# Patient Record
Sex: FEMALE | Race: WHITE | Employment: UNEMPLOYED | URBAN - METROPOLITAN AREA
[De-identification: names, ages, dates, MRNs, and addresses within clinical notes are randomized per-mention and may not be internally consistent; named-entity substitution may affect disease eponyms.]

---
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
## 2020-01-01 ENCOUNTER — MEDICAL CORRESPONDENCE (OUTPATIENT)
Dept: HEALTH INFORMATION MANAGEMENT | Facility: CLINIC | Age: 20
End: 2020-01-01

## 2020-06-11 ENCOUNTER — HOSPITAL ENCOUNTER (INPATIENT)
Facility: HOSPITAL | Age: 20
LOS: 83 days | Discharge: PSYCHIATRIC OR SUBSTANCE USE FACILITY | End: 2020-09-02
Attending: EMERGENCY MEDICINE | Admitting: PSYCHIATRY & NEUROLOGY
Payer: MEDICAID

## 2020-06-11 DIAGNOSIS — R12 HEARTBURN: ICD-10-CM

## 2020-06-11 DIAGNOSIS — R63.5 WEIGHT GAIN DUE TO MEDICATION: ICD-10-CM

## 2020-06-11 DIAGNOSIS — F25.0 SCHIZOAFFECTIVE DISORDER, BIPOLAR TYPE (H): ICD-10-CM

## 2020-06-11 DIAGNOSIS — R45.850 HOMICIDAL BEHAVIOR: ICD-10-CM

## 2020-06-11 DIAGNOSIS — F22 PARANOID DELUSION (H): ICD-10-CM

## 2020-06-11 DIAGNOSIS — G25.81 RESTLESS LEG SYNDROME: Primary | ICD-10-CM

## 2020-06-11 DIAGNOSIS — R45.851 SUICIDAL IDEATION: ICD-10-CM

## 2020-06-11 DIAGNOSIS — R25.9 ABNORMAL MOVEMENTS: ICD-10-CM

## 2020-06-11 DIAGNOSIS — J45.909 ASTHMA, UNSPECIFIED ASTHMA SEVERITY, UNSPECIFIED WHETHER COMPLICATED, UNSPECIFIED WHETHER PERSISTENT: ICD-10-CM

## 2020-06-11 DIAGNOSIS — G47.9 SLEEP DISTURBANCES: ICD-10-CM

## 2020-06-11 DIAGNOSIS — F41.9 ANXIETY: ICD-10-CM

## 2020-06-11 DIAGNOSIS — T50.905A WEIGHT GAIN DUE TO MEDICATION: ICD-10-CM

## 2020-06-11 LAB
ALBUMIN SERPL-MCNC: 3.9 G/DL (ref 3.4–5)
ALP SERPL-CCNC: 62 U/L (ref 40–150)
ALT SERPL W P-5'-P-CCNC: 165 U/L (ref 0–50)
ANION GAP SERPL CALCULATED.3IONS-SCNC: 4 MMOL/L (ref 3–14)
AST SERPL W P-5'-P-CCNC: 84 U/L (ref 0–45)
BASOPHILS # BLD AUTO: 0 10E9/L (ref 0–0.2)
BASOPHILS NFR BLD AUTO: 0.4 %
BILIRUB SERPL-MCNC: 0.4 MG/DL (ref 0.2–1.3)
BUN SERPL-MCNC: 11 MG/DL (ref 7–30)
CALCIUM SERPL-MCNC: 8.8 MG/DL (ref 8.5–10.1)
CHLORIDE SERPL-SCNC: 108 MMOL/L (ref 94–109)
CO2 SERPL-SCNC: 26 MMOL/L (ref 20–32)
CREAT SERPL-MCNC: 0.63 MG/DL (ref 0.52–1.04)
DIFFERENTIAL METHOD BLD: NORMAL
EOSINOPHIL # BLD AUTO: 0.1 10E9/L (ref 0–0.7)
EOSINOPHIL NFR BLD AUTO: 0.7 %
ERYTHROCYTE [DISTWIDTH] IN BLOOD BY AUTOMATED COUNT: 12.3 % (ref 10–15)
GFR SERPL CREATININE-BSD FRML MDRD: >90 ML/MIN/{1.73_M2}
GLUCOSE SERPL-MCNC: 81 MG/DL (ref 70–99)
HCT VFR BLD AUTO: 42.1 % (ref 35–47)
HGB BLD-MCNC: 14.1 G/DL (ref 11.7–15.7)
IMM GRANULOCYTES # BLD: 0 10E9/L (ref 0–0.4)
IMM GRANULOCYTES NFR BLD: 0.2 %
LYMPHOCYTES # BLD AUTO: 2.8 10E9/L (ref 0.8–5.3)
LYMPHOCYTES NFR BLD AUTO: 35.2 %
MCH RBC QN AUTO: 30.1 PG (ref 26.5–33)
MCHC RBC AUTO-ENTMCNC: 33.5 G/DL (ref 31.5–36.5)
MCV RBC AUTO: 90 FL (ref 78–100)
MONOCYTES # BLD AUTO: 0.6 10E9/L (ref 0–1.3)
MONOCYTES NFR BLD AUTO: 7 %
NEUTROPHILS # BLD AUTO: 4.6 10E9/L (ref 1.6–8.3)
NEUTROPHILS NFR BLD AUTO: 56.5 %
NRBC # BLD AUTO: 0 10*3/UL
NRBC BLD AUTO-RTO: 0 /100
PLATELET # BLD AUTO: 280 10E9/L (ref 150–450)
POTASSIUM SERPL-SCNC: 3.8 MMOL/L (ref 3.4–5.3)
PROT SERPL-MCNC: 8.1 G/DL (ref 6.8–8.8)
RBC # BLD AUTO: 4.69 10E12/L (ref 3.8–5.2)
SODIUM SERPL-SCNC: 138 MMOL/L (ref 133–144)
TSH SERPL DL<=0.005 MIU/L-ACNC: 0.85 MU/L (ref 0.4–4)
WBC # BLD AUTO: 8.1 10E9/L (ref 4–11)

## 2020-06-11 PROCEDURE — 85025 COMPLETE CBC W/AUTO DIFF WBC: CPT | Performed by: EMERGENCY MEDICINE

## 2020-06-11 PROCEDURE — 80053 COMPREHEN METABOLIC PANEL: CPT | Performed by: EMERGENCY MEDICINE

## 2020-06-11 PROCEDURE — 84443 ASSAY THYROID STIM HORMONE: CPT | Performed by: EMERGENCY MEDICINE

## 2020-06-11 PROCEDURE — 36415 COLL VENOUS BLD VENIPUNCTURE: CPT | Performed by: EMERGENCY MEDICINE

## 2020-06-11 PROCEDURE — 99285 EMERGENCY DEPT VISIT HI MDM: CPT

## 2020-06-11 PROCEDURE — 99284 EMERGENCY DEPT VISIT MOD MDM: CPT | Mod: Z6 | Performed by: EMERGENCY MEDICINE

## 2020-06-11 PROCEDURE — 87635 SARS-COV-2 COVID-19 AMP PRB: CPT | Performed by: EMERGENCY MEDICINE

## 2020-06-11 PROCEDURE — 12400000 ZZH R&B MH

## 2020-06-11 RX ORDER — HYDROXYZINE HYDROCHLORIDE 25 MG/1
25-50 TABLET, FILM COATED ORAL EVERY 4 HOURS PRN
Status: DISCONTINUED | OUTPATIENT
Start: 2020-06-11 | End: 2020-06-17

## 2020-06-11 RX ORDER — OLANZAPINE 5 MG/1
5-10 TABLET ORAL 3 TIMES DAILY PRN
Status: DISCONTINUED | OUTPATIENT
Start: 2020-06-11 | End: 2020-06-17

## 2020-06-11 RX ORDER — ACETAMINOPHEN 325 MG/1
650 TABLET ORAL EVERY 4 HOURS PRN
Status: DISCONTINUED | OUTPATIENT
Start: 2020-06-11 | End: 2020-09-02 | Stop reason: HOSPADM

## 2020-06-11 RX ORDER — OLANZAPINE 10 MG/2ML
5-10 INJECTION, POWDER, FOR SOLUTION INTRAMUSCULAR 3 TIMES DAILY PRN
Status: DISCONTINUED | OUTPATIENT
Start: 2020-06-11 | End: 2020-06-17

## 2020-06-11 ASSESSMENT — ENCOUNTER SYMPTOMS
AGITATION: 1
ABDOMINAL PAIN: 0
DYSPHORIC MOOD: 1
FEVER: 0
SHORTNESS OF BREATH: 0

## 2020-06-11 ASSESSMENT — ACTIVITIES OF DAILY LIVING (ADL)
TRANSFERRING: 0-->INDEPENDENT
BATHING: 0-->INDEPENDENT
SWALLOWING: 0-->SWALLOWS FOODS/LIQUIDS WITHOUT DIFFICULTY
DRESS: 0-->INDEPENDENT
COGNITION: 0 - NO COGNITION ISSUES REPORTED
TOILETING: 0-->INDEPENDENT
RETIRED_COMMUNICATION: 0-->UNDERSTANDS/COMMUNICATES WITHOUT DIFFICULTY
FALL_HISTORY_WITHIN_LAST_SIX_MONTHS: NO
RETIRED_EATING: 0-->INDEPENDENT
AMBULATION: 0-->INDEPENDENT

## 2020-06-11 ASSESSMENT — MIFFLIN-ST. JEOR: SCORE: 1490.51

## 2020-06-11 NOTE — ED NOTES
1619 Mental Health package printed  1620 [facility] aware of pt.   1622 Security aware of pt   1622 Nurse given an update
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
## 2020-06-11 NOTE — PROGRESS NOTES
06/11/20 1812   Patient Belongings   Did you bring any home meds/supplements to the hospital?  No   Patient Belongings locker   Patient Belongings Remaining with Patient none   Patient Belongings Put in Hospital Secure Location (Security or Locker, etc.) other (see comments)  (Sweatshirt, zebra pants, 2x bandanas, sandels, jacket,2x backpacks, wipes, mask, oil filter, 2x hats, underwear, socks x3, hair dye, white shirt, red top, scarf, 2x lighters)   Belongings Search Yes   Clothing Search Yes   Second Staff [employer]   List items sent to safe: 4x bracelets, white necklace, ring w/stone, 2x lotto tickets  All other belongings put in assigned cubby in belongings room. A pair of black sandals brought in 8/28/20   Bree RED       I have reviewed my belongings list on admission and verify that it is correct.     Patient signature_______________________________    Second staff witness (if patient unable to sign) ______________________________       I have received all my belongings at discharge.    Patient signature________________________________    OCHOA Stubbs  6/11/2020  6:16 PM

## 2020-06-11 NOTE — ED PROVIDER NOTES
"  History     Chief Complaint   Patient presents with     Psychiatric Evaluation     HPI  Jonelle Adams is a 19 year old female who presented to the emergency department via [place] [employer].  [place] [facility] [place] will kill herself if not allowed to leave at [place].  She is also stated that she does not know where she lives and she believes that she has been a missing person since the age of 6 years.  She has been rambling throughout ED stay and when she was with a [place] [employer].  Patient also stated that she wants to kill residents at [address] because they are \"in her house\".  Patient does not know how she [place] [facility]  All this is history was obtained from [employer].  Patient was smiling and anxious but was not able to give any history.  She however allowed me to examine her and requested that she be taken to her \"father's house\".  According to [place] [employer], she has had behavioral health issues from the time she was a teenager.    Allergies:  No Known Allergies    Problem List:    Patient Active Problem List    Diagnosis Date Noted     Paranoid delusion (H) 06/11/2020     Priority: Medium     Vaginal odor 12/05/2017     Priority: Medium     Pelvic pain in female 01/24/2017     Priority: Medium     Contact with or exposure to viral disease 01/24/2017     Priority: Medium     Abnormal uterine bleeding (AUB) 01/24/2017     Priority: Medium     Period less than 15 days apart x one accompanied by pelvic pain, heavy bleeding, and clots.          Past Medical History:    No past medical history on file.    Past Surgical History:    Past Surgical History:   Procedure Laterality Date     TONSILLECTOMY         Family History:    No family history on file.    Social History:  Marital Status:  Single [1]  Social History     Tobacco Use     Smoking status: Current Every Day Smoker     Smokeless tobacco: Never Used   Substance Use Topics     " "Alcohol use: Yes     Comment: occ     Drug use: Yes     Types: Marijuana     Comment: previous use        Medications:    No current outpatient medications on file.        Review of Systems   Constitutional: Negative for fever.   Respiratory: Negative for shortness of breath.    Cardiovascular: Negative for chest pain.   Gastrointestinal: Negative for abdominal pain.   Psychiatric/Behavioral: Positive for agitation and dysphoric mood.   All other systems reviewed and are negative.      Physical Exam   BP: 97/53  Heart Rate: 62  Temp: 96.2  F (35.7  C)  Resp: 16  Height: 163.8 cm (5' 4.5\")  Weight: 72.8 kg (160 lb 6.4 oz)  SpO2: 100 %      Physical Exam  Vitals signs and nursing note reviewed.   Constitutional:       General: She is not in acute distress.     Appearance: She is not diaphoretic.   HENT:      Head: Normocephalic and atraumatic.      Mouth/Throat:      Pharynx: No oropharyngeal exudate.   Eyes:      General: No scleral icterus.     Pupils: Pupils are equal, round, and reactive to light.   Cardiovascular:      Heart sounds: Normal heart sounds.   Pulmonary:      Effort: No respiratory distress.      Breath sounds: Normal breath sounds.   Abdominal:      General: Bowel sounds are normal.      Palpations: Abdomen is soft.      Tenderness: There is no abdominal tenderness.   Musculoskeletal:         General: No tenderness.   Skin:     General: Skin is warm.      Findings: No rash.   Psychiatric:         Attention and Perception: She is inattentive.         Mood and Affect: Mood is anxious and elated. Affect is labile.         Behavior: Behavior is agitated.         Thought Content: Thought content is delusional. Thought content includes homicidal and suicidal ideation.         Judgment: Judgment is impulsive.         ED Course   Patient evaluated upon arrival to the ED and laboratory tests ordered.    Procedures      Results for orders placed or performed during the hospital encounter of 06/11/20 (from the " past 24 hour(s))   CBC with platelets differential   Result Value Ref Range    WBC 8.1 4.0 - 11.0 10e9/L    RBC Count 4.69 3.8 - 5.2 10e12/L    Hemoglobin 14.1 11.7 - 15.7 g/dL    Hematocrit 42.1 35.0 - 47.0 %    MCV 90 78 - 100 fl    MCH 30.1 26.5 - 33.0 pg    MCHC 33.5 31.5 - 36.5 g/dL    RDW 12.3 10.0 - 15.0 %    Platelet Count 280 150 - 450 10e9/L    Diff Method Automated Method     % Neutrophils 56.5 %    % Lymphocytes 35.2 %    % Monocytes 7.0 %    % Eosinophils 0.7 %    % Basophils 0.4 %    % Immature Granulocytes 0.2 %    Nucleated RBCs 0 0 /100    Absolute Neutrophil 4.6 1.6 - 8.3 10e9/L    Absolute Lymphocytes 2.8 0.8 - 5.3 10e9/L    Absolute Monocytes 0.6 0.0 - 1.3 10e9/L    Absolute Eosinophils 0.1 0.0 - 0.7 10e9/L    Absolute Basophils 0.0 0.0 - 0.2 10e9/L    Abs Immature Granulocytes 0.0 0 - 0.4 10e9/L    Absolute Nucleated RBC 0.0    Comprehensive metabolic panel   Result Value Ref Range    Sodium 138 133 - 144 mmol/L    Potassium 3.8 3.4 - 5.3 mmol/L    Chloride 108 94 - 109 mmol/L    Carbon Dioxide 26 20 - 32 mmol/L    Anion Gap 4 3 - 14 mmol/L    Glucose 81 70 - 99 mg/dL    Urea Nitrogen 11 7 - 30 mg/dL    Creatinine 0.63 0.52 - 1.04 mg/dL    GFR Estimate >90 >60 mL/min/[1.73_m2]    GFR Estimate If Black >90 >60 mL/min/[1.73_m2]    Calcium 8.8 8.5 - 10.1 mg/dL    Bilirubin Total 0.4 0.2 - 1.3 mg/dL    Albumin 3.9 3.4 - 5.0 g/dL    Protein Total 8.1 6.8 - 8.8 g/dL    Alkaline Phosphatase 62 40 - 150 U/L     (H) 0 - 50 U/L    AST 84 (H) 0 - 45 U/L   TSH with free T4 reflex   Result Value Ref Range    TSH 0.85 0.40 - 4.00 mU/L       Medications   hydrOXYzine (ATARAX) tablet 25-50 mg (has no administration in time range)   acetaminophen (TYLENOL) tablet 650 mg (has no administration in time range)   magnesium hydroxide (MILK OF MAGNESIA) suspension 30 mL (has no administration in time range)   nicotine (NICORETTE) gum 2-4 mg (has no administration in time range)   OLANZapine (zyPREXA) tablet  5-10 mg (has no administration in time range)     Or   OLANZapine (zyPREXA) injection 5-10 mg (has no administration in time range)       Assessments & Plan (with Medical Decision Making)   Suicidal ideation:  Homicidal behavior:  Delusions:  Suicidal ideation: Patient is threatening to kill staff at [facility] if she is not allowed to leave there.    Homicidal behavior: She is threatening to kill the residents at  [address] [place] [address] believing that the house belongs to her and she wants to reprocess the house.  Delusions: Patient is delusional believing that she has been a lost child from the time she was 6 years of age.  P services safety was signed and patient admitted to the fifth floor under care of Dr. Davi Grossman.  Bree Russell NP graciously accepted the admission.    I have reviewed the nursing notes.    I have reviewed the findings, diagnosis, plan and need for follow up with the patient.    Current Discharge Medication List          Final diagnoses:   Suicidal ideation   Homicidal behavior       6/11/2020   [facility]     Yadiel Godinez MD  06/12/20 0824

## 2020-06-12 LAB
ALBUMIN UR-MCNC: 30 MG/DL
AMORPH CRY #/AREA URNS HPF: ABNORMAL /HPF
AMPHETAMINES UR QL: ABNORMAL NG/ML
APPEARANCE UR: ABNORMAL
BACTERIA #/AREA URNS HPF: ABNORMAL /HPF
BARBITURATES UR QL SCN: NOT DETECTED NG/ML
BENZODIAZ UR QL SCN: ABNORMAL NG/ML
BILIRUB UR QL STRIP: NEGATIVE
BUPRENORPHINE UR QL: NOT DETECTED NG/ML
CANNABINOIDS UR QL: ABNORMAL NG/ML
COCAINE UR QL SCN: NOT DETECTED NG/ML
COLOR UR AUTO: YELLOW
D-METHAMPHET UR QL: ABNORMAL NG/ML
GLUCOSE UR STRIP-MCNC: NEGATIVE MG/DL
HCG UR QL: NEGATIVE
HGB UR QL STRIP: NEGATIVE
KETONES UR STRIP-MCNC: NEGATIVE MG/DL
LEUKOCYTE ESTERASE UR QL STRIP: ABNORMAL
METHADONE UR QL SCN: NOT DETECTED NG/ML
MUCOUS THREADS #/AREA URNS LPF: PRESENT /LPF
NITRATE UR QL: NEGATIVE
OPIATES UR QL SCN: NOT DETECTED NG/ML
OXYCODONE UR QL SCN: NOT DETECTED NG/ML
PCP UR QL SCN: NOT DETECTED NG/ML
PH UR STRIP: 6 PH (ref 4.7–8)
PROPOXYPH UR QL: NOT DETECTED NG/ML
RBC #/AREA URNS AUTO: 3 /HPF (ref 0–2)
SARS-COV-2 PCR COMMENT: NORMAL
SARS-COV-2 RNA SPEC QL NAA+PROBE: NEGATIVE
SARS-COV-2 RNA SPEC QL NAA+PROBE: NORMAL
SOURCE: ABNORMAL
SP GR UR STRIP: 1.03 (ref 1–1.03)
SPECIMEN SOURCE: NORMAL
SPECIMEN SOURCE: NORMAL
SQUAMOUS #/AREA URNS AUTO: 18 /HPF (ref 0–1)
TRICYCLICS UR QL SCN: NOT DETECTED NG/ML
UROBILINOGEN UR STRIP-MCNC: NORMAL MG/DL (ref 0–2)
WBC #/AREA URNS AUTO: 4 /HPF (ref 0–5)

## 2020-06-12 PROCEDURE — 80306 DRUG TEST PRSMV INSTRMNT: CPT | Performed by: EMERGENCY MEDICINE

## 2020-06-12 PROCEDURE — 99223 1ST HOSP IP/OBS HIGH 75: CPT | Mod: 95 | Performed by: NURSE PRACTITIONER

## 2020-06-12 PROCEDURE — 12400000 ZZH R&B MH

## 2020-06-12 PROCEDURE — 81001 URINALYSIS AUTO W/SCOPE: CPT | Performed by: EMERGENCY MEDICINE

## 2020-06-12 PROCEDURE — 81025 URINE PREGNANCY TEST: CPT | Performed by: EMERGENCY MEDICINE

## 2020-06-12 RX ORDER — HALOPERIDOL 5 MG/ML
5 INJECTION INTRAMUSCULAR EVERY 6 HOURS PRN
Status: DISCONTINUED | OUTPATIENT
Start: 2020-06-12 | End: 2020-09-02 | Stop reason: HOSPADM

## 2020-06-12 RX ORDER — HALOPERIDOL 5 MG/1
5 TABLET ORAL EVERY 6 HOURS PRN
Status: DISCONTINUED | OUTPATIENT
Start: 2020-06-12 | End: 2020-09-02 | Stop reason: HOSPADM

## 2020-06-12 RX ORDER — DIPHENHYDRAMINE HYDROCHLORIDE 50 MG/ML
50 INJECTION INTRAMUSCULAR; INTRAVENOUS EVERY 6 HOURS PRN
Status: DISCONTINUED | OUTPATIENT
Start: 2020-06-12 | End: 2020-09-02 | Stop reason: HOSPADM

## 2020-06-12 RX ORDER — LORAZEPAM 2 MG/ML
1 INJECTION INTRAMUSCULAR EVERY 6 HOURS PRN
Status: DISCONTINUED | OUTPATIENT
Start: 2020-06-12 | End: 2020-06-17

## 2020-06-12 RX ORDER — DIPHENHYDRAMINE HCL 50 MG
50 CAPSULE ORAL EVERY 6 HOURS PRN
Status: DISCONTINUED | OUTPATIENT
Start: 2020-06-12 | End: 2020-09-02 | Stop reason: HOSPADM

## 2020-06-12 RX ORDER — LORAZEPAM 1 MG/1
1 TABLET ORAL EVERY 6 HOURS PRN
Status: DISCONTINUED | OUTPATIENT
Start: 2020-06-12 | End: 2020-06-17

## 2020-06-12 ASSESSMENT — ACTIVITIES OF DAILY LIVING (ADL)
HYGIENE/GROOMING: INDEPENDENT
DRESS: INDEPENDENT;SCRUBS (BEHAVIORAL HEALTH)

## 2020-06-12 NOTE — H&P
"Psychiatric Eval/H&P  Patient Name: Jonelle Adams   YOB: 2000  Age: 19 year old  [id number]    Primary Physician: ALIA Simms   Completed By: SABI Calderón CNP     CC:  Delusional, Homicidal statements      (per ED) Jonelle Adams is a 19 year old female who presented to the emergency department via [place] [employer].  Patient presented to [facility] stating that she will kill herself if not allowed to leave at [place].  She is also stated that she does not know where she lives and she believes that she has been a missing person since the age of 6 years.  She has been rambling throughout ED stay and when she was with a [place] [employer].  Patient also stated that she wants to kill residents at [address] because they are \"in her house\".  Patient does not know how she arrived at the [facility].  All this is history was obtained from [employer] [place] [employer].  Patient was smiling and anxious but was not able to give any history.  She however allowed me to examine her and requested that she be taken to her \"father's house\".  According to [place] [employer], she has had behavioral health issues from the time she was a teenager.     Jonelle Adams is a 19 year old female who is being evaluated via a billable video visit.      The patient has been notified of following:     \"This video visit will be conducted via a call between you and your physician/provider. We have found that certain health care needs can be provided without the need for an in-person physical exam.  This service lets us provide the care you need with a video conversation.  If a prescription is necessary we can send it directly to your pharmacy.  If lab work is needed we can place an order for that and you can then stop by our lab to have the test done at a later time.    If during the course of the call the physician/provider feels a video visit is not appropriate, you will not be charged for this service.\" " "    Patient has given verbal consent for Video visit? Yes    Video-Visit Details    Video Start Time: 0917    Video End Time (time video stopped): 0925    Originating Location (pt. Location): [facility]    Distant Location (provider location):  [facility]     Mode of Communication:  Video Conference via [employer]      [facility]  Met with patient in MICU via telemed, she immediately covers herself/face with a blanket and and states \"I'm not supposed to speak to anyone, you need to talk to my dad\".  She states his name is Travis Anthony and \"he's a \".  She becomes very irritable with any further questions, states \"I don't know why I' here, but the doctors have been very nice to me\".  She is able to identify that she is in the hospital in [place].  When asked about if she needs anything, medications, she replies \"yeah, Xanax and adderall\".  She also mentions her brothers are coming, but \"no clue\" where they are.  When asked where she lives, she replies \"where ever my dad says I live - talk to him, he's coming here\".  Any further questioning is met with resistance and no history is available upon record review.    Past Psychiatric History:   Unknown.  There are no medications listed as prescribed  According to [place] [employer], she has had behavioral health issues from the time she was a teenager.     Social History:   Answers no kids, education \"way above your pay grade\" - no additional information provided.       Chemical Use History:   Utox positive for cannabis, methamphetamine, amphetamine, and benzodiazepam        Family Psychiatric History:  Unknown       MSE/PSYCH  PSYCHIATRIC EXAM  BP 99/55   Temp 96.9  F (36.1  C) (Tympanic)   Resp 12   Ht 1.638 m (5' 4.5\")   Wt 72.8 kg (160 lb 6.4 oz)   SpO2 99%   BMI 27.11 kg/m       -Appearance/Behavior: Patient covers with a blanket and Casually groomed  {attitude:guarded and uncooperative  -Motor: agitated.  -Gait: Normal.  -Abnormal involuntary " movements: not able to accurately assess  -Mood: irritable and paranoid.  -Affect: not able to assess, patient covers face with blanket  -Speech: Pressured                  -Thought process/associations: Flight of ideas.  -Thought content: paranoid delusions, loose associations.  -Perceptual disturbances: not able to assess.              -Suicidal/Homicidal Ideation: Patient denies - admitted to ED with homicidal statements  -Judgment: Poor.  -Insight: Poor.  *Orientation: place.  *Memory: not good historian  *Attention: Poor  *Language: fluent, no aphasias, able to repeat phrases and name objects. Vocab intact.  *Fund of information:  not assessed.  *Cognitive functioning estimate: 0 - independent.          Medical Review of Systems:   Medical History and ROS  Prior to Admission medications    Medication Sig Start Date End Date Taking? Authorizing Provider   chlorhexidine (PERIDEX) 0.12 % solution Swish and spit 15 mLs in mouth 2 times daily 5/30/18   Radha Avila MD     No Known Allergies  No past medical history on file.  Past Surgical History:   Procedure Laterality Date     TONSILLECTOMY       Physical Exam and Review of Systems: performed by TRACIE Oreilly 6/12  No changes or discrepancies noted       Labs:   Results for orders placed or performed during the hospital encounter of 06/11/20   HCG qualitative urine     Status: None   Result Value Ref Range    HCG Qual Urine Negative NEG^Negative   UA reflex to Microscopic and Culture     Status: Abnormal    Specimen: Midstream Urine   Result Value Ref Range    Color Urine Yellow     Appearance Urine Slightly Cloudy     Glucose Urine Negative NEG^Negative mg/dL    Bilirubin Urine Negative NEG^Negative    Ketones Urine Negative NEG^Negative mg/dL    Specific Gravity Urine 1.030 1.003 - 1.035    Blood Urine Negative NEG^Negative    pH Urine 6.0 4.7 - 8.0 pH    Protein Albumin Urine 30 (A) NEG^Negative mg/dL    Urobilinogen mg/dL Normal 0.0 -  2.0 mg/dL    Nitrite Urine Negative NEG^Negative    Leukocyte Esterase Urine Small (A) NEG^Negative    Source Midstream Urine     RBC Urine 3 (H) 0 - 2 /HPF    WBC Urine 4 0 - 5 /HPF    Bacteria Urine None (A) NEG^Negative /HPF    Squamous Epithelial /HPF Urine 18 (H) 0 - 1 /HPF    Mucous Urine Present (A) NEG^Negative /LPF    Amorphous Crystals Moderate (A) NEG^Negative /HPF   Urine Drugs of Abuse Screen Panel 13     Status: Abnormal   Result Value Ref Range    Cannabinoids (32-spw-0-carboxy-9-THC) Detected, Abnormal Result (A) NDET^Not Detected ng/mL    Phencyclidine (Phencyclidine) Not Detected NDET^Not Detected ng/mL    Cocaine (Benzoylecgonine) Not Detected NDET^Not Detected ng/mL    Methamphetamine (d-Methamphetamine) Detected, Abnormal Result (A) NDET^Not Detected ng/mL    Opiates (Morphine) Not Detected NDET^Not Detected ng/mL    Amphetamine (d-Amphetamine) Detected, Abnormal Result (A) NDET^Not Detected ng/mL    Benzodiazepines (Nordiazepam) Detected, Abnormal Result (A) NDET^Not Detected ng/mL    Tricyclic Antidepressants (Desipramine) Not Detected NDET^Not Detected ng/mL    Methadone (Methadone) Not Detected NDET^Not Detected ng/mL    Barbiturates (Butalbital) Not Detected NDET^Not Detected ng/mL    Oxycodone (Oxycodone) Not Detected NDET^Not Detected ng/mL    Propoxyphene (Norpropoxyphene) Not Detected NDET^Not Detected ng/mL    Buprenorphine (Buprenorphine) Not Detected NDET^Not Detected ng/mL   CBC with platelets differential     Status: None   Result Value Ref Range    WBC 8.1 4.0 - 11.0 10e9/L    RBC Count 4.69 3.8 - 5.2 10e12/L    Hemoglobin 14.1 11.7 - 15.7 g/dL    Hematocrit 42.1 35.0 - 47.0 %    MCV 90 78 - 100 fl    MCH 30.1 26.5 - 33.0 pg    MCHC 33.5 31.5 - 36.5 g/dL    RDW 12.3 10.0 - 15.0 %    Platelet Count 280 150 - 450 10e9/L    Diff Method Automated Method     % Neutrophils 56.5 %    % Lymphocytes 35.2 %    % Monocytes 7.0 %    % Eosinophils 0.7 %    % Basophils 0.4 %    % Immature  "Granulocytes 0.2 %    Nucleated RBCs 0 0 /100    Absolute Neutrophil 4.6 1.6 - 8.3 10e9/L    Absolute Lymphocytes 2.8 0.8 - 5.3 10e9/L    Absolute Monocytes 0.6 0.0 - 1.3 10e9/L    Absolute Eosinophils 0.1 0.0 - 0.7 10e9/L    Absolute Basophils 0.0 0.0 - 0.2 10e9/L    Abs Immature Granulocytes 0.0 0 - 0.4 10e9/L    Absolute Nucleated RBC 0.0    Comprehensive metabolic panel     Status: Abnormal   Result Value Ref Range    Sodium 138 133 - 144 mmol/L    Potassium 3.8 3.4 - 5.3 mmol/L    Chloride 108 94 - 109 mmol/L    Carbon Dioxide 26 20 - 32 mmol/L    Anion Gap 4 3 - 14 mmol/L    Glucose 81 70 - 99 mg/dL    Urea Nitrogen 11 7 - 30 mg/dL    Creatinine 0.63 0.52 - 1.04 mg/dL    GFR Estimate >90 >60 mL/min/[1.73_m2]    GFR Estimate If Black >90 >60 mL/min/[1.73_m2]    Calcium 8.8 8.5 - 10.1 mg/dL    Bilirubin Total 0.4 0.2 - 1.3 mg/dL    Albumin 3.9 3.4 - 5.0 g/dL    Protein Total 8.1 6.8 - 8.8 g/dL    Alkaline Phosphatase 62 40 - 150 U/L     (H) 0 - 50 U/L    AST 84 (H) 0 - 45 U/L   TSH with free T4 reflex     Status: None   Result Value Ref Range    TSH 0.85 0.40 - 4.00 mU/L   Asymptomatic COVID-19 Virus (Coronavirus) by PCR     Status: None    Specimen: Nasopharyngeal   Result Value Ref Range    COVID-19 Virus PCR to [facility] - Source Nasopharyngeal     COVID-19 Virus PCR to [facility] - Result       Test received-See reflex to [facility] test SARS CoV2 (COVID-19) Virus RT-PCR          Assessment/Impression:   Patient presented to the emergency department via [place] [employer] after she was at [facility] stating that she will kill herself if not allowed to live at [place].  She is also stated that she does not know where she lives and she believes that she has been a missing person since the age of 6 years.  She was rambling throughout ED stay and when she was with a [place] [employer].  Patient also stated that she wants to kill residents at [address] because they are \"in her house\".  Patient does not " "know how she arrived at the [facility].  All this is history was obtained from [employer]. According to [employer], she has had behavioral health issues from the time she was a teenager.     Patient presents as very irritable with any assessment attempts, states \"talk to my dad\" and \"I don't know why I' here, but the doctors have been very nice to me\".  She is able to identify that she is in the hospital in [place].  When asked about if she needs anything, medications, she replies \"yeah, Xanax and adderall\".  She also mentions her brothers are coming, but \"no clue\" where they are.  When asked where she lives, she replies \"where ever my dad says I live - talk to him, he's coming here\".  Any further questioning is met with resistance and no history is available upon record review.  Will provide PRNs for comfort until further assessment - given positives in UTOX, patient is likely experiencing substance induced delusion.        Educated regarding medication indications, risks, benefits, side effects, contraindications and possible interactions. Verbally expressed understanding.     DX:  Paranoid Delusion r/o substance related  Substance Abuse Disorder       Plan:  Admit to Unit: [address]  Attending: Bree Leary    Monitor for target symptoms:   Provide a safe environment and therapeutic milieu.     PRN medications ordered for comfort    Anticipated length of stay:  3-5 days for stabilization and safe discharge planning       Bree Leary, APRN, CNP  "
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
## 2020-06-12 NOTE — PLAN OF CARE
"Problem: Adult Behavioral Health Plan of Care  Goal: Patient-Specific Goal (Individualization)  6/12/2020 0940 by Bina Weston RN  Outcome: No Change    Pt awakened for breakfast, noted to be irritable and took meal to room, ate 100%. Pt cooperative with request for urine sample and sent to lab at 0805. Returned to bedrest after eating. Pt was again awakened for assessment and admission with NP provider by electronic conference call, but refused to get out of bed. Pt did speak to provider remotely to state she was \"not allowed to talk to anyone,\" referencing that she is waiting for her Dad to return from [place] before she can talk to anyone. Pt denied any discomfort today, will assess as pt allows throughout the shift.     Problem: Cognitive Impairment (Psychotic Signs/Symptoms)  Goal: Improved Thought Clarity and Organization (Psychotic Signs/Symptoms)  6/12/2020 0940 by Bina Weston, RN  Outcome: No Change  Irritable and refusing to cooperate with assessment, difficult to determine baseline thought clarity today.     Face to face end of shift report to be communicated to oncoming shift RN.     Bina Weston RN  6/12/2020  10:18 AM           "

## 2020-06-12 NOTE — H&P
"Jonelle Adams is a 19 year old female who is being evaluated via a billable video visit.      The patient has been notified of following:     \"This video visit will be conducted via a call between you and your physician/provider. We have found that certain health care needs can be provided without the need for an in-person physical exam.  This service lets us provide the care you need with a video conversation.  If a prescription is necessary we can send it directly to your pharmacy.  If lab work is needed we can place an order for that and you can then stop by our lab to have the test done at a later time.    If during the course of the call the physician/provider feels a video visit is not appropriate, you will not be charged for this service.\"     Patient has given verbal consent for Video visit? No      Video Start Time: 1120    Jonelle Adams complains of    Chief Complaint   Patient presents with     Psychiatric Evaluation       I have reviewed and updated the patient's Past Medical History, Social History, Family History and Medication List.    ALLERGIES  Patient has no known allergies.            Video-Visit Details    Type of service:  Video Visit    Video End Time (time video stopped): 1123    Originating Location (pt. Location): [address] Location (provider location):  [facility]     Mode of Communication:  Video Conference via TRAC[employer] [facility]    History and Physical  Medical Services       Date of Admission:  6/11/2020  Date of Service (when I saw the patient): 06/12/20    Assessment & Plan     Principal Problem:    Paranoid delusion (H)    covid screening - pending     Pt refused to talk. Reports that everyone needs to talk to her dad. Pt does however deny any medical concerns.     Pt medically stable, no acute medical concerns. No chronic  medical problems identified. Will sign off. Please consult for any new medical issues " "or concerns.         Code Status: Full Code    Rosio Rocha CNP    Primary Care Physician   ALIA Simms    Chief Complaint   Psych evaluation     History is obtained from the electronic health record    History of Present Illness   (per ED) Jonelle Adams is a 19 year old female who presented to the emergency department via [place] [employer].  Patient presented to [facility] stating that she will kill herself if not allowed to leave at [place].  She is also stated that she does not know where she lives and she believes that she has been a missing person since the age of 6 years.  She has been rambling throughout ED stay and when she was with a [place] PD.  Patient also stated that she wants to kill residents at [address] because they are \"in her house\".  Patient does not know how she arrived at the [facility].  All this is history was obtained from [employer] [place] [employer].  Patient was smiling and anxious but was not able to give any history.  She however allowed me to examine her and requested that she be taken to her \"father's house\".  According to [place] [employer], she has had behavioral health issues from the time she was a teenager.    Past Medical History    I have reviewed this patient's medical history and updated it with pertinent information if needed.   No past medical history on file.    Past Surgical History   I have reviewed this patient's surgical history and updated it with pertinent information if needed.  Past Surgical History:   Procedure Laterality Date     TONSILLECTOMY         Prior to Admission Medications   Prior to Admission Medications   Prescriptions Last Dose Informant Patient Reported? Taking?   chlorhexidine (PERIDEX) 0.12 % solution   No No   Sig: Swish and spit 15 mLs in mouth 2 times daily      Facility-Administered Medications: None     Allergies   No Known Allergies    Social History   I have reviewed this patient's social history and updated it with " pertinent information if needed. Jonelle Adams  reports that she has been smoking. She has never used smokeless tobacco. She reports current alcohol use. She reports current drug use. Drug: Marijuana.    Family History   I have reviewed this patient's family history and updated it with pertinent information if needed.   No family history on file.    Review of Systems   Review of systems not obtainable due to patient factors - pt refused     Physical Exam   Temp: 96.9  F (36.1  C) Temp src: Tympanic BP: 99/55   Heart Rate: 62 Resp: 12 SpO2: 99 % O2 Device: None (Room air)    Vital Signs with Ranges  Temp:  [96.2  F (35.7  C)-96.9  F (36.1  C)] 96.9  F (36.1  C)  Heart Rate:  [62-67] 62  Resp:  [12-16] 12  BP: ()/(53-65) 99/55  SpO2:  [98 %-100 %] 99 %  160 lbs 6.4 oz    Constitutional: NAD, disheveled   HEENT: pt refused assessment   Respiratory: speaks in full sentences, no audible wheeze  Cardiovascular: pt refused   GI: pt refused   Lymph/Hematologic: pt refused   Genitourinary: deferred   Skin: pt refused   Musculoskeletal: pt refused   Neurologic: pt refused   Psychiatric: irritable, uncooperative     Data   Data reviewed today:   Recent Labs   Lab 06/11/20  1632   WBC 8.1   HGB 14.1   MCV 90         POTASSIUM 3.8   CHLORIDE 108   CO2 26   BUN 11   CR 0.63   ANIONGAP 4   DORIAN 8.8   GLC 81   ALBUMIN 3.9   PROTTOTAL 8.1   BILITOTAL 0.4   ALKPHOS 62   *   AST 84*       No results found for this or any previous visit (from the past 24 hour(s)).

## 2020-06-12 NOTE — PLAN OF CARE
Face to face end of shift report obtained from Ping RUTLEDGE RN.     Problem: Adult Inpatient Plan of Care  Goal: Patient-Specific Goal (Individualization)  Description: Patient will sleep 6-8 hours per night  Patient will eat at 75% of meals daily  Patient will attend <50% of group programming daily  Patient will be compliant with medication  Patient will have appropriate boundaries staff and peers during hospital  6/12/2020 0005 by Cindi Marin, RN  Outcome: No Change     Pt observed resting in bed.Pt appears to be sleeping in bed with eyes closed, having regular respirations and position changes. 15 minutes and PRN safety checks completed with no noted complaints.      Patient slept 7 hours. Will continue to monitor.    Problem: Cognitive Impairment (Psychotic Signs/Symptoms)  Goal: Improved Thought Clarity and Organization (Psychotic Signs/Symptoms)  Outcome: No Change    Face to face end of shift report communicated to oncoming RN.  Cindi Marin, RN  6:07 AM
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
## 2020-06-12 NOTE — PLAN OF CARE
"ADMISSION NOTE    Reason for admission Delusions/psychosis.  Safety concerns, per nurse to nurse reported, pt give specific address in [place] of where she wants to go and kill people. However, pt denies HI/SI and has no plans to hurt herself or others.  Risk for or history of violence none noted in records but pt admits to having \"anger issues and stated \"sometimes I don't recognize my own emotions\".   Full skin assessment: full skin assessment was completed. Pt skin was pink, moist clear/clean and cool to touch.     Patient arrived on unit from [facility] accompanied by Coleman [facility] on 6/11/2020  5:00 PM.   Status on arrival: Pt was cooperative, but with disorganized behavior with delusional thoughts. Pt was smiling to herself,   /65   Temp 96.9  F (36.1  C) (Tympanic)   Resp 14   Ht 1.638 m (5' 4.5\")   Wt 72.8 kg (160 lb 6.4 oz)   SpO2 98%   BMI 27.11 kg/m    Patient given tour of unit and Welcome to  unit papers given to patient, wanding completed, belongings inventoried, and admission assessment completed.   Patient's legal status on arrival is on a  hold. Appropriate legal rights discussed with and copy given to patient. Patient Bill of Rights discussed with and copy given to patient.   Patient denies SI, HI, and thoughts of self harm and contracts for safety while on unit.      Ping Sierra RN  6/11/2020  7:38 PM         "

## 2020-06-13 PROCEDURE — 99233 SBSQ HOSP IP/OBS HIGH 50: CPT | Mod: 95 | Performed by: NURSE PRACTITIONER

## 2020-06-13 PROCEDURE — 12400000 ZZH R&B MH

## 2020-06-13 ASSESSMENT — ACTIVITIES OF DAILY LIVING (ADL)
LAUNDRY: UNABLE TO COMPLETE
HYGIENE/GROOMING: INDEPENDENT
ORAL_HYGIENE: INDEPENDENT
DRESS: SCRUBS (BEHAVIORAL HEALTH);INDEPENDENT

## 2020-06-13 NOTE — PLAN OF CARE
Problem: Adult Behavioral Health Plan of Care  Goal: Patient-Specific Goal (Individualization)  Description: Patient will sleep 6-8 hours per night  Patient will eat at 75% of meals daily  Patient will attend <50% of group programming daily  Patient will be compliant with medication  Patient will have appropriate boundaries staff and peers during hospital  6/13/2020 1741 by Bina Weston, RN  Outcome: No Change    Pt remains uncooperative with staff and provider, refuses to participate in assessment for second day. Did come to lounge to get meals, but again brought to room to eat alone. Denied pain or any needs when writer asked, but otherwise became agitated with simple interaction and covered head with blankets, stating she will not talk until her Dad is here. Not attending groups, no medications given on shift.  Face to face end of shift report communicated to oncoming shift RN.     Bina Weston, RN  6/13/2020  5:47 PM

## 2020-06-13 NOTE — PLAN OF CARE
Face to face report received from Vivek  RN. Pt. Observed.     Problem: Adult Behavioral Health Plan of Care  Goal: Patient-Specific Goal (Individualization)  Description: Patient will sleep 6-8 hours per night  Patient will eat at 75% of meals daily  Patient will attend <50% of group programming daily  Patient will be compliant with medication  Patient will have appropriate boundaries staff and peers during hospital  6/13/2020 0522 by Julia Doherty, RN  Outcome: No Change  Note:     Pt has been in bed with eyes closed and regular respirations x 6.5 hors this noc shift. Pt. Up @ 0520 requesting and administered juice. 15 minute and PRN checks all night. No complaints offered. Will continue to monitor.       Face to face end of shift report to be communicated to oncoming RN.     Julia Doherty RN  6/13/2020

## 2020-06-13 NOTE — PROGRESS NOTES
"[facility]  Psychiatric Progress Note      Impression:   (per ED) Jonelle Adams is a 19 year old female who presented to the emergency department via [place] [employer].  Patient presented to [facility] stating that she will kill herself if not allowed to leave at [place].  She is also stated that she does not know where she lives and she believes that she has been a missing person since the age of 6 years.  She has been rambling throughout ED stay and when she was with a [place] [employer].  Patient also stated that she wants to kill residents at [address] because they are \"in her house\".  Patient does not know how she arrived at the [facility].  All this is history was obtained from [employer] [place] [employer] [place] [employer].  Patient was smiling and anxious but was not able to give any history.  She however allowed me to examine her and requested that she be taken to her \"father's house\".  According to [place] [employer], she has had behavioral health issues from the time she was a teenager.    Patient lying in bed in MICU - again, covers herself and face with a blanket and yells \"I'm not doing this, my dad is not here!\"  Encourage patient to just talk a bit, she replies \"not without my fucking dad!\" and adds \"Viri been missing since I was 3 year old\".  No reply when asked if there is anything we can help with.  Remind patient she has medications if needed for anything.    Limited cooperation with nursing staff so far, stating mainly \"I just want to sleep\".  Suspect substances used prior to admission will need to clear before cooperative.  Offer prn medications for comfort.      Educated regarding medication indications, risks, benefits, side effects, contraindications and possible interactions. Verbally expressed understanding.        Diagnoses:     Paranoid Delusion r/o substance related  Substance Abuse Disorder    Attestation:  Patient has been seen and evaluated by me,  Bree Leary, SABI CNP    "       Interim History:   The patient's care was discussed with the treatment team and chart notes were reviewed.    BEHAVIORAL TEAM DISCUSSION    Progress: None  Continued Stay Criteria/Rationale: Remains uncooperative and delusional  Medical/Physical: None  Precautions:   Falls precaution?: YES  Behavioral Orders   Procedures     Code 1 - Restrict to Unit     Routine Programming     As clinically indicated     Status 15     Every 15 minutes.     Plan: Continue to monitor and encourage assessments.  Offer prn medications.  Will attempt to contact Travis Anthony - although no contact information is yet provided    Rationale for change in precautions or plan: Remains sleeping much of her stay so far, encourage participation in assessments      Participants: Bree Leary NP,  Nursing        Medications:     Current Facility-Administered Medications Ordered in Epic   Medication Dose Route Frequency Last Rate Last Dose     acetaminophen (TYLENOL) tablet 650 mg  650 mg Oral Q4H PRN         diphenhydrAMINE (BENADRYL) capsule 50 mg  50 mg Oral Q6H PRN        Or     diphenhydrAMINE (BENADRYL) injection 50 mg  50 mg Intramuscular Q6H PRN         haloperidol (HALDOL) tablet 5 mg  5 mg Oral Q6H PRN        Or     haloperidol lactate (HALDOL) injection 5 mg  5 mg Intramuscular Q6H PRN         hydrOXYzine (ATARAX) tablet 25-50 mg  25-50 mg Oral Q4H PRN         LORazepam (ATIVAN) tablet 1 mg  1 mg Oral Q6H PRN        Or     LORazepam (ATIVAN) injection 1 mg  1 mg Intramuscular Q6H PRN         magnesium hydroxide (MILK OF MAGNESIA) suspension 30 mL  30 mL Oral At Bedtime PRN         nicotine (NICORETTE) gum 2-4 mg  2-4 mg Buccal Q1H PRN         OLANZapine (zyPREXA) tablet 5-10 mg  5-10 mg Oral TID PRN        Or     OLANZapine (zyPREXA) injection 5-10 mg  5-10 mg Intramuscular TID PRN         No current [facility]-ordered outpatient medications on file.              10 point ROS negative see H&P       Allergies:   No Known Allergies          "Psychiatric Examination:   BP 96/55   Pulse 77   Temp 97.1  F (36.2  C) (Tympanic)   Resp 16   Ht 1.638 m (5' 4.5\")   Wt 72.8 kg (160 lb 6.4 oz)   SpO2 98%   BMI 27.11 kg/m    Weight is 160 lbs 6.4 oz  Body mass index is 27.11 kg/m .    Appearance:  dressed in hospital scrubs  Attitude:  guarded and uncooperative  Eye Contact:  None, under covers  Mood:  angry  Affect:  unable to assess  Speech:  loud and angry  Psychomotor Behavior:  fidgeting  Thought Process:  disorganized and illogical  Associations:  loosening of associations present  Thought Content:  unclear  Insight:  limited  Judgment:  poor  Oriented to:  place  Attention Span and Concentration:  poor  Recent and Remote Memory:  poor  Fund of Knowledge: delayed  Muscle Strength and Tone: normal  Gait and Station: not observed           Labs:     Results for orders placed or performed during the hospital encounter of 06/11/20   HCG qualitative urine     Status: None   Result Value Ref Range    HCG Qual Urine Negative NEG^Negative   UA reflex to Microscopic and Culture     Status: Abnormal    Specimen: Midstream Urine   Result Value Ref Range    Color Urine Yellow     Appearance Urine Slightly Cloudy     Glucose Urine Negative NEG^Negative mg/dL    Bilirubin Urine Negative NEG^Negative    Ketones Urine Negative NEG^Negative mg/dL    Specific Gravity Urine 1.030 1.003 - 1.035    Blood Urine Negative NEG^Negative    pH Urine 6.0 4.7 - 8.0 pH    Protein Albumin Urine 30 (A) NEG^Negative mg/dL    Urobilinogen mg/dL Normal 0.0 - 2.0 mg/dL    Nitrite Urine Negative NEG^Negative    Leukocyte Esterase Urine Small (A) NEG^Negative    Source Midstream Urine     RBC Urine 3 (H) 0 - 2 /HPF    WBC Urine 4 0 - 5 /HPF    Bacteria Urine None (A) NEG^Negative /HPF    Squamous Epithelial /HPF Urine 18 (H) 0 - 1 /HPF    Mucous Urine Present (A) NEG^Negative /LPF    Amorphous Crystals Moderate (A) NEG^Negative /HPF   Urine Drugs of Abuse Screen Panel 13     Status: " Abnormal   Result Value Ref Range    Cannabinoids (25-hgu-5-carboxy-9-THC) Detected, Abnormal Result (A) NDET^Not Detected ng/mL    Phencyclidine (Phencyclidine) Not Detected NDET^Not Detected ng/mL    Cocaine (Benzoylecgonine) Not Detected NDET^Not Detected ng/mL    Methamphetamine (d-Methamphetamine) Detected, Abnormal Result (A) NDET^Not Detected ng/mL    Opiates (Morphine) Not Detected NDET^Not Detected ng/mL    Amphetamine (d-Amphetamine) Detected, Abnormal Result (A) NDET^Not Detected ng/mL    Benzodiazepines (Nordiazepam) Detected, Abnormal Result (A) NDET^Not Detected ng/mL    Tricyclic Antidepressants (Desipramine) Not Detected NDET^Not Detected ng/mL    Methadone (Methadone) Not Detected NDET^Not Detected ng/mL    Barbiturates (Butalbital) Not Detected NDET^Not Detected ng/mL    Oxycodone (Oxycodone) Not Detected NDET^Not Detected ng/mL    Propoxyphene (Norpropoxyphene) Not Detected NDET^Not Detected ng/mL    Buprenorphine (Buprenorphine) Not Detected NDET^Not Detected ng/mL   CBC with platelets differential     Status: None   Result Value Ref Range    WBC 8.1 4.0 - 11.0 10e9/L    RBC Count 4.69 3.8 - 5.2 10e12/L    Hemoglobin 14.1 11.7 - 15.7 g/dL    Hematocrit 42.1 35.0 - 47.0 %    MCV 90 78 - 100 fl    MCH 30.1 26.5 - 33.0 pg    MCHC 33.5 31.5 - 36.5 g/dL    RDW 12.3 10.0 - 15.0 %    Platelet Count 280 150 - 450 10e9/L    Diff Method Automated Method     % Neutrophils 56.5 %    % Lymphocytes 35.2 %    % Monocytes 7.0 %    % Eosinophils 0.7 %    % Basophils 0.4 %    % Immature Granulocytes 0.2 %    Nucleated RBCs 0 0 /100    Absolute Neutrophil 4.6 1.6 - 8.3 10e9/L    Absolute Lymphocytes 2.8 0.8 - 5.3 10e9/L    Absolute Monocytes 0.6 0.0 - 1.3 10e9/L    Absolute Eosinophils 0.1 0.0 - 0.7 10e9/L    Absolute Basophils 0.0 0.0 - 0.2 10e9/L    Abs Immature Granulocytes 0.0 0 - 0.4 10e9/L    Absolute Nucleated RBC 0.0    Comprehensive metabolic panel     Status: Abnormal   Result Value Ref Range    Sodium  "138 133 - 144 mmol/L    Potassium 3.8 3.4 - 5.3 mmol/L    Chloride 108 94 - 109 mmol/L    Carbon Dioxide 26 20 - 32 mmol/L    Anion Gap 4 3 - 14 mmol/L    Glucose 81 70 - 99 mg/dL    Urea Nitrogen 11 7 - 30 mg/dL    Creatinine 0.63 0.52 - 1.04 mg/dL    GFR Estimate >90 >60 mL/min/[1.73_m2]    GFR Estimate If Black >90 >60 mL/min/[1.73_m2]    Calcium 8.8 8.5 - 10.1 mg/dL    Bilirubin Total 0.4 0.2 - 1.3 mg/dL    Albumin 3.9 3.4 - 5.0 g/dL    Protein Total 8.1 6.8 - 8.8 g/dL    Alkaline Phosphatase 62 40 - 150 U/L     (H) 0 - 50 U/L    AST 84 (H) 0 - 45 U/L   TSH with free T4 reflex     Status: None   Result Value Ref Range    TSH 0.85 0.40 - 4.00 mU/L   Asymptomatic COVID-19 Virus (Coronavirus) by PCR     Status: None    Specimen: Nasopharyngeal   Result Value Ref Range    COVID-19 Virus PCR to [facility] - Source Nasopharyngeal     COVID-19 Virus PCR to [facility] - Result       Test received-See reflex to [facility] test SARS CoV2 (COVID-19) Virus RT-PCR   SARS-CoV-2 COVID-19 Virus (Coronavirus) RT-PCR Nasopharyngeal     Status: None    Specimen: Nasopharyngeal   Result Value Ref Range    SARS-CoV-2 Virus Specimen Source Nasopharyngeal     SARS-CoV-2 PCR Result NEGATIVE     SARS-CoV-2 PCR Comment       This automated, real-time RT-PCR assay by [employer] on the [employer] has   been given Emergency Use Authorization (EUA) for the in vitro qualitative detection of RNA   from the SARS-CoV2 virus in nasopharyngeal swabs in viral transport medium from patients   with signs and symptoms of infection who are suspected of COVID-19. The performance is   unknown in asymptomatic patients.       Jonelle Adams is a 19 year old female who is being evaluated via a billable video visit.       The patient has been notified of following:      \"This video visit will be conducted via a call between you and your physician/provider. We have found that certain health care needs can be provided without the need for an " "in-person physical exam.  This service lets us provide the care you need with a video conversation.  If a prescription is necessary we can send it directly to your pharmacy.  If lab work is needed we can place an order for that and you can then stop by our lab to have the test done at a later time.     If during the course of the call the physician/provider feels a video visit is not appropriate, you will not be charged for this service.\"      Patient has given verbal consent for Video visit? Yes     Video-Visit Details     Video Start Time: 1130     Video End Time (time video stopped): 1134    Originating Location (pt. Location): [facility]     Distant Location (provider location):  [facility]      Mode of Communication:  Video Conference via [employer]        "

## 2020-06-14 PROCEDURE — 99233 SBSQ HOSP IP/OBS HIGH 50: CPT | Mod: 95 | Performed by: NURSE PRACTITIONER

## 2020-06-14 PROCEDURE — 25000132 ZZH RX MED GY IP 250 OP 250 PS 637: Performed by: NURSE PRACTITIONER

## 2020-06-14 PROCEDURE — 25000128 H RX IP 250 OP 636: Performed by: NURSE PRACTITIONER

## 2020-06-14 PROCEDURE — 12400000 ZZH R&B MH

## 2020-06-14 RX ADMIN — DIPHENHYDRAMINE HYDROCHLORIDE 50 MG: 50 CAPSULE ORAL at 22:08

## 2020-06-14 RX ADMIN — LORAZEPAM 1 MG: 2 INJECTION INTRAMUSCULAR; INTRAVENOUS at 11:20

## 2020-06-14 RX ADMIN — HALOPERIDOL LACTATE 5 MG: 5 INJECTION, SOLUTION INTRAMUSCULAR at 11:21

## 2020-06-14 RX ADMIN — DIPHENHYDRAMINE HYDROCHLORIDE 50 MG: 50 INJECTION, SOLUTION INTRAMUSCULAR; INTRAVENOUS at 11:21

## 2020-06-14 ASSESSMENT — ACTIVITIES OF DAILY LIVING (ADL)
HYGIENE/GROOMING: PROMPTS;INDEPENDENT
LAUNDRY: UNABLE TO COMPLETE
HYGIENE/GROOMING: PROMPTS
ORAL_HYGIENE: INDEPENDENT
DRESS: PROMPTS
DRESS: PROMPTS;INDEPENDENT;SCRUBS (BEHAVIORAL HEALTH)
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
## 2020-06-14 NOTE — PLAN OF CARE
"  Problem: Adult Behavioral Health Plan of Care  Goal: Patient-Specific Goal (Individualization)  Description: Patient will sleep 6-8 hours per night  Patient will eat at 75% of meals daily  Patient will attend <50% of group programming daily  Patient will be compliant with medication  Patient will have appropriate boundaries staff and peers during hospital  6/13/2020 2239 by Chelsie Montiel RN  Outcome: No Change  Note: Patient isolative to her room this shift. Spent most of the shift in bed. States she is \"tired\" and wants to sleep this shift.  She is disheveled and untidy.  Her affect is flat.  Appears somewhat guarded of staff.  Received no scheduled or PRN medications this shift.      "
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
## 2020-06-14 NOTE — PLAN OF CARE
Face to face report received from Chelsie CHRISTOPHER. Pt. Observed.     Problem: Adult Behavioral Health Plan of Care  Goal: Patient-Specific Goal (Individualization)  Description: Patient will sleep 6-8 hours per night  Patient will eat at 75% of meals daily  Patient will attend <50% of group programming daily  Patient will be compliant with medication  Patient will have appropriate boundaries staff and peers during hospital  6/14/2020 0314 by Julia Doherty, RN  Outcome: No Change  Note:     Pt has been in bed with eyes closed and regular respirations x 7 hours this noc shift. 15 minute and PRN checks all night. No complaints offered. Will continue to monitor.    0610 Staff taking pt VS this a.m. Pt. Asking why she is here. Per staff she offered to get this writer to explain. Pt. Threatening to kill staff if she talked to anyone about her.         Face to face end of shift report communicated to oncoming RN.     Julia Doherty, ASHWIN  6/14/2020

## 2020-06-15 PROCEDURE — 25000132 ZZH RX MED GY IP 250 OP 250 PS 637: Performed by: NURSE PRACTITIONER

## 2020-06-15 PROCEDURE — 99233 SBSQ HOSP IP/OBS HIGH 50: CPT | Mod: 95 | Performed by: NURSE PRACTITIONER

## 2020-06-15 PROCEDURE — 12400000 ZZH R&B MH

## 2020-06-15 RX ADMIN — OLANZAPINE 10 MG: 5 TABLET, FILM COATED ORAL at 08:52

## 2020-06-15 NOTE — PLAN OF CARE
Face to face end of shift report obtained from Chelsie STEWART RN.     Problem: Adult Behavioral Health Plan of Care  Goal: Patient-Specific Goal (Individualization)  Description: Patient will sleep 6-8 hours per night  Patient will eat at 75% of meals daily  Patient will attend <50% of group programming daily  Patient will be compliant with medication  Patient will have appropriate boundaries staff and peers during hospital  6/15/2020 0131 by Cindi Marin, RN  Outcome: No Change  Note:    Pt observed resting in bed.Pt appears to be sleeping in bed with eyes closed, having regular respirations and position changes. 15 minutes and PRN safety checks completed with no noted complaints.      Patient slept 6.5 hours. Will continue to monitor.    Problem: Cognitive Impairment (Psychotic Signs/Symptoms)  Goal: Improved Thought Clarity and Organization (Psychotic Signs/Symptoms)  Outcome: No Change     Face to face end of shift report communicated to oncoming RN.  Cindi Marin, RN  6:20 AM

## 2020-06-15 NOTE — PLAN OF CARE
"Face to Face report received from ASHWIN Puente.  Rounding completed. Patient observed in room.   Problem: Adult Behavioral Health Plan of Care  Goal: Patient-Specific Goal (Individualization)  Description: Patient will sleep 6-8 hours per night  Patient will eat at 75% of meals daily  Patient will attend <50% of group programming daily  Patient will be compliant with medication  Patient will have appropriate boundaries staff and peers during hospital  6/15/2020 0816 by Vivek Farley, RN  Outcome: Improving  She is resting in her room. She is irritable when approached. She refuses to see provider today stating \"I'm not talking to you\", \"I said I don't want to talk to you for the 4th fucking time\". \"I need my dad here\".     Problem: Cognitive Impairment (Psychotic Signs/Symptoms)  Goal: Improved Thought Clarity and Organization (Psychotic Signs/Symptoms)  6/15/2020 0816 by Vivek Farley, RN  Outcome: No Change  She continues to be delusional, distractible, and tangential. She refuses to have conversation and states \"I just need to sleep, I'm waiting for my dad\"          "

## 2020-06-15 NOTE — PLAN OF CARE
"Face to face end of shift report communicated from Vivek MARINO RN.   Pt in her room in bed at the start of the shift.       Valentina Salcido RN  6/15/2020  4:09 PM       Problem: Adult Behavioral Health Plan of Care  Goal: Patient-Specific Goal (Individualization)  Description: Patient will sleep 6-8 hours per night  Patient will eat at 75% of meals daily  Patient will attend <50% of group programming daily  Patient will be compliant with medication  Patient will have appropriate boundaries staff and peers during hospital    Pt denies pain, anxiety. Pt became agitated when asking any questions regarding depression, hallucinations and SI/HI. Pt stated that she does not want anyone in her room. Pt states \"I can't answer questions, I'm underage.  I won't answer anything unless my dad is here\". Pt refused to get out of bed to have room checked.   6/15/2020 1608 by Valentina Salcido RN  Outcome: Improving  Note:        Problem: Cognitive Impairment (Psychotic Signs/Symptoms)  Goal: Improved Thought Clarity and Organization (Psychotic Signs/Symptoms)  6/15/2020 1608 by Valentina Salcido RN  Outcome: Improving     Problem: Mental State/Mood Impairment (Psychotic Signs/Symptoms)  Goal: Improved Mental State and Mood (Psychotic Signs/Symptoms)  6/15/2020 1608 by Valentina Salcido RN  Outcome: Improving       Face to face end of shift report communicated to oncromelia CHRISTOPHER.     Valentina Salcido RN  6/15/2020  4:09 PM       "

## 2020-06-15 NOTE — PROGRESS NOTES
"[facility]  Psychiatric Progress Note      Impression:   (per ED) Jonelle Adams is a 19 year old female who presented to the emergency department via [place] [employer].  Patient presented to [facility] stating that she will kill herself if not allowed to leave at [place].  She is also stated that she does not know where she lives and she believes that she has been a missing person since the age of 6 years.  She has been rambling throughout ED stay and when she was with a [place] [employer].  Patient also stated that she wants to kill residents at [address] because they are \"in her house\".  Patient does not know how she arrived at the [facility].  All this is history was obtained from Officer [employer].  Patient was smiling and anxious but was not able to give any history.  She however allowed me to examine her and requested that she be taken to her \"father's house\".  According to [employer], she has had behavioral health issues from the time she was a teenager.    Patient again uncooperative and states she does not want to talk, covering up with her blanket and rolling over in her bed.  Inform patient we have filed a petition for civil commitment and will need CAROLINE's for anyone she would like us to speak with, including her dad.  She yells out \"what for?\" - explained, further attempts at discussion are dismissed.      Educated regarding medication indications, risks, benefits, side effects, contraindications and possible interactions. Verbally expressed understanding.        Diagnoses:     Paranoid Delusion r/o substance related  Substance Abuse Disorder    Attestation:  Patient has been seen and evaluated by me,  SABI Calderón CNP          Interim History:   The patient's care was discussed with the treatment team and chart notes were reviewed.    BEHAVIORAL TEAM DISCUSSION    Progress: None  Continued Stay Criteria/Rationale: Remains uncooperative and " "delusional  Medical/Physical: None  Precautions:   Falls precaution?: YES  Behavioral Orders   Procedures     Code 1 - Restrict to Unit     Routine Programming     As clinically indicated     Status 15     Every 15 minutes.     Plan: Continue to monitor and encourage assessments.  Offer prn medications.  Filed petition for commitment     Rationale for change in precautions or plan: Remains sleeping much of her stay so far, encourage participation in assessments  PRN medications for comfort and staff safety    Participants: Bree Leary NP,  Nursing        Medications:     Current Facility-Administered Medications Ordered in Epic   Medication Dose Route Frequency Last Rate Last Dose     acetaminophen (TYLENOL) tablet 650 mg  650 mg Oral Q4H PRN         diphenhydrAMINE (BENADRYL) capsule 50 mg  50 mg Oral Q6H PRN   50 mg at 06/14/20 2208    Or     diphenhydrAMINE (BENADRYL) injection 50 mg  50 mg Intramuscular Q6H PRN   50 mg at 06/14/20 1121     haloperidol (HALDOL) tablet 5 mg  5 mg Oral Q6H PRN        Or     haloperidol lactate (HALDOL) injection 5 mg  5 mg Intramuscular Q6H PRN   5 mg at 06/14/20 1121     hydrOXYzine (ATARAX) tablet 25-50 mg  25-50 mg Oral Q4H PRN         LORazepam (ATIVAN) tablet 1 mg  1 mg Oral Q6H PRN        Or     LORazepam (ATIVAN) injection 1 mg  1 mg Intramuscular Q6H PRN   1 mg at 06/14/20 1120     magnesium hydroxide (MILK OF MAGNESIA) suspension 30 mL  30 mL Oral At Bedtime PRN         nicotine (NICORETTE) gum 2-4 mg  2-4 mg Buccal Q1H PRN         OLANZapine (zyPREXA) tablet 5-10 mg  5-10 mg Oral TID PRN   10 mg at 06/15/20 0852    Or     OLANZapine (zyPREXA) injection 5-10 mg  5-10 mg Intramuscular TID PRN         No current [facility]-ordered outpatient medications on file.              10 point ROS negative see H&P       Allergies:   No Known Allergies         Psychiatric Examination:   BP (!) 86/53   Pulse 79   Temp 97.6  F (36.4  C) (Tympanic)   Resp 16   Ht 1.638 m (5' 4.5\")   Wt " 72.8 kg (160 lb 6.4 oz)   SpO2 98%   BMI 27.11 kg/m    Weight is 160 lbs 6.4 oz  Body mass index is 27.11 kg/m .    Appearance:  dressed in hospital scrubs - covers face and body with blanket, in bed  Attitude:  guarded and uncooperative  Eye Contact:  None, under covers  Mood:  angry  Affect:  unable to assess  Speech:  loud and angry  Psychomotor Behavior:  fidgeting  Thought Process:  disorganized and illogical  Associations:  loosening of associations present  Thought Content:  unclear  Insight:  limited  Judgment:  poor  Oriented to:  place  Attention Span and Concentration:  poor  Recent and Remote Memory:  poor  Fund of Knowledge: delayed  Muscle Strength and Tone: normal  Gait and Station: not observed           Labs:     Results for orders placed or performed during the hospital encounter of 06/11/20   HCG qualitative urine     Status: None   Result Value Ref Range    HCG Qual Urine Negative NEG^Negative   UA reflex to Microscopic and Culture     Status: Abnormal    Specimen: Midstream Urine   Result Value Ref Range    Color Urine Yellow     Appearance Urine Slightly Cloudy     Glucose Urine Negative NEG^Negative mg/dL    Bilirubin Urine Negative NEG^Negative    Ketones Urine Negative NEG^Negative mg/dL    Specific Gravity Urine 1.030 1.003 - 1.035    Blood Urine Negative NEG^Negative    pH Urine 6.0 4.7 - 8.0 pH    Protein Albumin Urine 30 (A) NEG^Negative mg/dL    Urobilinogen mg/dL Normal 0.0 - 2.0 mg/dL    Nitrite Urine Negative NEG^Negative    Leukocyte Esterase Urine Small (A) NEG^Negative    Source Midstream Urine     RBC Urine 3 (H) 0 - 2 /HPF    WBC Urine 4 0 - 5 /HPF    Bacteria Urine None (A) NEG^Negative /HPF    Squamous Epithelial /HPF Urine 18 (H) 0 - 1 /HPF    Mucous Urine Present (A) NEG^Negative /LPF    Amorphous Crystals Moderate (A) NEG^Negative /HPF   Urine Drugs of Abuse Screen Panel 13     Status: Abnormal   Result Value Ref Range    Cannabinoids (38-qpt-1-carboxy-9-THC) Detected,  Abnormal Result (A) NDET^Not Detected ng/mL    Phencyclidine (Phencyclidine) Not Detected NDET^Not Detected ng/mL    Cocaine (Benzoylecgonine) Not Detected NDET^Not Detected ng/mL    Methamphetamine (d-Methamphetamine) Detected, Abnormal Result (A) NDET^Not Detected ng/mL    Opiates (Morphine) Not Detected NDET^Not Detected ng/mL    Amphetamine (d-Amphetamine) Detected, Abnormal Result (A) NDET^Not Detected ng/mL    Benzodiazepines (Nordiazepam) Detected, Abnormal Result (A) NDET^Not Detected ng/mL    Tricyclic Antidepressants (Desipramine) Not Detected NDET^Not Detected ng/mL    Methadone (Methadone) Not Detected NDET^Not Detected ng/mL    Barbiturates (Butalbital) Not Detected NDET^Not Detected ng/mL    Oxycodone (Oxycodone) Not Detected NDET^Not Detected ng/mL    Propoxyphene (Norpropoxyphene) Not Detected NDET^Not Detected ng/mL    Buprenorphine (Buprenorphine) Not Detected NDET^Not Detected ng/mL   CBC with platelets differential     Status: None   Result Value Ref Range    WBC 8.1 4.0 - 11.0 10e9/L    RBC Count 4.69 3.8 - 5.2 10e12/L    Hemoglobin 14.1 11.7 - 15.7 g/dL    Hematocrit 42.1 35.0 - 47.0 %    MCV 90 78 - 100 fl    MCH 30.1 26.5 - 33.0 pg    MCHC 33.5 31.5 - 36.5 g/dL    RDW 12.3 10.0 - 15.0 %    Platelet Count 280 150 - 450 10e9/L    Diff Method Automated Method     % Neutrophils 56.5 %    % Lymphocytes 35.2 %    % Monocytes 7.0 %    % Eosinophils 0.7 %    % Basophils 0.4 %    % Immature Granulocytes 0.2 %    Nucleated RBCs 0 0 /100    Absolute Neutrophil 4.6 1.6 - 8.3 10e9/L    Absolute Lymphocytes 2.8 0.8 - 5.3 10e9/L    Absolute Monocytes 0.6 0.0 - 1.3 10e9/L    Absolute Eosinophils 0.1 0.0 - 0.7 10e9/L    Absolute Basophils 0.0 0.0 - 0.2 10e9/L    Abs Immature Granulocytes 0.0 0 - 0.4 10e9/L    Absolute Nucleated RBC 0.0    Comprehensive metabolic panel     Status: Abnormal   Result Value Ref Range    Sodium 138 133 - 144 mmol/L    Potassium 3.8 3.4 - 5.3 mmol/L    Chloride 108 94 - 109 mmol/L  "   Carbon Dioxide 26 20 - 32 mmol/L    Anion Gap 4 3 - 14 mmol/L    Glucose 81 70 - 99 mg/dL    Urea Nitrogen 11 7 - 30 mg/dL    Creatinine 0.63 0.52 - 1.04 mg/dL    GFR Estimate >90 >60 mL/min/[1.73_m2]    GFR Estimate If Black >90 >60 mL/min/[1.73_m2]    Calcium 8.8 8.5 - 10.1 mg/dL    Bilirubin Total 0.4 0.2 - 1.3 mg/dL    Albumin 3.9 3.4 - 5.0 g/dL    Protein Total 8.1 6.8 - 8.8 g/dL    Alkaline Phosphatase 62 40 - 150 U/L     (H) 0 - 50 U/L    AST 84 (H) 0 - 45 U/L   TSH with free T4 reflex     Status: None   Result Value Ref Range    TSH 0.85 0.40 - 4.00 mU/L   Asymptomatic COVID-19 Virus (Coronavirus) by PCR     Status: None    Specimen: Nasopharyngeal   Result Value Ref Range    COVID-19 Virus PCR to [facility] - Source Nasopharyngeal     COVID-19 Virus PCR to [facility] - Result       Test received-See reflex to [place] test SARS CoV2 (COVID-19) Virus RT-PCR   SARS-CoV-2 COVID-19 Virus (Coronavirus) RT-PCR Nasopharyngeal     Status: None    Specimen: Nasopharyngeal   Result Value Ref Range    SARS-CoV-2 Virus Specimen Source Nasopharyngeal     SARS-CoV-2 PCR Result NEGATIVE     SARS-CoV-2 PCR Comment       This automated, real-time RT-PCR assay by [employer] on the [employer] has   been given Emergency Use Authorization (EUA) for the in vitro qualitative detection of RNA   from the SARS-CoV2 virus in nasopharyngeal swabs in viral transport medium from patients   with signs and symptoms of infection who are suspected of COVID-19. The performance is   unknown in asymptomatic patients.       Jonelle Adams is a 19 year old female who is being evaluated via a billable video visit.       The patient has been notified of following:      \"This video visit will be conducted via a call between you and your physician/provider. We have found that certain health care needs can be provided without the need for an in-person physical exam.  This service lets us provide the care you need with a video " "conversation.  If a prescription is necessary we can send it directly to your pharmacy.  If lab work is needed we can place an order for that and you can then stop by our lab to have the test done at a later time.     If during the course of the call the physician/provider feels a video visit is not appropriate, you will not be charged for this service.\"      Patient has given verbal consent for Video visit? no     Video-Visit Details     Video Start Time: 1053     Video End Time (time video stopped): 1054    Originating Location (pt. Location): [facility]     Distant Location (provider location):  [facility]      Mode of Communication:  Video Conference via [facility]        "

## 2020-06-15 NOTE — PLAN OF CARE
"Faxed and emailed physician's statement in support of commitment to [facility] this morning.     Spoke with Justine from [facility] this morning- she states pt \"grew up in their system.\" Justine states pt's dad is Dannie Jackson, mom is Cookie Adams and she is not sure who Travis Anthony is who pt has listed as her dad. Pt's dad went threw all of the hoops and got custody of pt years ago. He got into a car accident recently and got a TBI. Her father has a history of meth use for awhile.  Justine states she will be calling this afternoon to screen pt.   "

## 2020-06-16 PROCEDURE — 25000132 ZZH RX MED GY IP 250 OP 250 PS 637: Performed by: NURSE PRACTITIONER

## 2020-06-16 PROCEDURE — 12400000 ZZH R&B MH

## 2020-06-16 RX ADMIN — OLANZAPINE 10 MG: 5 TABLET, FILM COATED ORAL at 15:47

## 2020-06-16 ASSESSMENT — ACTIVITIES OF DAILY LIVING (ADL)
HYGIENE/GROOMING: INDEPENDENT
ORAL_HYGIENE: INDEPENDENT
DRESS: SCRUBS (BEHAVIORAL HEALTH);INDEPENDENT
LAUNDRY: UNABLE TO COMPLETE

## 2020-06-16 NOTE — PROGRESS NOTES
[facility]  Psychiatric Progress Note      Patient is again refusing to be seen by provider.              Interim History:   The patient's care was discussed with the treatment team and chart notes were reviewed.    BEHAVIORAL TEAM DISCUSSION    Progress: None  Continued Stay Criteria/Rationale: Delusional, uncooperative, angry, unable to get adequate assessment  Medical/Physical: None  Precautions:   Falls precaution?: YES  Behavioral Orders   Procedures     Code 1 - Restrict to Unit     Routine Programming     As clinically indicated     Status 15     Every 15 minutes.     Plan:   Continue to encourage assessments  Prn for comfort  Filed petition for commitment  Rationale for change in precautions or plan: Delusional, unable to get adequate assessment             Medications:     Current Facility-Administered Medications Ordered in Epic   Medication Dose Route Frequency Last Rate Last Dose     acetaminophen (TYLENOL) tablet 650 mg  650 mg Oral Q4H PRN         diphenhydrAMINE (BENADRYL) capsule 50 mg  50 mg Oral Q6H PRN   50 mg at 06/14/20 2208    Or     diphenhydrAMINE (BENADRYL) injection 50 mg  50 mg Intramuscular Q6H PRN   50 mg at 06/14/20 1121     haloperidol (HALDOL) tablet 5 mg  5 mg Oral Q6H PRN        Or     haloperidol lactate (HALDOL) injection 5 mg  5 mg Intramuscular Q6H PRN   5 mg at 06/14/20 1121     hydrOXYzine (ATARAX) tablet 25-50 mg  25-50 mg Oral Q4H PRN         LORazepam (ATIVAN) tablet 1 mg  1 mg Oral Q6H PRN        Or     LORazepam (ATIVAN) injection 1 mg  1 mg Intramuscular Q6H PRN   1 mg at 06/14/20 1120     magnesium hydroxide (MILK OF MAGNESIA) suspension 30 mL  30 mL Oral At Bedtime PRN         nicotine (NICORETTE) gum 2-4 mg  2-4 mg Buccal Q1H PRN         OLANZapine (zyPREXA) tablet 5-10 mg  5-10 mg Oral TID PRN   10 mg at 06/15/20 0852    Or     OLANZapine (zyPREXA) injection 5-10 mg  5-10 mg Intramuscular TID PRN         No current [facility]-ordered outpatient medications on  file.

## 2020-06-16 NOTE — PLAN OF CARE
"Face to Face report received from ASHWIN Swanson.  Rounding completed. Patient observed in room.   Problem: Adult Behavioral Health Plan of Care  Goal: Patient-Specific Goal (Individualization)  Description: Patient will sleep 6-8 hours per night  Patient will eat at 75% of meals daily  Patient will attend <50% of group programming daily  Patient will be compliant with medication  Patient will have appropriate boundaries staff and peers during hospital  6/16/2020 0737 by Vivek Farley RN  Outcome: No Change  She remains in bed this morning. She doesn't offer any conversation. She refused to talk with the [facility] screener, saying that she is too young to talk to a screener. She continues to think that she is waiting for her dad to pick her up. She is not responsive to any redirection. She covers her head with her blanket and excuses staff from her room.  1128: patient refused to see ADDIS Giron via IPAD. She states \"I'm not going to fucking see anyone on that thing, I'm just waiting for my dad\".  She covers her head with her blankets.   1139: nurse asked patient if she would talk with [facility] screener, Patient yells \"no, I'm not talking to anyone without my dad here, I'm too young to talk to them\". Patient reminded that she is a 19 year old adult and that her dad is not coming to the hospital. Patient continues to say that she will not talk to anyone without her dad. She then talks about Obamacare.   1330: patient demanding to leave. \"You have to let me out of here, I'm an  and a \", \"My client is here and you have to let me out of here\". She is then noted to be talking to \"Tacho Greogry\" and refers to him as her client when no one is next to her. \"I am not psychotic, I am not Cookie, that is my mom, she is psychotic, not me, I do drugs.\" Her speech is pressured. She is rambling. She insist that she is not going to go through a commitment because \"I work for the [employer] and " "[place] is here\", \"I will have to get the police to come here and they will shoot you\".     Problem: Cognitive Impairment (Psychotic Signs/Symptoms)  Goal: Improved Thought Clarity and Organization (Psychotic Signs/Symptoms)  Outcome: No Change  She continues with delusional thinking. She believes that a man named Travis Anthony is her father which isn't true. She believes that this man will be coming to pick her up and refuses any treatment here in the hospital.      Problem: Mental State/Mood Impairment (Psychotic Signs/Symptoms)  Goal: Improved Mental State and Mood (Psychotic Signs/Symptoms)  Outcome: No Change  She is irritable with questions from staff. She is only up to eat and then returns to bed. She refuses to shower after multiple prompts.     Face to face end of shift report to be communicated to evening shift RN.     Vivek Farley RN  6/16/2020  3:02 PM           "

## 2020-06-16 NOTE — PLAN OF CARE
Problem: Adult Behavioral Health Plan of Care  Goal: Patient-Specific Goal (Individualization)  Description: Patient will sleep 6-8 hours per night  Patient will eat at 75% of meals daily  Patient will attend <50% of group programming daily  Patient will be compliant with medication  Patient will have appropriate boundaries staff and peers during hospital  6/16/2020 0029 by Kiki Castle, RN  Outcome: No Change  Note:      Face to face shift report received from Olena RN. Rounding completed, pt observed.     Pt appeared to be sleeping most of this shift, normal respirations and position changes noted.     Face to face report will be communicated to oncoming RN.    Kiki Castle, ASHWIN  6/16/2020  6:11 AM

## 2020-06-16 NOTE — PLAN OF CARE
Face to face end of shift report communicated from Vivek MARINO RN. [facility]     Valentina Salcido RN  6/16/2020  3:56 PM       Problem: Mental State/Mood Impairment (Psychotic Signs/Symptoms)  Goal: Improved Mental State and Mood (Psychotic Signs/Symptoms)  6/16/2020 1555 by Valentina Salcido RN  Outcome: No Change     Problem: Adult Behavioral Health Plan of Care  Goal: Patient-Specific Goal (Individualization)  Description: Patient will sleep 6-8 hours per night  Patient will eat at 75% of meals daily  Patient will attend <50% of group programming daily  Patient will be compliant with medication  Patient will have appropriate boundaries staff and peers during hospital  6/16/2020 1555 by Valentina Salcido RN  Outcome: Improving  Note:        Problem: Cognitive Impairment (Psychotic Signs/Symptoms)  Goal: Improved Thought Clarity and Organization (Psychotic Signs/Symptoms)    Pt continues to make delusional statements. Pt states she is ready to leave and she if perfectly fine. Pt told that she is on a hold and will need to talk to the [facility] screener. Pt asked to talk to her at this time, was told that the screener is no longer at work and will need to wait until tomorrow. Pt states that she needs to talk to Peg the screener and she works from home, pt wants nursing to call her. Pt was offered zyprexa 10mg which she did take at 1547. Pt states that she is not agitated and denies all symptoms.   6/16/2020 1555 by Valentina Salcido RN  Outcome: Improving    Face to face end of shift report communicated to oncromelia RN.     Valentina Salcido RN  6/16/2020  3:56 PM

## 2020-06-16 NOTE — PLAN OF CARE
Justine from [facility] attempted to screen pt this morning and pt refused to participate in screening.

## 2020-06-17 PROCEDURE — 12400000 ZZH R&B MH

## 2020-06-17 PROCEDURE — 25000128 H RX IP 250 OP 636: Performed by: NURSE PRACTITIONER

## 2020-06-17 PROCEDURE — 99232 SBSQ HOSP IP/OBS MODERATE 35: CPT | Performed by: NURSE PRACTITIONER

## 2020-06-17 RX ORDER — OLANZAPINE 10 MG/2ML
10 INJECTION, POWDER, FOR SOLUTION INTRAMUSCULAR DAILY
Status: DISCONTINUED | OUTPATIENT
Start: 2020-06-18 | End: 2020-06-23

## 2020-06-17 RX ORDER — LORAZEPAM 2 MG/ML
2 INJECTION INTRAMUSCULAR EVERY 6 HOURS PRN
Status: DISCONTINUED | OUTPATIENT
Start: 2020-06-17 | End: 2020-07-12

## 2020-06-17 RX ORDER — LORAZEPAM 1 MG/1
2 TABLET ORAL EVERY 6 HOURS PRN
Status: DISCONTINUED | OUTPATIENT
Start: 2020-06-17 | End: 2020-07-12

## 2020-06-17 RX ORDER — OLANZAPINE 10 MG/1
10 TABLET ORAL DAILY
Status: DISCONTINUED | OUTPATIENT
Start: 2020-06-17 | End: 2020-06-17

## 2020-06-17 RX ORDER — OLANZAPINE 10 MG/1
10 TABLET ORAL DAILY
Status: DISCONTINUED | OUTPATIENT
Start: 2020-06-18 | End: 2020-06-23

## 2020-06-17 RX ORDER — OLANZAPINE 10 MG/2ML
10 INJECTION, POWDER, FOR SOLUTION INTRAMUSCULAR DAILY
Status: DISCONTINUED | OUTPATIENT
Start: 2020-06-17 | End: 2020-06-17

## 2020-06-17 RX ADMIN — DIPHENHYDRAMINE HYDROCHLORIDE 50 MG: 50 INJECTION, SOLUTION INTRAMUSCULAR; INTRAVENOUS at 14:10

## 2020-06-17 RX ADMIN — HALOPERIDOL LACTATE 5 MG: 5 INJECTION, SOLUTION INTRAMUSCULAR at 14:10

## 2020-06-17 RX ADMIN — LORAZEPAM 2 MG: 2 INJECTION INTRAMUSCULAR; INTRAVENOUS at 14:10

## 2020-06-17 ASSESSMENT — ACTIVITIES OF DAILY LIVING (ADL)
DRESS: SCRUBS (BEHAVIORAL HEALTH)
HYGIENE/GROOMING: INDEPENDENT
LAUNDRY: UNABLE TO COMPLETE
ORAL_HYGIENE: INDEPENDENT

## 2020-06-17 NOTE — PLAN OF CARE
"Face to Face report received from ASHWIN Swanosn.  Rounding completed. Patient observed in room.   Problem: Adult Behavioral Health Plan of Care  Goal: Patient-Specific Goal (Individualization)  Description: Patient will sleep 6-8 hours per night  Patient will eat at 75% of meals daily  Patient will attend <50% of group programming daily  Patient will be compliant with medication  Patient will have appropriate boundaries staff and peers during hospital  6/17/2020 0720 by Vivek Farley RN  Outcome: No Change  She is resting in her room this morning. She doesn't offer any conversation. She is dismissive. She did get up and eat all of her breakfast.     Problem: Cognitive Impairment (Psychotic Signs/Symptoms)  Goal: Improved Thought Clarity and Organization (Psychotic Signs/Symptoms)  Outcome: No Change  She continues to be delusional and says that she is not psychotic. She talks of working for the counties of \"[place]\". She says \"I'm not going to court\". She is reminded that a petition for commitment was filed with [facility] and she should be getting papers today or tomorrow. She states \"I don't go to court, I work for the [employer]\", she denies having any mental illness.   1400: patient escorted by security back into the MHICU after eloping from the MHICU.   1410: patient given haldol 5mg, benadryl 50mg and ativan 2mg IM at this time. She accepted with encouragement and no manual hold necessary at this time.      Problem: Mental State/Mood Impairment (Psychotic Signs/Symptoms)  Goal: Improved Mental State and Mood (Psychotic Signs/Symptoms)  Outcome: No Change  She is calm so far this morning. She can be labile when questioned about her mental health.  Face to face end of shift report to be communicated to evening shift RN.     Vivek Farley RN  6/17/2020  2:49 PM           "

## 2020-06-17 NOTE — PROGRESS NOTES
"[facility]  Psychiatric Progress Note                Today Jonelle mcgeeoped from icu. She is extremely disorganized and delusional. She is very confrontational. When moises asked her retrurn to the icu she came up closer to the nurse and said \"you need to get the fuck out of my face\". She said this twice to the staff with severe agitation as if she were going to strike out. She asked ot speak with me and then told me \"I want my dad to be present and pointed at another patient stating that this was her father\". She has been taking prn zyprexa with limited b enefit of thought process. Does seem to help with agitation a bit though is still very agitated when wakes up and quite threatneing in her tone and what she says to staff. She is very high risk of harming staff or another patient if medicatinos are not scheudled as she is extremely confrontational and difficult to redirect. Code green was called and IM haldol and ativan was ordered.           Interim History:   The patient's care was discussed with the treatment team and chart notes were reviewed.    BEHAVIORAL TEAM DISCUSSION    Progress: None  Continued Stay Criteria/Rationale: Delusional, uncooperative, angry, unable to get adequate assessment. threatening  Medical/Physical: None  Precautions:   Falls precaution?: YES  Behavioral Orders   Procedures     Code 1 - Restrict to Unit     Routine Programming     As clinically indicated     Status 15     Every 15 minutes.     Plan:       zyprexa scheduled.   Prn haldol will be available with ativan and benardyl if zyprexa is not effective enough           Medications:     Current Facility-Administered Medications Ordered in Epic   Medication Dose Route Frequency Last Rate Last Dose     acetaminophen (TYLENOL) tablet 650 mg  650 mg Oral Q4H PRN         diphenhydrAMINE (BENADRYL) capsule 50 mg  50 mg Oral Q6H PRN   50 mg at 06/14/20 2208    Or     diphenhydrAMINE (BENADRYL) injection 50 mg  50 mg " Intramuscular Q6H PRN   50 mg at 06/17/20 1410     haloperidol (HALDOL) tablet 5 mg  5 mg Oral Q6H PRN        Or     haloperidol lactate (HALDOL) injection 5 mg  5 mg Intramuscular Q6H PRN   5 mg at 06/17/20 1410     LORazepam (ATIVAN) injection 2 mg  2 mg Intramuscular Q6H PRN   2 mg at 06/17/20 1410    Or     LORazepam (ATIVAN) tablet 2 mg  2 mg Oral Q6H PRN         magnesium hydroxide (MILK OF MAGNESIA) suspension 30 mL  30 mL Oral At Bedtime PRN         nicotine (NICORETTE) gum 2-4 mg  2-4 mg Buccal Q1H PRN         [START ON 6/18/2020] OLANZapine (zyPREXA) tablet 10 mg  10 mg Oral Daily        Or     [START ON 6/18/2020] OLANZapine (zyPREXA) injection 10 mg  10 mg Intramuscular Daily         No current Epic-ordered outpatient medications on file.

## 2020-06-17 NOTE — PLAN OF CARE
"  Problem: Adult Behavioral Health Plan of Care  Goal: Patient-Specific Goal (Individualization)  Description: Patient will sleep 6-8 hours per night  Patient will eat at 75% of meals daily  Patient will attend <50% of group programming daily  Patient will be compliant with medication  Patient will have appropriate boundaries staff and peers during hospital    Patient isolative and withdrawn to her room, lying in bed. Affect is flat, mood is calm. Denies all criteria. States \"I'm fine, whatever\". Did receive a copy of her court papers. Ate dinner, has been in bed all shift, is more cooperative, offers a \"thank you\" to writer. Has been appropriate with staff and peers.   Face to face end of shift report communicated to oncoming shift RN.     Maddie Bergeron RN  6/17/2020  10:59 PM          6/17/2020 1622 by Maddie Bergeron RN  Outcome: No Change    Problem: Cognitive Impairment (Psychotic Signs/Symptoms)  Goal: Improved Thought Clarity and Organization (Psychotic Signs/Symptoms)    Patient in bed, does not appear to be responding to internal stimuli.   6/17/2020 1622 by Maddie Bergeron RN  Outcome: No Change          "

## 2020-06-17 NOTE — PLAN OF CARE
"Violent/Self-Destructive Seclusion or Restraint Initiation Note    Patient behaviors justifying violent/self-destructive seclusion or restraint (describe attempted least restricted alternatives and patient's response to interventions): Patient demanding to leave. She is psychotic and delusional. [facility] el[facility] the [facility] staff. She refused to return to the [facility] and states \"I will have you arrested, I'm placing you on a citizens arrest\", \"I am not going back there\". Security then put hands on patient to escort her back to her room in the [facility]  She yells \"I'm resisting, can't you see that I'm resisting\".   Type of restraint initiated: manual hold  Date Started: 6/17/2020  Time Started: 1400  LIP notified (name): April ADDIS Haryd  Order obtained: Yes.   Patient educated on reason for seclusion or restraints and discontinuation criteria: Yes.  Care plan updated: Yes.    Violent/Self-Destructive Seclusion or Restraint Discontinuation Note    Type of seclusion or restraint: manual hold  Patient's response to intervention: irritable, psychotic and continues to say that she is leaving.   Date of discontinuation: 6/17/2020  Time of discontinuation: 1401   Face to face assessment completed: Yes.  Restraint monitoring minimum of every 15 minutes: Yes.  Debriefing with patient completed: Yes.  Care plan updated: Yes.         "

## 2020-06-17 NOTE — PLAN OF CARE
Seclusion/Restraint Face to Face Note  In person face to face assessment completed, including an evaluation of the patient's immediate reaction to the intervention, complete review of systems assessment, behavioral assessment and review/assessment of history, drugs and medications, recent labs, etc., and behavioral condition.  The patient experienced: No adverse physical outcome from seclusion/restraint initiation.  The intervention of restraint or seclusion needs to terminate.  If face to face was completed by a trained RN, the above findings were discused with Roxy Hardy NP (responsible provider).   Vivek Farley RN  6/17/2020  2:57 PM

## 2020-06-17 NOTE — PLAN OF CARE
Received court dates from [facility]- Pt's confinement hearing is scheduled for Friday June 19th at 8:15am and the commitment hearing is scheduled for Tuesday June 30th at 8:15am. Notified security and pt's nurse.       Met with pt this afternoon and provided her with paperwork from Justine Romero. Informed pt of upcoming court dates. Court process was explained to pt but she did not retain this. Pt stated she does not need to be here and that her mom is actually supposed to be here instead. Pt was frustrated that she could not leave but was informed that she would see a  on Friday.

## 2020-06-17 NOTE — PLAN OF CARE
Problem: Adult Behavioral Health Plan of Care  Goal: Patient-Specific Goal (Individualization)  Description: Patient will sleep 6-8 hours per night  Patient will eat at 75% of meals daily  Patient will attend <50% of group programming daily  Patient will be compliant with medication  Patient will have appropriate boundaries staff and peers during hospital  6/16/2020 2322 by Kiki Castle, RN  Outcome: No Change  Note:      Face to face shift report received from Olena RN. Rounding completed, pt observed.     Pt appeared to be sleeping most of this shift, normal respirations and position changes noted.     Face to face report will be communicated to oncoming RN.    Kiki Castle, ASHWIN  6/17/2020  6:04 AM

## 2020-06-17 NOTE — PLAN OF CARE
4697. Looking through the window Jonelle was observed standing at the end of the hallway.  This writer and a  open the MHICU door to complete 15 minute check.  While  door was closing Jonelle dashed up the hallway and through the door to the open area of the unit.She refused to go back into the MHICU .

## 2020-06-18 PROCEDURE — 25000132 ZZH RX MED GY IP 250 OP 250 PS 637: Performed by: NURSE PRACTITIONER

## 2020-06-18 PROCEDURE — 12400000 ZZH R&B MH

## 2020-06-18 RX ADMIN — OLANZAPINE 10 MG: 10 TABLET, FILM COATED ORAL at 08:29

## 2020-06-18 ASSESSMENT — ACTIVITIES OF DAILY LIVING (ADL)
HYGIENE/GROOMING: INDEPENDENT
DRESS: SCRUBS (BEHAVIORAL HEALTH);INDEPENDENT
LAUNDRY: UNABLE TO COMPLETE
ORAL_HYGIENE: INDEPENDENT

## 2020-06-18 NOTE — PLAN OF CARE
Nurse took over patient's cares at 1315. Patient has been in bed.     Face to face end of shift report communicated to oncoming shift RN.     Maddie Bergeron RN  6/18/2020  2:31 PM

## 2020-06-18 NOTE — PLAN OF CARE
Problem: Adult Behavioral Health Plan of Care  Goal: Patient-Specific Goal (Individualization)  Description: Patient will sleep 6-8 hours per night  Patient will eat at 75% of meals daily  Patient will attend >50% of group programming daily  Patient will be compliant with medication  Patient will have appropriate boundaries staff and peers during hospital  6/17/2020-patient unable to wean to open unit due to elopement from MHICU. Treatment team will reassess daily.  6/18/2020 0742 by Kiki Castle, RN  Outcome: No Change  Note:      Writer continued cares of pt from previous shift.     Pt provides minimal conversation with writer this shift. Pt is compliant with medications and mouth checks.  Pt denies need to be here and says she was wrongfully placed here. Pt does eat meals in her room. Pt has maintained appropriate boundaries this shift. Pt has not had attempts to elope this shift.     Face to face report will be communicated to oncoming RN.    Kiki Castle RN  6/18/2020  12:11 PM

## 2020-06-18 NOTE — PROGRESS NOTES
"[facility]  Psychiatric Progress Note    .    She is calmer than she was the past two days though still disorganized and delusional. She tells me she has power to \"read what people are and what they do\". She states \"when I look at you I see nurse, pharmacueticals, and hospital\". She states the  told her she did not need ot be here and that she could go to treatment at [facility] today and that there was a warrant for her arrest. None of this is accurate. She is asking to come out of mhicu though she is a significant flight risk and continues to be very delusional and asking to discharge. itis not safe ofr her to come out of mhicu at this time. Did have court hearing this AM.       Interim History:   The patient's care was discussed with the treatment team and chart notes were reviewed.    BEHAVIORAL TEAM DISCUSSION    Progress: Is not as agitated as yesterday.  Petition for [place] filled out.   Continued Stay Criteria/Rationale: Delusional, uncooperative, angry, unable to get adequate assessment. threatening  Medical/Physical: None  Precautions: no  Falls precaution?: no  Behavioral Orders   Procedures     Code 1 - Restrict to Unit     Elopement precautions     Routine Programming     As clinically indicated     Status 15     Every 15 minutes.            Medications:     Current Facility-Administered Medications Ordered in Epic   Medication Dose Route Frequency Last Rate Last Dose     acetaminophen (TYLENOL) tablet 650 mg  650 mg Oral Q4H PRN         diphenhydrAMINE (BENADRYL) capsule 50 mg  50 mg Oral Q6H PRN   50 mg at 06/14/20 2208    Or     diphenhydrAMINE (BENADRYL) injection 50 mg  50 mg Intramuscular Q6H PRN   50 mg at 06/17/20 1410     haloperidol (HALDOL) tablet 5 mg  5 mg Oral Q6H PRN        Or     haloperidol lactate (HALDOL) injection 5 mg  5 mg Intramuscular Q6H PRN   5 mg at 06/17/20 1410     LORazepam (ATIVAN) injection 2 mg  2 mg Intramuscular Q6H PRN   2 mg at 06/17/20 1410    Or     " "LORazepam (ATIVAN) tablet 2 mg  2 mg Oral Q6H PRN         magnesium hydroxide (MILK OF MAGNESIA) suspension 30 mL  30 mL Oral At Bedtime PRN         nicotine (NICORETTE) gum 2-4 mg  2-4 mg Buccal Q1H PRN         OLANZapine (zyPREXA) tablet 10 mg  10 mg Oral Daily   10 mg at 06/18/20 0829    Or     OLANZapine (zyPREXA) injection 10 mg  10 mg Intramuscular Daily         No current Epic-ordered outpatient medications on file.           MSE/PSYCH  PSYCHIATRIC EXAM  /58   Pulse 98   Temp 97.1  F (36.2  C) (Tympanic)   Resp 18   Ht 1.638 m (5' 4.5\")   Wt 72.8 kg (160 lb 6.4 oz)   SpO2 98%   BMI 27.11 kg/m    -Appearance/Behavior: normal and improved  -Motor: intact  -Gait: intact  -Abnormal involuntary movements: None  -Mood: labile  -Affect:  Less expansive  -Speech: less pressurred    -Thought process/associations: tangential  -Thought content:  hallucinations though likely present.grandiose  -Perceptual disturbances: denies hallucinations..              -Suicidal/Homicidal Ideation: denies any   -Judgment: poor  -Insight: poor  *Orientation: time, place and person.  *Memory: difficult to assess due to mike  *Attention: poor  *Language: fluent, no aphasias, able to repeat phrases and name objects. Vocab intact.  *Fund of information: difficult to assess due to mike  *Cognitive functioning estimate: 0 - independent.        Plan:       Will not wean out of mhicu yet too high of flight  Continue scheduled Zyprexa to prevent escalation of behaviors. Was aggressive 2 days ago and symptoms are somewhat improving.   Prn haldol will be available with ativan and benardyl if zyprexa is not effective enough  Petition for [place] will be filled out including Invega, Zyprexa, Abilify, Risperdal, and Geodon  "

## 2020-06-18 NOTE — PLAN OF CARE
Problem: Adult Behavioral Health Plan of Care  Goal: Patient-Specific Goal (Individualization)  Description: Patient will sleep 6-8 hours per night  Patient will eat at 75% of meals daily  Patient will attend >50% of group programming daily  Patient will be compliant with medication  Patient will have appropriate boundaries staff and peers during hospital  6/17/2020-patient unable to wean to open unit due to elopement from MHICU. Treatment team will reassess daily.  6/17/2020 2351 by Kiki Castle, RN  Outcome: No Change     Face to face shift report received from Maddie CHRISTOPHER. Rounding completed, pt observed.     Pt appeared to be sleeping most of this shift, normal respirations and position changes noted.     Face to face report will be communicated to oncoming RN.    Kiki Castle RN  6/18/2020  6:23 AM

## 2020-06-19 PROCEDURE — 12400000 ZZH R&B MH

## 2020-06-19 PROCEDURE — 99233 SBSQ HOSP IP/OBS HIGH 50: CPT | Performed by: NURSE PRACTITIONER

## 2020-06-19 PROCEDURE — 25000132 ZZH RX MED GY IP 250 OP 250 PS 637: Performed by: NURSE PRACTITIONER

## 2020-06-19 RX ADMIN — DIPHENHYDRAMINE HYDROCHLORIDE 50 MG: 50 CAPSULE ORAL at 20:38

## 2020-06-19 RX ADMIN — OLANZAPINE 10 MG: 10 TABLET, FILM COATED ORAL at 08:45

## 2020-06-19 ASSESSMENT — ACTIVITIES OF DAILY LIVING (ADL)
HYGIENE/GROOMING: INDEPENDENT
LAUNDRY: UNABLE TO COMPLETE
DRESS: SCRUBS (BEHAVIORAL HEALTH);INDEPENDENT
LAUNDRY: UNABLE TO COMPLETE
HYGIENE/GROOMING: INDEPENDENT
DRESS: SCRUBS (BEHAVIORAL HEALTH);INDEPENDENT
ORAL_HYGIENE: INDEPENDENT
ORAL_HYGIENE: INDEPENDENT

## 2020-06-19 NOTE — PLAN OF CARE
Problem: Adult Behavioral Health Plan of Care  Goal: Patient-Specific Goal (Individualization)  Description: Patient will sleep 6-8 hours per night  Patient will eat at 75% of meals daily  Patient will attend >50% of group programming daily  Patient will be compliant with medication  Patient will have appropriate boundaries staff and peers during hospital  6/19/2020-patient unable to wean to open unit due to elopement from MHICU. Treatment team will reassess daily.  6/19/2020 0755 by Kiki Castle, RN  Outcome: No Change  Note:        Problem: Cognitive Impairment (Psychotic Signs/Symptoms)  Goal: Improved Thought Clarity and Organization (Psychotic Signs/Symptoms)  Outcome: No Change     Problem: Mental State/Mood Impairment (Psychotic Signs/Symptoms)  Goal: Improved Mental State and Mood (Psychotic Signs/Symptoms)  Outcome: No Change     Writer continued cares of pt from previous shift.    Pt attends court this morning and returns to room appropriately afterwards. Pt denies needing to be here and says she is supposed to be at inpatient treatment and she was only homeless as she went to detox and then they didn't have an open inpatient bed so she had no where to go while she was waiting. Pt says she just needs a police escort to [place] so she can go to treatment. Pt says in court this morning they were going to let here leave on intensive supervised probation with an ankle monitor and then someone else started talking so now she is stuck here longer. Pt reminded that a mental health commitment was filed and that today was not a probation violation hearing.

## 2020-06-19 NOTE — PLAN OF CARE
"ADMISSION NOTE    Patient arrived on unit from [facility]  accompanied by security and staff  on 6/19/2020  5:06 PM.   Status on arrival: alert   BP 99/57   Pulse 89   Temp 99.4  F (37.4  C) (Tympanic)   Resp 16   Ht 1.638 m (5' 4.5\")   Wt 72.8 kg (160 lb 6.4 oz)   SpO2 97%   BMI 27.11 kg/m    Patient given tour of unit and Welcome to  unit papers given to patient, wanding completed, belongings inventoried, and admission assessment completed.   Patient's legal status on arrival is confined and 6-30 for comittment ct. 0815. Appropriate legal rights discussed with and copy given to patient. Patient Bill of Rights discussed with and copy given to patient.   Patient denies SI, HI, and thoughts of self harm and contracts for safety while on unit.      Lina Carrero RN  6/19/2020  5:06 PM          "

## 2020-06-20 PROCEDURE — 12400000 ZZH R&B MH

## 2020-06-20 PROCEDURE — 25000132 ZZH RX MED GY IP 250 OP 250 PS 637: Performed by: NURSE PRACTITIONER

## 2020-06-20 RX ADMIN — OLANZAPINE 10 MG: 10 TABLET, FILM COATED ORAL at 08:40

## 2020-06-20 ASSESSMENT — ACTIVITIES OF DAILY LIVING (ADL)
ORAL_HYGIENE: INDEPENDENT
HYGIENE/GROOMING: INDEPENDENT
ORAL_HYGIENE: INDEPENDENT
HYGIENE/GROOMING: INDEPENDENT
LAUNDRY: UNABLE TO COMPLETE
DRESS: INDEPENDENT
DRESS: INDEPENDENT;SCRUBS (BEHAVIORAL HEALTH)

## 2020-06-20 ASSESSMENT — MIFFLIN-ST. JEOR: SCORE: 1528.61

## 2020-06-20 NOTE — PLAN OF CARE
Face to face shift report received from Linda MCGREGOR RN. Rounding completed, pt observed.  Xi Go RN  6/20/2020  8:04 AM  Problem: Adult Behavioral Health Plan of Care  Goal: Patient-Specific Goal (Individualization)  Description: Patient will sleep 6-8 hours per night  Patient will eat at 75% of meals daily  Patient will attend >50% of group programming daily  Patient will be compliant with medication  Patient will have appropriate boundaries staff and peers during hospital  ELOPEMENT PRECAUTIONS; Eloped from ICU 6/17/2020 6/20/2020-No wean r/t unpredictable behavior. Treatment team will reassess daily.  6/20/2020 0801 by Xi Go RN  Outcome: Improving  Note: Shift Summery:  Patient remains in a room in the MHICU r/t unpredictable behavior.  Patient awakened easily when nurse in room.  Patient is cooperative with nurse but minimizes why she is here or what she is here for.  Patient took her scheduled Zyprexa 10 mg po without issues.  Cooperative with mouth check after administration.  Patient stated her last name as different (Onur) than on hospital name band.  Patient explained that it is her fathers last name and she prefers to use that instead of Bryan but did state that Bryan is her legal name.    Problem: Mental State/Mood Impairment (Psychotic Signs/Symptoms)  Goal: Improved Mental State and Mood (Psychotic Signs/Symptoms)  Description: Patient will have a reality based conversation.  Patient will report a decrease in delusions/hallucinations.  Patient will use coping skills to manage labile behavior.  6/20/2020 0801 by Xi Go RN  Outcome: Improving   Patient held a reality based conversation with nurse but interactions were brief.    Problem: Cognitive Impairment (Psychotic Signs/Symptoms)  Goal: Improved Thought Clarity and Organization (Psychotic Signs/Symptoms)  6/20/2020 0801 by Xi Go RN  Outcome: Change based on patient need/priority     Face to face report will  be communicated to oncoming RN.

## 2020-06-20 NOTE — PLAN OF CARE
"Pt denies SI/HI and hallucinations. During initial assessment with patient when she arrived on unit patient asked for a pen to correct her court paperwork. Paperwork with corrections filed in top of chart.  In her corrected paperwork PT again wrote/changed her address to the address she had initially stated she was going to \"kill the people who live there because she lives there\" PT also stated her last name is Shannon not Bryan.   When asked if patient knew why she was here, PT stated \" I dont like the way you are talking to me, because I did not do anything, I shouldn't be here I am getting a .\"  PT asked if she could come to groups yet.    PT was calm throughout shift, layed in bed throughout shift but her paperwork that she gave me is still presenting as delusional.    Face to face end of shift report communicated to oncoming ASHWIN Pandey RN  6/19/2020  11:00 PM             Problem: Adult Behavioral Health Plan of Care  Goal: Patient-Specific Goal (Individualization)  Description: Patient will sleep 6-8 hours per night  Patient will eat at 75% of meals daily  Patient will attend >50% of group programming daily  Patient will be compliant with medication  Patient will have appropriate boundaries staff and peers during hospital  6/19/2020-patient unable to wean to open unit due to elopement from MHICU. Treatment team will reassess daily.  Outcome: No Change  Note:        Problem: Cognitive Impairment (Psychotic Signs/Symptoms)  Goal: Improved Thought Clarity and Organization (Psychotic Signs/Symptoms)  Outcome: No Change                     "

## 2020-06-20 NOTE — PLAN OF CARE
PT arrived with security from [facility]. Calm, cooperative and room placement in MHICU for unpredictable behavior.

## 2020-06-20 NOTE — PLAN OF CARE
Problem: Adult Behavioral Health Plan of Care  Goal: Patient-Specific Goal (Individualization)  Description: Patient will sleep 6-8 hours per night  Patient will eat at 75% of meals daily  Patient will attend >50% of group programming daily  Patient will be compliant with medication  Patient will have appropriate boundaries staff and peers during hospital  6/19/2020-patient unable to wean to open unit due to elopement from MHICU. Treatment team will reassess daily.  6/20/2020 0133 by Linda Fair RN  Outcome: Improving  Note: Report received from Yessi rice. Pt observed sleeping in right side lying position with regular and unlabored respirations.    Pt has been in bed with eyes closed and regular respirations. 15 minute and PRN checks all night. No complaints offered. Will continue to monitor.    Pt slept approx 7 hours this NOC shift    Face to face end of shift report communicated to oncromelia RN.    June 20, 2020  1:33 AM          Problem: Cognitive Impairment (Psychotic Signs/Symptoms)  Goal: Improved Thought Clarity and Organization (Psychotic Signs/Symptoms)  6/20/2020 0133 by Linda Fair, RN  Outcome: Improving  Note: Unable to assess due to pt sleeping. No issues or concerns noted at this time.       Problem: Mental State/Mood Impairment (Psychotic Signs/Symptoms)  Goal: Improved Mental State and Mood (Psychotic Signs/Symptoms)  Outcome: Improving  Note: Unable to assess due to pt sleeping. No issues or concerns noted at this time.

## 2020-06-21 PROCEDURE — 99233 SBSQ HOSP IP/OBS HIGH 50: CPT | Performed by: NURSE PRACTITIONER

## 2020-06-21 PROCEDURE — 12400000 ZZH R&B MH

## 2020-06-21 PROCEDURE — 25000132 ZZH RX MED GY IP 250 OP 250 PS 637: Performed by: NURSE PRACTITIONER

## 2020-06-21 RX ADMIN — OLANZAPINE 10 MG: 10 TABLET, FILM COATED ORAL at 08:06

## 2020-06-21 ASSESSMENT — ACTIVITIES OF DAILY LIVING (ADL)
LAUNDRY: UNABLE TO COMPLETE
DRESS: INDEPENDENT;SCRUBS (BEHAVIORAL HEALTH)
ORAL_HYGIENE: PROMPTS
DRESS: INDEPENDENT
HYGIENE/GROOMING: PROMPTS
ORAL_HYGIENE: PROMPTS;INDEPENDENT
HYGIENE/GROOMING: PROMPTS;INDEPENDENT

## 2020-06-21 NOTE — PLAN OF CARE
"Face to face shift report received from Linda MCGREGOR RN. Rounding completed, pt observed.  Xi Go RN  6/21/2020  7:52 AM    Problem: Adult Behavioral Health Plan of Care  Goal: Patient-Specific Goal (Individualization)  Description: Patient will sleep 6-8 hours per night  Patient will eat at 75% of meals daily  Patient will attend >50% of group programming daily  Patient will be compliant with medication  Patient will have appropriate boundaries staff and peers during hospital  ELOPEMENT PRECAUTIONS; [facility] 6/21/2020-No wean r/t unpredictable behavior. Treatment team will reassess daily.  6/21/2020 0768 by Xi Go RN  Outcome: Improving  Note: Shift Summery:  Patient remains in a room in the ICU r/t unpredictable behavior  Patient cooperative with nurse at start of shift but stated she was tired.  Patient took Zyprexa without issues and cooperative with mouth check.  Patient visited with NP and this RN.  Patient making delusional and grandiose statements about how she created the world and then asking if her Dad was here.  When told that her Dad was not here, patient stated,\"He must have OD'd\". Patient paces around her room during conversation.   Patient is aware that she has a court hearing on 6/30/20 but then states that she is \"here in place of [place].  She is the one who is supposed to be here.\"  Patient is not currrently aggressive but too disorganized and unpredictable to wean to open unit at this time.  Patient denies pain or physical issues.  Appetite is good. Neglected hygiene.     Problem: Mental State/Mood Impairment (Psychotic Signs/Symptoms)  Goal: Improved Mental State and Mood (Psychotic Signs/Symptoms)  Description: Patient will have a reality based conversation.  Patient will report a decrease in delusions/hallucinations.  Patient will use coping skills to manage labile behavior.  6/21/2020 0293 by Xi Go RN  Outcome: No Change   Remains delusional " and grandiose.    Face to face report will be communicated to oncoming RN.

## 2020-06-21 NOTE — PLAN OF CARE
PT has been in bed throughout shift. PT irritable and continues to report that she is not supposed to be here. PT is disheveled and odorous. PT has asked to go to groups but is currently still a no wean. PT has not made any threats to staff this shift or had any outbursts. PT has been calm throughout shift.   PT denies SI/HI and does not appear to be responding to hallucinations.     PT had no PRNs this shift and no requests for PRNS.     Face to face end of shift report communicated to oncoming RN    Yessi Pandey RN  6/20/2020  11:18 PM       Problem: Adult Behavioral Health Plan of Care  Goal: Patient-Specific Goal (Individualization)  Description: Patient will sleep 6-8 hours per night  Patient will eat at 75% of meals daily  Patient will attend >50% of group programming daily  Patient will be compliant with medication  Patient will have appropriate boundaries staff and peers during hospital  ELOPEMENT PRECAUTIONS; Eloped from MHICU 6/17/2020 6/20/2020-No wean r/t unpredictable behavior. Treatment team will reassess daily.  Outcome: Improving  Note:        Problem: Mental State/Mood Impairment (Psychotic Signs/Symptoms)  Goal: Improved Mental State and Mood (Psychotic Signs/Symptoms)  Description: Patient will have a reality based conversation.  Patient will report a decrease in delusions/hallucinations.  Patient will use coping skills to manage labile behavior.  6/20/2020 9035 by Yessi Pandey, RN  Outcome: No Change

## 2020-06-21 NOTE — PROGRESS NOTES
"[facility]  Psychiatric Progress Note    Dayna is still very delusional.  She tells me that she is sleeping better and she states the Zyprexa is helping with that.  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  When I asked her if she can see she states \"yes, that is how I created the world\".  She stated that she has created the world multiple times in her conversation and obviously appears still very grandiose.  She is asking to come out of the mental health intensive care unit and to attend groups though she is extremely preoccupied disorganized and delusional and would not benefit from any of our groups at this point and would likely be a significant distraction to others.  I told her I was still concerned about her being a flight risk and she states that she would not run though then she tells me that it is not her who needs to be here and that she is taking the bed of a woman named Marbella who \"really needs to be here\" she then tells me that she believes that her father is at home and that he has overdosed and .  She appears to truly believe everything she is telling me.          Interim History:   The patient's care was discussed with the treatment team and chart notes were reviewed.    BEHAVIORAL TEAM DISCUSSION    Progress: Is not as agitated as yesterday.  Still very delusional.  Not agitated though very high flight risk   medical/Physical: None  Precautions: no  Falls precaution?: no  Behavioral Orders   Procedures     Code 1 - Restrict to Unit     Elopement precautions     Routine Programming     As clinically indicated     Status 15     Every 15 minutes.            Medications:     Current Facility-Administered Medications Ordered in Epic   Medication Dose Route Frequency Last Rate Last Dose     acetaminophen (TYLENOL) tablet 650 mg  650 mg Oral Q4H PRN         diphenhydrAMINE (BENADRYL) capsule 50 mg  50 mg Oral Q6H PRN   50 mg at 20    Or     diphenhydrAMINE (BENADRYL) " "injection 50 mg  50 mg Intramuscular Q6H PRN   50 mg at 06/17/20 1410     haloperidol (HALDOL) tablet 5 mg  5 mg Oral Q6H PRN        Or     haloperidol lactate (HALDOL) injection 5 mg  5 mg Intramuscular Q6H PRN   5 mg at 06/17/20 1410     LORazepam (ATIVAN) injection 2 mg  2 mg Intramuscular Q6H PRN   2 mg at 06/17/20 1410    Or     LORazepam (ATIVAN) tablet 2 mg  2 mg Oral Q6H PRN         magnesium hydroxide (MILK OF MAGNESIA) suspension 30 mL  30 mL Oral At Bedtime PRN         nicotine (NICORETTE) gum 2-4 mg  2-4 mg Buccal Q1H PRN         OLANZapine (zyPREXA) tablet 10 mg  10 mg Oral Daily   10 mg at 06/21/20 0806    Or     OLANZapine (zyPREXA) injection 10 mg  10 mg Intramuscular Daily         No current Epic-ordered outpatient medications on file.           MSE/PSYCH  PSYCHIATRIC EXAM  /61   Pulse 79   Temp 98.8  F (37.1  C) (Tympanic)   Resp 14   Ht 1.638 m (5' 4.5\")   Wt 76.6 kg (168 lb 12.8 oz)   SpO2 99%   BMI 28.53 kg/m    -Appearance/Behavior: normal and improved  -Motor: intact  -Gait: intact  -Abnormal involuntary movements: None  -Mood: labile  -Affect:  Less expansive  -Speech: less pressurred    -Thought process/associations: tangential  -Thought content:  hallucinations though likely present.grandiose still very delusional.  Believes she created the world.  -Perceptual disturbances: denies hallucinations..              -Suicidal/Homicidal Ideation: denies any   -Judgment: poor  -Insight: poor  *Orientation: time, place and person.  *Memory: difficult to assess due to mike  *Attention: poor  *Language: fluent, no aphasias, able to repeat phrases and name objects. Vocab intact.  *Fund of information: difficult to assess due to mike  *Cognitive functioning estimate: 0 - independent.        Plan:       Will not wean out of mhicu yet, still she is too high of flight risk  Continue scheduled Zyprexa to prevent escalation of behaviors. Was aggressive 3 days ago and symptoms are somewhat " improving although she remains very delusional..   Prn haldol will be available with ativan and benardyl if zyprexa is not effective enough  Petition for Granado will be filled out including Invega, Zyprexa, Abilify, Risperdal, and Geodon

## 2020-06-21 NOTE — PLAN OF CARE
Problem: Adult Behavioral Health Plan of Care  Goal: Patient-Specific Goal (Individualization)  Description: Patient will sleep 6-8 hours per night  Patient will eat at 75% of meals daily  Patient will attend >50% of group programming daily  Patient will be compliant with medication  Patient will have appropriate boundaries staff and peers during hospital  ELOPEMENT PRECAUTIONS; Eloped from MHICU 6/17/2020 6/20/2020-No wean r/t unpredictable behavior. Treatment team will reassess daily.  6/20/2020 2349 by Linda Fair, RN  Outcome: Improving  Note: Report received from Yessi rice. Pt observed sleeping in supine position with regular and unlabored respirations.    Pt has been in bed with eyes closed and regular respirations. 15 minute and PRN checks all night. No complaints offered. Will continue to monitor.    Pt slept approx 8 hours    Face to face end of shift report communicated to oncoming RN.    June 20, 2020  11:49 PM          Problem: Mental State/Mood Impairment (Psychotic Signs/Symptoms)  Goal: Improved Mental State and Mood (Psychotic Signs/Symptoms)  Description: Patient will have a reality based conversation.  Patient will report a decrease in delusions/hallucinations.  Patient will use coping skills to manage labile behavior.  6/20/2020 2349 by Linda Fair, RN  Outcome: Improving  Note: Unable to assess due to pt sleeping. No issues or concerns noted at this time.

## 2020-06-22 PROCEDURE — 25000132 ZZH RX MED GY IP 250 OP 250 PS 637: Performed by: NURSE PRACTITIONER

## 2020-06-22 PROCEDURE — 12400000 ZZH R&B MH

## 2020-06-22 RX ADMIN — OLANZAPINE 10 MG: 10 TABLET, FILM COATED ORAL at 08:08

## 2020-06-22 RX ADMIN — DIPHENHYDRAMINE HYDROCHLORIDE 50 MG: 50 CAPSULE ORAL at 20:23

## 2020-06-22 ASSESSMENT — ACTIVITIES OF DAILY LIVING (ADL)
HYGIENE/GROOMING: PROMPTS
DRESS: SCRUBS (BEHAVIORAL HEALTH);INDEPENDENT
LAUNDRY: UNABLE TO COMPLETE
DRESS: INDEPENDENT
ORAL_HYGIENE: PROMPTS
HYGIENE/GROOMING: PROMPTS
ORAL_HYGIENE: PROMPTS

## 2020-06-22 NOTE — PLAN OF CARE
Face to face shift report received from Mariposa KELLY RN. Rounding completed, pt observed.  Xi Go RN  6/22/2020  7:53 AM  Problem: Adult Behavioral Health Plan of Care  Goal: Patient-Specific Goal (Individualization)  Description: Patient will sleep 6-8 hours per night  Patient will eat at 75% of meals daily  Patient will attend >50% of group programming daily  Patient will be compliant with medication  Patient will have appropriate boundaries staff and peers during hospital  ELOPEMENT PRECAUTIONS; Eloped from ICU 6/17/2020 6/22/2020-No wean r/t unpredictable behavior. Treatment team will reassess daily.  6/22/2020 0751 by Xi Go RN  Outcome: No Change  Note: Shift Summery:  Patient remains in a room in the MHICU r/t unpredictable behavior and the need for decreased stimulation.  Patient cooperates with nursing assessment but continues to deny that she should be here.  Patient encouraged ivone shower but declines even after multiple prompts.  Patient gets up to eat and returns to bed.     Problem: Mental State/Mood Impairment (Psychotic Signs/Symptoms)  Goal: Improved Mental State and Mood (Psychotic Signs/Symptoms)  Description: Patient will have a reality based conversation.  Patient will report a decrease in delusions/hallucinations.  Patient will use coping skills to manage labile behavior.  6/22/2020 0751 by Xi Go RN  Outcome: No Change  Patient can only appropriately answer questions like if she is in pain or if she ahs showered.  Any other type of question and the patient rambles delusional comments/statements.     Face to face report will be communicated to oncoming RN.

## 2020-06-22 NOTE — PLAN OF CARE
"PT requesting to go to groups again this shift. PT continues with the delusion that she is not supposed to be here and that she does not have mental health issues. PT encouraged to shower but refused. PT untidy, disheveled and odorous. PT has not had any emotional outbursts throughout shift and has remained in bed.   PT denies all mental health criteria as shes \"waiting for my  because I should not even be in here.\"  PT is eating and drinking well.       Face to face end of shift report communicated to oncoming RN.     Yessi Pandey RN  6/21/2020  9:05 PM                 Problem: Adult Behavioral Health Plan of Care  Goal: Patient-Specific Goal (Individualization)  Description: Patient will sleep 6-8 hours per night  Patient will eat at 75% of meals daily  Patient will attend >50% of group programming daily  Patient will be compliant with medication  Patient will have appropriate boundaries staff and peers during hospital  ELOPEMENT PRECAUTIONS; Eloped from ICU 6/17/2020 6/21/2020-No wean r/t unpredictable behavior. Treatment team will reassess daily.  6/21/2020 2102 by Yessi Pandey, RN  Outcome: Improving  Note:        Problem: Mental State/Mood Impairment (Psychotic Signs/Symptoms)  Goal: Improved Mental State and Mood (Psychotic Signs/Symptoms)  Description: Patient will have a reality based conversation.  Patient will report a decrease in delusions/hallucinations.  Patient will use coping skills to manage labile behavior.  6/21/2020 2102 by Yessi Pandey, RN  Outcome: No Change     "

## 2020-06-22 NOTE — PLAN OF CARE
Face to face end of shift report will be communicated to oncromelia RN.    Problem: Adult Behavioral Health Plan of Care  Goal: Patient-Specific Goal (Individualization)  Description: Patient will sleep 6-8 hours per night  Patient will eat at 75% of meals daily  Patient will attend >50% of group programming daily  Patient will be compliant with medication  Patient will have appropriate boundaries staff and peers during hospital  ELOPEMENT PRECAUTIONS; Eloped from ICU 6/17/2020 6/21/2020-No wean r/t unpredictable behavior. Treatment team will reassess daily.  6/22/2020 0043 by Marbella Renteria RN  Outcome: No Change     Problem: Mental State/Mood Impairment (Psychotic Signs/Symptoms)  Goal: Improved Mental State and Mood (Psychotic Signs/Symptoms)  Description: Patient will have a reality based conversation.  Patient will report a decrease in delusions/hallucinations.  Patient will use coping skills to manage labile behavior.  6/22/2020 0043 by Marbella Renteria RN  Outcome: No Change   Face to face end of shift report obtained from ASHWIN Dickson. Pt observed resting in bed.  Pt appears to be sleeping in bed with eyes closed, having regular respirations and position changes. 15 minutes and PRN safety checks completed with no noted complains. Will continue to monitor.   0600- Pt slept 5.5 hours. Woke up at 0415 and requested juice, otherwise no complains or statements to staff. Pt pleasant.

## 2020-06-22 NOTE — PLAN OF CARE
Faxed jarvised petition to [facility] court admin this morning.     Spoke with staff at [facility] court admin this morning who confirms they received the petition.    Called and spoke with  Ceferino Resendiz who states he will be able to represent the hospital in the chisholm hearing on the 30th at 8:15am.     Received court paperwork from [facility] with pt's chisholm hearing which is scheduled for June 30th at 8:15am. Faxed court paperwork to Ceferino Resendiz and informed the court that he would be appearing by phone. Updated pt's nurse and NP.

## 2020-06-23 LAB
ALBUMIN SERPL-MCNC: 3.5 G/DL (ref 3.4–5)
ALP SERPL-CCNC: 63 U/L (ref 40–150)
ALT SERPL W P-5'-P-CCNC: 381 U/L (ref 0–50)
AST SERPL W P-5'-P-CCNC: 179 U/L (ref 0–45)
BILIRUB DIRECT SERPL-MCNC: <0.1 MG/DL (ref 0–0.2)
BILIRUB SERPL-MCNC: 0.3 MG/DL (ref 0.2–1.3)
PROT SERPL-MCNC: 7.7 G/DL (ref 6.8–8.8)

## 2020-06-23 PROCEDURE — 99233 SBSQ HOSP IP/OBS HIGH 50: CPT | Performed by: NURSE PRACTITIONER

## 2020-06-23 PROCEDURE — 25000132 ZZH RX MED GY IP 250 OP 250 PS 637: Performed by: NURSE PRACTITIONER

## 2020-06-23 PROCEDURE — 12400000 ZZH R&B MH

## 2020-06-23 PROCEDURE — 80076 HEPATIC FUNCTION PANEL: CPT | Performed by: NURSE PRACTITIONER

## 2020-06-23 PROCEDURE — 36415 COLL VENOUS BLD VENIPUNCTURE: CPT | Performed by: NURSE PRACTITIONER

## 2020-06-23 RX ORDER — OLANZAPINE 10 MG/2ML
10 INJECTION, POWDER, FOR SOLUTION INTRAMUSCULAR DAILY
Status: DISCONTINUED | OUTPATIENT
Start: 2020-06-24 | End: 2020-07-05

## 2020-06-23 RX ADMIN — DIPHENHYDRAMINE HYDROCHLORIDE 50 MG: 50 CAPSULE ORAL at 22:31

## 2020-06-23 RX ADMIN — OLANZAPINE 10 MG: 10 TABLET, FILM COATED ORAL at 08:20

## 2020-06-23 ASSESSMENT — ACTIVITIES OF DAILY LIVING (ADL)
DRESS: SCRUBS (BEHAVIORAL HEALTH)
ORAL_HYGIENE: PROMPTS
HYGIENE/GROOMING: PROMPTS

## 2020-06-23 NOTE — PROGRESS NOTES
"[facility]  Psychiatric Progress Note    Dayna is able to hold conversation for a longer period of time before she becomes delusional and agitated. 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 She then tells me that I look like I am high on cocaine and xanax. \"what kind of doctor asks if they can sit down and talk to you\". She wants to move over out of mhicu though she is still a very large flight risk as she continues to state she does not want to be here and doesn't need to be here. There would be too much stimulation if she was out of mhicu and her risk of aggression and paranoia would likely increase.           Interim History:   The patient's care was discussed with the treatment team and chart notes were reviewed.    BEHAVIORAL TEAM DISCUSSION    Progress: Is not as agitated as yesterday.  Still very delusional.  Not agitated though very high flight risk   medical/Physical: None  Precautions: no  Falls precaution?: no  Behavioral Orders   Procedures     Code 1 - Restrict to Unit     Elopement precautions     Routine Programming     As clinically indicated     Status 15     Every 15 minutes.            Medications:     Current Facility-Administered Medications Ordered in Epic   Medication Dose Route Frequency Last Rate Last Dose     acetaminophen (TYLENOL) tablet 650 mg  650 mg Oral Q4H PRN         diphenhydrAMINE (BENADRYL) capsule 50 mg  50 mg Oral Q6H PRN   50 mg at 06/22/20 2023    Or     diphenhydrAMINE (BENADRYL) injection 50 mg  50 mg Intramuscular Q6H PRN   50 mg at 06/17/20 1410     haloperidol (HALDOL) tablet 5 mg  5 mg Oral Q6H PRN        Or     haloperidol lactate (HALDOL) injection 5 mg  5 mg Intramuscular Q6H PRN   5 mg at 06/17/20 1410     LORazepam (ATIVAN) injection 2 mg  2 mg Intramuscular Q6H PRN   2 mg at 06/17/20 1410    Or     LORazepam (ATIVAN) tablet 2 mg  2 mg Oral Q6H PRN         " "magnesium hydroxide (MILK OF MAGNESIA) suspension 30 mL  30 mL Oral At Bedtime PRN         nicotine (NICORETTE) gum 2-4 mg  2-4 mg Buccal Q1H PRN         OLANZapine (zyPREXA) tablet 10 mg  10 mg Oral Daily   10 mg at 06/23/20 0820    Or     OLANZapine (zyPREXA) injection 10 mg  10 mg Intramuscular Daily         No current Epic-ordered outpatient medications on file.           MSE/PSYCH  PSYCHIATRIC EXAM  /64   Pulse 78   Temp 98.1  F (36.7  C) (Tympanic)   Resp 17   Ht 1.638 m (5' 4.5\")   Wt 76.6 kg (168 lb 12.8 oz)   SpO2 98%   BMI 28.53 kg/m    -Appearance/Behavior: normal and improved  -Motor: intact  -Gait: intact  -Abnormal involuntary movements: None  -Mood: labile  -Affect:  Less expansive  -Speech: less pressurred    -Thought process/associations: tangential  -Thought content:  hallucinations though likely present.grandiose still very delusional.  Believes she created the world.  -Perceptual disturbances: denies hallucinations..              -Suicidal/Homicidal Ideation: denies any   -Judgment: poor  -Insight: poor  *Orientation: time, place and person.  *Memory: difficult to assess due to mike  *Attention: poor  *Language: fluent, no aphasias, able to repeat phrases and name objects. Vocab intact.  *Fund of information: difficult to assess due to mike  *Cognitive functioning estimate: 0 - independent.        Plan:     Start depakote Er if lfts are normal.   Will not wean out of mhicu yet, still she is too high of flight risk  Continue scheduled Zyprexa and increase to 15 mg to prevent escalation of behaviors. Was aggressive 3 days ago and symptoms are somewhat improving although she remains very delusional..   Prn haldol will be available with ativan and benardyl if zyprexa is not effective enough  Petition for Granado will be filled out including Invega, Zyprexa, Abilify, Risperdal, and Geodon  "

## 2020-06-23 NOTE — PLAN OF CARE
"  Problem: Adult Behavioral Health Plan of Care  Goal: Patient-Specific Goal (Individualization)  Description: Patient will sleep 6-8 hours per night  Patient will eat at 75% of meals daily  Patient will attend >50% of group programming daily  Patient will be compliant with medication  Patient will have appropriate boundaries staff and peers during hospital  ELOPEMENT PRECAUTIONS; Eloped from MHICU 6/17/2020 6/22/2020-No wean r/t unpredictable behavior. Treatment team will reassess daily.  6/23/2020 0937 by Linda Concepcion RN  Outcome: No Change  Note:        Problem: Mental State/Mood Impairment (Psychotic Signs/Symptoms)  Goal: Improved Mental State and Mood (Psychotic Signs/Symptoms)  Description: Patient will have a reality based conversation.  Patient will report a decrease in delusions/hallucinations.  Patient will use coping skills to manage labile behavior.  6/23/2020 0937 by Linda Concepcion RN  Outcome: No Change   Pt. Slept 8 hours last night, ate 100% of breakfast, complaint with taking prescribed medication, cooperative with cares, states \"I want to go home, I am homesick\", no weaning due to unpredictable behaviors and flight risk.  Pt. 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    Face to face end of shift report will be communicated to oncoming afternoon shift RN.     Linda Concepcion RN  6/23/2020  10:40 AM        "

## 2020-06-23 NOTE — PLAN OF CARE
Face to face shift report received from ALIA Mckeon RN. Rounding completed, pt observed.   Problem: Adult Behavioral Health Plan of Care  Goal: Patient-Specific Goal (Individualization)  Description: Patient will sleep 6-8 hours per night  Patient will eat at 75% of meals daily  Patient will attend >50% of group programming daily  Patient will be compliant with medication  Patient will have appropriate boundaries staff and peers during hospital  ELOPEMENT PRECAUTIONS; Eloped from ICU 6/17/2020 6/23/2020-Per treatment team and NP, , elopement risk and the need for decreased stimulation wean r/t unpredictable behavior. Treatment team will reassess daily.  6/23/2020 1556 by Ping Sierra, RN  Outcome: No Change  Note: Shift Summery:      Pt continues in the  ICU,  With disorganized and delusions. Pt requested to wean to groups but mood is still labile with clear speech. She has been calm and displayed safe behaviors.Overall appearance is disheveled. Pt ate hundred percent of meals and snacks. Nursing will continue to monitor pt for any changes in behaviors.      Face to face report will be communicated to oncoming RN.    Ping Sierra RN  6/23/2020  3:58 PM   Problem: Adult Behavioral Health Plan of Care  Goal: Adheres to Safety Considerations for Self and Others  Outcome: No Change     Problem: Adult Behavioral Health Plan of Care  Goal: Optimized Coping Skills in Response to Life Stressors  Outcome: No Change     Problem: Adult Behavioral Health Plan of Care  Goal: Develops/Participates in Therapeutic Central to Support Successful Transition  Outcome: No Change     Problem: Mental State/Mood Impairment (Psychotic Signs/Symptoms)  Goal: Improved Mental State and Mood (Psychotic Signs/Symptoms)  Description: Patient will have a reality based conversation.  Patient will report a decrease in delusions/hallucinations.  Patient will use coping skills to manage labile behavior.  6/23/2020 1556 by Ping Sierra,  RN  Outcome: No Change

## 2020-06-23 NOTE — PLAN OF CARE
"PT has been calm and cooperative throughout shift. PT initially refused shower but then took one around 830pm. PT requested again to come out for groups but continues to make delusional statements. \"I should be able to come out, I am here for someone else anyways, I dont want to have to lay in here all day.\"  PT denies all mental health criteria.   PT denies pain.     Face to face end of shift report communicated to oncoming RN    Yessi Pandey RN  6/22/2020  10:18 PM               Problem: Adult Behavioral Health Plan of Care  Goal: Patient-Specific Goal (Individualization)  Description: Patient will sleep 6-8 hours per night  Patient will eat at 75% of meals daily  Patient will attend >50% of group programming daily  Patient will be compliant with medication  Patient will have appropriate boundaries staff and peers during hospital  ELOPEMENT PRECAUTIONS; Eloped from MHICU 6/17/2020 6/22/2020-No wean r/t unpredictable behavior. Treatment team will reassess daily.  Outcome: Improving  Note:        Problem: Mental State/Mood Impairment (Psychotic Signs/Symptoms)  Goal: Improved Mental State and Mood (Psychotic Signs/Symptoms)  Description: Patient will have a reality based conversation.  Patient will report a decrease in delusions/hallucinations.  Patient will use coping skills to manage labile behavior.  Outcome: Improving     "

## 2020-06-23 NOTE — PLAN OF CARE
Observed pt lying on her right side - eyes closed - non-labored breathing noted  It is now 0648 and pt has slept all noc without issue.Face to face end of shift report communicated to oncoming RN    Tita Power RN  6/23/2020  6:49 AM      .

## 2020-06-24 PROCEDURE — 25000132 ZZH RX MED GY IP 250 OP 250 PS 637: Performed by: NURSE PRACTITIONER

## 2020-06-24 PROCEDURE — 12400000 ZZH R&B MH

## 2020-06-24 RX ADMIN — OLANZAPINE 15 MG: 10 TABLET, FILM COATED ORAL at 11:05

## 2020-06-24 RX ADMIN — HALOPERIDOL 5 MG: 5 TABLET ORAL at 09:15

## 2020-06-24 RX ADMIN — DIPHENHYDRAMINE HYDROCHLORIDE 50 MG: 50 CAPSULE ORAL at 09:15

## 2020-06-24 RX ADMIN — LORAZEPAM 2 MG: 1 TABLET ORAL at 09:15

## 2020-06-24 ASSESSMENT — ACTIVITIES OF DAILY LIVING (ADL)
LAUNDRY: UNABLE TO COMPLETE
DRESS: SCRUBS (BEHAVIORAL HEALTH);INDEPENDENT
ORAL_HYGIENE: INDEPENDENT
HYGIENE/GROOMING: INDEPENDENT

## 2020-06-24 NOTE — PLAN OF CARE
Observed pt lying in a supine position - eyes closed non-labored breathing noted. Continuous camera and 15 min physical checks.  It is now 0650 pt was moved to 526 bed as MHICU bed needed - pt pleasant, polite and appropriate.

## 2020-06-24 NOTE — PLAN OF CARE
Face to face shift report received from GEMMA Montgomery RN. Rounding completed, pt observed.   Problem: Adult Behavioral Health Plan of Care  Goal: Plan of Care Review  Outcome: No Change     Problem: Adult Behavioral Health Plan of Care  Goal: Patient-Specific Goal (Individualization)  Description: Patient will sleep 6-8 hours per night  Patient will eat at 75% of meals daily  Patient will attend >50% of group programming daily  Patient will be compliant with medication  Patient will have appropriate boundaries staff and peers during hospital  ELOPEMENT PRECAUTIONS; Eloped from ICU 6/17/2020 6/24/2020 1616 by Ping Sierra, RN  Outcome: No Change  Note: Shift Summery:      Pt spend most the shift in bed sleeping, mood is calm with blunted affect. Pt speech is clear and overall appearance is disheveled with uncombed hair. Pt was encouraged to shower and wash hair. Pt adhered but didn't shower. Pt is cooperative and displayed safe behavior during this shift. She reported feeling well and verbalized feeling tired. Pt ate 100% of meals and compliant with unit guidelines. Nursing will continue to monitor pt for any changes in behaviors.     Face to face report will be communicated to oncoming RN.    Ping Sierra RN  6/24/2020  4:22 PM   Problem: Adult Behavioral Health Plan of Care  Goal: Adheres to Safety Considerations for Self and Others  Outcome: No Change     Problem: Adult Behavioral Health Plan of Care  Goal: Optimized Coping Skills in Response to Life Stressors  Outcome: No Change     Problem: Mental State/Mood Impairment (Psychotic Signs/Symptoms)  Goal: Improved Mental State and Mood (Psychotic Signs/Symptoms)  Description: Patient will have a reality based conversation.  Patient will report a decrease in delusions/hallucinations.  Patient will use coping skills to manage labile behavior.  6/24/2020 1616 by Ping Sierra, RN  Outcome: No Change

## 2020-06-24 NOTE — PLAN OF CARE
"Face to face end of shift report communicated from Tita HARDIN RN. Pt in [facility] at the start of the shift.     Valentina Salcido RN  6/24/2020  8:03 AM       Problem: Adult Behavioral Health Plan of Care  Goal: Patient-Specific Goal (Individualization)  Description: Patient will sleep 6-8 hours per night  Patient will eat at 75% of meals daily  Patient will attend >50% of group programming daily  Patient will be compliant with medication  Patient will have appropriate boundaries staff and peers during hospital  ELOPEMENT PRECAUTIONS; Eloped from ICU 6/17/2020 6/23/2020-Per treatment team and NP, , elopement risk and the need for decreased stimulation wean r/t unpredictable behavior. Treatment team will reassess daily.  Outcome: Improving    Pt in [facility] for breakfast, attended morning group. Pt denies pain, rates her anxiety at 10/10. Pt then states that she can't rate any of the symptoms. Pt states that she hs been like this for so long that it is her new normal and can't tell if her symptoms are high or low. Pt states that she has been drinking and doing meth so much that she doesn't even get high anymore. Pt states that she can't explain how she feels, that she paints the colors of the world like her mother did. Pt states that she is like a butterfly but not a monarch since they are mean. Pt also talked about jumping out of an airplane with her brother. Pt admits to being high anxiety, unfocused but states she is not crazy like they told her mom. Pt was given diphenhydramine 50mg, ativan 2mg and haldol 5mg at 0915.     1045-Pt came to nursing office, pt states that she needs \"a shot in her arm of the stuff she got in the isolation\" Pt was told that she did get the same medications this morning only in pill form. She stated that what she took this morning is exactly opposite of what she needs. Pt states that the other lady gave me shots in the arm when I first came here\". Pt was asked who she is talking about, " "pt states that she sent me a picture of the lady with her mind. Nursing told pt that we can not read her mind. Pt stated that it was the lady who is on the computer. Pt stated that she needs adderall Encouraged pt to talk to provider.     1105-pt asked if she wanted her morning medication, pt stated \"yea, I think I better\". Zyprexa given at 1105.    Pt in lounge for lunch but stayed in her room after lunch.           Problem: Mental State/Mood Impairment (Psychotic Signs/Symptoms)  Goal: Improved Mental State and Mood (Psychotic Signs/Symptoms)  Description: Patient will have a reality based conversation.  Patient will report a decrease in delusions/hallucinations.  Patient will use coping skills to manage labile behavior.    Pt able to have a conversation with nursing, occasionally pt appeared to have magical thinking. Pt   Outcome: Improving    Face to face end of shift report communicated to oncoming RN. Reported that pt is an elopement risk.     Valentina Salcido RN  6/24/2020  8:04 AM          "

## 2020-06-24 NOTE — PLAN OF CARE
Received email with time change of pt's commitment and chisholm hearing on June 30th- hearing will be changed from 8:15am to 9:30am- notified security.     Provided pt with copy of examiners report. Pt was resting in her room and denied any questions or concerns.

## 2020-06-25 PROCEDURE — 12400000 ZZH R&B MH

## 2020-06-25 PROCEDURE — 25000132 ZZH RX MED GY IP 250 OP 250 PS 637: Performed by: NURSE PRACTITIONER

## 2020-06-25 PROCEDURE — 99232 SBSQ HOSP IP/OBS MODERATE 35: CPT | Performed by: NURSE PRACTITIONER

## 2020-06-25 RX ADMIN — OLANZAPINE 15 MG: 10 TABLET, FILM COATED ORAL at 08:46

## 2020-06-25 RX ADMIN — LORAZEPAM 2 MG: 1 TABLET ORAL at 16:20

## 2020-06-25 ASSESSMENT — ACTIVITIES OF DAILY LIVING (ADL)
LAUNDRY: UNABLE TO COMPLETE
ORAL_HYGIENE: INDEPENDENT
HYGIENE/GROOMING: INDEPENDENT
DRESS: SCRUBS (BEHAVIORAL HEALTH);INDEPENDENT

## 2020-06-25 NOTE — PROGRESS NOTES
"[facility]  Psychiatric Progress Note    Patient is moved out to open unit. Today she is resting in her bed, limited responses although states \"I'm doing good\" and reports \"finally sleeping ok\".  She does not offer any additional concerns and wants to sleep.  She had labs drawn which indicates elevated liver functions so will not be starting Depakote.  Will consider starting lithium.  Patient has commit and chisholm court on Tuesday.          Interim History:   The patient's care was discussed with the treatment team and chart notes were reviewed.    BEHAVIORAL TEAM DISCUSSION    Progress: minimal, remains very delusional.  Not as agitated though remains flight risk   medical/Physical: None  Precautions: no  Falls precaution?: no  Behavioral Orders   Procedures     Code 1 - Restrict to Unit     Elopement precautions     Routine Programming     As clinically indicated     Status 15     Every 15 minutes.            Medications:     Current Facility-Administered Medications Ordered in Epic   Medication Dose Route Frequency Last Rate Last Dose     acetaminophen (TYLENOL) tablet 650 mg  650 mg Oral Q4H PRN         diphenhydrAMINE (BENADRYL) capsule 50 mg  50 mg Oral Q6H PRN   50 mg at 06/24/20 0915    Or     diphenhydrAMINE (BENADRYL) injection 50 mg  50 mg Intramuscular Q6H PRN   50 mg at 06/17/20 1410     haloperidol (HALDOL) tablet 5 mg  5 mg Oral Q6H PRN   5 mg at 06/24/20 0915    Or     haloperidol lactate (HALDOL) injection 5 mg  5 mg Intramuscular Q6H PRN   5 mg at 06/17/20 1410     LORazepam (ATIVAN) injection 2 mg  2 mg Intramuscular Q6H PRN   2 mg at 06/17/20 1410    Or     LORazepam (ATIVAN) tablet 2 mg  2 mg Oral Q6H PRN   2 mg at 06/24/20 0915     magnesium hydroxide (MILK OF MAGNESIA) suspension 30 mL  30 mL Oral At Bedtime PRN         nicotine (NICORETTE) gum 2-4 mg  2-4 mg Buccal Q1H PRN         OLANZapine (zyPREXA) tablet 15 mg  15 mg Oral Daily   15 mg at 06/25/20 0846    Or     OLANZapine " "(zyPREXA) injection 10 mg  10 mg Intramuscular Daily         No current Epic-ordered outpatient medications on file.           MSE/PSYCH  PSYCHIATRIC EXAM  BP (!) 95/45   Pulse 68   Temp 97.8  F (36.6  C) (Tympanic)   Resp 14   Ht 1.638 m (5' 4.5\")   Wt 76.6 kg (168 lb 12.8 oz)   SpO2 98%   BMI 28.53 kg/m    -Appearance/Behavior: normal and improved  -Motor: intact  -Gait: intact  -Abnormal involuntary movements: None  -Mood: labile  -Affect:  Less expansive  -Speech: less pressurred    -Thought process/associations: tangential  -Thought content:  hallucinations though likely present.grandiose still very delusional.  Believes she created the world.  -Perceptual disturbances: denies hallucinations..              -Suicidal/Homicidal Ideation: denies any   -Judgment: poor  -Insight: poor  *Orientation: time, place and person.  *Memory: difficult to assess due to mike  *Attention: poor  *Language: fluent, no aphasias, able to repeat phrases and name objects. Vocab intact.  *Fund of information: difficult to assess due to mike  *Cognitive functioning estimate: 0 - independent.        Plan:   ALT - 381  AST -179  Will repeat labs, possibly start lithium  Continue scheduled Zyprexa and increase to 15 mg to prevent escalation of behaviors.   Prn haldol will be available with ativan and benardyl if zyprexa is not effective enough  Petition for Granado will be filled out including Invega, Zyprexa, Abilify, Risperdal, and Geodon  "

## 2020-06-25 NOTE — PLAN OF CARE
Face to face shift report received from nurse. Rounding completed, pt observed.    Problem: Adult Behavioral Health Plan of Care  Goal: Patient-Specific Goal (Individualization)  Description: Patient will sleep 6-8 hours per night  Patient will eat at 75% of meals daily  Patient will attend >50% of group programming daily  Patient will be compliant with medication  Patient will have appropriate boundaries staff and peers during hospital  EL[facility]CAUTIONS; [facility]  Outcome: No Change  Note: Patient is bed at the start of shift. Patient came out to the day room to eat breakfast then went back to room and layed down. Patient in bed until lunch. Patient is calm and cooperative. Patient went back to room after lunch.    Problem: Mental State/Mood Impairment (Psychotic Signs/Symptoms)  Goal: Improved Mental State and Mood (Psychotic Signs/Symptoms)  Description: Patient will have a reality based conversation.  Patient will report a decrease in delusions/hallucinations.  Patient will use coping skills to manage labile behavior.  Outcome: No Change   Patient is calm.    Face to face report will be communicated to oncoming RN.    Rhys King RN  6/25/2020  7:53 AM

## 2020-06-25 NOTE — PLAN OF CARE
Observed pt lying on her right side - eyes closed - non-labored breathing noted - it is now 0615 and pt has slept all night without issue.Face to face end of shift report communicated to oncoming RN.     Tita Power RN  6/25/2020  6:17 AM

## 2020-06-25 NOTE — PLAN OF CARE
Face to face shift report received from PAT Joiner RN. Rounding completed, pt observed.   Problem: Adult Behavioral Health Plan of Care  Goal: Patient-Specific Goal (Individualization)  Description: Patient will sleep 6-8 hours per night  Patient will eat at 75% of meals daily  Patient will attend >50% of group programming daily  Patient will be compliant with medication  Patient will have appropriate boundaries staff and peers during hospital  ELOPEMENT PRECAUTIONS; Eloped from ICU 6/17/2020 6/25/2020 1708 by Ping Sierra RN  Outcome: No Change  Note: Shift Summery:   1620, pt utilized prn Ativan 2 mg for anxiety. Prn was effective.  Pt spend most of the shift in bed however, she  came out for meals and other request. Pt's mood is labile with blunted affect. Pt speech is clear and overall appearance is disheveled with uncombed hair. Pt was cooperative with display of safe behaviors. Pt ate 100% of meals and was compliant with unit guidelines. Nursing will continue to monitor pt for any changes in behaviors.     Face to face report will be communicated to oncoming RN.    Ping Sierra RN  6/25/2020  5:10 PM      Problem: Adult Behavioral Health Plan of Care  Goal: Adheres to Safety Considerations for Self and Others  Outcome: No Change     Problem: Adult Behavioral Health Plan of Care  Goal: Optimized Coping Skills in Response to Life Stressors  Outcome: No Change     Problem: Adult Behavioral Health Plan of Care  Goal: Develops/Participates in Therapeutic [place] to Support Successful Transition  Outcome: No Change     Problem: Mental State/Mood Impairment (Psychotic Signs/Symptoms)  Goal: Improved Mental State and Mood (Psychotic Signs/Symptoms)  Description: Patient will have a reality based conversation.  Patient will report a decrease in delusions/hallucinations.  Patient will use coping skills to manage labile behavior.  6/25/2020 1708 by Ping Sierra, RN  Outcome: No Change

## 2020-06-26 PROCEDURE — 12400000 ZZH R&B MH

## 2020-06-26 PROCEDURE — 25000132 ZZH RX MED GY IP 250 OP 250 PS 637: Performed by: NURSE PRACTITIONER

## 2020-06-26 RX ORDER — LITHIUM CARBONATE 300 MG/1
300 TABLET, FILM COATED, EXTENDED RELEASE ORAL EVERY 12 HOURS SCHEDULED
Status: DISCONTINUED | OUTPATIENT
Start: 2020-06-26 | End: 2020-06-28

## 2020-06-26 RX ADMIN — LITHIUM CARBONATE 300 MG: 300 TABLET, EXTENDED RELEASE ORAL at 20:35

## 2020-06-26 RX ADMIN — OLANZAPINE 15 MG: 10 TABLET, FILM COATED ORAL at 08:36

## 2020-06-26 RX ADMIN — LITHIUM CARBONATE 300 MG: 300 TABLET, EXTENDED RELEASE ORAL at 12:08

## 2020-06-26 ASSESSMENT — ACTIVITIES OF DAILY LIVING (ADL): HYGIENE/GROOMING: INDEPENDENT

## 2020-06-26 NOTE — PLAN OF CARE
Observed pt lying on her right side - eyes closed - non-labored breathing noted.  Slept all noc without issue.Face to face end of shift report communicated to oncoming RN.     Tita Power RN  6/26/2020  6:50 AM           Tita Power RN  6/26/2020  6:49 AM

## 2020-06-26 NOTE — PLAN OF CARE
Pt was in her room resting most of the day. Pt has court for commitment and chisholm on June 30th at 9:30am

## 2020-06-26 NOTE — PLAN OF CARE
"Problem: Adult Behavioral Health Plan of Care  Goal: Patient-Specific Goal (Individualization)  Description: Patient will sleep 6-8 hours per night  Patient will eat at 75% of meals daily  Patient will attend >50% of group programming daily  Patient will be compliant with medication  Patient will have appropriate boundaries staff and peers during hospital  ELOPEMENT PRECAUTIONS; Eloped from [facility] 6/17/2020    Pt out in lounge at start of shift for breakfast; ate 100%. According to charting, slept approx. 8 hours last night. Reports slept well. Compliant with medications. When asked about anxiety, etc pt stated \"how would I know, you're the nurse you tell me.\" Talking about how her \"grandpa had 70 heart attacks\". Pt did become irritable with writer at this time. Will continue to monitor.    1200 Ate 100% of lunch. Pt education provided on lithium and pt compliant with this medication. Provided new bedding as there was a red nicko; pt states it is from a marker, writer unsure as pt did request pads this am. Pt spent much of morning in room bed resting.  1535 Pt up at nurses station asking for a different name band as the one she has isn't her real name. Convinced another nurse took her mother's baby away.     Outcome: No Change     Problem: Mental State/Mood Impairment (Psychotic Signs/Symptoms)  Goal: Improved Mental State and Mood (Psychotic Signs/Symptoms)  Description: Patient will have a reality based conversation.  Patient will report a decrease in delusions/hallucinations.  Patient will use coping skills to manage labile behavior.  Outcome: No Change    Face to face end of shift report communicated to oncoming RN.     6/26/2020  12:40 PM     "

## 2020-06-27 PROCEDURE — 12400000 ZZH R&B MH

## 2020-06-27 PROCEDURE — 25000132 ZZH RX MED GY IP 250 OP 250 PS 637: Performed by: NURSE PRACTITIONER

## 2020-06-27 PROCEDURE — 99232 SBSQ HOSP IP/OBS MODERATE 35: CPT | Performed by: NURSE PRACTITIONER

## 2020-06-27 RX ADMIN — LITHIUM CARBONATE 300 MG: 300 TABLET, EXTENDED RELEASE ORAL at 20:30

## 2020-06-27 RX ADMIN — OLANZAPINE 15 MG: 10 TABLET, FILM COATED ORAL at 08:33

## 2020-06-27 RX ADMIN — LORAZEPAM 2 MG: 1 TABLET ORAL at 16:52

## 2020-06-27 RX ADMIN — HALOPERIDOL 5 MG: 5 TABLET ORAL at 16:52

## 2020-06-27 RX ADMIN — NICOTINE POLACRILEX 4 MG: 2 GUM, CHEWING ORAL at 16:52

## 2020-06-27 RX ADMIN — LITHIUM CARBONATE 300 MG: 300 TABLET, EXTENDED RELEASE ORAL at 08:33

## 2020-06-27 RX ADMIN — NICOTINE POLACRILEX 4 MG: 2 GUM, CHEWING ORAL at 08:57

## 2020-06-27 ASSESSMENT — ACTIVITIES OF DAILY LIVING (ADL)
HYGIENE/GROOMING: INDEPENDENT
HYGIENE/GROOMING: INDEPENDENT
DRESS: INDEPENDENT;SCRUBS (BEHAVIORAL HEALTH)

## 2020-06-27 NOTE — PROGRESS NOTES
"[facility]  Psychiatric Progress Note    Impression:   (per ED) Jonelle Adams is a 19 year old female who presented to the emergency department via [employer].  Patient presented to [facility] stating that she will kill herself if not allowed to leave at [place].  She is also stated that she does not know where she lives and she believes that she has been a missing person since the age of 6 years.  She has been rambling throughout ED stay and when she was with a [place] [employer].  Patient also stated that she wants to kill residents at [address] because they are \"in her house\".  Patient does not know how she arrived at the [facility].  All this is history was obtained from [employer].  Patient was smiling and anxious but was not able to give any history.  She however allowed me to examine her and requested that she be taken to her \"father's house\".  According to [employer], she has had behavioral health issues from the time she was a teenager.     Patient is up on the unit, showered and getting coffee this morning.  She reports doing \"ok\" and slept \"ok\".  We discuss starting the lithium, she states \"yeah, I think that's what I took\".  She denies suicidal thoughts or negative thoughts - and states \"no, I'm fine\" and politely ends conversation.       Educated regarding medication indications, risks, benefits, side effects, contraindications and possible interactions. Verbally expressed understanding.        Diagnoses:      Schizoaffective Disorder, bipolar type  Substance Abuse Disorder     Attestation:  Patient has been seen and evaluated by me,  SABI Calderón CNP          Interim History:   The patient's care was discussed with the treatment team and chart notes were reviewed.     BEHAVIORAL TEAM DISCUSSION     Progress: minimal  Continued Stay Criteria/Rationale: more cooperative remains delusional  Medical/Physical: None  Precautions:   Falls " "precaution?: YES       Behavioral Orders   Procedures     Code 1 - Restrict to Unit     Routine Programming       As clinically indicated     Status 15       Every 15 minutes.     Plan:   Start Lithium 300mg bid  Continue Zyprexa 15mg qday  Continue to monitor and offer prn medications.  Commitment and [place] court Tuesday, 6/30     Rationale for change in precautions or plan: Medication adjustment to target mood and delusion  Petition for [place]  including Invega, Zyprexa, Abilify, Risperdal, and Geodon     Participants: Bree Leary NP,  Nursing         Current Facility-Administered Medications   Medication     acetaminophen (TYLENOL) tablet 650 mg     diphenhydrAMINE (BENADRYL) capsule 50 mg    Or     diphenhydrAMINE (BENADRYL) injection 50 mg     haloperidol (HALDOL) tablet 5 mg    Or     haloperidol lactate (HALDOL) injection 5 mg     lithium ER (LITHOBID) CR tablet 300 mg     LORazepam (ATIVAN) injection 2 mg    Or     LORazepam (ATIVAN) tablet 2 mg     magnesium hydroxide (MILK OF MAGNESIA) suspension 30 mL     nicotine (NICORETTE) gum 2-4 mg     OLANZapine (zyPREXA) tablet 15 mg    Or     OLANZapine (zyPREXA) injection 10 mg            10 point ROS negative see H&P       Allergies:   No Known Allergies          Psychiatric Examination:   BP (!) 86/53   Pulse 79   Temp 97.6  F (36.4  C) (Tympanic)   Resp 16   Ht 1.638 m (5' 4.5\")   Wt 72.8 kg (160 lb 6.4 oz)   SpO2 98%   BMI 27.11 kg/m    Weight is 160 lbs 6.4 oz  Body mass index is 27.11 kg/m .     Appearance: appropriate, groomed  Attitude: somewhat guarded although cooperative today  Eye Contact:  minimal  Mood:  improving  Affect: flat  Speech: appropriate  Psychomotor Behavior: normal, no evidence of abnormal movements  Thought Process:  disorganized and illogical  Associations:  loosening of associations present  Thought Content:  disorganized, some improvement  Insight:  limited  Judgment:  poor  Oriented to:  x3  Attention Span and " Concentration:  poor  Recent and Remote Memory:  poor  Fund of Knowledge: delayed  Muscle Strength and Tone: normal  Gait and Station: normal             Labs:             Results for orders placed or performed during the hospital encounter of 06/11/20   HCG qualitative urine     Status: None   Result Value Ref Range     HCG Qual Urine Negative NEG^Negative   UA reflex to Microscopic and Culture     Status: Abnormal     Specimen: Midstream Urine   Result Value Ref Range     Color Urine Yellow       Appearance Urine Slightly Cloudy       Glucose Urine Negative NEG^Negative mg/dL     Bilirubin Urine Negative NEG^Negative     Ketones Urine Negative NEG^Negative mg/dL     Specific Gravity Urine 1.030 1.003 - 1.035     Blood Urine Negative NEG^Negative     pH Urine 6.0 4.7 - 8.0 pH     Protein Albumin Urine 30 (A) NEG^Negative mg/dL     Urobilinogen mg/dL Normal 0.0 - 2.0 mg/dL     Nitrite Urine Negative NEG^Negative     Leukocyte Esterase Urine Small (A) NEG^Negative     Source Midstream Urine       RBC Urine 3 (H) 0 - 2 /HPF     WBC Urine 4 0 - 5 /HPF     Bacteria Urine None (A) NEG^Negative /HPF     Squamous Epithelial /HPF Urine 18 (H) 0 - 1 /HPF     Mucous Urine Present (A) NEG^Negative /LPF     Amorphous Crystals Moderate (A) NEG^Negative /HPF   Urine Drugs of Abuse Screen Panel 13     Status: Abnormal   Result Value Ref Range     Cannabinoids (19-jia-7-carboxy-9-THC) Detected, Abnormal Result (A) NDET^Not Detected ng/mL     Phencyclidine (Phencyclidine) Not Detected NDET^Not Detected ng/mL     Cocaine (Benzoylecgonine) Not Detected NDET^Not Detected ng/mL     Methamphetamine (d-Methamphetamine) Detected, Abnormal Result (A) NDET^Not Detected ng/mL     Opiates (Morphine) Not Detected NDET^Not Detected ng/mL     Amphetamine (d-Amphetamine) Detected, Abnormal Result (A) NDET^Not Detected ng/mL     Benzodiazepines (Nordiazepam) Detected, Abnormal Result (A) NDET^Not Detected ng/mL     Tricyclic Antidepressants  (Desipramine) Not Detected NDET^Not Detected ng/mL     Methadone (Methadone) Not Detected NDET^Not Detected ng/mL     Barbiturates (Butalbital) Not Detected NDET^Not Detected ng/mL     Oxycodone (Oxycodone) Not Detected NDET^Not Detected ng/mL     Propoxyphene (Norpropoxyphene) Not Detected NDET^Not Detected ng/mL     Buprenorphine (Buprenorphine) Not Detected NDET^Not Detected ng/mL   CBC with platelets differential     Status: None   Result Value Ref Range     WBC 8.1 4.0 - 11.0 10e9/L     RBC Count 4.69 3.8 - 5.2 10e12/L     Hemoglobin 14.1 11.7 - 15.7 g/dL     Hematocrit 42.1 35.0 - 47.0 %     MCV 90 78 - 100 fl     MCH 30.1 26.5 - 33.0 pg     MCHC 33.5 31.5 - 36.5 g/dL     RDW 12.3 10.0 - 15.0 %     Platelet Count 280 150 - 450 10e9/L     Diff Method Automated Method       % Neutrophils 56.5 %     % Lymphocytes 35.2 %     % Monocytes 7.0 %     % Eosinophils 0.7 %     % Basophils 0.4 %     % Immature Granulocytes 0.2 %     Nucleated RBCs 0 0 /100     Absolute Neutrophil 4.6 1.6 - 8.3 10e9/L     Absolute Lymphocytes 2.8 0.8 - 5.3 10e9/L     Absolute Monocytes 0.6 0.0 - 1.3 10e9/L     Absolute Eosinophils 0.1 0.0 - 0.7 10e9/L     Absolute Basophils 0.0 0.0 - 0.2 10e9/L     Abs Immature Granulocytes 0.0 0 - 0.4 10e9/L     Absolute Nucleated RBC 0.0     Comprehensive metabolic panel     Status: Abnormal   Result Value Ref Range     Sodium 138 133 - 144 mmol/L     Potassium 3.8 3.4 - 5.3 mmol/L     Chloride 108 94 - 109 mmol/L     Carbon Dioxide 26 20 - 32 mmol/L     Anion Gap 4 3 - 14 mmol/L     Glucose 81 70 - 99 mg/dL     Urea Nitrogen 11 7 - 30 mg/dL     Creatinine 0.63 0.52 - 1.04 mg/dL     GFR Estimate >90 >60 mL/min/[1.73_m2]     GFR Estimate If Black >90 >60 mL/min/[1.73_m2]     Calcium 8.8 8.5 - 10.1 mg/dL     Bilirubin Total 0.4 0.2 - 1.3 mg/dL     Albumin 3.9 3.4 - 5.0 g/dL     Protein Total 8.1 6.8 - 8.8 g/dL     Alkaline Phosphatase 62 40 - 150 U/L      (H) 0 - 50 U/L     AST 84 (H) 0 - 45 U/L    TSH with free T4 reflex     Status: None   Result Value Ref Range     TSH 0.85 0.40 - 4.00 mU/L   Asymptomatic COVID-19 Virus (Coronavirus) by PCR     Status: None     Specimen: Nasopharyngeal   Result Value Ref Range     COVID-19 Virus PCR to [facility] - Source Nasopharyngeal       COVID-19 Virus PCR to [facility] - Result           Test received-See reflex to [place] test SARS CoV2 (COVID-19) Virus RT-PCR   SARS-CoV-2 COVID-19 Virus (Coronavirus) RT-PCR Nasopharyngeal     Status: None     Specimen: Nasopharyngeal   Result Value Ref Range     SARS-CoV-2 Virus Specimen Source Nasopharyngeal       SARS-CoV-2 PCR Result NEGATIVE       SARS-CoV-2 PCR Comment           This automated, real-time RT-PCR assay by [employer] on the [employer] has   been given Emergency Use Authorization (EUA) for the in vitro qualitative detection of RNA   from the SARS-CoV2 virus in nasopharyngeal swabs in viral transport medium from patients   with signs and symptoms of infection who are suspected of COVID-19. The performance is   unknown in asymptomatic patients.

## 2020-06-27 NOTE — PLAN OF CARE
"Problem: Adult Behavioral Health Plan of Care  Goal: Patient-Specific Goal (Individualization)  Description: Patient will sleep 6-8 hours per night  Patient will eat at 75% of meals daily  Patient will attend >50% of group programming daily  Patient will be compliant with medication  Patient will have appropriate boundaries staff and peers during hospital  ELOPEMENT PRECAUTIONS; Eloped from [facility] 6/17/2020    Pt in bed at start of shift. According to charting, pt slept approx. 8 hours last night. Ate 100% of breakfast. BP this am 86/50. Rechecked, /56. Compliant with medications. Pleasant, cooperative this am. Pt states unable to report if anxious. Denies pain. Pt did request if writer was able to change her last name in the system to her father's last name. States that \"Bryan\" is not her real last name, however was pleasant during this conversation. Did spend majority of the shift sitting in the rocking chair out in the lounge. Pt did shower and change bedding independently. Will continue to monitor.    1100 Attending groups.  1200 Ate 100% of lunch.    Outcome: No Change    Problem: Mental State/Mood Impairment (Psychotic Signs/Symptoms)  Goal: Improved Mental State and Mood (Psychotic Signs/Symptoms)  Description: Patient will have a reality based conversation.  Patient will report a decrease in delusions/hallucinations.  Patient will use coping skills to manage labile behavior.    Outcome: No Change    Face to face end of shift report communicated to nickolas RN.     6/27/2020  12:56 PM     "

## 2020-06-27 NOTE — PLAN OF CARE
Problem: Adult Behavioral Health Plan of Care  Goal: Patient-Specific Goal (Individualization)  Description: Patient will sleep 6-8 hours per night  Patient will eat at 75% of meals daily  Patient will attend >50% of group programming daily  Patient will be compliant with medication  Patient will have appropriate boundaries staff and peers during hospital  ELOPEMENT PRECAUTIONS; Eloped from ICU 6/17/2020 6/27/2020 0108 by Linda Fair, RN  Outcome: Improving  Note: Report received from Kyara Rounding complete. Pt observed sleeping in left side lying position with regular and unlabored respirations.    Pt up briefly at approx 0005 for a tampon and juice and returned to bed and was observed to be sleeping again by the 0015 check.    Pt has been in bed with eyes closed and regular respirations. 15 minute and PRN checks all night. No complaints offered. Will continue to monitor.    Pt slept approx 8 hours    Face to face end of shift report communicated to oncoming RN.    June 27, 2020  1:08 AM          Problem: Mental State/Mood Impairment (Psychotic Signs/Symptoms)  Goal: Improved Mental State and Mood (Psychotic Signs/Symptoms)  Description: Patient will have a reality based conversation.  Patient will report a decrease in delusions/hallucinations.  Patient will use coping skills to manage labile behavior.  6/27/2020 0108 by Linda Fair, RN  Outcome: Improving  Note: Pt only made brief requests and has remained asleep. Unable to assess.

## 2020-06-27 NOTE — PLAN OF CARE
"Problem: Adult Behavioral Health Plan of Care  Goal: Patient-Specific Goal (Individualization)  Description: Patient will sleep 6-8 hours per night  Patient will eat at 75% of meals daily  Patient will attend >50% of group programming daily  Patient will be compliant with medication  Patient will have appropriate boundaries staff and peers during hospital  ELOPEMENT PRECAUTIONS; Eloped from [facility] 6/17/2020 6/27/2020 1832 by Bina Weston, RN  Outcome: No Change    Pt asleep at start of shift, was up in lounge doing a jigsaw puzzle for a short time when writer made attempt to assess her. Pt is delusional and unable to participate, gives vague answers and rambles with disorganized thoughts. 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 Pt frustrated because she is supposed to be \"hooked up to a machine,\" and indicated her AC veins have never been this clear and her BP should never be under 116. Pt asked what we've done to her, as she was 6 feet tall a month ago, but is now 5'6\". Pt continued to ramble, increasingly agitated when explaining \"I'm not a Bryan, I'm Román Razo Shannon Admas, the only girl.\" Pt then asked about her Lithium; writer explained it was not due until bedtime and she was offered PRN which pt accepted. Pt was given Ativan and Haldol, asleep in bed after eating 100% of supper. Continued monitoring for needs or opportunity to assess further.     Problem: Mental State/Mood Impairment (Psychotic Signs/Symptoms)  Goal: Improved Mental State and Mood (Psychotic Signs/Symptoms)  Description: Patient will have a reality based conversation.  Patient will report a decrease in delusions/hallucinations.  Patient will use coping skills to manage labile behavior.  6/27/2020 1832 by Bina Weston, RN  Outcome: No Change  Continues to have delusional content in " conversation.    2330 - Face to face end of shift report to be communicated to oncoming shift RN.     Bina Weston RN  6/27/2020  7:30 PM

## 2020-06-28 PROCEDURE — 25000132 ZZH RX MED GY IP 250 OP 250 PS 637: Performed by: NURSE PRACTITIONER

## 2020-06-28 PROCEDURE — 12400000 ZZH R&B MH

## 2020-06-28 RX ORDER — LITHIUM CARBONATE 450 MG
450 TABLET, EXTENDED RELEASE ORAL EVERY 12 HOURS SCHEDULED
Status: DISCONTINUED | OUTPATIENT
Start: 2020-06-28 | End: 2020-07-05

## 2020-06-28 RX ADMIN — LITHIUM CARBONATE 450 MG: 450 TABLET ORAL at 19:47

## 2020-06-28 RX ADMIN — OLANZAPINE 15 MG: 10 TABLET, FILM COATED ORAL at 08:21

## 2020-06-28 RX ADMIN — NICOTINE POLACRILEX 4 MG: 2 GUM, CHEWING ORAL at 17:09

## 2020-06-28 RX ADMIN — LITHIUM CARBONATE 300 MG: 300 TABLET, EXTENDED RELEASE ORAL at 08:21

## 2020-06-28 ASSESSMENT — ACTIVITIES OF DAILY LIVING (ADL)
HYGIENE/GROOMING: INDEPENDENT;SHOWER
HYGIENE/GROOMING: INDEPENDENT
DRESS: INDEPENDENT;SCRUBS (BEHAVIORAL HEALTH)

## 2020-06-28 ASSESSMENT — MIFFLIN-ST. JEOR: SCORE: 1561.72

## 2020-06-28 NOTE — PLAN OF CARE
Face to face end of shift report will be communicated to oncromelia RN.     Problem: Adult Behavioral Health Plan of Care  Goal: Patient-Specific Goal (Individualization)  Description: Patient will sleep 6-8 hours per night  Patient will eat at 75% of meals daily  Patient will attend >50% of group programming daily  Patient will be compliant with medication  Patient will have appropriate boundaries staff and peers during hospital  EL[facility]CAUTIONS[facility] 6/28/2020 0104 by Marbella Renteria RN  Outcome: No Change     Problem: Mental State/Mood Impairment (Psychotic Signs/Symptoms)  Goal: Improved Mental State and Mood (Psychotic Signs/Symptoms)  Description: Patient will have a reality based conversation.  Patient will report a decrease in delusions/hallucinations.  Patient will use coping skills to manage labile behavior.  6/28/2020 0104 by Marbella Renteria RN  Outcome: No Change   Face to face end of shift report obtained from ASHWIN Curran. Pt observed resting in bed.  Pt appears to be sleeping in bed with eyes closed, having regular respirations, and position changes. 15 minutes and PRN safety checks completed with no noted complains.  Will continue to monitor.   0600- Pt slept 6 hours. Pt woke up once and requested snack, then pt returned to bed without any complains.

## 2020-06-28 NOTE — PLAN OF CARE
Problem: Adult Behavioral Health Plan of Care  Goal: Patient-Specific Goal (Individualization)  Description: Patient will sleep 6-8 hours per night  Patient will eat at 75% of meals daily  Patient will attend >50% of group programming daily  Patient will be compliant with medication  Patient will have appropriate boundaries staff and peers during hospital  [facility]CAUT[facility]    Pt in bed at start of shift. According to charting, pt slept approx. 6 hours last night. Ate 100% of breakfast. Compliant with medications. Pt pleasant, cooperative. Unable to report if feeling anxious, pain etc as she states she doesn't know the symptoms Stated her evening and night went well. At first states she will go to groups today and then says she probably won't when asked. Pt did not attend groups today. Isolative and withdrawn today. No complaints at this time. Will continue to monitor.    1200 Ate 100% of lunch.  1315 Bed resting.  1410 Pt out in lounge for snack. Encouraged to attend group.  1419 Pt attended group for a couple minutes.    Outcome: No Change    Problem: Mental State/Mood Impairment (Psychotic Signs/Symptoms)  Goal: Improved Mental State and Mood (Psychotic Signs/Symptoms)  Description: Patient will have a reality based conversation.  Patient will report a decrease in delusions/hallucinations.  Patient will use coping skills to manage labile behavior.    Outcome: No Change    Face to face end of shift report communicated to oncoming RN.     6/28/2020  1:15 PM

## 2020-06-28 NOTE — PLAN OF CARE
Problem: Adult Behavioral Health Plan of Care  Goal: Patient-Specific Goal (Individualization)  Description: Patient will sleep 6-8 hours per night  Patient will eat at 75% of meals daily  Patient will attend >50% of group programming daily  Patient will be compliant with medication  Patient will have appropriate boundaries staff and peers during hospital  ELOPEMENT PRECAUTIONS; Eloped from ICU 6/17/2020 6/28/2020 1901 by Bina Weston, RN  Outcome: No Change  Pt remains isolative to her room, napping most of the shift. Does interact with staff appropriately and has been calm and cooperative. Pt taking Lithium as ordered, no PRN meds requested or needed on shift.  When discussing medications, pt stated she feels healthy today. Not attending groups. Denied any physical complaint and is eating 100% of meals.     Problem: Mental State/Mood Impairment (Psychotic Signs/Symptoms)  Goal: Improved Mental State and Mood (Psychotic Signs/Symptoms)  Description: Patient will have a reality based conversation.  Patient will report a decrease in delusions/hallucinations.  Patient will use coping skills to manage labile behavior.  6/28/2020 1901 by Bina Weston, RN  Outcome: No Change    2330 - Face to face end of shift report to be communicated to oncoming shift RN.     Bina Weston RN  6/28/2020  7:10 PM

## 2020-06-29 LAB
ALBUMIN SERPL-MCNC: 3.4 G/DL (ref 3.4–5)
ALP SERPL-CCNC: 56 U/L (ref 40–150)
ALT SERPL W P-5'-P-CCNC: 304 U/L (ref 0–50)
AST SERPL W P-5'-P-CCNC: 143 U/L (ref 0–45)
BILIRUB DIRECT SERPL-MCNC: <0.1 MG/DL (ref 0–0.2)
BILIRUB SERPL-MCNC: 0.2 MG/DL (ref 0.2–1.3)
PROT SERPL-MCNC: 7.6 G/DL (ref 6.8–8.8)

## 2020-06-29 PROCEDURE — 36415 COLL VENOUS BLD VENIPUNCTURE: CPT | Performed by: NURSE PRACTITIONER

## 2020-06-29 PROCEDURE — 12400000 ZZH R&B MH

## 2020-06-29 PROCEDURE — 25000132 ZZH RX MED GY IP 250 OP 250 PS 637: Performed by: NURSE PRACTITIONER

## 2020-06-29 PROCEDURE — 99232 SBSQ HOSP IP/OBS MODERATE 35: CPT | Performed by: NURSE PRACTITIONER

## 2020-06-29 PROCEDURE — 80076 HEPATIC FUNCTION PANEL: CPT | Performed by: NURSE PRACTITIONER

## 2020-06-29 RX ADMIN — NICOTINE POLACRILEX 4 MG: 2 GUM, CHEWING ORAL at 20:36

## 2020-06-29 RX ADMIN — LITHIUM CARBONATE 450 MG: 450 TABLET ORAL at 09:24

## 2020-06-29 RX ADMIN — OLANZAPINE 15 MG: 10 TABLET, FILM COATED ORAL at 09:29

## 2020-06-29 RX ADMIN — LITHIUM CARBONATE 450 MG: 450 TABLET ORAL at 20:15

## 2020-06-29 RX ADMIN — DIPHENHYDRAMINE HYDROCHLORIDE 50 MG: 50 CAPSULE ORAL at 20:36

## 2020-06-29 ASSESSMENT — ACTIVITIES OF DAILY LIVING (ADL)
HYGIENE/GROOMING: INDEPENDENT
ORAL_HYGIENE: INDEPENDENT
DRESS: INDEPENDENT

## 2020-06-29 NOTE — PLAN OF CARE
Observed pt lying in a prone position - eyes closed - non-labored breathing noted.  It is now 0633 and pt has slept most of the night until lab interrupted her to obtain labs ordered by CNP - pt did inquire what lab and previous labs were for and she was given that information.  - pleasant, polite and appropriateFace to face end of shift report communicated to oncoming ASHWIN Power RN  6/29/2020  6:34 AM

## 2020-06-29 NOTE — PROGRESS NOTES
"[facility]  Psychiatric Progress Note    Impression:   (per ED) Jonelle Adams is a 19 year old female who presented to the emergency department via [place] [employer].  Patient presented to [facility] stating that she will kill herself if not allowed to leave at [place].  She is also stated that she does not know where she lives and she believes that she has been a missing person since the age of 6 years.  She has been rambling throughout ED stay and when she was with a [place] [employer].  Patient also stated that she wants to kill residents at [address] because they are \"in her house\".  Patient does not know how she arrived at the [facility].  All this is history was obtained from [employer].  Patient was smiling and anxious but was not able to give any history.  She however allowed me to examine her and requested that she be taken to her \"father's house\".  According to [place] [employer], she has had behavioral health issues from the time she was a teenager.     Patient is resting in her room.  She reports doing \"good\", sleeping \"good\" and denies suicidal thoughts or negative thoughts.  We discuss the recent increase in Lithium to 450mg and inform her we will be drawing lab again in a couple days.  She states \"well I'm going home tomorrow so don't worry about it\".  Remind patient she has court tomorrow and we will find out what her discharge plan will be after that.  Patient states \"I know I'm going to court tomorrow and then get to go home, my  said\".  She adds \"you know I have a house and that's where I'll be\".  Patient has been cooperative and polite, although clearly lacks insight.  She politely dismisses again.  Patient typically keeps to herself on the unit, most often in her room, she does not attend group programming, remains appropriate though with staff and peers.       Educated regarding medication indications, risks, benefits, side effects, contraindications " "and possible interactions. Verbally expressed understanding.        Diagnoses:      Schizoaffective Disorder, bipolar type  Substance Abuse Disorder     Attestation:  Patient has been seen and evaluated by me,  Bree Leary, SABI MOLINA          Interim History:   The patient's care was discussed with the treatment team and chart notes were reviewed.     BEHAVIORAL TEAM DISCUSSION     Progress: minimal  Continued Stay Criteria/Rationale: more cooperative remains delusional  Medical/Physical: None  Precautions:   Falls precaution?: YES       Behavioral Orders   Procedures     Code 1 - Restrict to Unit     Routine Programming       As clinically indicated     Status 15       Every 15 minutes.     Plan:   Continue Lithium 450mg bid, increased 6/28  Continue Zyprexa 15mg qday  Continue to monitor and offer prn medications.  Commitment and [place] court Tuesday, 6/30     Rationale for change in precautions or plan: Medication adjustment to target mood and delusion  Petition for [place]  including Invega, Zyprexa, Abilify, Risperdal, and Geodon     Participants: Bree Leary NP,  Nursing         Current Facility-Administered Medications   Medication     acetaminophen (TYLENOL) tablet 650 mg     diphenhydrAMINE (BENADRYL) capsule 50 mg    Or     diphenhydrAMINE (BENADRYL) injection 50 mg     haloperidol (HALDOL) tablet 5 mg    Or     haloperidol lactate (HALDOL) injection 5 mg     lithium (ESKALITH CR/LITHOBID) CR tablet 450 mg     LORazepam (ATIVAN) injection 2 mg    Or     LORazepam (ATIVAN) tablet 2 mg     magnesium hydroxide (MILK OF MAGNESIA) suspension 30 mL     nicotine (NICORETTE) gum 2-4 mg     OLANZapine (zyPREXA) tablet 15 mg    Or     OLANZapine (zyPREXA) injection 10 mg            10 point ROS negative see H&P       Allergies:   No Known Allergies          Psychiatric Examination:   BP (!) 86/53   Pulse 79   Temp 97.6  F (36.4  C) (Tympanic)   Resp 16   Ht 1.638 m (5' 4.5\")   Wt 72.8 kg (160 lb 6.4 " oz)   SpO2 98%   BMI 27.11 kg/m    Weight is 160 lbs 6.4 oz  Body mass index is 27.11 kg/m .     Appearance: appropriate, groomed  Attitude: somewhat guarded although cooperative again today  Eye Contact:  minimal  Mood:  improving  Affect: flat  Speech: appropriate  Psychomotor Behavior: normal, no evidence of abnormal movements  Thought Process:  disorganized and illogical  Associations:  loosening of associations present  Thought Content:  disorganized, some improvement  Insight:  limited  Judgment:  poor  Oriented to:  x3  Attention Span and Concentration:  poor  Recent and Remote Memory:  poor  Fund of Knowledge: delayed  Muscle Strength and Tone: normal  Gait and Station: normal             Labs:             Results for orders placed or performed during the hospital encounter of 06/11/20   HCG qualitative urine     Status: None   Result Value Ref Range     HCG Qual Urine Negative NEG^Negative   UA reflex to Microscopic and Culture     Status: Abnormal     Specimen: Midstream Urine   Result Value Ref Range     Color Urine Yellow       Appearance Urine Slightly Cloudy       Glucose Urine Negative NEG^Negative mg/dL     Bilirubin Urine Negative NEG^Negative     Ketones Urine Negative NEG^Negative mg/dL     Specific Gravity Urine 1.030 1.003 - 1.035     Blood Urine Negative NEG^Negative     pH Urine 6.0 4.7 - 8.0 pH     Protein Albumin Urine 30 (A) NEG^Negative mg/dL     Urobilinogen mg/dL Normal 0.0 - 2.0 mg/dL     Nitrite Urine Negative NEG^Negative     Leukocyte Esterase Urine Small (A) NEG^Negative     Source Midstream Urine       RBC Urine 3 (H) 0 - 2 /HPF     WBC Urine 4 0 - 5 /HPF     Bacteria Urine None (A) NEG^Negative /HPF     Squamous Epithelial /HPF Urine 18 (H) 0 - 1 /HPF     Mucous Urine Present (A) NEG^Negative /LPF     Amorphous Crystals Moderate (A) NEG^Negative /HPF   Urine Drugs of Abuse Screen Panel 13     Status: Abnormal   Result Value Ref Range     Cannabinoids (66-[id number]-7-carboxy-9-THC)  Detected, Abnormal Result (A) NDET^Not Detected ng/mL     Phencyclidine (Phencyclidine) Not Detected NDET^Not Detected ng/mL     Cocaine (Benzoylecgonine) Not Detected NDET^Not Detected ng/mL     Methamphetamine (d-Methamphetamine) Detected, Abnormal Result (A) NDET^Not Detected ng/mL     Opiates (Morphine) Not Detected NDET^Not Detected ng/mL     Amphetamine (d-Amphetamine) Detected, Abnormal Result (A) NDET^Not Detected ng/mL     Benzodiazepines (Nordiazepam) Detected, Abnormal Result (A) NDET^Not Detected ng/mL     Tricyclic Antidepressants (Desipramine) Not Detected NDET^Not Detected ng/mL     Methadone (Methadone) Not Detected NDET^Not Detected ng/mL     Barbiturates (Butalbital) Not Detected NDET^Not Detected ng/mL     Oxycodone (Oxycodone) Not Detected NDET^Not Detected ng/mL     Propoxyphene (Norpropoxyphene) Not Detected NDET^Not Detected ng/mL     Buprenorphine (Buprenorphine) Not Detected NDET^Not Detected ng/mL   CBC with platelets differential     Status: None   Result Value Ref Range     WBC 8.1 4.0 - 11.0 10e9/L     RBC Count 4.69 3.8 - 5.2 10e12/L     Hemoglobin 14.1 11.7 - 15.7 g/dL     Hematocrit 42.1 35.0 - 47.0 %     MCV 90 78 - 100 fl     MCH 30.1 26.5 - 33.0 pg     MCHC 33.5 31.5 - 36.5 g/dL     RDW 12.3 10.0 - 15.0 %     Platelet Count 280 150 - 450 10e9/L     Diff Method Automated Method       % Neutrophils 56.5 %     % Lymphocytes 35.2 %     % Monocytes 7.0 %     % Eosinophils 0.7 %     % Basophils 0.4 %     % Immature Granulocytes 0.2 %     Nucleated RBCs 0 0 /100     Absolute Neutrophil 4.6 1.6 - 8.3 10e9/L     Absolute Lymphocytes 2.8 0.8 - 5.3 10e9/L     Absolute Monocytes 0.6 0.0 - 1.3 10e9/L     Absolute Eosinophils 0.1 0.0 - 0.7 10e9/L     Absolute Basophils 0.0 0.0 - 0.2 10e9/L     Abs Immature Granulocytes 0.0 0 - 0.4 10e9/L     Absolute Nucleated RBC 0.0     Comprehensive metabolic panel     Status: Abnormal   Result Value Ref Range     Sodium 138 133 - 144 mmol/L     Potassium 3.8  3.4 - 5.3 mmol/L     Chloride 108 94 - 109 mmol/L     Carbon Dioxide 26 20 - 32 mmol/L     Anion Gap 4 3 - 14 mmol/L     Glucose 81 70 - 99 mg/dL     Urea Nitrogen 11 7 - 30 mg/dL     Creatinine 0.63 0.52 - 1.04 mg/dL     GFR Estimate >90 >60 mL/min/[1.73_m2]     GFR Estimate If Black >90 >60 mL/min/[1.73_m2]     Calcium 8.8 8.5 - 10.1 mg/dL     Bilirubin Total 0.4 0.2 - 1.3 mg/dL     Albumin 3.9 3.4 - 5.0 g/dL     Protein Total 8.1 6.8 - 8.8 g/dL     Alkaline Phosphatase 62 40 - 150 U/L      (H) 0 - 50 U/L     AST 84 (H) 0 - 45 U/L   TSH with free T4 reflex     Status: None   Result Value Ref Range     TSH 0.85 0.40 - 4.00 mU/L   Asymptomatic COVID-19 Virus (Coronavirus) by PCR     Status: None     Specimen: Nasopharyngeal   Result Value Ref Range     COVID-19 Virus PCR to [facility] - Source Nasopharyngeal       COVID-19 Virus PCR to [facility] - Result           Test received-See reflex to [place] test SARS CoV2 (COVID-19) Virus RT-PCR   SARS-CoV-2 COVID-19 Virus (Coronavirus) RT-PCR Nasopharyngeal     Status: None     Specimen: Nasopharyngeal   Result Value Ref Range     SARS-CoV-2 Virus Specimen Source Nasopharyngeal       SARS-CoV-2 PCR Result NEGATIVE       SARS-CoV-2 PCR Comment           This automated, real-time RT-PCR assay by [employer] on the [employer] has   been given Emergency Use Authorization (EUA) for the in vitro qualitative detection of RNA   from the SARS-CoV2 virus in nasopharyngeal swabs in viral transport medium from patients   with signs and symptoms of infection who are suspected of COVID-19. The performance is   unknown in asymptomatic patients.

## 2020-06-29 NOTE — PLAN OF CARE
Face to face report received from Tita CHRISTOPHER. Pt. Observed.     Problem: Adult Behavioral Health Plan of Care  Goal: Patient-Specific Goal (Individualization)  Description: Patient will sleep 6-8 hours per night  Patient will eat at 75% of meals daily  Patient will attend >50% of group programming daily  Patient will be compliant with medication  Patient will have appropriate boundaries staff and peers during hospital  ELOP[facility] PRECAUTIONS[facility]    Pt. Denies all criteria. Pt. Denies HI, SI, anxiety, depression, hallucinations, and pain. This a.m. Pt. Smiling while talking to this writer. Pt. Reporting I just woke up happy. Pt. Cooperative with medications and nursing assessment. Pt. In agreement to update this writer to thoughts feelings of wanting to harm self or others. Pt. Appropriate with staff and peers. Pt. Eating WDL. Pt. Offers no complaints of issues with bowel and bladder.     Julia Doherty RN  6/29/2020  12:36 PM

## 2020-06-29 NOTE — PLAN OF CARE
"Face to face shift report received from day shift RN. Rounding completed, pt observed.  Xi Go RN  6/29/2020  3:57 PM    Problem: Adult Behavioral Health Plan of Care  Goal: Patient-Specific Goal (Individualization)  Description: Patient will sleep 6-8 hours per night  Patient will eat at 75% of meals daily  Patient will attend >50% of group programming daily  Patient will be compliant with medication  Patient will have appropriate boundaries staff and peers during hospital  [facility]CA[facility] 6/29/2020 1554 by Xi Go, RN  Outcome: No Change  Note: Shift Summery:  Patient was resting in her room a the start of this shift. Patient prompted up for evening meal.  Appetite is good, denies pain.  Pleasant and smiled during conversation.  Patient requested and given Benadryl 50 mg po at HS for sleep aid.  Patient was cooperative with HS scheduled Lithium but stated,\" I have ADHD not a mood disorder\".  Denies any unwanted side effects or benefits from current medications.     Problem: Mental State/Mood Impairment (Psychotic Signs/Symptoms)  Goal: Improved Mental State and Mood (Psychotic Signs/Symptoms)  Description: Patient will have a reality based conversation.  Patient will report a decrease in delusions/hallucinations.  Patient will use coping skills to manage labile behavior.  Outcome: No Change  Patient is oriented x4.  Aware of court hearing tomorrow morning.  Able to make her needs known to staff.     Face to face report will be communicated to oncoming RN.        "

## 2020-06-30 PROCEDURE — 25000132 ZZH RX MED GY IP 250 OP 250 PS 637: Performed by: NURSE PRACTITIONER

## 2020-06-30 PROCEDURE — 12400000 ZZH R&B MH

## 2020-06-30 PROCEDURE — 12400011

## 2020-06-30 RX ADMIN — LORAZEPAM 2 MG: 1 TABLET ORAL at 20:27

## 2020-06-30 RX ADMIN — HALOPERIDOL 5 MG: 5 TABLET ORAL at 20:27

## 2020-06-30 RX ADMIN — OLANZAPINE 15 MG: 10 TABLET, FILM COATED ORAL at 08:21

## 2020-06-30 RX ADMIN — LITHIUM CARBONATE 450 MG: 450 TABLET ORAL at 08:21

## 2020-06-30 RX ADMIN — LITHIUM CARBONATE 450 MG: 450 TABLET ORAL at 20:22

## 2020-06-30 NOTE — PLAN OF CARE
"Pt had court this morning for commitment and [place]-  Anshul has decided to take it \"under advisement.\" Pt denies any other questions or concerns at this time.   "

## 2020-06-30 NOTE — PLAN OF CARE
Problem: Adult Behavioral Health Plan of Care  Goal: Patient-Specific Goal (Individualization)  Description: Patient will sleep 6-8 hours per night  Patient will eat at 75% of meals daily  Patient will attend >50% of group programming daily  Patient will be compliant with medication  Patient will have appropriate boundaries staff and peers during hospital  ELOPEMENT PRECAUTIONS; Eloped from ICU 6/17/2020 6/29/2020 7132 by Kiki Castle, RN  Outcome: No Change  Note:      Face to face shift report received from Xi CHRISTOPHER. Rounding completed, pt observed.     Pt in bed most of this shift, appearing to sleep approximately 5 hours.     Face to face report will be communicated to oncoming RN.    Kiki Castle RN  6/30/2020  6:07 AM

## 2020-06-30 NOTE — PLAN OF CARE
"Face to face end of shift report communicated to oncoming shift.      Annamaria Em RN  6/30/2020  2:43 PM    Patient reports no difficulties with sleep.  Patient eats 100% of her meals.  Patient is out in the lounge for meal time. Patient does not attend groups this shift.  Patient compliant with all medications.  Boundaries remain appropriate with staff and peers.    No elopement attempts this shift.   Patient denies SI, HI, AH and VH, appears responding at times.      Patient reports \"I'm going home today\"  when questioning patient of this patient states \"I have court right away and am leaving\"      Face to face start of shift report received from Kiki SANCHES RN  Patient in bed at this time, will continue to monitor.     Problem: Adult Behavioral Health Plan of Care  Goal: Patient-Specific Goal (Individualization)  Description: Patient will sleep 6-8 hours per night  Patient will eat at 75% of meals daily  Patient will attend >50% of group programming daily  Patient will be compliant with medication  Patient will have appropriate boundaries staff and peers during hospital  ELOPEMENT PRECAUTIONS; Eloped from [facility] 6/17/2020 6/30/2020 0822 by Annamaria Em RN  Outcome: No Change     Problem: Mental State/Mood Impairment (Psychotic Signs/Symptoms)  Goal: Improved Mental State and Mood (Psychotic Signs/Symptoms)  Description: Patient will have a reality based conversation.  Patient will report a decrease in delusions/hallucinations.  Patient will use coping skills to manage labile behavior.  Outcome: No Change     "

## 2020-06-30 NOTE — PLAN OF CARE
Face to face end of shift report communicated from Annamaria ANDERSON RN. Pt in bed at the start of the shift.     Valentina Salcido RN  6/30/2020  4:13 PM       Problem: Adult Behavioral Health Plan of Care  Goal: Patient-Specific Goal (Individualization)  Description: Patient will sleep 6-8 hours per night  Patient will eat at 75% of meals daily  Patient will attend >50% of group programming daily  Patient will be compliant with medication  Patient will have appropriate boundaries staff and peers during hospital  ELOPEMENT PRECAUTIONS; Eloped from ICU 6/17/2020 6/30/2020 1613 by Valentina Salcido RN  Outcome: Improving  Note:        Problem: Mental State/Mood Impairment (Psychotic Signs/Symptoms)  Goal: Improved Mental State and Mood (Psychotic Signs/Symptoms)  Description: Patient will have a reality based conversation.  Patient will report a decrease in delusions/hallucinations.  Patient will use coping skills to manage labile behavior.  6/30/2020 1613 by Valentina Salcido RN  Outcome: Improving    Face to face end of shift report communicated to nickolas CHRISTOPHER.     Valentina Salcido RN  6/30/2020  4:14 PM

## 2020-07-01 PROCEDURE — 25000132 ZZH RX MED GY IP 250 OP 250 PS 637: Performed by: NURSE PRACTITIONER

## 2020-07-01 PROCEDURE — 12400011

## 2020-07-01 PROCEDURE — 99232 SBSQ HOSP IP/OBS MODERATE 35: CPT | Performed by: NURSE PRACTITIONER

## 2020-07-01 PROCEDURE — 12400000 ZZH R&B MH

## 2020-07-01 RX ADMIN — OLANZAPINE 15 MG: 10 TABLET, FILM COATED ORAL at 08:14

## 2020-07-01 RX ADMIN — LITHIUM CARBONATE 450 MG: 450 TABLET ORAL at 08:14

## 2020-07-01 RX ADMIN — LITHIUM CARBONATE 450 MG: 450 TABLET ORAL at 20:44

## 2020-07-01 RX ADMIN — HALOPERIDOL 5 MG: 5 TABLET ORAL at 20:44

## 2020-07-01 RX ADMIN — LORAZEPAM 2 MG: 1 TABLET ORAL at 20:44

## 2020-07-01 ASSESSMENT — ACTIVITIES OF DAILY LIVING (ADL)
LAUNDRY: UNABLE TO COMPLETE
ORAL_HYGIENE: INDEPENDENT
HYGIENE/GROOMING: INDEPENDENT
DRESS: INDEPENDENT;SCRUBS (BEHAVIORAL HEALTH)

## 2020-07-01 NOTE — PLAN OF CARE
"Face to face end of shift report communicated from Annamaria ANDERSON RN. Pt in bed at the start of the shift.     Valentina Salcido RN  7/1/2020  3:39 PM       Problem: Adult Behavioral Health Plan of Care  Goal: Patient-Specific Goal (Individualization)  Description: Patient will sleep 6-8 hours per night  Patient will eat at 75% of meals daily  Patient will attend >50% of group programming daily  Patient will be compliant with medication  Patient will have appropriate boundaries staff and peers during hospital  ELOPEMENT PRECAUTIONS; Eloped from ICU 6/17/2020      Pt has been in bed most of the shift. Pt got up for dinner and snack. Pt denies pain, anxiety, hallucinations and SI. Pt does report depression but states that it \"isn't too bad\".   7/1/2020 1538 by Valentina Salcido RN  Outcome: Improving  Note:        Problem: Mental State/Mood Impairment (Psychotic Signs/Symptoms)  Goal: Improved Mental State and Mood (Psychotic Signs/Symptoms)  Description: Patient will have a reality based conversation.  Patient will report a decrease in delusions/hallucinations.  Patient will use coping skills to manage labile behavior.  7/1/2020 1538 by Valentina Salcido RN  Outcome: Improving       Face to face end of shift report communicated to oncromelia RN. Reported that pt is an elopement risk.     Valentina Salcido RN  7/1/2020  3:39 PM       "

## 2020-07-01 NOTE — PLAN OF CARE
Problem: Adult Behavioral Health Plan of Care  Goal: Patient-Specific Goal (Individualization)  Description: Patient will sleep 6-8 hours per night  Patient will eat at 75% of meals daily  Patient will attend >50% of group programming daily  Patient will be compliant with medication  Patient will have appropriate boundaries staff and peers during hospital  ELOPEMENT PRECAUTIONS; Eloped from [facility] 6/17/2020 7/1/2020 0029 by Kiki Castle, RN  Outcome: No Change  Note:        Problem: Mental State/Mood Impairment (Psychotic Signs/Symptoms)  Goal: Improved Mental State and Mood (Psychotic Signs/Symptoms)  Description: Patient will have a reality based conversation.  Patient will report a decrease in delusions/hallucinations.  Patient will use coping skills to manage labile behavior.  7/1/2020 0029 by Kiki Castle, RN  Outcome: No Change     Face to face shift report received from Olena RN. Rounding completed, pt observed.    Pt appeared to be sleeping most of this shift. Pt did get up for a snack and returned to bed. Pt appropriate with staff this shift. Pt did not make any attempts to elope unit.     Face to face report will be communicated to oncromelia RN.    Kiki Castle RN  7/1/2020  6:18 AM

## 2020-07-01 NOTE — PLAN OF CARE
Face to face end of shift report communicated to oncoming shift.     Annamaria Em RN  7/1/2020  1:35 PM    Patient sleeps greater than 6 hours per night, eats 100% of meals, patient does not attend groups.  Patient is compliant with medications.  Patient is up in the lounge to eat breakfast this shift.  Reports back to room after breakfast and goes to sleep.    Patient denies SI, HI, AH and VH, appears responding at times.    Patient continues to remain withdrawn and isolative to her room, laying in bed.      Face to face start of shift report received from Kiki SANCHES RN  Patient in bed at this time, will continue to monitor.     Problem: Adult Behavioral Health Plan of Care  Goal: Patient-Specific Goal (Individualization)  Description: Patient will sleep 6-8 hours per night  Patient will eat at 75% of meals daily  Patient will attend >50% of group programming daily  Patient will be compliant with medication  Patient will have appropriate boundaries staff and peers during hospital  ELOPEMENT PRECAUTIONS; Eloped from ICU 6/17/2020 7/1/2020 1017 by Annamaria Em RN  Outcome: No Change  Note:        Problem: Mental State/Mood Impairment (Psychotic Signs/Symptoms)  Goal: Improved Mental State and Mood (Psychotic Signs/Symptoms)  Description: Patient will have a reality based conversation.  Patient will report a decrease in delusions/hallucinations.  Patient will use coping skills to manage labile behavior.  7/1/2020 1017 by Annamaria Em RN  Outcome: No Change

## 2020-07-01 NOTE — PLAN OF CARE
Spoke with Justine WEST from the [facility] to see if a commitment or chisholm order has been ordered and she states she still has not seen anything come through.    Faxed referral info to [facility] for the [facility] list.

## 2020-07-02 PROCEDURE — 25000132 ZZH RX MED GY IP 250 OP 250 PS 637: Performed by: NURSE PRACTITIONER

## 2020-07-02 PROCEDURE — 12400011

## 2020-07-02 PROCEDURE — 12400000 ZZH R&B MH

## 2020-07-02 PROCEDURE — 99232 SBSQ HOSP IP/OBS MODERATE 35: CPT | Performed by: NURSE PRACTITIONER

## 2020-07-02 RX ADMIN — LITHIUM CARBONATE 450 MG: 450 TABLET ORAL at 20:23

## 2020-07-02 RX ADMIN — NICOTINE POLACRILEX 4 MG: 2 GUM, CHEWING ORAL at 18:18

## 2020-07-02 RX ADMIN — DIPHENHYDRAMINE HYDROCHLORIDE 50 MG: 50 CAPSULE ORAL at 20:26

## 2020-07-02 RX ADMIN — OLANZAPINE 15 MG: 10 TABLET, FILM COATED ORAL at 08:23

## 2020-07-02 RX ADMIN — LITHIUM CARBONATE 450 MG: 450 TABLET ORAL at 08:24

## 2020-07-02 RX ADMIN — LORAZEPAM 2 MG: 1 TABLET ORAL at 16:17

## 2020-07-02 ASSESSMENT — ACTIVITIES OF DAILY LIVING (ADL)
HYGIENE/GROOMING: INDEPENDENT
ORAL_HYGIENE: INDEPENDENT
DRESS: INDEPENDENT

## 2020-07-02 NOTE — PLAN OF CARE
Spoke with court admin this morning and confirmed that a commitment and chisholm order has been issued. Court admin email staff a copy of this. Placed copy in pt's chart and provided pt with copy and explained order to her. Pt was not receptive to this and thinks she is able to discharge even though she was told she would not be able to. Encouraged pt to contact her  with additional questions and provided her with contact info.     Completed insurance screening tool with pt and sent info to [facility].    Faxed court order to [facility].

## 2020-07-02 NOTE — PLAN OF CARE
"Face to face shift report received from Annamaria ANDERSON RN. Rounding completed, pt observed.  Xi Go RN  7/2/2020  3:52 PM    Problem: Adult Behavioral Health Plan of Care  Goal: Patient-Specific Goal (Individualization)  Description: Patient will sleep 6-8 hours per night  Patient will eat at 75% of meals daily  Patient will attend >50% of group programming daily  Patient will be compliant with medication  Patient will have appropriate boundaries staff and peers during hospital  ELOPEMENT PRECAUTIONS; Eloped from [facility] 6/17/2020 7/2/2020 1551 by Xi Go, RN  Outcome: Improving  Note: Shift Summery:  Patient was in group at the start of this shift.  Patient pulse elevated at start of this shift at 120 bpm.  Patient was asked if she was anxious or upset.  Patient went into how she did not even know what anxiety felt like and denies being upset.  Patient's leg moving entire time nurse was talking to her about her elevated pulse.  Patient was offered and accepted Ativan 2 mg po at 1617.  Pule at 108 within 30 minutes.  Patient asked for sherly gum from another RN and gave her last name as her fathers last name again.  States it should be hyphenated with Bryan.  Patient gets tense about this if questioned too much.  Patient insisting that she should have her medical record changed to reflect her fathers last name. Encouraged patient to speak to SW tomorrow and patient became upset and stated,\"I don't trust social workers.  They split up my family so now my little brother lives in the streets and why I am here, and my other brother killed himself.\"  Informed patient that we could check to see if she has another medical record with the hyphenated name.     2030:  Patient took scheduled HS [place] without incident.  Cooperative with mouth check after administration.  Patient did complain of not feeling good.  Patient wa eating a snack at the time.  Nurse asked her to explain what didn't feel good and patient " "stated,\"I just don't feel like myself\"  Patient then stated that she felt dizzy.  Patient was instructed to stand slowly before walking as it could be from medications and nurse also informed patient that it could be related to medications and encouraged to ensure she is drinking adequate amounts of water.  Patient requested and given Benadryl 50 mg po for sleep aid.    Problem: Mental State/Mood Impairment (Psychotic Signs/Symptoms)  Goal: Improved Mental State and Mood (Psychotic Signs/Symptoms)  Description: Patient will have a reality based conversation.  Patient will report a decrease in delusions/hallucinations.  Patient will use coping skills to manage labile behavior.  7/2/2020 1551 by Xi Go, RN  Outcome: Improving     Face to face report will be communicated to oncoming RN.        "

## 2020-07-02 NOTE — PLAN OF CARE
"Face to face [facility] of [facility]     Annamaria Em RN  7/2/2020  2:31 PM    Patient sleeps greater than 6 hours per night, eats 100% of meals, patient does not attend groups.  Patient is compliant with medications.  Patient is up in the lounge to eat breakfast this shift.  Reports back to room after breakfast and goes to sleep.    Patient denies SI, HI, AH and VH, appears responding at times.     \"I am going home once they finally decide what is going on with the court thing\"   Patient receives court papers this shift.  Patient goes out to lounge to look over the papers, immediately goes back to her room.  Patient sleeps this shift.     Patient continues to remain withdrawn and isolative to her room, laying in bed.       Face to face start of shift report received from Kiki SANCHES RN  Patient in bed at this time, will continue to monitor.     Problem: Adult Behavioral Health Plan of Care  Goal: Patient-Specific Goal (Individualization)  Description: Patient will sleep 6-8 hours per night  Patient will eat at 75% of meals daily  Patient will attend >50% of group programming daily  Patient will be compliant with medication  Patient will have appropriate boundaries staff and peers during hospital  ELOPEMENT PRECAUTIONS; Eloped from [facility] 6/17/2020 7/2/2020 1031 by Annamaria Em, RN  Outcome: No Change  Note:   Problem: Mental State/Mood Impairment (Psychotic Signs/Symptoms)  Goal: Improved Mental State and Mood (Psychotic Signs/Symptoms)  Description: Patient will have a reality based conversation.  Patient will report a decrease in delusions/hallucinations.  Patient will use coping skills to manage labile behavior.  Outcome: No Change     "

## 2020-07-02 NOTE — PLAN OF CARE
Problem: Adult Behavioral Health Plan of Care  Goal: Patient-Specific Goal (Individualization)  Description: Patient will sleep 6-8 hours per night  Patient will eat at 75% of meals daily  Patient will attend >50% of group programming daily  Patient will be compliant with medication  Patient will have appropriate boundaries staff and peers during hospital  ELOPEMENT PRECAUTIONS; Eloped from ICU 6/17/2020 7/2/2020 0031 by Kiki Castle, RN  Outcome: No Change  Note:      Face to face shift report received from Olena RN. Rounding completed, pt observed.     Pt appeared to be sleeping most of this shift, normal respirations and position changes noted. Pt did not have any attempts of elopement this shift.    Face to face report will be communicated to oncoming RN.    Kiki Castle RN  7/2/2020  6:22 AM

## 2020-07-02 NOTE — PROGRESS NOTES
"[facility]  Psychiatric Progress Note    Impression:     Jonelle  Is still very delusional. She tells me \"I had my court case and it was my case against that woman in the chair and I think I won. I looked up the informatino online and the juidge doomed me to go home\". I assume she meant to say deemed though the  did not sign the order yet and we are still waiting for final court papers. She is still grandiose though not to the extene she was previously. She is still pressured in speech though not as pressurred. She is a bit sedated though not overly.      Educated regarding medication indications, risks, benefits, side effects, contraindications and possible interactions. Verbally expressed understanding.        Diagnoses:      Schizoaffective Disorder, bipolar type  Substance Abuse Disorder     Attestation:  Patient has been seen and evaluated by me,  SABI Calderón CNP          Interim History:   The patient's care was discussed with the treatment team and chart notes were reviewed.     BEHAVIORAL TEAM DISCUSSION     Progress: minimal  Continued Stay Criteria/Rationale: more cooperative remains delusional  Medical/Physical: None  Precautions:   Falls precaution?: YES       Behavioral Orders   Procedures     Code 1 - Restrict to Unit     Routine Programming       As clinically indicated     Status 15       Every 15 minutes.     Plan:     Continue lithium.  Awaiting [facility] order and commitment order will order lithium level in 2days as it was last increased 3 days ago     Rationale for change in precautions or plan: Medication adjustment to target mood and delusion  Petition for Granado  including Invega, Zyprexa, Abilify, Risperdal, and Geodon     Participants: Roxy Hardy NP, social work, occupational therapy Nursing         Current Facility-Administered Medications   Medication     acetaminophen (TYLENOL) tablet 650 mg     diphenhydrAMINE (BENADRYL) capsule 50 mg    Or     " "diphenhydrAMINE (BENADRYL) injection 50 mg     haloperidol (HALDOL) tablet 5 mg    Or     haloperidol lactate (HALDOL) injection 5 mg     lithium (ESKALITH CR/LITHOBID) CR tablet 450 mg     LORazepam (ATIVAN) injection 2 mg    Or     LORazepam (ATIVAN) tablet 2 mg     magnesium hydroxide (MILK OF MAGNESIA) suspension 30 mL     nicotine (NICORETTE) gum 2-4 mg     OLANZapine (zyPREXA) tablet 15 mg    Or     OLANZapine (zyPREXA) injection 10 mg            10 point ROS negative see H&P       Allergies:   No Known Allergies          Psychiatric Examination:   BP (!) 86/53   Pulse 79   Temp 97.6  F (36.4  C) (Tympanic)   Resp 16   Ht 1.638 m (5' 4.5\")   Wt 72.8 kg (160 lb 6.4 oz)   SpO2 98%   BMI 27.11 kg/m    Weight is 160 lbs 6.4 oz  Body mass index is 27.11 kg/m .     Appearance: Somewhat disheveled  Attitude:  guarded   Eye Contact:  minimal  Mood:  improving.  Still dismissive though not as grandiose not as elated  Affect: flat  Speech: Not as pressured  Psychomotor Behavior: normal, no evidence of abnormal movements  Thought Process:  disorganized and illogical  Associations:  loosening of associations present  Thought Content:  disorganized, some improvement  Insight:  limited  Judgment:  poor  Oriented to:  x3  Attention Span and Concentration:  poor  Recent and Remote Memory:  poor  Fund of Knowledge: delayed  Muscle Strength and Tone: normal  Gait and Station: normal             Labs:             Results for orders placed or performed during the hospital encounter of 06/11/20   HCG qualitative urine     Status: None   Result Value Ref Range     HCG Qual Urine Negative NEG^Negative   UA reflex to Microscopic and Culture     Status: Abnormal     Specimen: Midstream Urine   Result Value Ref Range     Color Urine Yellow       Appearance Urine Slightly Cloudy       Glucose Urine Negative NEG^Negative mg/dL     Bilirubin Urine Negative NEG^Negative     Ketones Urine Negative NEG^Negative mg/dL     Specific " Gravity Urine 1.030 1.003 - 1.035     Blood Urine Negative NEG^Negative     pH Urine 6.0 4.7 - 8.0 pH     Protein Albumin Urine 30 (A) NEG^Negative mg/dL     Urobilinogen mg/dL Normal 0.0 - 2.0 mg/dL     Nitrite Urine Negative NEG^Negative     Leukocyte Esterase Urine Small (A) NEG^Negative     Source Midstream Urine       RBC Urine 3 (H) 0 - 2 /HPF     WBC Urine 4 0 - 5 /HPF     Bacteria Urine None (A) NEG^Negative /HPF     Squamous Epithelial /HPF Urine 18 (H) 0 - 1 /HPF     Mucous Urine Present (A) NEG^Negative /LPF     Amorphous Crystals Moderate (A) NEG^Negative /HPF   Urine Drugs of Abuse Screen Panel 13     Status: Abnormal   Result Value Ref Range     Cannabinoids (94-new-6-carboxy-9-THC) Detected, Abnormal Result (A) NDET^Not Detected ng/mL     Phencyclidine (Phencyclidine) Not Detected NDET^Not Detected ng/mL     Cocaine (Benzoylecgonine) Not Detected NDET^Not Detected ng/mL     Methamphetamine (d-Methamphetamine) Detected, Abnormal Result (A) NDET^Not Detected ng/mL     Opiates (Morphine) Not Detected NDET^Not Detected ng/mL     Amphetamine (d-Amphetamine) Detected, Abnormal Result (A) NDET^Not Detected ng/mL     Benzodiazepines (Nordiazepam) Detected, Abnormal Result (A) NDET^Not Detected ng/mL     Tricyclic Antidepressants (Desipramine) Not Detected NDET^Not Detected ng/mL     Methadone (Methadone) Not Detected NDET^Not Detected ng/mL     Barbiturates (Butalbital) Not Detected NDET^Not Detected ng/mL     Oxycodone (Oxycodone) Not Detected NDET^Not Detected ng/mL     Propoxyphene (Norpropoxyphene) Not Detected NDET^Not Detected ng/mL     Buprenorphine (Buprenorphine) Not Detected NDET^Not Detected ng/mL   CBC with platelets differential     Status: None   Result Value Ref Range     WBC 8.1 4.0 - 11.0 10e9/L     RBC Count 4.69 3.8 - 5.2 10e12/L     Hemoglobin 14.1 11.7 - 15.7 g/dL     Hematocrit 42.1 35.0 - 47.0 %     MCV 90 78 - 100 fl     MCH 30.1 26.5 - 33.0 pg     MCHC 33.5 31.5 - 36.5 g/dL     RDW  12.3 10.0 - 15.0 %     Platelet Count 280 150 - 450 10e9/L     Diff Method Automated Method       % Neutrophils 56.5 %     % Lymphocytes 35.2 %     % Monocytes 7.0 %     % Eosinophils 0.7 %     % Basophils 0.4 %     % Immature Granulocytes 0.2 %     Nucleated RBCs 0 0 /100     Absolute Neutrophil 4.6 1.6 - 8.3 10e9/L     Absolute Lymphocytes 2.8 0.8 - 5.3 10e9/L     Absolute Monocytes 0.6 0.0 - 1.3 10e9/L     Absolute Eosinophils 0.1 0.0 - 0.7 10e9/L     Absolute Basophils 0.0 0.0 - 0.2 10e9/L     Abs Immature Granulocytes 0.0 0 - 0.4 10e9/L     Absolute Nucleated RBC 0.0     Comprehensive metabolic panel     Status: Abnormal   Result Value Ref Range     Sodium 138 133 - 144 mmol/L     Potassium 3.8 3.4 - 5.3 mmol/L     Chloride 108 94 - 109 mmol/L     Carbon Dioxide 26 20 - 32 mmol/L     Anion Gap 4 3 - 14 mmol/L     Glucose 81 70 - 99 mg/dL     Urea Nitrogen 11 7 - 30 mg/dL     Creatinine 0.63 0.52 - 1.04 mg/dL     GFR Estimate >90 >60 mL/min/[1.73_m2]     GFR Estimate If Black >90 >60 mL/min/[1.73_m2]     Calcium 8.8 8.5 - 10.1 mg/dL     Bilirubin Total 0.4 0.2 - 1.3 mg/dL     Albumin 3.9 3.4 - 5.0 g/dL     Protein Total 8.1 6.8 - 8.8 g/dL     Alkaline Phosphatase 62 40 - 150 U/L      (H) 0 - 50 U/L     AST 84 (H) 0 - 45 U/L   TSH with free T4 reflex     Status: None   Result Value Ref Range     TSH 0.85 0.40 - 4.00 mU/L   Asymptomatic COVID-19 Virus (Coronavirus) by PCR     Status: None     Specimen: Nasopharyngeal   Result Value Ref Range     COVID-19 Virus PCR to [facility] - Source Nasopharyngeal       COVID-19 Virus PCR to [facility] - Result           Test received-See reflex to [place] test SARS CoV2 (COVID-19) Virus RT-PCR   SARS-CoV-2 COVID-19 Virus (Coronavirus) RT-PCR Nasopharyngeal     Status: None     Specimen: Nasopharyngeal   Result Value Ref Range     SARS-CoV-2 Virus Specimen Source Nasopharyngeal       SARS-CoV-2 PCR Result NEGATIVE       SARS-CoV-2 PCR Comment           This automated,  real-time RT-PCR assay by [employer] on the [employer] has   been given Emergency Use Authorization (EUA) for the in vitro qualitative detection of RNA   from the SARS-CoV2 virus in nasopharyngeal swabs in viral transport medium from patients   with signs and symptoms of infection who are suspected of COVID-19. The performance is   unknown in asymptomatic patients.

## 2020-07-03 PROCEDURE — 12400011

## 2020-07-03 PROCEDURE — 12400000 ZZH R&B MH

## 2020-07-03 PROCEDURE — 99232 SBSQ HOSP IP/OBS MODERATE 35: CPT | Performed by: NURSE PRACTITIONER

## 2020-07-03 PROCEDURE — 25000132 ZZH RX MED GY IP 250 OP 250 PS 637: Performed by: NURSE PRACTITIONER

## 2020-07-03 RX ADMIN — LITHIUM CARBONATE 450 MG: 450 TABLET ORAL at 20:13

## 2020-07-03 RX ADMIN — LORAZEPAM 2 MG: 1 TABLET ORAL at 20:26

## 2020-07-03 RX ADMIN — OLANZAPINE 15 MG: 10 TABLET, FILM COATED ORAL at 08:19

## 2020-07-03 RX ADMIN — LITHIUM CARBONATE 450 MG: 450 TABLET ORAL at 08:19

## 2020-07-03 RX ADMIN — DIPHENHYDRAMINE HYDROCHLORIDE 50 MG: 50 CAPSULE ORAL at 20:13

## 2020-07-03 RX ADMIN — NICOTINE POLACRILEX 2 MG: 2 GUM, CHEWING ORAL at 18:12

## 2020-07-03 ASSESSMENT — ACTIVITIES OF DAILY LIVING (ADL)
ORAL_HYGIENE: INDEPENDENT
DRESS: INDEPENDENT
LAUNDRY: UNABLE TO COMPLETE
HYGIENE/GROOMING: INDEPENDENT
DRESS: SCRUBS (BEHAVIORAL HEALTH)
HYGIENE/GROOMING: INDEPENDENT
ORAL_HYGIENE: INDEPENDENT

## 2020-07-03 NOTE — PLAN OF CARE
"  Problem: Adult Behavioral Health Plan of Care  Goal: Patient-Specific Goal (Individualization)  Description: Patient will sleep 6-8 hours per night  Patient will eat at 75% of meals daily  Patient will attend >50% of group programming daily  Patient will be compliant with medication  Patient will have appropriate boundaries staff and peers during hospital  ELOPEMENT PRECAUTIONS; [facility]    Patient out on open unit, watching tv. Affect is full range, mood is calm. Denies all criteria. States she \"feels good\", she is bright and engaging. She states her last name is Shannon during medication pass. Temp is 99.1 this afternoon, will continue to monitor. Has been appropriate with staff and peers.   Face to face end of shift report communicated to oncoming shift RN.     Maddie Bergeron RN  7/3/2020  2:52 PM          7/3/2020 0802 by Maddie Bergeron RN  Outcome: Improving    Problem: Mental State/Mood Impairment (Psychotic Signs/Symptoms)  Goal: Improved Mental State and Mood (Psychotic Signs/Symptoms)  Description: Patient will have a reality based conversation.  Patient will report a decrease in delusions/hallucinations.  Patient will use coping skills to manage labile behavior.    Patient able to have reality based conversation, denies hallucinations, has not made any delusional statements.   Outcome: Improving          "

## 2020-07-03 NOTE — PLAN OF CARE
Problem: Adult Behavioral Health Plan of Care  Goal: Patient-Specific Goal (Individualization)  Description: Patient will sleep 6-8 hours per night  Patient will eat at 75% of meals daily  Patient will attend >50% of group programming daily  Patient will be compliant with medication  Patient will have appropriate boundaries staff and peers during hospital  ELOPEMENT PRECAUTIONS; Eloped from ICU 6/17/2020 7/2/2020 0894 by Kiki Castle, RN  Outcome: No Change  Note:      Face to face shift report received from Xi CHRISTOPHER. Rounding completed, pt observed.     Pt appeared to be sleeping most of this shift, normal respirations and position changes noted. Pt did not have any elopement attempts.    Face to face report will be communicated to oncoming RN.    Kiki Castle RN  7/3/2020  5:33 AM

## 2020-07-03 NOTE — PLAN OF CARE
"Met with pt this morning- She states she talked to her  yesterday and he informed her that he was waiting to hear from the  when they could release her. Informed pt that the court order that came yesterday explains she is under a civil commitment and she will be staying here until there is a bed [facility] her [facility] the [facility]. Pt states she would rather be committed to going to treatment in [place] and would like a police escort there today. Informed pt that treatment is not the discharge plan. Then pt states \"Go talk to the doctor she is your mom, go listen to what she says.\"   "

## 2020-07-03 NOTE — PROGRESS NOTES
"[facility]  Psychiatric Progress Note    Impression:     Court papers were received,. She is committed and chisholm order signed. She tells me \"my papers came. They said I could go. Are you able to discharge me today\". I told her the papers did not say this. I ask her if she ebelives she has a mental illness and she states \"I did\". I ask her what she believes she had and why. She states \"schizophrenia from drug use\" she has no insight. She is still disorganized and delusinoal though has been improving.   Educated regarding medication indications, risks, benefits, side effects, contraindications and possible interactions. Verbally expressed understanding.        Diagnoses:      Schizoaffective Disorder, bipolar type  Substance Abuse Disorder     Attestation:  Patient has been seen and evaluated by me,  SABI Calderón CNP          Interim History:   The patient's care was discussed with the treatment team and chart notes were reviewed.     BEHAVIORAL TEAM DISCUSSION     Progress: minimal  Continued Stay Criteria/Rationale: more cooperative remains delusional  Medical/Physical: None  Precautions:   Falls precaution?: YES       Behavioral Orders   Procedures     Code 1 - Restrict to Unit     Routine Programming       As clinically indicated     Status 15       Every 15 minutes.       Current Facility-Administered Medications   Medication     acetaminophen (TYLENOL) tablet 650 mg     diphenhydrAMINE (BENADRYL) capsule 50 mg    Or     diphenhydrAMINE (BENADRYL) injection 50 mg     haloperidol (HALDOL) tablet 5 mg    Or     haloperidol lactate (HALDOL) injection 5 mg     lithium (ESKALITH CR/LITHOBID) CR tablet 450 mg     LORazepam (ATIVAN) injection 2 mg    Or     LORazepam (ATIVAN) tablet 2 mg     magnesium hydroxide (MILK OF MAGNESIA) suspension 30 mL     nicotine (NICORETTE) gum 2-4 mg     OLANZapine (zyPREXA) tablet 15 mg    Or     OLANZapine (zyPREXA) injection 10 mg            10 point ROS " "negative see H&P       Allergies:   No Known Allergies          Psychiatric Examination:   BP (!) 86/53   Pulse 79   Temp 97.6  F (36.4  C) (Tympanic)   Resp 16   Ht 1.638 m (5' 4.5\")   Wt 72.8 kg (160 lb 6.4 oz)   SpO2 98%   BMI 27.11 kg/m    Weight is 160 lbs 6.4 oz  Body mass index is 27.11 kg/m .     Appearance: Somewhat disheveled  Attitude:  guarded   Eye Contact:  minimal  Mood:  improving.  Still dismissive though not as grandiose not as elated  Affect: flat  Speech: Not as pressured  Psychomotor Behavior: normal, no evidence of abnormal movements  Thought Process:  disorganized and illogical  Associations:  loosening of associations present  Thought Content:  disorganized, some improvement  Insight:  limited  Judgment:  poor  Oriented to:  x3  Attention Span and Concentration:  poor  Recent and Remote Memory:  poor  Fund of Knowledge: delayed  Muscle Strength and Tone: normal  Gait and Station: normal             Labs:             Results for orders placed or performed during the hospital encounter of 06/11/20   HCG qualitative urine     Status: None   Result Value Ref Range     HCG Qual Urine Negative NEG^Negative   UA reflex to Microscopic and Culture     Status: Abnormal     Specimen: Midstream Urine   Result Value Ref Range     Color Urine Yellow       Appearance Urine Slightly Cloudy       Glucose Urine Negative NEG^Negative mg/dL     Bilirubin Urine Negative NEG^Negative     Ketones Urine Negative NEG^Negative mg/dL     Specific Gravity Urine 1.030 1.003 - 1.035     Blood Urine Negative NEG^Negative     pH Urine 6.0 4.7 - 8.0 pH     Protein Albumin Urine 30 (A) NEG^Negative mg/dL     Urobilinogen mg/dL Normal 0.0 - 2.0 mg/dL     Nitrite Urine Negative NEG^Negative     Leukocyte Esterase Urine Small (A) NEG^Negative     Source Midstream Urine       RBC Urine 3 (H) 0 - 2 /HPF     WBC Urine 4 0 - 5 /HPF     Bacteria Urine None (A) NEG^Negative /HPF     Squamous Epithelial /HPF Urine 18 (H) 0 - 1 " /HPF     Mucous Urine Present (A) NEG^Negative /LPF     Amorphous Crystals Moderate (A) NEG^Negative /HPF   Urine Drugs of Abuse Screen Panel 13     Status: Abnormal   Result Value Ref Range     Cannabinoids (12-lug-1-carboxy-9-THC) Detected, Abnormal Result (A) NDET^Not Detected ng/mL     Phencyclidine (Phencyclidine) Not Detected NDET^Not Detected ng/mL     Cocaine (Benzoylecgonine) Not Detected NDET^Not Detected ng/mL     Methamphetamine (d-Methamphetamine) Detected, Abnormal Result (A) NDET^Not Detected ng/mL     Opiates (Morphine) Not Detected NDET^Not Detected ng/mL     Amphetamine (d-Amphetamine) Detected, Abnormal Result (A) NDET^Not Detected ng/mL     Benzodiazepines (Nordiazepam) Detected, Abnormal Result (A) NDET^Not Detected ng/mL     Tricyclic Antidepressants (Desipramine) Not Detected NDET^Not Detected ng/mL     Methadone (Methadone) Not Detected NDET^Not Detected ng/mL     Barbiturates (Butalbital) Not Detected NDET^Not Detected ng/mL     Oxycodone (Oxycodone) Not Detected NDET^Not Detected ng/mL     Propoxyphene (Norpropoxyphene) Not Detected NDET^Not Detected ng/mL     Buprenorphine (Buprenorphine) Not Detected NDET^Not Detected ng/mL   CBC with platelets differential     Status: None   Result Value Ref Range     WBC 8.1 4.0 - 11.0 10e9/L     RBC Count 4.69 3.8 - 5.2 10e12/L     Hemoglobin 14.1 11.7 - 15.7 g/dL     Hematocrit 42.1 35.0 - 47.0 %     MCV 90 78 - 100 fl     MCH 30.1 26.5 - 33.0 pg     MCHC 33.5 31.5 - 36.5 g/dL     RDW 12.3 10.0 - 15.0 %     Platelet Count 280 150 - 450 10e9/L     Diff Method Automated Method       % Neutrophils 56.5 %     % Lymphocytes 35.2 %     % Monocytes 7.0 %     % Eosinophils 0.7 %     % Basophils 0.4 %     % Immature Granulocytes 0.2 %     Nucleated RBCs 0 0 /100     Absolute Neutrophil 4.6 1.6 - 8.3 10e9/L     Absolute Lymphocytes 2.8 0.8 - 5.3 10e9/L     Absolute Monocytes 0.6 0.0 - 1.3 10e9/L     Absolute Eosinophils 0.1 0.0 - 0.7 10e9/L     Absolute  Basophils 0.0 0.0 - 0.2 10e9/L     Abs Immature Granulocytes 0.0 0 - 0.4 10e9/L     Absolute Nucleated RBC 0.0     Comprehensive metabolic panel     Status: Abnormal   Result Value Ref Range     Sodium 138 133 - 144 mmol/L     Potassium 3.8 3.4 - 5.3 mmol/L     Chloride 108 94 - 109 mmol/L     Carbon Dioxide 26 20 - 32 mmol/L     Anion Gap 4 3 - 14 mmol/L     Glucose 81 70 - 99 mg/dL     Urea Nitrogen 11 7 - 30 mg/dL     Creatinine 0.63 0.52 - 1.04 mg/dL     GFR Estimate >90 >60 mL/min/[1.73_m2]     GFR Estimate If Black >90 >60 mL/min/[1.73_m2]     Calcium 8.8 8.5 - 10.1 mg/dL     Bilirubin Total 0.4 0.2 - 1.3 mg/dL     Albumin 3.9 3.4 - 5.0 g/dL     Protein Total 8.1 6.8 - 8.8 g/dL     Alkaline Phosphatase 62 40 - 150 U/L      (H) 0 - 50 U/L     AST 84 (H) 0 - 45 U/L   TSH with free T4 reflex     Status: None   Result Value Ref Range     TSH 0.85 0.40 - 4.00 mU/L   Asymptomatic COVID-19 Virus (Coronavirus) by PCR     Status: None     Specimen: Nasopharyngeal   Result Value Ref Range     COVID-19 Virus PCR to [facility] - Source Nasopharyngeal       COVID-19 Virus PCR to [facility] - Result           Test received-See reflex to [facility] test SARS CoV2 (COVID-19) Virus RT-PCR   SARS-CoV-2 COVID-19 Virus (Coronavirus) RT-PCR Nasopharyngeal     Status: None     Specimen: Nasopharyngeal   Result Value Ref Range     SARS-CoV-2 Virus Specimen Source Nasopharyngeal       SARS-CoV-2 PCR Result NEGATIVE       SARS-CoV-2 PCR Comment           This automated, real-time RT-PCR assay by [employer] on the [employer] has   been given Emergency Use Authorization (EUA) for the in vitro qualitative detection of RNA   from the SARS-CoV2 virus in nasopharyngeal swabs in viral transport medium from patients   with signs and symptoms of infection who are suspected of COVID-19. The performance is   unknown in asymptomatic patients.          Plan:     Continue lithium.    Lithium level tomorrow.       Rationale for  change in precautions or plan: Medication adjustment to target mood and delusions    Petition for Granado  including Invega, Zyprexa, Abilify, Risperdal, and Geodon     Participants: Roxy Hardy NP, social work, occupational therapy Nursing

## 2020-07-03 NOTE — PLAN OF CARE
Face to face shift report received from Maddie TOGN RN. Rounding completed, pt observed.  Xi Go RN  7/3/2020  4:28 PM    Problem: Adult Behavioral Health Plan of Care  Goal: Patient-Specific Goal (Individualization)  Description: Patient will sleep 6-8 hours per night  Patient will eat at 75% of meals daily  Patient will attend >50% of group programming daily  Patient will be compliant with medication  Patient will have appropriate boundaries staff and peers during hospital  [facility]    7/3/2020 1627 by Xi Go RN  Outcome: Improving  Note: Shift Summery:   Patient in her room resting in bed at the start of this shift.  Patient up at evening meal.  Patient remains withdrawn and can become easily agitated if asked too many questions.  Appetite is good, denies pain or unwanted side effects but also denies any benefit from medications.  No loitering near exit doors and has not demanded r asked to leave on this shift.  2013:  Patient has attended groups this evneing.  Pulse elevated at 103, constant restless legs during interaction.  Patient requested and given Benadryl 50 mg po for sleep aid.    2026:  Patient continues with restlessness and states she feels a little agitated.  Requested and given Ativan 2 mg po.    Problem: Mental State/Mood Impairment (Psychotic Signs/Symptoms)  Goal: Improved Mental State and Mood (Psychotic Signs/Symptoms)  Description: Patient will have a reality based conversation.  Patient will report a decrease in delusions/hallucinations.  Patient will use coping skills to manage labile behavior.  7/3/2020 1627 by Xi Go RN  Outcome: Improving   Face to face report will be communicated to oncoming RN.

## 2020-07-04 LAB — LITHIUM SERPL-SCNC: 0.48 MMOL/L (ref 0.6–1.2)

## 2020-07-04 PROCEDURE — 12400011

## 2020-07-04 PROCEDURE — 25000132 ZZH RX MED GY IP 250 OP 250 PS 637: Performed by: NURSE PRACTITIONER

## 2020-07-04 PROCEDURE — 12400000 ZZH R&B MH

## 2020-07-04 PROCEDURE — 36415 COLL VENOUS BLD VENIPUNCTURE: CPT | Performed by: NURSE PRACTITIONER

## 2020-07-04 PROCEDURE — 80178 ASSAY OF LITHIUM: CPT | Performed by: NURSE PRACTITIONER

## 2020-07-04 RX ADMIN — LORAZEPAM 2 MG: 1 TABLET ORAL at 19:11

## 2020-07-04 RX ADMIN — HALOPERIDOL 5 MG: 5 TABLET ORAL at 19:11

## 2020-07-04 RX ADMIN — OLANZAPINE 15 MG: 10 TABLET, FILM COATED ORAL at 08:31

## 2020-07-04 RX ADMIN — LITHIUM CARBONATE 450 MG: 450 TABLET ORAL at 08:31

## 2020-07-04 RX ADMIN — LITHIUM CARBONATE 450 MG: 450 TABLET ORAL at 19:09

## 2020-07-04 RX ADMIN — DIPHENHYDRAMINE HYDROCHLORIDE 50 MG: 50 CAPSULE ORAL at 19:09

## 2020-07-04 ASSESSMENT — ACTIVITIES OF DAILY LIVING (ADL)
ORAL_HYGIENE: INDEPENDENT
HYGIENE/GROOMING: INDEPENDENT
LAUNDRY: UNABLE TO COMPLETE
ORAL_HYGIENE: INDEPENDENT
DRESS: SCRUBS (BEHAVIORAL HEALTH);INDEPENDENT
DRESS: SCRUBS (BEHAVIORAL HEALTH);INDEPENDENT
HYGIENE/GROOMING: INDEPENDENT
LAUNDRY: UNABLE TO COMPLETE

## 2020-07-04 NOTE — PLAN OF CARE
PT was up for breakfast, compliant with medications. PT has been in bed throughout shift. PT has not attended groups.   PT denies all criteria. PT has not made any delusion statements to writer but also has been asleep throughout shift.   PT denies pain.     PT had lithium draw which was 0.48        Problem: Adult Behavioral Health Plan of Care  Goal: Patient-Specific Goal (Individualization)  Description: Patient will sleep 6-8 hours per night  Patient will eat at 75% of meals daily  Patient will attend >50% of group programming daily  Patient will be compliant with medication  Patient will have appropriate boundaries staff and peers during hospital  [facility] 7/4/2020 1145 by Yessi Pandey, RN  Outcome: Improving  Note:        Problem: Mental State/Mood Impairment (Psychotic Signs/Symptoms)  Goal: Improved Mental State and Mood (Psychotic Signs/Symptoms)  Description: Patient will have a reality based conversation.  Patient will report a decrease in delusions/hallucinations.  Patient will use coping skills to manage labile behavior.  Outcome: Improving

## 2020-07-04 NOTE — PLAN OF CARE
Problem: Adult Behavioral Health Plan of Care  Goal: Patient-Specific Goal (Individualization)  Description: Patient will sleep 6-8 hours per night  Patient will eat at 75% of meals daily  Patient will attend >50% of group programming daily  Patient will be compliant with medication  Patient will have appropriate boundaries staff and peers during hospital  [facility] 7/4/2020 1540 by Kiki Castle, RN  Outcome: No Change     Face to face shift report received from Yessi CHRISTOPHER. Rounding completed, pt observed.     Pt is compliant with medications this shift. Pt continues to be withdrawn and isolative. Pt does not feel she needs to be here and feels she needs transported to treatment in [place]. Pt maintained appropriate boundaries with peers and staff. Pt did not have any attempts to elope. Pt does come out to lobby for meals. Pt given Benadryl 50 mg, Haldol 5 mg and Ativan 2 mg at 1910 per request.     Face to face report will be communicated to oncoming RN.    Kiki Castle RN  7/4/2020  6:25 PM

## 2020-07-05 PROCEDURE — 12400011

## 2020-07-05 PROCEDURE — 25000132 ZZH RX MED GY IP 250 OP 250 PS 637: Performed by: NURSE PRACTITIONER

## 2020-07-05 PROCEDURE — 12400000 ZZH R&B MH

## 2020-07-05 PROCEDURE — 99232 SBSQ HOSP IP/OBS MODERATE 35: CPT | Performed by: NURSE PRACTITIONER

## 2020-07-05 RX ORDER — OLANZAPINE 10 MG/1
20 TABLET ORAL DAILY
Status: DISCONTINUED | OUTPATIENT
Start: 2020-07-06 | End: 2020-07-07

## 2020-07-05 RX ORDER — OLANZAPINE 10 MG/2ML
10 INJECTION, POWDER, FOR SOLUTION INTRAMUSCULAR DAILY
Status: DISCONTINUED | OUTPATIENT
Start: 2020-07-06 | End: 2020-07-07

## 2020-07-05 RX ORDER — LITHIUM CARBONATE 300 MG/1
600 TABLET, FILM COATED, EXTENDED RELEASE ORAL EVERY 12 HOURS SCHEDULED
Status: DISCONTINUED | OUTPATIENT
Start: 2020-07-05 | End: 2020-09-02 | Stop reason: HOSPADM

## 2020-07-05 RX ADMIN — DIPHENHYDRAMINE HYDROCHLORIDE 50 MG: 50 CAPSULE ORAL at 20:14

## 2020-07-05 RX ADMIN — OLANZAPINE 15 MG: 10 TABLET, FILM COATED ORAL at 08:33

## 2020-07-05 RX ADMIN — LITHIUM CARBONATE 600 MG: 300 TABLET, EXTENDED RELEASE ORAL at 20:14

## 2020-07-05 RX ADMIN — LITHIUM CARBONATE 450 MG: 450 TABLET ORAL at 08:33

## 2020-07-05 ASSESSMENT — ACTIVITIES OF DAILY LIVING (ADL)
HYGIENE/GROOMING: INDEPENDENT
ORAL_HYGIENE: INDEPENDENT
LAUNDRY: UNABLE TO COMPLETE
DRESS: SCRUBS (BEHAVIORAL HEALTH);INDEPENDENT

## 2020-07-05 ASSESSMENT — MIFFLIN-ST. JEOR: SCORE: 1605.27

## 2020-07-05 NOTE — PLAN OF CARE
"  Problem: Adult Behavioral Health Plan of Care  Goal: Patient-Specific Goal (Individualization)  Description: Patient will sleep 6-8 hours per night  Patient will eat at 75% of meals daily  Patient will attend >50% of group programming daily  Patient will be compliant with medication  Patient will have appropriate boundaries staff and peers during hospital  [facility] 7/5/2020 1533 by Kiki Castle, RN  Outcome: No Change  Note:      Face to face shift report received from Yessi CHRISTOPHER. Rounding completed, pt observed.     Pt denies need to be here and refuses to answer nursing questions saying she is \"sick of having to continuously tell everyone the same thing.\" Pt does come out for meals but then returns to room. Pt did come out and go to a group after encouragement from a peer. Pt has maintained appropriate boundaries with peers and staff. Pt has not had any elopement attempts. Pt is compliant with medications. Pt has neglected grooming this shift. Pt given Benadryl 50 mg at 2014 per request and returned to her room to go to bed.    Face to face report will be communicated to oncoming RN.    Kiki Castle RN  7/5/2020  8:17 PM  "

## 2020-07-05 NOTE — PROGRESS NOTES
"[facility]  Psychiatric Progress Note     Impression:     Remains delusional. Is very grandiose. mikey tells me she has 3 houses and the  committed her to her house. She states she has three jobs she needs to get back to. I ask her what mikey was doing for work and she stated \"I work with [employer]\". I asked what that was and she tells me that TI is \"the rapper\".  He is apparently a famous rapper. mikey also states mikey is a person  for this rapper. mikey states that \"they are needing back\". mikey has been sleeping at night. No akathesia. No dystonia. Appears comfortable. Lithium level was low.  g      Educated regarding medication indications, risks, benefits, side effects, contraindications and possible interactions. Verbally expressed understanding.        Diagnoses:      Schizoaffective Disorder, bipolar type  Substance Abuse Disorder     Attestation:  Patient has been seen and evaluated by me,  SABI Calderón CNP          Interim History:   The patient's care was discussed with the treatment team and chart notes were reviewed.     BEHAVIORAL TEAM DISCUSSION     Progress: minimal  Continued Stay Criteria/Rationale: more cooperative remains delusional  Medical/Physical: None  Precautions:   Falls precaution?: YES       Behavioral Orders   Procedures     Code 1 - Restrict to Unit     Routine Programming       As clinically indicated     Status 15       Every 15 minutes.       Current Facility-Administered Medications   Medication     acetaminophen (TYLENOL) tablet 650 mg     diphenhydrAMINE (BENADRYL) capsule 50 mg    Or     diphenhydrAMINE (BENADRYL) injection 50 mg     haloperidol (HALDOL) tablet 5 mg    Or     haloperidol lactate (HALDOL) injection 5 mg     lithium ER (LITHOBID) CR tablet 600 mg     LORazepam (ATIVAN) injection 2 mg    Or     LORazepam (ATIVAN) tablet 2 mg     magnesium hydroxide (MILK OF MAGNESIA) suspension 30 mL     nicotine (NICORETTE) gum 2-4 mg     [START ON 7/6/2020] " "OLANZapine (zyPREXA) tablet 20 mg    Or     [START ON 7/6/2020] OLANZapine (zyPREXA) injection 10 mg            10 point ROS negative see H&P       Allergies:   No Known Allergies          Psychiatric Examination:   BP (!) 86/53   Pulse 79   Temp 97.6  F (36.4  C) (Tympanic)   Resp 16   Ht 1.638 m (5' 4.5\")   Wt 72.8 kg (160 lb 6.4 oz)   SpO2 98%   BMI 27.11 kg/m    Weight is 160 lbs 6.4 oz  Body mass index is 27.11 kg/m .     Appearance: Somewhat disheveled  Attitude:  guarded   Eye Contact:  minimal  Mood:  improving.  Still dismissive though not as grandiose not as elated  Affect: flat  Speech: Not as pressured  Psychomotor Behavior: normal, no evidence of abnormal movements  Thought Process:  disorganized and illogical  Associations:  loosening of associations present  Thought Content:  disorganized, some improvement  Insight:  limited  Judgment:  poor  Oriented to:  x3  Attention Span and Concentration:  poor  Recent and Remote Memory:  poor  Fund of Knowledge: delayed  Muscle Strength and Tone: normal  Gait and Station: normal             Labs:             Results for orders placed or performed during the hospital encounter of 06/11/20   HCG qualitative urine     Status: None   Result Value Ref Range     HCG Qual Urine Negative NEG^Negative   UA reflex to Microscopic and Culture     Status: Abnormal     Specimen: Midstream Urine   Result Value Ref Range     Color Urine Yellow       Appearance Urine Slightly Cloudy       Glucose Urine Negative NEG^Negative mg/dL     Bilirubin Urine Negative NEG^Negative     Ketones Urine Negative NEG^Negative mg/dL     Specific Gravity Urine 1.030 1.003 - 1.035     Blood Urine Negative NEG^Negative     pH Urine 6.0 4.7 - 8.0 pH     Protein Albumin Urine 30 (A) NEG^Negative mg/dL     Urobilinogen mg/dL Normal 0.0 - 2.0 mg/dL     Nitrite Urine Negative NEG^Negative     Leukocyte Esterase Urine Small (A) NEG^Negative     Source Midstream Urine       RBC Urine 3 (H) 0 - 2 " /HPF     WBC Urine 4 0 - 5 /HPF     Bacteria Urine None (A) NEG^Negative /HPF     Squamous Epithelial /HPF Urine 18 (H) 0 - 1 /HPF     Mucous Urine Present (A) NEG^Negative /LPF     Amorphous Crystals Moderate (A) NEG^Negative /HPF   Urine Drugs of Abuse Screen Panel 13     Status: Abnormal   Result Value Ref Range     Cannabinoids (44-pyu-6-carboxy-9-THC) Detected, Abnormal Result (A) NDET^Not Detected ng/mL     Phencyclidine (Phencyclidine) Not Detected NDET^Not Detected ng/mL     Cocaine (Benzoylecgonine) Not Detected NDET^Not Detected ng/mL     Methamphetamine (d-Methamphetamine) Detected, Abnormal Result (A) NDET^Not Detected ng/mL     Opiates (Morphine) Not Detected NDET^Not Detected ng/mL     Amphetamine (d-Amphetamine) Detected, Abnormal Result (A) NDET^Not Detected ng/mL     Benzodiazepines (Nordiazepam) Detected, Abnormal Result (A) NDET^Not Detected ng/mL     Tricyclic Antidepressants (Desipramine) Not Detected NDET^Not Detected ng/mL     Methadone (Methadone) Not Detected NDET^Not Detected ng/mL     Barbiturates (Butalbital) Not Detected NDET^Not Detected ng/mL     Oxycodone (Oxycodone) Not Detected NDET^Not Detected ng/mL     Propoxyphene (Norpropoxyphene) Not Detected NDET^Not Detected ng/mL     Buprenorphine (Buprenorphine) Not Detected NDET^Not Detected ng/mL   CBC with platelets differential     Status: None   Result Value Ref Range     WBC 8.1 4.0 - 11.0 10e9/L     RBC Count 4.69 3.8 - 5.2 10e12/L     Hemoglobin 14.1 11.7 - 15.7 g/dL     Hematocrit 42.1 35.0 - 47.0 %     MCV 90 78 - 100 fl     MCH 30.1 26.5 - 33.0 pg     MCHC 33.5 31.5 - 36.5 g/dL     RDW 12.3 10.0 - 15.0 %     Platelet Count 280 150 - 450 10e9/L     Diff Method Automated Method       % Neutrophils 56.5 %     % Lymphocytes 35.2 %     % Monocytes 7.0 %     % Eosinophils 0.7 %     % Basophils 0.4 %     % Immature Granulocytes 0.2 %     Nucleated RBCs 0 0 /100     Absolute Neutrophil 4.6 1.6 - 8.3 10e9/L     Absolute Lymphocytes 2.8  0.8 - 5.3 10e9/L     Absolute Monocytes 0.6 0.0 - 1.3 10e9/L     Absolute Eosinophils 0.1 0.0 - 0.7 10e9/L     Absolute Basophils 0.0 0.0 - 0.2 10e9/L     Abs Immature Granulocytes 0.0 0 - 0.4 10e9/L     Absolute Nucleated RBC 0.0     Comprehensive metabolic panel     Status: Abnormal   Result Value Ref Range     Sodium 138 133 - 144 mmol/L     Potassium 3.8 3.4 - 5.3 mmol/L     Chloride 108 94 - 109 mmol/L     Carbon Dioxide 26 20 - 32 mmol/L     Anion Gap 4 3 - 14 mmol/L     Glucose 81 70 - 99 mg/dL     Urea Nitrogen 11 7 - 30 mg/dL     Creatinine 0.63 0.52 - 1.04 mg/dL     GFR Estimate >90 >60 mL/min/[1.73_m2]     GFR Estimate If Black >90 >60 mL/min/[1.73_m2]     Calcium 8.8 8.5 - 10.1 mg/dL     Bilirubin Total 0.4 0.2 - 1.3 mg/dL     Albumin 3.9 3.4 - 5.0 g/dL     Protein Total 8.1 6.8 - 8.8 g/dL     Alkaline Phosphatase 62 40 - 150 U/L      (H) 0 - 50 U/L     AST 84 (H) 0 - 45 U/L   TSH with free T4 reflex     Status: None   Result Value Ref Range     TSH 0.85 0.40 - 4.00 mU/L   Asymptomatic COVID-19 Virus (Coronavirus) by PCR     Status: None     Specimen: Nasopharyngeal   Result Value Ref Range     COVID-19 Virus PCR to [facility] - Source Nasopharyngeal       COVID-19 Virus PCR to [facility] - Result           Test received-See reflex to [facility] [place] test SARS CoV2 (COVID-19) Virus RT-PCR   SARS-CoV-2 COVID-19 Virus (Coronavirus) RT-PCR Nasopharyngeal     Status: None     Specimen: Nasopharyngeal   Result Value Ref Range     SARS-CoV-2 Virus Specimen Source Nasopharyngeal       SARS-CoV-2 PCR Result NEGATIVE       SARS-CoV-2 PCR Comment           This automated, real-time RT-PCR assay by [employer] on the [employer] has   been given Emergency Use Authorization (EUA) for the in vitro qualitative detection of RNA   from the SARS-CoV2 virus in nasopharyngeal swabs in viral transport medium from patients   with signs and symptoms of infection who are suspected of COVID-19. The performance is    unknown in asymptomatic patients.          Plan:         Increase lithium 600 mg bid      Increase zyprexa to 20 mg     Rationale for change in precautions or plan: Medication adjustment to target mood and delusions    Petition for Granado  including Invega, Zyprexa, Abilify, Risperdal, and Geodon     Participants: Roxy Hardy NP, social work, occupational therapy Nursing

## 2020-07-05 NOTE — PLAN OF CARE
"PT sleeping throughout shift. PT refused assessment stating \"I dont know why you guys always ask me that stuff, I am not here for mental health, I shouldn't even be here. I just want my medications and to go back to bed, I slept like shit last night.\"    PT met with provider and made delusional statements that she was a  and that she also worked for a high profile  and we were keeping her from her jobs.    PT denied pain.   PT denies all criteria but making delusional statements.     PT neglected grooming this shift, appears disheveled and room is very odorous.      Face to face end of shift report communicated to oncoming RN    Yessi Pandey, RN  7/5/2020  1:49 PM          Problem: Adult Behavioral Health Plan of Care  Goal: Patient-Specific Goal (Individualization)  Description: Patient will sleep 6-8 hours per night  Patient will eat at 75% of meals daily  Patient will attend >50% of group programming daily  Patient will be compliant with medication  Patient will have appropriate boundaries staff and peers during hospital  ELOPEMENT PRECAUTIONS; Eloped from ICU 6/17/2020 7/5/2020 1200 by Yessi Pandey, RN  Outcome: No Change  Note:        Problem: Mental State/Mood Impairment (Psychotic Signs/Symptoms)  Goal: Improved Mental State and Mood (Psychotic Signs/Symptoms)  Description: Patient will have a reality based conversation.  Patient will report a decrease in delusions/hallucinations.  Patient will use coping skills to manage labile behavior.  Outcome: No Change     "

## 2020-07-05 NOTE — PLAN OF CARE
Problem: Adult Behavioral Health Plan of Care  Goal: Patient-Specific Goal (Individualization)  Description: Patient will sleep 6-8 hours per night  Patient will eat at 75% of meals daily  Patient will attend >50% of group programming daily  Patient will be compliant with medication  Patient will have appropriate boundaries staff and peers during hospital  [facility] 7/4/2020 2324 by Kiki Castle, RN  Outcome: No Change  Note:      Writer continued cares of pt from previous shift.    Pt appeared to sleep a total of 5 hours this shift. Pt did not have any noted elopement attempts.    Face to face report will be communicated to oncoming RN.    Kiki Castle, ASHWIN  7/5/2020  6:12 AM

## 2020-07-06 PROCEDURE — 25000132 ZZH RX MED GY IP 250 OP 250 PS 637: Performed by: NURSE PRACTITIONER

## 2020-07-06 PROCEDURE — 12400000 ZZH R&B MH

## 2020-07-06 PROCEDURE — 12400011

## 2020-07-06 RX ADMIN — LITHIUM CARBONATE 600 MG: 300 TABLET, EXTENDED RELEASE ORAL at 20:13

## 2020-07-06 RX ADMIN — DIPHENHYDRAMINE HYDROCHLORIDE 50 MG: 50 CAPSULE ORAL at 20:14

## 2020-07-06 RX ADMIN — LITHIUM CARBONATE 600 MG: 300 TABLET, EXTENDED RELEASE ORAL at 08:16

## 2020-07-06 RX ADMIN — OLANZAPINE 20 MG: 10 TABLET, FILM COATED ORAL at 08:16

## 2020-07-06 ASSESSMENT — ACTIVITIES OF DAILY LIVING (ADL)
HYGIENE/GROOMING: INDEPENDENT
DRESS: SCRUBS (BEHAVIORAL HEALTH)
LAUNDRY: UNABLE TO COMPLETE
DRESS: INDEPENDENT
ORAL_HYGIENE: INDEPENDENT
ORAL_HYGIENE: INDEPENDENT
HYGIENE/GROOMING: INDEPENDENT

## 2020-07-06 NOTE — PLAN OF CARE
Problem: Adult Behavioral Health Plan of Care  Goal: Patient-Specific Goal (Individualization)  Description: Patient will sleep 6-8 hours per night  Patient will eat at 75% of meals daily  Patient will attend >50% of group programming daily  Patient will be compliant with medication  Patient will have appropriate boundaries staff and peers during hospital  ELOPEMENT PRECAUTIONS; Eloped from ICU 6/17/2020 7/5/2020 2341 by Kiki Castle, RN  Outcome: No Change  Note:      Writer continued cares of pt from previous shift.    Pt appeared to be sleeping most of this shift, normal respirations and position changes noted. Pt did not have any elopement attempts.     Face to face report will be communicated to oncoming RN.    Kiki Castle, RN  7/6/2020  6:02 AM

## 2020-07-06 NOTE — PLAN OF CARE
1:1 time with the patient.  No changes made to the discharge plan at this time.   See the AVS for follow up appointments and recommendations.

## 2020-07-06 NOTE — PLAN OF CARE
"Face to face shift report received from Bel GO RN. Rounding completed, pt observed.  Xi Go RN  7/6/2020  7:38 AM    Problem: Adult Behavioral Health Plan of Care  Goal: Patient-Specific Goal (Individualization)  Description: Patient will sleep 6-8 hours per night  Patient will eat at 75% of meals daily  Patient will attend >50% of group programming daily  Patient will be compliant with medication  Patient will have appropriate boundaries staff and peers during hospital  [facility] 7/6/2020 0737 by Xi Go RN  Outcome: Improving  Note: Shift Summery:  Patient was in bed at the start of this shift.  Patient easily prompted up for breakfast.  Patient remains disheveled.  Patient did state her mood as \"happy\" today but affect is flat most of the time.  Patient states that the lithium may help with her feeling happy.  Encouraged patient to remain up on the unit but she stated she was tired and returned to bed after eating.  Patient did mention having some \"cramping like a period but not in that area\".  Declines offer of PRN pain reliever.  Encouraged patient to describe to NP as well.  Patient can make needs known to staff.  Medication compliant.    Problem: Mental State/Mood Impairment (Psychotic Signs/Symptoms)  Goal: Improved Mental State and Mood (Psychotic Signs/Symptoms)  Description: Patient will have a reality based conversation.  Patient will report a decrease in delusions/hallucinations.  Patient will use coping skills to manage labile behavior.  Outcome: Improving  Short conversations are reality based.       Face to face report will be communicated to oncoming RN.        "

## 2020-07-06 NOTE — PLAN OF CARE
Problem: Adult Behavioral Health Plan of Care  Goal: Patient-Specific Goal (Individualization)  Description: Patient will sleep 6-8 hours per night  Patient will eat at 75% of meals daily  Patient will attend >50% of group programming daily  Patient will be compliant with medication  Patient will have appropriate boundaries staff and peers during hospital  ELOPEMENT PRECAUTIONS; Eloped from ICU 6/17/2020    Patient pleasant and cooperative with nursing assessment. Affect is full range, mood is calm. Denies all criteria. States she feels good, is bright in conversation. Did take a nap after dinner. States she would like Benadryl with her bedtime medications. Has been out on the open unit, attending groups. Has been appropriate with staff and peers.   2014-requested and accepted Benadryl 50 mg for sleep.  Face to face end of shift report communicated to oncoming shift RN.     Maddie Bergeron RN  7/6/2020  10:35 PM          7/6/2020 1852 by Maddie Bergeron RN  Outcome: Improving    Problem: Mental State/Mood Impairment (Psychotic Signs/Symptoms)  Goal: Improved Mental State and Mood (Psychotic Signs/Symptoms)  Description: Patient will have a reality based conversation.  Patient will report a decrease in delusions/hallucinations.  Patient will use coping skills to manage labile behavior.    Patient able to have short reality based conversation, has not made any delusional statements.   7/6/2020 1852 by Maddie Bergeron RN  Outcome: Improving

## 2020-07-07 PROCEDURE — 99232 SBSQ HOSP IP/OBS MODERATE 35: CPT | Performed by: NURSE PRACTITIONER

## 2020-07-07 PROCEDURE — 25000132 ZZH RX MED GY IP 250 OP 250 PS 637: Performed by: NURSE PRACTITIONER

## 2020-07-07 PROCEDURE — 12400011

## 2020-07-07 PROCEDURE — 12400000 ZZH R&B MH

## 2020-07-07 RX ORDER — PALIPERIDONE 6 MG/1
6 TABLET, EXTENDED RELEASE ORAL DAILY
Status: DISCONTINUED | OUTPATIENT
Start: 2020-07-07 | End: 2020-07-13

## 2020-07-07 RX ORDER — METFORMIN HCL 500 MG
500 TABLET, EXTENDED RELEASE 24 HR ORAL
Status: DISCONTINUED | OUTPATIENT
Start: 2020-07-07 | End: 2020-08-24

## 2020-07-07 RX ADMIN — OLANZAPINE 20 MG: 10 TABLET, FILM COATED ORAL at 08:07

## 2020-07-07 RX ADMIN — LITHIUM CARBONATE 600 MG: 300 TABLET, EXTENDED RELEASE ORAL at 20:06

## 2020-07-07 RX ADMIN — LITHIUM CARBONATE 600 MG: 300 TABLET, EXTENDED RELEASE ORAL at 08:07

## 2020-07-07 RX ADMIN — METFORMIN ER 500 MG 500 MG: 500 TABLET ORAL at 17:09

## 2020-07-07 RX ADMIN — PALIPERIDONE 6 MG: 6 TABLET, EXTENDED RELEASE ORAL at 10:06

## 2020-07-07 ASSESSMENT — ACTIVITIES OF DAILY LIVING (ADL)
HYGIENE/GROOMING: INDEPENDENT
DRESS: INDEPENDENT
ORAL_HYGIENE: INDEPENDENT
HYGIENE/GROOMING: INDEPENDENT
DRESS: INDEPENDENT
ORAL_HYGIENE: INDEPENDENT

## 2020-07-07 NOTE — PLAN OF CARE
Problem: Adult Behavioral Health Plan of Care  Goal: Patient-Specific Goal (Individualization)  Description: Patient will sleep 6-8 hours per night  Patient will eat at 75% of meals daily  Patient will attend >50% of group programming daily  Patient will be compliant with medication  Patient will have appropriate boundaries staff and peers during hospital  [facility] 7/7/2020 1648 by Linda Concepcion RN  Outcome: Improving  Note:        Problem: Mental State/Mood Impairment (Psychotic Signs/Symptoms)  Goal: Improved Mental State and Mood (Psychotic Signs/Symptoms)  Description: Patient will have a reality based conversation.  Patient will report a decrease in delusions/hallucinations.  Patient will use coping skills to manage labile behavior.  7/7/2020 1648 by Linda Concepcion RN  Outcome: Improving   Pt. Slept 7 hours last night, is medication compliant, has not attempted to elope this shift, has shown appropriate boundaries with staff and patients, denies hallucinations, depression, anxiety and pain, has mostly reality based conversation, ate 100% of dinner, polite and cooperative, spending most of time in room, isolative.      Face to face end of shift report will be communicated to oncoming night shift RN.     Linda Concepcion RN  7/7/2020  6:42 PM

## 2020-07-07 NOTE — PROGRESS NOTES
"[facility]  Psychiatric Progress Note     Impression:     She continues to have grandiose delusions. Today she tells me that she has a record deal with \"[employer]\". 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 She states she is sleeping well. She is not agitated. She asks when she can leave and I did tell her she was on a list for [facility].  I tried to explain what this was and why she was needing to go. She couldn't understand that this was not housing.  Today I discussed discontinuing her Zyprexa as she has gained quite a bit of weight on it.  I did talk to her about starting metformin to prevent further weight gain and switched to Invega today.    Educated regarding medication indications, risks, benefits, side effects, contraindications and possible interactions. Verbally expressed understanding.        Diagnoses:      Schizoaffective Disorder, bipolar type  Substance Abuse Disorder     Attestation:  Patient has been seen and evaluated by me,  SABI Calderón CNP          Interim History:   The patient's care was discussed with the treatment team and chart notes were reviewed.     BEHAVIORAL TEAM DISCUSSION     Progress: minimal  Continued Stay Criteria/Rationale: more cooperative remains delusional  Medical/Physical: None  Precautions:   Falls precaution?: YES       Behavioral Orders   Procedures     Code 1 - Restrict to Unit     Routine Programming       As clinically indicated     Status 15       Every 15 minutes.       Current Facility-Administered Medications   Medication     acetaminophen (TYLENOL) tablet 650 mg     diphenhydrAMINE (BENADRYL) capsule 50 mg    Or     diphenhydrAMINE (BENADRYL) injection 50 mg     haloperidol (HALDOL) tablet 5 mg    Or     haloperidol lactate (HALDOL) injection 5 mg     lithium ER (LITHOBID) CR tablet 600 mg     LORazepam (ATIVAN) injection 2 mg    Or     LORazepam (ATIVAN) tablet 2 mg     magnesium hydroxide (MILK OF MAGNESIA) " "suspension 30 mL     metFORMIN (GLUCOPHAGE-XR) 24 hr tablet 500 mg     nicotine (NICORETTE) gum 2-4 mg     paliperidone ER (INVEGA) 24 hr tablet 6 mg            10 point ROS negative see H&P       Allergies:   No Known Allergies          Psychiatric Examination:   BP (!) 86/53   Pulse 79   Temp 97.6  F (36.4  C) (Tympanic)   Resp 16   Ht 1.638 m (5' 4.5\")   Wt 72.8 kg (160 lb 6.4 oz)   SpO2 98%   BMI 27.11 kg/m    Weight is 160 lbs 6.4 oz  Body mass index is 27.11 kg/m .     Appearance: Somewhat disheveled  Attitude:  guarded   Eye Contact:  minimal  Mood:  improving.  Still dismissive though not as grandiose not as elated  Affect: flat  Speech: Not as pressured  Psychomotor Behavior: normal, no evidence of abnormal movements  Thought Process:  disorganized and illogical  Associations:  loosening of associations present  Thought Content:  disorganized, some improvement  Insight:  limited  Judgment:  poor  Oriented to:  x3  Attention Span and Concentration:  poor  Recent and Remote Memory:  poor  Fund of Knowledge: delayed  Muscle Strength and Tone: normal  Gait and Station: normal             Labs:             Results for orders placed or performed during the hospital encounter of 06/11/20   HCG qualitative urine     Status: None   Result Value Ref Range     HCG Qual Urine Negative NEG^Negative   UA reflex to Microscopic and Culture     Status: Abnormal     Specimen: Midstream Urine   Result Value Ref Range     Color Urine Yellow       Appearance Urine Slightly Cloudy       Glucose Urine Negative NEG^Negative mg/dL     Bilirubin Urine Negative NEG^Negative     Ketones Urine Negative NEG^Negative mg/dL     Specific Gravity Urine 1.030 1.003 - 1.035     Blood Urine Negative NEG^Negative     pH Urine 6.0 4.7 - 8.0 pH     Protein Albumin Urine 30 (A) NEG^Negative mg/dL     Urobilinogen mg/dL Normal 0.0 - 2.0 mg/dL     Nitrite Urine Negative NEG^Negative     Leukocyte Esterase Urine Small (A) NEG^Negative     " Source Midstream Urine       RBC Urine 3 (H) 0 - 2 /HPF     WBC Urine 4 0 - 5 /HPF     Bacteria Urine None (A) NEG^Negative /HPF     Squamous Epithelial /HPF Urine 18 (H) 0 - 1 /HPF     Mucous Urine Present (A) NEG^Negative /LPF     Amorphous Crystals Moderate (A) NEG^Negative /HPF   Urine Drugs of Abuse Screen Panel 13     Status: Abnormal   Result Value Ref Range     Cannabinoids (16-vyl-3-carboxy-9-THC) Detected, Abnormal Result (A) NDET^Not Detected ng/mL     Phencyclidine (Phencyclidine) Not Detected NDET^Not Detected ng/mL     Cocaine (Benzoylecgonine) Not Detected NDET^Not Detected ng/mL     Methamphetamine (d-Methamphetamine) Detected, Abnormal Result (A) NDET^Not Detected ng/mL     Opiates (Morphine) Not Detected NDET^Not Detected ng/mL     Amphetamine (d-Amphetamine) Detected, Abnormal Result (A) NDET^Not Detected ng/mL     Benzodiazepines (Nordiazepam) Detected, Abnormal Result (A) NDET^Not Detected ng/mL     Tricyclic Antidepressants (Desipramine) Not Detected NDET^Not Detected ng/mL     Methadone (Methadone) Not Detected NDET^Not Detected ng/mL     Barbiturates (Butalbital) Not Detected NDET^Not Detected ng/mL     Oxycodone (Oxycodone) Not Detected NDET^Not Detected ng/mL     Propoxyphene (Norpropoxyphene) Not Detected NDET^Not Detected ng/mL     Buprenorphine (Buprenorphine) Not Detected NDET^Not Detected ng/mL   CBC with platelets differential     Status: None   Result Value Ref Range     WBC 8.1 4.0 - 11.0 10e9/L     RBC Count 4.69 3.8 - 5.2 10e12/L     Hemoglobin 14.1 11.7 - 15.7 g/dL     Hematocrit 42.1 35.0 - 47.0 %     MCV 90 78 - 100 fl     MCH 30.1 26.5 - 33.0 pg     MCHC 33.5 31.5 - 36.5 g/dL     RDW 12.3 10.0 - 15.0 %     Platelet Count 280 150 - 450 10e9/L     Diff Method Automated Method       % Neutrophils 56.5 %     % Lymphocytes 35.2 %     % Monocytes 7.0 %     % Eosinophils 0.7 %     % Basophils 0.4 %     % Immature Granulocytes 0.2 %     Nucleated RBCs 0 0 /100     Absolute Neutrophil  4.6 1.6 - 8.3 10e9/L     Absolute Lymphocytes 2.8 0.8 - 5.3 10e9/L     Absolute Monocytes 0.6 0.0 - 1.3 10e9/L     Absolute Eosinophils 0.1 0.0 - 0.7 10e9/L     Absolute Basophils 0.0 0.0 - 0.2 10e9/L     Abs Immature Granulocytes 0.0 0 - 0.4 10e9/L     Absolute Nucleated RBC 0.0     Comprehensive metabolic panel     Status: Abnormal   Result Value Ref Range     Sodium 138 133 - 144 mmol/L     Potassium 3.8 3.4 - 5.3 mmol/L     Chloride 108 94 - 109 mmol/L     Carbon Dioxide 26 20 - 32 mmol/L     Anion Gap 4 3 - 14 mmol/L     Glucose 81 70 - 99 mg/dL     Urea Nitrogen 11 7 - 30 mg/dL     Creatinine 0.63 0.52 - 1.04 mg/dL     GFR Estimate >90 >60 mL/min/[1.73_m2]     GFR Estimate If Black >90 >60 mL/min/[1.73_m2]     Calcium 8.8 8.5 - 10.1 mg/dL     Bilirubin Total 0.4 0.2 - 1.3 mg/dL     Albumin 3.9 3.4 - 5.0 g/dL     Protein Total 8.1 6.8 - 8.8 g/dL     Alkaline Phosphatase 62 40 - 150 U/L      (H) 0 - 50 U/L     AST 84 (H) 0 - 45 U/L   TSH with free T4 reflex     Status: None   Result Value Ref Range     TSH 0.85 0.40 - 4.00 mU/L   Asymptomatic COVID-19 Virus (Coronavirus) by PCR     Status: None     Specimen: Nasopharyngeal   Result Value Ref Range     COVID-19 Virus PCR to [facility] - Source Nasopharyngeal       COVID-19 Virus PCR to [facility] - Result           Test received-See reflex to [place] test SARS CoV2 (COVID-19) Virus RT-PCR   SARS-CoV-2 COVID-19 Virus (Coronavirus) RT-PCR Nasopharyngeal     Status: None     Specimen: Nasopharyngeal   Result Value Ref Range     SARS-CoV-2 Virus Specimen Source Nasopharyngeal       SARS-CoV-2 PCR Result NEGATIVE       SARS-CoV-2 PCR Comment           This automated, real-time RT-PCR assay by [employer] has   been given Emergency Use Authorization (EUA) for the in vitro qualitative detection of RNA   from the SARS-CoV2 virus in nasopharyngeal swabs in viral transport medium from patients   with signs and symptoms of infection who  are suspected of COVID-19. The performance is   unknown in asymptomatic patients.          Plan:         Continue lithium 600 mg bid    I did put lithium level and BMP in computer for the 10th  Discontinue Zyprexa  Start Invega 6 mg nightly  Start metformin    Rationale for change in precautions or plan: Medication adjustment to target mood and delusions    Petition for [place]  including Invega, Zyprexa, Abilify, Risperdal, and Geodon     Participants: Roxy Hardy NP, social work, occupational therapy Nursing

## 2020-07-07 NOTE — PLAN OF CARE
Face to face end of shift report obtained from Maddie WEST RN.     Problem: Adult Behavioral Health Plan of Care  Goal: Patient-Specific Goal (Individualization)  Description: Patient will sleep 6-8 hours per night  Patient will eat at 75% of meals daily  Patient will attend >50% of group programming daily  Patient will be compliant with medication  Patient will have appropriate boundaries staff and peers during hospital  ELOPEMENT PRECAUTIONS; Eloped from ICU 6/17/2020 7/7/2020 0038 by Cindi Marin, RN  Outcome: No Change  Note:   Pt observed resting in bed.Pt appears to be sleeping in bed with eyes closed, having regular respirations and position changes. 15 minutes and PRN safety checks completed with no noted complaints.      Patient slept 7 hours. Will continue to monitor.      Problem: Mental State/Mood Impairment (Psychotic Signs/Symptoms)  Goal: Improved Mental State and Mood (Psychotic Signs/Symptoms)  Description: Patient will have a reality based conversation.  Patient will report a decrease in delusions/hallucinations.  Patient will use coping skills to manage labile behavior.  7/7/2020 0038 by Cindi Marin, RN  Outcome: No Change     Face to face end of shift report communicated to oncoming ASHWIN.  Cindi Marin, ASHWIN  6:16 AM

## 2020-07-07 NOTE — PLAN OF CARE
Faxed updated notes and MAR to [facility] this morning.     Emailed Justine WEST with [facility] this afternoon to see if pt has been assigned a [facility] .

## 2020-07-07 NOTE — PLAN OF CARE
Face to face shift report received from Cindi MALONE RN. Rounding completed, pt observed.    Xi Go RN  7/7/2020  7:45 AM    Problem: Adult Behavioral Health Plan of Care  Goal: Patient-Specific Goal (Individualization)  Description: Patient will sleep 6-8 hours per night  Patient will eat at 75% of meals daily  Patient will attend >50% of group programming daily  Patient will be compliant with medication  Patient will have appropriate boundaries staff and peers during hospital  [facility] 7/7/2020 0743 by Xi Go RN  Outcome: Improving  Note: Shift Summery:  Patient was in bed at the start of this shift.  Patient easily prompted up for breakfast.  Patient denies pain or unwanted side effects.  Med compliant.  Patient given education handouts for Invega and Metformin prior to first doses.  Patient had no questions at this time.  Appetite is good.  Encouraged to shower.      Problem: Mental State/Mood Impairment (Psychotic Signs/Symptoms)  Goal: Improved Mental State and Mood (Psychotic Signs/Symptoms)  Description: Patient will have a reality based conversation.  Patient will report a decrease in delusions/hallucinations.  Patient will use coping skills to manage labile behavior.  7/7/2020 0743 by Xi Go RN  Outcome: Improving  Patient is able to have short reality based conversations but topics becomedelusional grandiose when talking in depth.     Face to face report will be communicated to oncoming RN.

## 2020-07-08 PROCEDURE — 12400011

## 2020-07-08 PROCEDURE — 12400000 ZZH R&B MH

## 2020-07-08 PROCEDURE — 99232 SBSQ HOSP IP/OBS MODERATE 35: CPT | Performed by: NURSE PRACTITIONER

## 2020-07-08 PROCEDURE — 25000132 ZZH RX MED GY IP 250 OP 250 PS 637: Performed by: NURSE PRACTITIONER

## 2020-07-08 RX ADMIN — PALIPERIDONE 6 MG: 6 TABLET, EXTENDED RELEASE ORAL at 08:14

## 2020-07-08 RX ADMIN — METFORMIN ER 500 MG 500 MG: 500 TABLET ORAL at 16:40

## 2020-07-08 RX ADMIN — LITHIUM CARBONATE 600 MG: 300 TABLET, EXTENDED RELEASE ORAL at 08:14

## 2020-07-08 RX ADMIN — LITHIUM CARBONATE 600 MG: 300 TABLET, EXTENDED RELEASE ORAL at 20:05

## 2020-07-08 RX ADMIN — DIPHENHYDRAMINE HYDROCHLORIDE 50 MG: 50 CAPSULE ORAL at 20:08

## 2020-07-08 ASSESSMENT — ACTIVITIES OF DAILY LIVING (ADL)
LAUNDRY: UNABLE TO COMPLETE
HYGIENE/GROOMING: INDEPENDENT
ORAL_HYGIENE: INDEPENDENT
DRESS: SCRUBS (BEHAVIORAL HEALTH)
DRESS: SCRUBS (BEHAVIORAL HEALTH)
ORAL_HYGIENE: INDEPENDENT
HYGIENE/GROOMING: INDEPENDENT

## 2020-07-08 NOTE — PLAN OF CARE
Problem: Adult Behavioral Health Plan of Care  Goal: Patient-Specific Goal (Individualization)  Description: Patient will sleep 6-8 hours per night  Patient will eat at 75% of meals daily  Patient will attend >50% of group programming daily  Patient will be compliant with medication  Patient will have appropriate boundaries staff and peers during hospital  [facility] 7/8/2020 1700 by Linda Concepcion, RN  Outcome: No Change  Note:        Problem: Mental State/Mood Impairment (Psychotic Signs/Symptoms)  Goal: Improved Mental State and Mood (Psychotic Signs/Symptoms)  Description: Patient will have a reality based conversation.  Patient will report a decrease in delusions/hallucinations.  Patient will use coping skills to manage labile behavior.  7/8/2020 1700 by Linda Concepcion RN  Outcome: No Change   Pt. Has been isolating in room, did attend group briefly for 10 minutes, slept 7 hours last night, eating at least 75% of meals, compliant with taking prescribed medications, showing appropriate boundaries, has some reality based conversation, has not attempted to elope.  2008-  Pt. Requested/received benadryl 50 mg po for sleep.    Face to face end of shift report will be communicated to oncoming night shift RN.     Linda Concepcion RN  7/8/2020  6:03 PM

## 2020-07-08 NOTE — PROGRESS NOTES
"[facility]  Psychiatric Progress Note     Impression:     Jonelle is resting in bed when I see her today. She states she is \"just waiting to go home\". Reminded her that she is now on a commitment though she then asks \"I'm not really sure what that even means\". Discussed that we would like her to have a longer period of stability taking medications. Zyprexa was switched to Invega yesterday, she denies any side effects from this. 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 Notes indicate that she has been sleeping well. Is currently committed and chisholm order granted.     Educated regarding medication indications, risks, benefits, side effects, contraindications and possible interactions. Verbally expressed understanding.        Diagnoses:      Schizoaffective Disorder, bipolar type  Substance Abuse Disorder     Attestation:  Patient has been seen and evaluated by me,  SABI Calderón CNP          Interim History:   The patient's care was discussed with the treatment team and chart notes were reviewed.     BEHAVIORAL TEAM DISCUSSION     Progress: limited. Remains delusional at times, less agitated     Continued Stay Criteria/Rationale: Zyprexa changed to Invega yesterday, remains delusional, lacks insight into mental health    Medical/Physical: None  Precautions:   Falls precaution?: YES       Behavioral Orders   Procedures     Code 1 - Restrict to Unit     Routine Programming       As clinically indicated     Status 15       Every 15 minutes.     Plan:   Continue lithium 600 mg BID  Lithium level and BMP ordered for the 10th  Start Invega 6 mg nightly  Start metformin 500 mg daily with dinner for neuroleptic induced weight gain  Is now committed and chisholm granted    Rationale for change in precautions or plan: Medication adjustment to target mood and delusions    Petition for Ch[facility]olm including Invega, Zyprexa, Abilify, Risperdal, and Geodon     Participants: Mai" "Elizabeth NP, social work, occupational therapy, Nursing         Current Facility-Administered Medications   Medication     acetaminophen (TYLENOL) tablet 650 mg     diphenhydrAMINE (BENADRYL) capsule 50 mg    Or     diphenhydrAMINE (BENADRYL) injection 50 mg     haloperidol (HALDOL) tablet 5 mg    Or     haloperidol lactate (HALDOL) injection 5 mg     lithium ER (LITHOBID) CR tablet 600 mg     LORazepam (ATIVAN) injection 2 mg    Or     LORazepam (ATIVAN) tablet 2 mg     magnesium hydroxide (MILK OF MAGNESIA) suspension 30 mL     metFORMIN (GLUCOPHAGE-XR) 24 hr tablet 500 mg     nicotine (NICORETTE) gum 2-4 mg     paliperidone ER (INVEGA) 24 hr tablet 6 mg            10 point ROS negative see H&P       Allergies:   No Known Allergies          Psychiatric Examination:   BP (!) 86/53   Pulse 79   Temp 97.6  F (36.4  C) (Tympanic)   Resp 16   Ht 1.638 m (5' 4.5\")   Wt 72.8 kg (160 lb 6.4 oz)   SpO2 98%   BMI 27.11 kg/m    Weight is 160 lbs 6.4 oz  Body mass index is 27.11 kg/m .     Appearance: awake, alert, dressed in hospital scrubs, casually groomed  Attitude:  guarded   Eye Contact: fair  Mood: improving  Affect: blunted  Speech: clear, coherent, offers little in conversation today  Psychomotor Behavior: normal, no evidence of abnormal movements  Thought Process:  disorganized and illogical though improving  Associations:  loosening of associations present  Thought Content: denies hallucinations, denies suicidal thoughts  Insight:  limited  Judgment:  poor  Oriented to:  x3  Attention Span and Concentration: limited  Recent and Remote Memory:  impaired  Fund of Knowledge: delayed  Muscle Strength and Tone: normal  Gait and Station: normal             Labs:   No results found for this or any previous visit (from the past 24 hour(s)).        "

## 2020-07-08 NOTE — PLAN OF CARE
Spoke with Rosalba in pt [employer] who states she would like to do a phone screening with pt today. Spoke with pt this morning and she agrees to speak with Rosalba.     Called and spoke with Mariah at [facility] this morning- She states pt is currently only on the [facility] list. There are currently 24 priority pt's- they are working on placing pt's who were referred June 1st and pt was referred on July 1st. Informed Mariah that when pt is assigned a [facility]  staff will request that pt be added to all of the [facility] lists.     Pt spoke with Rosalba in pt financial this afternoon over the phone and worked on jeremías for MA.

## 2020-07-08 NOTE — PLAN OF CARE
Face to face end of shift report obtained from Linda ROJO RN.     Problem: Adult Behavioral Health Plan of Care  Goal: Patient-Specific Goal (Individualization)  Description: Patient will sleep 6-8 hours per night  Patient will eat at 75% of meals daily  Patient will attend >50% of group programming daily  Patient will be compliant with medication  Patient will have appropriate boundaries staff and peers during hospital  ELOPEMENT PRECAUTIONS; Eloped from ICU 6/17/2020 7/8/2020 0121 by Cindi Marin, RN  Outcome: No Change  Note:    Pt observed resting in bed.Pt appears to be sleeping in bed with eyes closed, having regular respirations and position changes. 15 minutes and PRN safety checks completed with no noted complaints.      Patient slept 7 hours. Will continue to monitor.    Problem: Mental State/Mood Impairment (Psychotic Signs/Symptoms)  Goal: Improved Mental State and Mood (Psychotic Signs/Symptoms)  Description: Patient will have a reality based conversation.  Patient will report a decrease in delusions/hallucinations.  Patient will use coping skills to manage labile behavior.  7/8/2020 0121 by Cindi Marin, RN  Outcome: No Change     Face to face end of shift report communicated to oncoming ASHWIN.  Cindi Marin, ASHWIN  6:27 AM

## 2020-07-08 NOTE — PLAN OF CARE
Problem: Mental State/Mood Impairment (Psychotic Signs/Symptoms)  Goal: Improved Mental State and Mood (Psychotic Signs/Symptoms)  Description: Patient will have a reality based conversation.  Patient will report a decrease in delusions/hallucinations.  Patient will use coping skills to manage labile behavior.  7/8/2020 1220 by Oneyda Lopez RN  Outcome: Improving   Patient was able to have a reality based conversation with this writer.  She does keep responses short during nursing assessment.  Denies hallucination, anxiety, and depression at this time.        Problem: Adult Behavioral Health Plan of Care  Goal: Patient-Specific Goal (Individualization)  Description: Patient will sleep 6-8 hours per night  Patient will eat at 75% of meals daily  Patient will attend >50% of group programming daily  Patient will be compliant with medication  Patient will have appropriate boundaries staff and peers during hospital  ELOPEMENT PRECAUTIONS; Eloped from ICU 6/17/2020 7/8/2020 1220 by Oneyda Lopez RN  Outcome: Improving  Note:      Patient has been calm, cooperative, and medication compliant this shift.  She rested after breakfast but was ut in lounge more after lunch.  No attempts to elope.  No complaints of pain.  VS WNL.  Face to face end of shift report communicated to evening shift ASHWIN.     Oneyda Lopez RN  7/8/2020  12:22 PM

## 2020-07-09 PROCEDURE — 25000132 ZZH RX MED GY IP 250 OP 250 PS 637: Performed by: NURSE PRACTITIONER

## 2020-07-09 PROCEDURE — 12400011

## 2020-07-09 PROCEDURE — 12400000 ZZH R&B MH

## 2020-07-09 RX ADMIN — METFORMIN ER 500 MG 500 MG: 500 TABLET ORAL at 17:24

## 2020-07-09 RX ADMIN — DIPHENHYDRAMINE HYDROCHLORIDE 50 MG: 50 CAPSULE ORAL at 20:12

## 2020-07-09 RX ADMIN — ACETAMINOPHEN 650 MG: 325 TABLET, FILM COATED ORAL at 01:12

## 2020-07-09 RX ADMIN — LITHIUM CARBONATE 600 MG: 300 TABLET, EXTENDED RELEASE ORAL at 20:12

## 2020-07-09 RX ADMIN — PALIPERIDONE 6 MG: 6 TABLET, EXTENDED RELEASE ORAL at 08:19

## 2020-07-09 RX ADMIN — ACETAMINOPHEN 650 MG: 325 TABLET, FILM COATED ORAL at 08:22

## 2020-07-09 RX ADMIN — LITHIUM CARBONATE 600 MG: 300 TABLET, EXTENDED RELEASE ORAL at 08:19

## 2020-07-09 ASSESSMENT — ACTIVITIES OF DAILY LIVING (ADL)
ORAL_HYGIENE: INDEPENDENT
HYGIENE/GROOMING: INDEPENDENT
HYGIENE/GROOMING: INDEPENDENT
DRESS: SCRUBS (BEHAVIORAL HEALTH);INDEPENDENT
ORAL_HYGIENE: INDEPENDENT
LAUNDRY: UNABLE TO COMPLETE
DRESS: INDEPENDENT;SCRUBS (BEHAVIORAL HEALTH)

## 2020-07-09 NOTE — PLAN OF CARE
Face to face end of shift report obtained from Linda ROJO RN.     Problem: Adult Behavioral Health Plan of Care  Goal: Patient-Specific Goal (Individualization)  Description: Patient will sleep 6-8 hours per night  Patient will eat at 75% of meals daily  Patient will attend >50% of group programming daily  Patient will be compliant with medication  Patient will have appropriate boundaries staff and peers during hospital  ELOPEMENT PRECAUTIONS; Eloped from ICU 6/17/2020 7/9/2020 0006 by Cindi Marin, RN  Outcome: No Change  Note:    Pt observed resting in bed.Pt appears to be sleeping in bed with eyes closed, having regular respirations and position changes. 15 minutes and PRN safety checks completed with no noted complaints.      0112: 650 mg tylenol given for migraine     Patient slept 7 hours. Will continue to monitor.    Problem: Mental State/Mood Impairment (Psychotic Signs/Symptoms)  Goal: Improved Mental State and Mood (Psychotic Signs/Symptoms)  Description: Patient will have a reality based conversation.  Patient will report a decrease in delusions/hallucinations.  Patient will use coping skills to manage labile behavior.  7/9/2020 0006 by Cindi Marin, RN  Outcome: No Change     Face to face end of shift report communicated to oncoming ASHWIN.  Cindi Marin, ASHWIN  6:35 AM

## 2020-07-09 NOTE — PLAN OF CARE
"Pt isolates to room all of this shift. Was encouraged to get out of bed several times. Declines to go to group. \"I just want to sleep.\" Denies all mental health criteria. Complains of 9/10 headache. Tylenol given as requested. \"I feel like I have the flu.\" Denies nausea or vomiting. No cough or fever noted. Compliant with prescribed medication.     Problem: Adult Behavioral Health Plan of Care  Goal: Plan of Care Review  Outcome: No Change     Problem: Adult Behavioral Health Plan of Care  Goal: Patient-Specific Goal (Individualization)  Description: Patient will sleep 6-8 hours per night  Patient will eat at 75% of meals daily  Patient will attend >50% of group programming daily  Patient will be compliant with medication  Patient will have appropriate boundaries staff and peers during hospital  ELOPEMENT PRECAUTIONS; Eloped from ICU 6/17/2020 7/9/2020 1115 by Doreen Rodriguez, RN  Outcome: No Change  Note:          Problem: Mental State/Mood Impairment (Psychotic Signs/Symptoms)  Goal: Improved Mental State and Mood (Psychotic Signs/Symptoms)  Description: Patient will have a reality based conversation.  Patient will report a decrease in delusions/hallucinations.  Patient will use coping skills to manage labile behavior.  7/9/2020 1115 by Doreen Rodriguez, RN  Outcome: No Change    Face to face end of shift report communicated to on coming RN.               "

## 2020-07-10 LAB
ANION GAP SERPL CALCULATED.3IONS-SCNC: 2 MMOL/L (ref 3–14)
BUN SERPL-MCNC: 13 MG/DL (ref 7–30)
CALCIUM SERPL-MCNC: 9.2 MG/DL (ref 8.5–10.1)
CHLORIDE SERPL-SCNC: 104 MMOL/L (ref 94–109)
CO2 SERPL-SCNC: 28 MMOL/L (ref 20–32)
CREAT SERPL-MCNC: 0.61 MG/DL (ref 0.52–1.04)
GFR SERPL CREATININE-BSD FRML MDRD: >90 ML/MIN/{1.73_M2}
GLUCOSE SERPL-MCNC: 85 MG/DL (ref 70–99)
LITHIUM SERPL-SCNC: 0.7 MMOL/L (ref 0.6–1.2)
POTASSIUM SERPL-SCNC: 3.7 MMOL/L (ref 3.4–5.3)
SODIUM SERPL-SCNC: 134 MMOL/L (ref 133–144)

## 2020-07-10 PROCEDURE — 12400000 ZZH R&B MH

## 2020-07-10 PROCEDURE — 80048 BASIC METABOLIC PNL TOTAL CA: CPT | Performed by: NURSE PRACTITIONER

## 2020-07-10 PROCEDURE — 99232 SBSQ HOSP IP/OBS MODERATE 35: CPT | Performed by: NURSE PRACTITIONER

## 2020-07-10 PROCEDURE — 25000132 ZZH RX MED GY IP 250 OP 250 PS 637: Performed by: NURSE PRACTITIONER

## 2020-07-10 PROCEDURE — 36415 COLL VENOUS BLD VENIPUNCTURE: CPT | Performed by: NURSE PRACTITIONER

## 2020-07-10 PROCEDURE — 12400011

## 2020-07-10 PROCEDURE — 80178 ASSAY OF LITHIUM: CPT | Performed by: NURSE PRACTITIONER

## 2020-07-10 RX ADMIN — LITHIUM CARBONATE 600 MG: 300 TABLET, EXTENDED RELEASE ORAL at 21:12

## 2020-07-10 RX ADMIN — PALIPERIDONE 6 MG: 6 TABLET, EXTENDED RELEASE ORAL at 08:28

## 2020-07-10 RX ADMIN — METFORMIN ER 500 MG 500 MG: 500 TABLET ORAL at 16:55

## 2020-07-10 RX ADMIN — NICOTINE POLACRILEX 4 MG: 2 GUM, CHEWING ORAL at 19:55

## 2020-07-10 RX ADMIN — Medication 2 TABLET: at 21:12

## 2020-07-10 RX ADMIN — LITHIUM CARBONATE 600 MG: 300 TABLET, EXTENDED RELEASE ORAL at 08:28

## 2020-07-10 ASSESSMENT — ACTIVITIES OF DAILY LIVING (ADL)
ORAL_HYGIENE: INDEPENDENT
LAUNDRY: UNABLE TO COMPLETE
ORAL_HYGIENE: INDEPENDENT
DRESS: SCRUBS (BEHAVIORAL HEALTH)
HYGIENE/GROOMING: INDEPENDENT
HYGIENE/GROOMING: INDEPENDENT
DRESS: INDEPENDENT

## 2020-07-10 NOTE — PLAN OF CARE
"Problem: Adult Behavioral Health Plan of Care  Goal: Patient-Specific Goal (Individualization)  Description: Patient will sleep 6-8 hours per night  Patient will eat at 75% of meals daily  Patient will attend >50% of group programming daily  Patient will be compliant with medication  Patient will have appropriate boundaries staff and peers during hospital  [facility] 7/9/2020 1912 by Luciana Gonzalez, RN  Outcome: Improving  Note: Pt verbalized that she is feeling \"homesick.\" She denied all mental health criteria. No evidence of psychosis noted. Pt ate 100% of supper tonight. She participated in group programming from 1900 to 2000. She has otherwise been isolative and withdrawn to her room. She was compliant with her scheduled medications.     2012 - Pt requested and rec'd 50 mg of PRN Benadryl for sleep.     Face to face end of shift report communicated to oncoming RN.      Problem: Mental State/Mood Impairment (Psychotic Signs/Symptoms)  Goal: Improved Mental State and Mood (Psychotic Signs/Symptoms)  Description: Patient will have a reality based conversation.  Patient will report a decrease in delusions/hallucinations.  Patient will use coping skills to manage labile behavior.  7/9/2020 1912 by Luciana Gonzalez, RN  Outcome: Improving  Note: Pt was able to have a reality based conversation. No evidence of delusional thinking noted. She denied hallucinations.      "

## 2020-07-10 NOTE — PLAN OF CARE
Face to face shift report received from Cindi MALONE RN. Rounding completed, pt observed.  Xi Go RN  7/10/2020  7:49 AM    Problem: Adult Behavioral Health Plan of Care  Goal: Patient-Specific Goal (Individualization)  Description: Patient will sleep 6-8 hours per night  Patient will eat at 75% of meals daily  Patient will attend >50% of group programming daily  Patient will be compliant with medication  Patient will have appropriate boundaries staff and peers during hospital  ELOPEMENT PRECAUTIONS; Eloped from ICU 6/17/2020    7/10/2020 0748 by Xi Go RN  Outcome: Improving  Note: Shift Summery:  Patient was prompted up for breakfast.  Mood is cooperative, affect is flat.  Patient complains of feeling tired and having a headache but declines offer of PRN pain reliever.  Patient states she does not feel any different mentally on Invega vs Zyprexa.  Patient up mid morning and asked for shower supplies.  Showered before lunch.    Problem: Mental State/Mood Impairment (Psychotic Signs/Symptoms)  Goal: Improved Mental State and Mood (Psychotic Signs/Symptoms)  Description: Patient will have a reality based conversation.  Patient will report a decrease in delusions/hallucinations.  Patient will use coping skills to manage labile behavior.  7/10/2020 0748 by Xi Go RN  Outcome: Improving     Face to face report will be communicated to oncoming RN.

## 2020-07-10 NOTE — PLAN OF CARE
Face to face end of shift report obtained from Luciana PEREZ RN.     Problem: Adult Behavioral Health Plan of Care  Goal: Patient-Specific Goal (Individualization)  Description: Patient will sleep 6-8 hours per night  Patient will eat at 75% of meals daily  Patient will attend >50% of group programming daily  Patient will be compliant with medication  Patient will have appropriate boundaries staff and peers during hospital  [facility] 7/9/2020 9872 by Cindi Marin, RN  Outcome: No Change   Pt observed resting in bed.Pt appears to be sleeping in bed with eyes closed, having regular respirations and position changes. 15 minutes and PRN safety checks completed with no noted complaints.      Patient slept 7 hours. Will continue to monitor.    Problem: Mental State/Mood Impairment (Psychotic Signs/Symptoms)  Goal: Improved Mental State and Mood (Psychotic Signs/Symptoms)  Description: Patient will have a reality based conversation.  Patient will report a decrease in delusions/hallucinations.  Patient will use coping skills to manage labile behavior.  7/9/2020 5764 by Cindi Marin, RN  Outcome: No Change     Face to face end of shift report communicated to oncoming RN.  Cindi Marin, ASHWIN  6:15 AM

## 2020-07-10 NOTE — PLAN OF CARE
Called [facility]. Patient is only on the list still for [facility].    Faxed updated notes to [facility].     Called Justine Romero and left message for patient to be opened up to all [facility] lists. SW also called person of the day line and left same message.    [facility] worker of the day called and stated they called [facility] and the patient is open to all [facility] lists.

## 2020-07-10 NOTE — PROGRESS NOTES
"[facility]  Psychiatric Progress Note     Impression:     Jonelle is resting in bed when I see her today. She states she is \"just chilling\". States that she has not been sleeping well at night, though notes indicate she slept 7 hours the last 2 nights. She does spend much of the morning in bed. We discussed whether she feels the Invega is making her tired when she takes it in the morning, though she denies this. She is no longer making grandiose statements, no symptoms of psychosis noted in nursing notes for at least the last 2 days. Does report that over the last 2 days she has been feeling somewhat nauseated, though it is better today. Discussed that this may be due to new medications, which included Glucophage. She will have a lithium level drawn this evening.     Educated regarding medication indications, risks, benefits, side effects, contraindications and possible interactions. Verbally expressed understanding.        Diagnoses:      Schizoaffective Disorder, bipolar type  Substance Abuse Disorder     Attestation:  Patient has been seen and evaluated by me,  SABI Calderón CNP          Interim History:   The patient's care was discussed with the treatment team and chart notes were reviewed.     BEHAVIORAL TEAM DISCUSSION     Progress: improving. Improvement in psychosis and grandiosity    Continued Stay Criteria/Rationale: Switched to Invega, lacks insight into mental health, monitor for psychosis and labile mood    Medical/Physical: None  Precautions:   Falls precaution?: YES       Behavioral Orders   Procedures     Code 1 - Restrict to Unit     Routine Programming       As clinically indicated     Status 15       Every 15 minutes.     Plan:   Continue lithium 600 mg BID  Lithium level and BMP this evening  Continue Invega 6 mg daily  Continue metformin 500 mg daily with dinner for neuroleptic-induced weight gain  Is now committed and chisholm granted    Rationale for change in " "precautions or plan: Medication adjustment to target mood and delusions    Petition for [place] including Invega, Zyprexa, Abilify, Risperdal, and Geodon     Participants: Mai Johnson NP, social work, occupational therapy, Nursing         Current Facility-Administered Medications   Medication     acetaminophen (TYLENOL) tablet 650 mg     diphenhydrAMINE (BENADRYL) capsule 50 mg    Or     diphenhydrAMINE (BENADRYL) injection 50 mg     haloperidol (HALDOL) tablet 5 mg    Or     haloperidol lactate (HALDOL) injection 5 mg     lithium ER (LITHOBID) CR tablet 600 mg     LORazepam (ATIVAN) injection 2 mg    Or     LORazepam (ATIVAN) tablet 2 mg     magnesium hydroxide (MILK OF MAGNESIA) suspension 30 mL     melatonin 3 mg (with vit B6 10 mg) extended release tablet 2 tablet     metFORMIN (GLUCOPHAGE-XR) 24 hr tablet 500 mg     nicotine (NICORETTE) gum 2-4 mg     paliperidone ER (INVEGA) 24 hr tablet 6 mg            10 point ROS negative see H&P       Allergies:   No Known Allergies          Psychiatric Examination:   BP (!) 86/53   Pulse 79   Temp 97.6  F (36.4  C) (Tympanic)   Resp 16   Ht 1.638 m (5' 4.5\")   Wt 72.8 kg (160 lb 6.4 oz)   SpO2 98%   BMI 27.11 kg/m    Weight is 160 lbs 6.4 oz  Body mass index is 27.11 kg/m .     Appearance: awake, alert, dressed in hospital scrubs, casually groomed  Attitude: pleasant, somewhat guarded  Eye Contact: fair  Mood: \"fine\", some anxiety at times  Affect: blunted  Speech: clear, coherent, offers little in conversation today  Psychomotor Behavior: normal, no evidence of abnormal movements  Thought Process: more reality based and linear  Associations: no loosening of associations noted  Thought Content: denies hallucinations, denies suicidal thoughts  Insight:  limited  Judgment: limited  Oriented to:  x3  Attention Span and Concentration: limited  Recent and Remote Memory:  impaired  Fund of Knowledge: delayed  Muscle Strength and Tone: normal  Gait and Station: " normal             Labs:   No results found for this or any previous visit (from the past 24 hour(s)).

## 2020-07-10 NOTE — PLAN OF CARE
"  Problem: Adult Behavioral Health Plan of Care  Goal: Patient-Specific Goal (Individualization)  Description: Patient will sleep 6-8 hours per night  Patient will eat at 75% of meals daily  Patient will attend >50% of group programming daily  Patient will be compliant with medication  Patient will have appropriate boundaries staff and peers during hospital  [facility]    Patient pleasant and cooperative with nursing assessment and medication administration. Affect is full range, mood is calm. Denies all criteria. States she is \"good\", that she is enjoying groups. She did give her last name as \"Bryan\" tonight, instead of \"Makela\", then later asks if there is a way to change her last name to Makela as \"Bryan isn't really my last name, it's hyphenated with Makela so it always comes up as just Bryan\". Did attend groups, but ate dinner in her room. Has been appropriate with staff and peers.   Face to face end of shift report communicated to oncoming shift RN.     Maddie Bergeron RN  7/10/2020  10:16 PM          7/10/2020 1716 by Maddie Bergeron RN  Outcome: No Change    Problem: Mental State/Mood Impairment (Psychotic Signs/Symptoms)  Goal: Improved Mental State and Mood (Psychotic Signs/Symptoms)  Description: Patient will have a reality based conversation.  Patient will report a decrease in delusions/hallucinations.  Patient will use coping skills to manage labile behavior.    Patient denies hallucinations, does not appear to be responding to internal stimuli.   7/10/2020 1716 by Maddie Bergeron RN  Outcome: Improving        "

## 2020-07-11 PROCEDURE — 25000132 ZZH RX MED GY IP 250 OP 250 PS 637: Performed by: NURSE PRACTITIONER

## 2020-07-11 PROCEDURE — 12400000 ZZH R&B MH

## 2020-07-11 PROCEDURE — 99232 SBSQ HOSP IP/OBS MODERATE 35: CPT | Performed by: NURSE PRACTITIONER

## 2020-07-11 PROCEDURE — 12400011

## 2020-07-11 RX ADMIN — PALIPERIDONE 6 MG: 6 TABLET, EXTENDED RELEASE ORAL at 08:21

## 2020-07-11 RX ADMIN — LITHIUM CARBONATE 600 MG: 300 TABLET, EXTENDED RELEASE ORAL at 08:21

## 2020-07-11 RX ADMIN — NICOTINE POLACRILEX 4 MG: 2 GUM, CHEWING ORAL at 21:02

## 2020-07-11 RX ADMIN — METFORMIN ER 500 MG 500 MG: 500 TABLET ORAL at 17:07

## 2020-07-11 RX ADMIN — LORAZEPAM 2 MG: 1 TABLET ORAL at 17:10

## 2020-07-11 RX ADMIN — LITHIUM CARBONATE 600 MG: 300 TABLET, EXTENDED RELEASE ORAL at 20:44

## 2020-07-11 RX ADMIN — Medication 2 TABLET: at 20:43

## 2020-07-11 ASSESSMENT — ACTIVITIES OF DAILY LIVING (ADL)
LAUNDRY: UNABLE TO COMPLETE
HYGIENE/GROOMING: INDEPENDENT
DRESS: INDEPENDENT
ORAL_HYGIENE: INDEPENDENT

## 2020-07-11 NOTE — PLAN OF CARE
Face to face end of shift report received form Valentina DISLA RN. Rounding completed and patient observed in the [facility] [place] [facility]. No requests at this time.     18:45 Update: Patient denied depression, HI/SI and hallucinations.She endorsed anxiety at a 4 of 10 and requested 2mg Ativan at 17:10 She was polite. After she ate she went back to bed. She denied pain. Patient denied depression, HI/SI and hallucinations and also stated she's never had hallucinations. . She endorsed some anxiety but declined needing any other PRNs besides the Ativan.. She had no outbursts. Gait is balanced and steady. Patient reported when she lays down she can't relax her hands and they are continuously moving. She said this is new for her. She also reported that when she lays down her head twitches and sometimes this makes it hard to fall asleep. She also reported she gets cravings.       22:30 Update: Patient has slept the rest of the shift but did get up for snack.      23:30 Update: Face to face end of shift report communicated to the charge nurse. This writer will continue to provide nursing services to patient throughout the next shift. Rounding completed and patient observed lying in bed with eyes closed, breathing regular and unlabored.      7/12/20 06:45 Update:      Face to face end of shift report communicated to oncromelia RN      Problem: Adult Behavioral Health Plan of Care  Goal: Patient-Specific Goal (Individualization)  Description: Patient will sleep 6-8 hours per night  Patient will eat at 75% of meals daily  Patient will attend >50% of group programming daily  Patient will be compliant with medication  Patient will have appropriate boundaries staff and peers during hospital  [facility] 7/11/2020 1620 by Adina Gunter, RN  Outcome: No Change     Problem: Mental State/Mood Impairment (Psychotic Signs/Symptoms)  Goal: Improved Mental State and Mood (Psychotic Signs/Symptoms)  Description:  Patient will have a reality based conversation.  Patient will report a decrease in delusions/hallucinations.  Patient will use coping skills to manage labile behavior.  Outcome: No Change

## 2020-07-11 NOTE — PLAN OF CARE
Face to face end of shift report will be communicated to oncoming RN.    Problem: Adult Behavioral Health Plan of Care  Goal: Patient-Specific Goal (Individualization)  Description: Patient will sleep 6-8 hours per night  Patient will eat at 75% of meals daily  Patient will attend >50% of group programming daily  Patient will be compliant with medication  Patient will have appropriate boundaries staff and peers during hospital  [facility] 7/11/2020 0101 by Marbella Renteria RN  Outcome: No Change     Problem: Mental State/Mood Impairment (Psychotic Signs/Symptoms)  Goal: Improved Mental State and Mood (Psychotic Signs/Symptoms)  Description: Patient will have a reality based conversation.  Patient will report a decrease in delusions/hallucinations.  Patient will use coping skills to manage labile behavior.  7/11/2020 0101 by Marbella Renteria RN  Outcome: No Change   Face to face end of shift report obtained from ASHWIN Perkins. Pt observed resting in bed.  Pt appears to be sleeping in bed with eyes closed, having regular respirations. 15 minutes and PRN safety checks completed with no noted complains.  Will continue to monitor.   0600- Pt slept 7 hours.

## 2020-07-11 NOTE — PLAN OF CARE
Problem: Adult Behavioral Health Plan of Care  Goal: Patient-Specific Goal (Individualization)  Description: Patient will sleep 6-8 hours per night  Patient will eat at 75% of meals daily  Patient will attend >50% of group programming daily  Patient will be compliant with medication  Patient will have appropriate boundaries staff and peers during hospital  ELOPEMENT PRECAUTIONS; Eloped from ICU 6/17/2020    Pt in bed at the start of the shift. Pt was up for breakfast but returned to her room. Pt has been in bed most of the shift, getting up only for meals. Pt denied all symptoms of pain, anxiety, depression, hallucinations and SI/HI. Pt stated that she doesn't plan on attending group today. Pt compliant with taking medications.      7/11/2020 1331 by Valentina Salcido, RN  Outcome: Improving  Note:      Face to face end of shift report communicated to oncoming RN.     Valentina Salcido RN  7/11/2020  1:31 PM

## 2020-07-11 NOTE — PROGRESS NOTES
[facility]  Psychiatric Progress Note     Impression:     Jonelle is resting in bed when I see her today. Tends to spend the first half of the day in her room, then is out more in the afternoon and evening. Did attend some groups yesterday on evening shift. She denies any hallucinations, denies suicidal ideation. Slept well at night. She does not make any grandiose statements to me, though is still half asleep when I see her this morning. Denies side effects from medications. Will try to meet with her later in the afternoon tomorrow when she seems to be awake and up out of her room.     Educated regarding medication indications, risks, benefits, side effects, contraindications and possible interactions. Verbally expressed understanding.        Diagnoses:      Schizoaffective Disorder, bipolar type  Substance Abuse Disorder     Attestation:  Patient has been seen and evaluated by me,  SABI Calderón CNP          Interim History:   The patient's care was discussed with the treatment team and chart notes were reviewed.     BEHAVIORAL TEAM DISCUSSION     Progress: improving. Improvement in psychosis and grandiosity    Continued Stay Criteria/Rationale: Switched to Invega, lacks insight into mental health, monitor for psychosis and labile mood    Medical/Physical: None  Precautions:   Falls precaution?: YES       Behavioral Orders   Procedures     Code 1 - Restrict to Unit     Routine Programming       As clinically indicated     Status 15       Every 15 minutes.     Plan:   Continue Lithium  mg BID  Lithium level 0.7  Continue Invega 6 mg daily  Continue metformin 500 mg daily with dinner for neuroleptic-induced weight gain  Is now committed and chisholm granted    Rationale for change in precautions or plan: Medication adjustment to target mood and delusions    Petition for Chisholm including Invega, Zyprexa, Abilify, Risperdal, and Geodon     Participants: Mai Johnson NP, Nursing  "        Current Facility-Administered Medications   Medication     acetaminophen (TYLENOL) tablet 650 mg     diphenhydrAMINE (BENADRYL) capsule 50 mg    Or     diphenhydrAMINE (BENADRYL) injection 50 mg     haloperidol (HALDOL) tablet 5 mg    Or     haloperidol lactate (HALDOL) injection 5 mg     lithium ER (LITHOBID) CR tablet 600 mg     LORazepam (ATIVAN) injection 2 mg    Or     LORazepam (ATIVAN) tablet 2 mg     magnesium hydroxide (MILK OF MAGNESIA) suspension 30 mL     melatonin 3 mg (with vit B6 10 mg) extended release tablet 2 tablet     metFORMIN (GLUCOPHAGE-XR) 24 hr tablet 500 mg     nicotine (NICORETTE) gum 2-4 mg     paliperidone ER (INVEGA) 24 hr tablet 6 mg            10 point ROS negative see H&P       Allergies:   No Known Allergies          Psychiatric Examination:   BP (!) 86/53   Pulse 79   Temp 97.6  F (36.4  C) (Tympanic)   Resp 16   Ht 1.638 m (5' 4.5\")   Wt 72.8 kg (160 lb 6.4 oz)   SpO2 98%   BMI 27.11 kg/m    Weight is 160 lbs 6.4 oz  Body mass index is 27.11 kg/m .     Appearance: awake, alert, dressed in hospital scrubs, casually groomed  Attitude: pleasant,  guarded  Eye Contact: fair  Mood: \"fine\" \"tired\"  Affect: blunted  Speech: clear, coherent, offers little in conversation   Psychomotor Behavior: normal, no evidence of abnormal movements  Thought Process: more reality based and linear, no grandiosity noted  Associations: no loosening of associations noted  Thought Content: denies hallucinations, denies suicidal thoughts  Insight:  limited  Judgment: limited  Oriented to:  x3  Attention Span and Concentration: limited  Recent and Remote Memory:  impaired  Fund of Knowledge: delayed  Muscle Strength and Tone: normal  Gait and Station: normal             Labs:     Results for orders placed or performed during the hospital encounter of 06/11/20 (from the past 24 hour(s))   Lithium level   Result Value Ref Range    Lithium Level 0.70 0.60 - 1.20 mmol/L   Basic metabolic panel "   Result Value Ref Range    Sodium 134 133 - 144 mmol/L    Potassium 3.7 3.4 - 5.3 mmol/L    Chloride 104 94 - 109 mmol/L    Carbon Dioxide 28 20 - 32 mmol/L    Anion Gap 2 (L) 3 - 14 mmol/L    Glucose 85 70 - 99 mg/dL    Urea Nitrogen 13 7 - 30 mg/dL    Creatinine 0.61 0.52 - 1.04 mg/dL    GFR Estimate >90 >60 mL/min/[1.73_m2]    GFR Estimate If Black >90 >60 mL/min/[1.73_m2]    Calcium 9.2 8.5 - 10.1 mg/dL

## 2020-07-12 PROCEDURE — 25000132 ZZH RX MED GY IP 250 OP 250 PS 637: Performed by: NURSE PRACTITIONER

## 2020-07-12 PROCEDURE — 12400011

## 2020-07-12 PROCEDURE — 99233 SBSQ HOSP IP/OBS HIGH 50: CPT | Performed by: NURSE PRACTITIONER

## 2020-07-12 PROCEDURE — 12400000 ZZH R&B MH

## 2020-07-12 RX ORDER — LORAZEPAM 1 MG/1
1 TABLET ORAL EVERY 6 HOURS PRN
Status: DISCONTINUED | OUTPATIENT
Start: 2020-07-12 | End: 2020-08-25

## 2020-07-12 RX ORDER — BENZTROPINE MESYLATE 1 MG/1
1 TABLET ORAL 2 TIMES DAILY PRN
Status: DISCONTINUED | OUTPATIENT
Start: 2020-07-12 | End: 2020-07-12

## 2020-07-12 RX ORDER — LORAZEPAM 1 MG/1
1 TABLET ORAL EVERY 6 HOURS PRN
Status: DISCONTINUED | OUTPATIENT
Start: 2020-07-12 | End: 2020-07-12

## 2020-07-12 RX ORDER — PROPRANOLOL HYDROCHLORIDE 10 MG/1
10 TABLET ORAL 3 TIMES DAILY PRN
Status: DISCONTINUED | OUTPATIENT
Start: 2020-07-12 | End: 2020-08-16

## 2020-07-12 RX ORDER — LORAZEPAM 2 MG/ML
1 INJECTION INTRAMUSCULAR EVERY 6 HOURS PRN
Status: DISCONTINUED | OUTPATIENT
Start: 2020-07-12 | End: 2020-07-12

## 2020-07-12 RX ORDER — BENZTROPINE MESYLATE 1 MG/1
1 TABLET ORAL 2 TIMES DAILY PRN
Status: DISCONTINUED | OUTPATIENT
Start: 2020-07-12 | End: 2020-07-23

## 2020-07-12 RX ORDER — BENZTROPINE MESYLATE 0.5 MG/1
0.5 TABLET ORAL DAILY
Status: DISCONTINUED | OUTPATIENT
Start: 2020-07-13 | End: 2020-07-23

## 2020-07-12 RX ORDER — LORAZEPAM 2 MG/ML
1 INJECTION INTRAMUSCULAR EVERY 6 HOURS PRN
Status: DISCONTINUED | OUTPATIENT
Start: 2020-07-12 | End: 2020-08-25

## 2020-07-12 RX ADMIN — METFORMIN ER 500 MG 500 MG: 500 TABLET ORAL at 16:33

## 2020-07-12 RX ADMIN — LITHIUM CARBONATE 600 MG: 300 TABLET, EXTENDED RELEASE ORAL at 20:41

## 2020-07-12 RX ADMIN — PALIPERIDONE 6 MG: 6 TABLET, EXTENDED RELEASE ORAL at 09:07

## 2020-07-12 RX ADMIN — Medication 2 TABLET: at 20:41

## 2020-07-12 RX ADMIN — BENZTROPINE MESYLATE 1 MG: 1 TABLET ORAL at 14:48

## 2020-07-12 RX ADMIN — LITHIUM CARBONATE 600 MG: 300 TABLET, EXTENDED RELEASE ORAL at 09:07

## 2020-07-12 RX ADMIN — PROPRANOLOL HYDROCHLORIDE 10 MG: 10 TABLET ORAL at 16:36

## 2020-07-12 RX ADMIN — LORAZEPAM 1 MG: 1 TABLET ORAL at 20:43

## 2020-07-12 ASSESSMENT — ACTIVITIES OF DAILY LIVING (ADL)
DRESS: INDEPENDENT;SCRUBS (BEHAVIORAL HEALTH)
HYGIENE/GROOMING: INDEPENDENT

## 2020-07-12 ASSESSMENT — MIFFLIN-ST. JEOR: SCORE: 1601.19

## 2020-07-12 NOTE — PLAN OF CARE
Face to face end of shift report communicated to the charge nurse. This writer will continue to provide nursing services to patient throughout the next shift. Please see full nursing documentation in previous note. Rounding completed and patient observed lying in bed with eyes closed, breathing regular and unlabored.

## 2020-07-12 NOTE — PLAN OF CARE
"Problem: Adult Behavioral Health Plan of Care  Goal: Patient-Specific Goal (Individualization)  Description: Patient will sleep 6-8 hours per night  Patient will eat at 75% of meals daily  Patient will attend >50% of group programming daily  Patient will be compliant with medication  Patient will have appropriate boundaries staff and peers during hospital  [facility]    Outcome: No Change    Pt has been isolative to her room, only out to eat breakfast. Denied SI, HI and hallucinations, rated anxiety 3/10 and depression low. Pt not attending groups today, stated she would go \"when Ed (MHP) gets back.\" Cooperative with assessment and taking scheduled medications. Pt does not experience SE during the day, but reported feeling some uncontrolled hand movements and occasional head twitching AFTER taking her meds. No cogwheeling present. Provider aware, PRN Cogentin ordered. Physical complaint of 4/10 back pain, pt declined PRN Tylenol for this.     Problem: Mental State/Mood Impairment (Psychotic Signs/Symptoms)  Goal: Improved Mental State and Mood (Psychotic Signs/Symptoms)  Description: Patient will have a reality based conversation.  Patient will report a decrease in delusions/hallucinations.  Patient will use coping skills to manage labile behavior.  Outcome: Improving  No delusional content in conversation, pt used correct last name today.    1530 - Face to face end of shift report to be communicated to oncoming shift RN.     Bina Weston RN  7/12/2020  12:52 PM             "

## 2020-07-12 NOTE — PROGRESS NOTES
"[facility]  Psychiatric Progress Note     Impression:     Jonelle is resting bed when I see her today. Offers little, states that she is \"fine\". Staff had reported that she had mentioned some neck stiffness and feeling like her hands were restless yesterday. When asked about this she states that these are \"better\" today. Will add a dose of Cogentin with her Invega with additional PRN dosing if needed. Does tend to sleep in, typically getting up later in the afternoon and evening. It does appear that she asked for and received 2 mg of Ativan yesterday at around 1700 for reported anxiety, then spent the rest of the evening sleeping in bed. Will decrease the dose of Ativan and limit use to severe anxiety and agitation, as she has told me that she is going to sleep until she is able to go home. Will add Propranolol as needed if what she is experiencing is akathisia.    Educated regarding medication indications, risks, benefits, side effects, contraindications and possible interactions. Verbally expressed understanding.        Diagnoses:      Schizoaffective Disorder, bipolar type  Substance Abuse Disorder     Attestation:  Patient has been seen and evaluated by me,  Bree Leary, SABI MOLINA          Interim History:   The patient's care was discussed with the treatment team and chart notes were reviewed.     BEHAVIORAL TEAM DISCUSSION     Progress: improving. Improvement in psychosis and grandiosity    Continued Stay Criteria/Rationale: Switched to Invega, lacks insight into mental health, monitor for psychosis and labile mood    Medical/Physical: None  Precautions:   Falls precaution?: YES       Behavioral Orders   Procedures     Code 1 - Restrict to Unit     Routine Programming       As clinically indicated     Status 15       Every 15 minutes.     Plan:   Continue Lithium  mg BID  Lithium level 0.7  Continue Invega 6 mg daily  Add Cogentin 0.5 mg in AM  Add Propranolol 10 mg TID prn " "anxiety/akathisia  Continue metformin 500 mg daily with dinner for neuroleptic-induced weight gain  Is now committed and chisholm granted    Rationale for change in precautions or plan: Medication adjustment to target mood and delusions    Petition for Chisholm including Invega, Zyprexa, Abilify, Risperdal, and Geodon     Participants: Mai Johnson NP, Nursing         Current Facility-Administered Medications   Medication     acetaminophen (TYLENOL) tablet 650 mg     [START ON 7/13/2020] benztropine (COGENTIN) tablet 0.5 mg     benztropine (COGENTIN) tablet 1 mg     diphenhydrAMINE (BENADRYL) capsule 50 mg    Or     diphenhydrAMINE (BENADRYL) injection 50 mg     haloperidol (HALDOL) tablet 5 mg    Or     haloperidol lactate (HALDOL) injection 5 mg     lithium ER (LITHOBID) CR tablet 600 mg     LORazepam (ATIVAN) tablet 1 mg    Or     LORazepam (ATIVAN) injection 1 mg     magnesium hydroxide (MILK OF MAGNESIA) suspension 30 mL     melatonin 3 mg (with vit B6 10 mg) extended release tablet 2 tablet     metFORMIN (GLUCOPHAGE-XR) 24 hr tablet 500 mg     nicotine (NICORETTE) gum 2-4 mg     paliperidone ER (INVEGA) 24 hr tablet 6 mg            10 point ROS negative see H&P       Allergies:   No Known Allergies          Psychiatric Examination:   BP (!) 86/53   Pulse 79   Temp 97.6  F (36.4  C) (Tympanic)   Resp 16   Ht 1.638 m (5' 4.5\")   Wt 72.8 kg (160 lb 6.4 oz)   SpO2 98%   BMI 27.11 kg/m    Weight is 160 lbs 6.4 oz  Body mass index is 27.11 kg/m .     Appearance: awake, alert, dressed in hospital scrubs, casually groomed  Attitude: pleasant,  guarded  Eye Contact: fair  Mood: \"good\"  Affect: blunted  Speech: clear, coherent, offers little in conversation   Psychomotor Behavior: normal, no evidence of abnormal movements  Thought Process: more reality based and linear, no grandiosity noted  Associations: no loosening of associations noted  Thought Content: denies hallucinations, denies suicidal thoughts  Insight: "  limited  Judgment: limited  Oriented to:  x3  Attention Span and Concentration: limited  Recent and Remote Memory:  impaired  Fund of Knowledge: delayed  Muscle Strength and Tone: normal  Gait and Station: normal             Labs:     No results found for this or any previous visit (from the past 24 hour(s)).

## 2020-07-13 PROCEDURE — 25000132 ZZH RX MED GY IP 250 OP 250 PS 637: Performed by: NURSE PRACTITIONER

## 2020-07-13 PROCEDURE — 12400000 ZZH R&B MH

## 2020-07-13 PROCEDURE — 12400011

## 2020-07-13 PROCEDURE — 99232 SBSQ HOSP IP/OBS MODERATE 35: CPT | Performed by: NURSE PRACTITIONER

## 2020-07-13 RX ORDER — PALIPERIDONE 6 MG/1
6 TABLET, EXTENDED RELEASE ORAL AT BEDTIME
Status: DISCONTINUED | OUTPATIENT
Start: 2020-07-14 | End: 2020-07-21

## 2020-07-13 RX ADMIN — METFORMIN ER 500 MG 500 MG: 500 TABLET ORAL at 17:02

## 2020-07-13 RX ADMIN — LITHIUM CARBONATE 600 MG: 300 TABLET, EXTENDED RELEASE ORAL at 08:04

## 2020-07-13 RX ADMIN — LORAZEPAM 1 MG: 1 TABLET ORAL at 17:07

## 2020-07-13 RX ADMIN — BENZTROPINE MESYLATE 0.5 MG: 0.5 TABLET ORAL at 08:04

## 2020-07-13 RX ADMIN — PALIPERIDONE 6 MG: 6 TABLET, EXTENDED RELEASE ORAL at 08:04

## 2020-07-13 RX ADMIN — Medication 2 TABLET: at 20:47

## 2020-07-13 RX ADMIN — BENZTROPINE MESYLATE 1 MG: 1 TABLET ORAL at 17:06

## 2020-07-13 RX ADMIN — LITHIUM CARBONATE 600 MG: 300 TABLET, EXTENDED RELEASE ORAL at 20:48

## 2020-07-13 ASSESSMENT — ACTIVITIES OF DAILY LIVING (ADL)
HYGIENE/GROOMING: INDEPENDENT
DRESS: SCRUBS (BEHAVIORAL HEALTH);INDEPENDENT
DRESS: SCRUBS (BEHAVIORAL HEALTH)
ORAL_HYGIENE: INDEPENDENT
HYGIENE/GROOMING: INDEPENDENT
ORAL_HYGIENE: INDEPENDENT

## 2020-07-13 NOTE — PLAN OF CARE
Pt was in her room for most of this shift- She asks about discharge planning- informed pt when a bed comes up on the [facility] list staff will keep her updated.

## 2020-07-13 NOTE — PLAN OF CARE
"  Problem: Adult Behavioral Health Plan of Care  Goal: Patient-Specific Goal (Individualization)  Description: Patient will sleep 6-8 hours per night  Patient will eat at 75% of meals daily  Patient will attend >50% of group programming daily  Patient will be compliant with medication  Patient will have appropriate boundaries staff and peers during hospital  [facility] 7/13/2020 0113 by Luciana Garcia, RN  Outcome: Improving  Note: Shift Summery:  Patient in bed with eyes closed and regular resps. Occasional position changes noted.    Patient remains in bed with eyes closed and regular resps for a total of 8 hours of sleep.    Face to face end of shift report communicated to 7-3 shift RN.     Luciana Garcia RN  7/13/2020  5:41 AM        Patient's Stated Goal for Shift:  \"no stated goal\"    Goal Status:  In Process       Problem: Mental State/Mood Impairment (Psychotic Signs/Symptoms)  Goal: Improved Mental State and Mood (Psychotic Signs/Symptoms)  Description: Patient will have a reality based conversation.  Patient will report a decrease in delusions/hallucinations.  Patient will use coping skills to manage labile behavior.  7/13/2020 0113 by Luciana aGrcia, RN  Outcome: Improving     "

## 2020-07-13 NOTE — PLAN OF CARE
"Face to face end of shift report received from Bina MARINO RN. Rounding completed and patient observed in her room, lying in bed awake. No requests at this time.     17:00 Update: Patient endorsed anxiety and depression. She rated anxiety at 6 of 10 and said she had a little depression. Patient denied HI/SI and hallucinations. She isolated to her room. She is withdrawn and gave minimal details. She laid in bed most of the shift but did not seem to be sleeping. She is disheveled. She received 10mg propanolol at 16:36 for anxiety. She also reported the cogentin she took earlier \"did nothing\" for her hand restlessness.     20:45 Update: Patient requested 1mg Ativan with her nightly scheduled meds.     Face to face end of shift report communicated to oncoming RN.       Problem: Adult Behavioral Health Plan of Care  Goal: Patient-Specific Goal (Individualization)  Description: Patient will sleep 6-8 hours per night  Patient will eat at 75% of meals daily  Patient will attend >50% of group programming daily  Patient will be compliant with medication  Patient will have appropriate boundaries staff and peers during hospital  ELOPEMENT PRECAUTIONS; Eloped from ICU 6/17/2020 7/12/2020 1939 by Adina Gunter, RN  Outcome: No Change     Problem: Mental State/Mood Impairment (Psychotic Signs/Symptoms)  Goal: Improved Mental State and Mood (Psychotic Signs/Symptoms)  Description: Patient will have a reality based conversation.  Patient will report a decrease in delusions/hallucinations.  Patient will use coping skills to manage labile behavior.  7/12/2020 1939 by Adina Gunter, RN  Outcome: No Change     "

## 2020-07-13 NOTE — PLAN OF CARE
Problem: Adult Behavioral Health Plan of Care  Goal: Patient-Specific Goal (Individualization)  Description: Patient will sleep 6-8 hours per night  Patient will eat at 75% of meals daily  Patient will attend >50% of group programming daily  Patient will be compliant with medication  Patient will have appropriate boundaries staff and peers during hospital  ELOPEMENT PRECAUTIONS; Eloped from ICU 6/17/2020 7/13/2020 1723 by Linda Concepcion RN  Outcome: No Change  Note:        Problem: Mental State/Mood Impairment (Psychotic Signs/Symptoms)  Goal: Improved Mental State and Mood (Psychotic Signs/Symptoms)  Description: Patient will have a reality based conversation.  Patient will report a decrease in delusions/hallucinations.  Patient will use coping skills to manage labile behavior.  7/13/2020 1723 by Linda Concepcion RN  Outcome: No Change   Pt. Slept 8 hours last night, eating at least 75% of meals, compliant with taking prescribed medications, showing appropriate boundaries with staff and other patients, has not attempted to elope, denies hallucinations and delusional thinking, spending most of time in room, is able to let needs be known.  1706-  Pt. Complaining of tingling and stiffness in hands, requested/received cogentin 1 mg po and ativan 1 mg po for side effects and anxiety.    Face to face end of shift report will be communicated to oncoming night shift RN.     Linda Concepcion RN  7/13/2020  6:31 PM

## 2020-07-13 NOTE — PLAN OF CARE
"  Problem: Adult Behavioral Health Plan of Care  Goal: Patient-Specific Goal (Individualization)  Description: Patient will sleep 6-8 hours per night  Patient will eat at 75% of meals daily  Patient will attend >50% of group programming daily  Patient will be compliant with medication  Patient will have appropriate boundaries staff and peers during hospital  [facility] 7/13/2020 0850 by Luciana Gonzalez, RN  Outcome: Improving  Note: Pt had a blunted, flat affect - she endorsed depression rated 2/10 and anxiety rated \"6/10\". She was compliant with her scheduled medications. She has been isolative and withdrawn and spent the majority of the shift resting in bed.     Face to face end of shift report communicated to oncoming RN.     Problem: Mental State/Mood Impairment (Psychotic Signs/Symptoms)  Goal: Improved Mental State and Mood (Psychotic Signs/Symptoms)  Description: Patient will have a reality based conversation.  Patient will report a decrease in delusions/hallucinations.  Patient will use coping skills to manage labile behavior.  7/13/2020 0850 by Luciana Gonzalez, RN  Outcome: Improving  Note: Pt did not make any paranoid or delusional statements today. She did not have any mood lability. She denied hallucinations.      "

## 2020-07-13 NOTE — PROGRESS NOTES
[facility]  Psychiatric Progress Note     Impression:     Jonelle is in bed this morning when I see her. She asks when she will be able to discharge. She denies any depression and denies hallucinations. Continues to spend much of the morning in bed. Will try changing Invega to bedtime to see if this makes any difference. Does sleep well through the night. Since starting Invega she has been noticeably less grandiose and over the weekend began giving nursing staff her correct last name during medication administrations. Will encourage her to be up more during the day, attend groups as able.     Educated regarding medication indications, risks, benefits, side effects, contraindications and possible interactions. Verbally expressed understanding.        Diagnoses:      Schizoaffective Disorder, bipolar type  Substance Abuse Disorder     Attestation:  Patient has been seen and evaluated by me,  SABI Calderón CNP          Interim History:   The patient's care was discussed with the treatment team and chart notes were reviewed.     BEHAVIORAL TEAM DISCUSSION     Progress: improving. Improvement in psychosis and grandiosity    Continued Stay Criteria/Rationale: Switched to Invega, lacks insight into mental health, current plan discharge to [facility], could possibly consider IRTS if further improvement is made and participating in groups.  Medical/Physical: None  Precautions:   Falls precaution?: YES       Behavioral Orders   Procedures     Code 1 - Restrict to Unit     Routine Programming       As clinically indicated     Status 15       Every 15 minutes.     Plan:   Continue Lithium  mg BID  Lithium level 0.7  Change Invega 6 mg to bedtime  Continue Cogentin 0.5 mg in AM  Continue Propranolol 10 mg TID prn anxiety/akathisia  Continue metformin 500 mg daily with dinner for neuroleptic-induced weight gain  Is now committed and chisholm granted    Rationale for change in precautions or plan: Medication  "adjustment to target mood and delusions    Petition for [place] including Invega, Zyprexa, Abilify, Risperdal, and Geodon     Participants: Mai Johnson NP, Nursing, OT, SW        Current Facility-Administered Medications   Medication     acetaminophen (TYLENOL) tablet 650 mg     benztropine (COGENTIN) tablet 0.5 mg     benztropine (COGENTIN) tablet 1 mg     diphenhydrAMINE (BENADRYL) capsule 50 mg    Or     diphenhydrAMINE (BENADRYL) injection 50 mg     haloperidol (HALDOL) tablet 5 mg    Or     haloperidol lactate (HALDOL) injection 5 mg     lithium ER (LITHOBID) CR tablet 600 mg     LORazepam (ATIVAN) tablet 1 mg    Or     LORazepam (ATIVAN) injection 1 mg     magnesium hydroxide (MILK OF MAGNESIA) suspension 30 mL     melatonin 3 mg (with vit B6 10 mg) extended release tablet 2 tablet     metFORMIN (GLUCOPHAGE-XR) 24 hr tablet 500 mg     nicotine (NICORETTE) gum 2-4 mg     paliperidone ER (INVEGA) 24 hr tablet 6 mg     propranolol (INDERAL) tablet 10 mg            10 point ROS negative see H&P       Allergies:   No Known Allergies          Psychiatric Examination:   BP (!) 86/53   Pulse 79   Temp 97.6  F (36.4  C) (Tympanic)   Resp 16   Ht 1.638 m (5' 4.5\")   Wt 72.8 kg (160 lb 6.4 oz)   SpO2 98%   BMI 27.11 kg/m    Weight is 160 lbs 6.4 oz  Body mass index is 27.11 kg/m .     Appearance: awake, alert, dressed in hospital scrubs, somewhat disheveled  Attitude: pleasant, guarded  Eye Contact: fair  Mood: \"good\"  Affect: blunted  Speech: clear, coherent, offers little in conversation   Psychomotor Behavior: normal, no evidence of abnormal movements  Thought Process: more reality based and linear, no grandiosity noted  Associations: no loosening of associations noted  Thought Content: denies hallucinations, denies suicidal thoughts  Insight:  limited  Judgment: limited  Oriented to:  x3  Attention Span and Concentration: limited  Recent and Remote Memory:  impaired  Fund of Knowledge: delayed  Muscle " Strength and Tone: normal  Gait and Station: normal             Labs:     No results found for this or any previous visit (from the past 24 hour(s)).

## 2020-07-14 PROCEDURE — 25000132 ZZH RX MED GY IP 250 OP 250 PS 637: Performed by: NURSE PRACTITIONER

## 2020-07-14 PROCEDURE — 12400011

## 2020-07-14 PROCEDURE — 12400000 ZZH R&B MH

## 2020-07-14 PROCEDURE — 99232 SBSQ HOSP IP/OBS MODERATE 35: CPT | Performed by: NURSE PRACTITIONER

## 2020-07-14 RX ADMIN — LITHIUM CARBONATE 600 MG: 300 TABLET, EXTENDED RELEASE ORAL at 20:09

## 2020-07-14 RX ADMIN — PALIPERIDONE 6 MG: 6 TABLET, EXTENDED RELEASE ORAL at 20:09

## 2020-07-14 RX ADMIN — METFORMIN ER 500 MG 500 MG: 500 TABLET ORAL at 16:59

## 2020-07-14 RX ADMIN — LITHIUM CARBONATE 600 MG: 300 TABLET, EXTENDED RELEASE ORAL at 08:41

## 2020-07-14 RX ADMIN — LORAZEPAM 1 MG: 1 TABLET ORAL at 15:47

## 2020-07-14 RX ADMIN — Medication 2 TABLET: at 20:09

## 2020-07-14 RX ADMIN — BENZTROPINE MESYLATE 0.5 MG: 0.5 TABLET ORAL at 08:41

## 2020-07-14 RX ADMIN — BENZTROPINE MESYLATE 1 MG: 1 TABLET ORAL at 18:43

## 2020-07-14 ASSESSMENT — ACTIVITIES OF DAILY LIVING (ADL)
HYGIENE/GROOMING: INDEPENDENT
ORAL_HYGIENE: INDEPENDENT
HYGIENE/GROOMING: INDEPENDENT
DRESS: INDEPENDENT
DRESS: INDEPENDENT;SCRUBS (BEHAVIORAL HEALTH)

## 2020-07-14 NOTE — PROGRESS NOTES
[facility]  Psychiatric Progress Note     Impression:     Jonelle is in bed this morning when I see her. Discussed that she will get the Invega at bedtime tonight instead of in the morning to see if this helps improve fatigue during the day. In reviewing notes later in the day it appears that she was up after lunch and showered. Denied any side effects from medication. Conversation is linear during our meeting, though she does not offer much spontaneous conversation though does answer my questions. Feels she is sleeping well at night. She denies any hallucinations or suicidal thoughts.    Educated regarding medication indications, risks, benefits, side effects, contraindications and possible interactions. Verbally expressed understanding.        Diagnoses:      Schizoaffective Disorder, bipolar type  Substance Abuse Disorder     Attestation:  Patient has been seen and evaluated by me,  SABI Calderón CNP          Interim History:   The patient's care was discussed with the treatment team and chart notes were reviewed.     BEHAVIORAL TEAM DISCUSSION     Progress: Fair. Improvement in psychosis and grandiosity though isolative to room    Continued Stay Criteria/Rationale: Switched to Invega, lacks insight into mental health, current plan discharge to [facility], could possibly consider IRTS if further improvement is made and participating in groups.  Medical/Physical: None  Precautions:   Falls precaution?: YES       Behavioral Orders   Procedures     Code 1 - Restrict to Unit     Routine Programming       As clinically indicated     Status 15       Every 15 minutes.     Plan:   Continue Lithium  mg BID  Lithium level 0.7  Change Invega 6 mg to bedtime  Continue Cogentin 0.5 mg scheduled daily  Continue Propranolol 10 mg TID prn anxiety/akathisia  Continue metformin 500 mg daily with dinner for neuroleptic-induced weight gain  Is now committed and chisholm granted    Rationale for change in  "precautions or plan: Medication adjustment to target mood and delusions    Petition for Granado including Invega, Zyprexa, Abilify, Risperdal, and Geodon     Participants: Mai Johnson NP, Nursing, OT, SW        Current Facility-Administered Medications   Medication     acetaminophen (TYLENOL) tablet 650 mg     benztropine (COGENTIN) tablet 0.5 mg     benztropine (COGENTIN) tablet 1 mg     diphenhydrAMINE (BENADRYL) capsule 50 mg    Or     diphenhydrAMINE (BENADRYL) injection 50 mg     haloperidol (HALDOL) tablet 5 mg    Or     haloperidol lactate (HALDOL) injection 5 mg     lithium ER (LITHOBID) CR tablet 600 mg     LORazepam (ATIVAN) tablet 1 mg    Or     LORazepam (ATIVAN) injection 1 mg     magnesium hydroxide (MILK OF MAGNESIA) suspension 30 mL     melatonin 3 mg (with vit B6 10 mg) extended release tablet 2 tablet     metFORMIN (GLUCOPHAGE-XR) 24 hr tablet 500 mg     nicotine (NICORETTE) gum 2-4 mg     paliperidone ER (INVEGA) 24 hr tablet 6 mg     propranolol (INDERAL) tablet 10 mg            10 point ROS negative see H&P       Allergies:   No Known Allergies          Psychiatric Examination:   BP (!) 86/53   Pulse 79   Temp 97.6  F (36.4  C) (Tympanic)   Resp 16   Ht 1.638 m (5' 4.5\")   Wt 72.8 kg (160 lb 6.4 oz)   SpO2 98%   BMI 27.11 kg/m    Weight is 160 lbs 6.4 oz  Body mass index is 27.11 kg/m .     Appearance: awake, alert, dressed in hospital scrubs, somewhat disheveled  Attitude: pleasant though guarded  Eye Contact: fair  Mood: \"good\"  Affect: blunted  Speech: clear, coherent, offers little in conversation   Psychomotor Behavior: normal, no evidence of abnormal movements  Thought Process: more reality based and linear, no grandiosity noted  Associations: no loosening of associations noted  Thought Content: denies hallucinations, denies suicidal thoughts  Insight:  limited  Judgment: limited  Oriented to:  x3  Attention Span and Concentration: limited  Recent and Remote Memory:  " impaired  Fund of Knowledge: delayed  Muscle Strength and Tone: normal  Gait and Station: normal             Labs:     No results found for this or any previous visit (from the past 24 hour(s)).

## 2020-07-14 NOTE — PLAN OF CARE
Problem: Adult Behavioral Health Plan of Care  Goal: Patient-Specific Goal (Individualization)  Description: Patient will sleep 6-8 hours per night  Patient will eat at 75% of meals daily  Patient will attend >50% of group programming daily  Patient will be compliant with medication  Patient will have appropriate boundaries staff and peers during hospital  [facility] 7/14/2020 1656 by Linda Concepcion RN  Outcome: Improving  Note:        Problem: Mental State/Mood Impairment (Psychotic Signs/Symptoms)  Goal: Improved Mental State and Mood (Psychotic Signs/Symptoms)  Description: Patient will have a reality based conversation.  Patient will report a decrease in delusions/hallucinations.  Patient will use coping skills to manage labile behavior.  7/14/2020 1656 by Linda Concepcion RN  Outcome: Improving   Pt. Slept 8 hours last night, compliant with taking prescribed medications, has shown appropriate boundaries, has reality based conversation, denies delusions and hallucinations, denies depression and suicidal ideation, has not attempted to elope from unit, pleasant and cooperative.  1547-  Pt. Requested/received ativan 1 mg po for high anxiety.  1645-  Pt. Reported relief from anxiety.  1843- Pt. Requested/received cogentin 1 mg po for stiffness in hands and forearms.  1940-  Pt. Reported relief from muscle stiffness.      Face to face end of shift report will be communicated to oncoming night shift RN.     Linda Concepcion RN  7/14/2020  6:07 PM

## 2020-07-14 NOTE — PLAN OF CARE
Faxed updated notes and MAR to [facility] this morning.     Emailed Justine WEST with [facility] to see if pt has been assigned a  yet.     Justine states pt will be working with Ariana Prieto- Spoke with pt this afternoon and informed her of assigned  and provided her with contact info.

## 2020-07-14 NOTE — PLAN OF CARE
"  Problem: Adult Behavioral Health Plan of Care  Goal: Patient-Specific Goal (Individualization)  Description: Patient will sleep 6-8 hours per night  Patient will eat at 75% of meals daily  Patient will attend >50% of group programming daily  Patient will be compliant with medication  Patient will have appropriate boundaries staff and peers during hospital  [facility] 7/14/2020 0050 by Luciana Garcia, RN  Outcome: Improving  Note: Shift Summery:  Patient in bed with eyes closed and regular resps, occasional position changes noted.    Face to face end of shift report communicated to 7-3 shift RN.     Luciana Garcia RN  7/14/2020  5:23 AM        Patient's Stated Goal for Shift:  \"no stated goal\"    Goal Status:  In Process       Problem: Mental State/Mood Impairment (Psychotic Signs/Symptoms)  Goal: Improved Mental State and Mood (Psychotic Signs/Symptoms)  Description: Patient will have a reality based conversation.  Patient will report a decrease in delusions/hallucinations.  Patient will use coping skills to manage labile behavior.  7/14/2020 0050 by Luciana Garcia, RN  Outcome: Improving     "

## 2020-07-14 NOTE — PLAN OF CARE
Problem: Adult Behavioral Health Plan of Care  Goal: Patient-Specific Goal (Individualization)  Description: Patient will sleep 6-8 hours per night  Patient will eat at 75% of meals daily  Patient will attend >50% of group programming daily  Patient will be compliant with medication  Patient will have appropriate boundaries staff and peers during hospital  [facility]    Outcome: No Change   Pt continues to be withdrawn to bed most of the day, but today was up more after lunch and showered this afternoon. Does cooperate with assessment and denied SI, HI and hallucinations. Anxiety and depression low and tolerable. Pt takes all scheduled medications and denied having any noted SE after taking. Conversation clear, does make requests appropriately. Denied physical complaints.    Problem: Mental State/Mood Impairment (Psychotic Signs/Symptoms)  Goal: Improved Mental State and Mood (Psychotic Signs/Symptoms)  Description: Patient will have a reality based conversation.  Patient will report a decrease in delusions/hallucinations.  Patient will use coping skills to manage labile behavior.  Outcome: Improving    Face to face end of shift report communicated to oncoming shift RN.     Bina Weston RN  7/14/2020  4:07 PM

## 2020-07-15 PROCEDURE — 25000132 ZZH RX MED GY IP 250 OP 250 PS 637: Performed by: NURSE PRACTITIONER

## 2020-07-15 PROCEDURE — 12400000 ZZH R&B MH

## 2020-07-15 PROCEDURE — 12400011

## 2020-07-15 PROCEDURE — 99232 SBSQ HOSP IP/OBS MODERATE 35: CPT | Mod: 95 | Performed by: PSYCHIATRY & NEUROLOGY

## 2020-07-15 RX ADMIN — LORAZEPAM 1 MG: 1 TABLET ORAL at 18:31

## 2020-07-15 RX ADMIN — Medication 2 TABLET: at 20:23

## 2020-07-15 RX ADMIN — NICOTINE POLACRILEX 4 MG: 2 GUM, CHEWING ORAL at 18:31

## 2020-07-15 RX ADMIN — LITHIUM CARBONATE 600 MG: 300 TABLET, EXTENDED RELEASE ORAL at 10:00

## 2020-07-15 RX ADMIN — METFORMIN ER 500 MG 500 MG: 500 TABLET ORAL at 17:05

## 2020-07-15 RX ADMIN — BENZTROPINE MESYLATE 0.5 MG: 0.5 TABLET ORAL at 10:00

## 2020-07-15 RX ADMIN — BENZTROPINE MESYLATE 1 MG: 1 TABLET ORAL at 20:23

## 2020-07-15 RX ADMIN — LITHIUM CARBONATE 600 MG: 300 TABLET, EXTENDED RELEASE ORAL at 20:23

## 2020-07-15 RX ADMIN — PALIPERIDONE 6 MG: 6 TABLET, EXTENDED RELEASE ORAL at 20:23

## 2020-07-15 ASSESSMENT — ACTIVITIES OF DAILY LIVING (ADL)
HYGIENE/GROOMING: INDEPENDENT;SHOWER
LAUNDRY: UNABLE TO COMPLETE
DRESS: INDEPENDENT;SCRUBS (BEHAVIORAL HEALTH)
ORAL_HYGIENE: INDEPENDENT
DRESS: SCRUBS (BEHAVIORAL HEALTH);INDEPENDENT
HYGIENE/GROOMING: INDEPENDENT

## 2020-07-15 NOTE — PLAN OF CARE
"  Problem: Adult Behavioral Health Plan of Care  Goal: Patient-Specific Goal (Individualization)  Description: Patient will sleep 6-8 hours per night  Patient will eat at 75% of meals daily  Patient will attend >50% of group programming daily  Patient will be compliant with medication  Patient will have appropriate boundaries staff and peers during hospital  [facility]    Outcome: No Change  Note: Patient denies SI, HI, hallucinations, pain. Calm and cooperative with medications and assessment.  Her affect is blunted.  1831:  Patient endorsed severe anxiety and feeling \"on edge\".  Appears restless and shifts her weight from one leg to the other throughout conversation.  Patient requested and received PRN lorazepam 1 mg at this time.  2023:  PRN cogentin 1 mg po administered for c/o hand stiffness.       Problem: Mental State/Mood Impairment (Psychotic Signs/Symptoms)  Goal: Improved Mental State and Mood (Psychotic Signs/Symptoms)  Description: Patient will have a reality based conversation.  Patient will report a decrease in delusions/hallucinations.  Patient will use coping skills to manage labile behavior.  Outcome: Improving  Note: Patient is able to hold reality based conversation.  She made no noted delusional statements this shift.      Will continue to monitor.       "

## 2020-07-15 NOTE — PLAN OF CARE
Emailed pt's ESTER Prieto this morning and asked to have pt added up to all of the [facility]'s lists.     Emailed Rosalba in pt financial this morning for update on insurance.

## 2020-07-15 NOTE — PROGRESS NOTES
"Patient seen via telemedicine.  Care discussed with treatment team staff.  Blood pressure 100/66, pulse 73, temperature 98.3  F (36.8  C), temperature source Tympanic, resp. rate 18, height 1.638 m (5' 4.5\"), weight 83.8 kg (184 lb 12.8 oz), SpO2 98 %, not currently breastfeeding.    By report, stabilizing.  More focused in group, waiting for [facility] bed.    Committed and Jarvised    Alert.  Affect and eye contact good.  Speech normal.  Eye contact good.  Psychomotor behavior shows mild restlessness, she states this is not new, and gait  normal.  No delusions or hallucinations.  Thoughts logical.  Associations intact. Cognitions intact.  Not suicidal.    Plan: update lever labs and Lithium level      Current Facility-Administered Medications:      acetaminophen (TYLENOL) tablet 650 mg, 650 mg, Oral, Q4H PRN, Bree Leary APRN CNP, 650 mg at 07/09/20 0822     benztropine (COGENTIN) tablet 0.5 mg, 0.5 mg, Oral, Daily, Mai Johnson NP, 0.5 mg at 07/15/20 1000     benztropine (COGENTIN) tablet 1 mg, 1 mg, Oral, BID PRN, Mai Johnson NP, 1 mg at 07/14/20 1843     diphenhydrAMINE (BENADRYL) capsule 50 mg, 50 mg, Oral, Q6H PRN, 50 mg at 07/09/20 2012 **OR** diphenhydrAMINE (BENADRYL) injection 50 mg, 50 mg, Intramuscular, Q6H PRN, Bree Leary APRN CNP, 50 mg at 06/17/20 1410     haloperidol (HALDOL) tablet 5 mg, 5 mg, Oral, Q6H PRN, 5 mg at 07/04/20 1911 **OR** haloperidol lactate (HALDOL) injection 5 mg, 5 mg, Intramuscular, Q6H PRN, Bree Leary APRN CNP, 5 mg at 06/17/20 1410     lithium ER (LITHOBID) CR tablet 600 mg, 600 mg, Oral, Q12H Hayley FONTANA April Annette, NP, 600 mg at 07/15/20 1000     LORazepam (ATIVAN) tablet 1 mg, 1 mg, Oral, Q6H PRN, 1 mg at 07/14/20 1547 **OR** LORazepam (ATIVAN) injection 1 mg, 1 mg, Intramuscular, Q6H PRN, Mai Johnson, NP     magnesium hydroxide (MILK OF MAGNESIA) suspension 30 mL, 30 mL, Oral, At Bedtime PRN, Bree Leary, APRN " CNP     melatonin 3 mg (with vit B6 10 mg) extended release tablet 2 tablet, 2 tablet, Oral, At Bedtime, Mai Johnson NP, 2 tablet at 07/14/20 2009     metFORMIN (GLUCOPHAGE-XR) 24 hr tablet 500 mg, 500 mg, Oral, Daily with supperHayley April Carolyn, NP, 500 mg at 07/14/20 1659     nicotine (NICORETTE) gum 2-4 mg, 2-4 mg, Buccal, Q1H PRN, Bree Leary APRN CNP, 4 mg at 07/11/20 2102     paliperidone ER (INVEGA) 24 hr tablet 6 mg, 6 mg, Oral, At Bedtime, Mai Johnson NP, 6 mg at 07/14/20 2009     propranolol (INDERAL) tablet 10 mg, 10 mg, Oral, TID PRN, Mai Johnson NP, 10 mg at 07/12/20 1636  Recent Results (from the past 168 hour(s))   Lithium level    Collection Time: 07/10/20  7:49 PM   Result Value [facility]    Lithium Level 0.70 0.60 - 1.20 mmol/L   Basic metabolic panel    Collection Time: 07/10/20  7:49 PM   Result Value Ref [facility]    Sodium 134 133 - 144 mmol/L    Potassium 3.7 3.4 - 5.3 mmol/L    Chloride 104 94 - 109 mmol/L    Carbon Dioxide 28 20 - 32 mmol/L    Anion Gap 2 (L) 3 - 14 mmol/L    Glucose 85 70 - 99 mg/dL    Urea Nitrogen 13 7 - 30 mg/dL    Creatinine 0.61 0.52 - 1.04 mg/dL    GFR Estimate >90 >60 mL/min/[1.73_m2]    GFR Estimate If Black >90 >60 mL/min/[1.73_m2]    Calcium 9.2 8.5 - 10.1 mg/dL     Telemedicine Visit: The patient's condition can be safely assessed and treated via synchronous audio and visual telemedicine encounter.      Start Time: 1140  Stop Time: 1200    Reason for Telemedicine Visit: covid    Originating Site (Patient Location): [facility]    Distant Site (Provider Location): Provider Remote Setting    Consent:  The patient/guardian has verbally consented to: the potential risks and benefits of telemedicine (video visit) versus in person care; bill my insurance or make self-payment for services provided; and responsibility for payment of non-covered services.     Mode of Communication:  Video Conference via [employer]    As the provider I attest to  compliance with applicable laws and regulations related to telemedicine.             .

## 2020-07-15 NOTE — PLAN OF CARE
"  Problem: Adult Behavioral Health Plan of Care  Goal: Patient-Specific Goal (Individualization)  Description: Patient will sleep 6-8 hours per night  Patient will eat at 75% of meals daily  Patient will attend >50% of group programming daily  Patient will be compliant with medication  Patient will have appropriate boundaries staff and peers during hospital  [facility]    7/15/2020 0156 by Luciana Garcia, RN  Outcome: Improving  Note: Shift Summery:  Patient in bed with eyes closed and regular resps. Occasional position changes noted.    0545 Patient remains in bed with eyes closed and regular resps. Lab here to draw now and patient was cooperative.    Face to face end of shift report communicated to 7-3 shift RN.     Luciana Garcia RN  7/15/2020  5:45 AM        Patient's Stated Goal for Shift:  \"no stated goal\"    Goal Status:  In Process       Problem: Mental State/Mood Impairment (Psychotic Signs/Symptoms)  Goal: Improved Mental State and Mood (Psychotic Signs/Symptoms)  Description: Patient will have a reality based conversation.  Patient will report a decrease in delusions/hallucinations.  Patient will use coping skills to manage labile behavior.  7/15/2020 0156 by Luciana Garcia, RN  Outcome: Improving     "

## 2020-07-16 LAB
ALBUMIN SERPL-MCNC: 3.7 G/DL (ref 3.4–5)
ALP SERPL-CCNC: 59 U/L (ref 40–150)
ALT SERPL W P-5'-P-CCNC: 407 U/L (ref 0–50)
AST SERPL W P-5'-P-CCNC: 197 U/L (ref 0–45)
BILIRUB DIRECT SERPL-MCNC: 0.1 MG/DL (ref 0–0.2)
BILIRUB SERPL-MCNC: 0.3 MG/DL (ref 0.2–1.3)
LITHIUM SERPL-SCNC: 0.84 MMOL/L (ref 0.6–1.2)
PROT SERPL-MCNC: 8.1 G/DL (ref 6.8–8.8)

## 2020-07-16 PROCEDURE — 36415 COLL VENOUS BLD VENIPUNCTURE: CPT | Performed by: PSYCHIATRY & NEUROLOGY

## 2020-07-16 PROCEDURE — 25000132 ZZH RX MED GY IP 250 OP 250 PS 637: Performed by: NURSE PRACTITIONER

## 2020-07-16 PROCEDURE — 12400000 ZZH R&B MH

## 2020-07-16 PROCEDURE — 80178 ASSAY OF LITHIUM: CPT | Performed by: PSYCHIATRY & NEUROLOGY

## 2020-07-16 PROCEDURE — 80076 HEPATIC FUNCTION PANEL: CPT | Performed by: PSYCHIATRY & NEUROLOGY

## 2020-07-16 PROCEDURE — 12400011

## 2020-07-16 RX ADMIN — LITHIUM CARBONATE 600 MG: 300 TABLET, EXTENDED RELEASE ORAL at 08:15

## 2020-07-16 RX ADMIN — LITHIUM CARBONATE 600 MG: 300 TABLET, EXTENDED RELEASE ORAL at 20:51

## 2020-07-16 RX ADMIN — ACETAMINOPHEN 650 MG: 325 TABLET, FILM COATED ORAL at 20:55

## 2020-07-16 RX ADMIN — LORAZEPAM 1 MG: 1 TABLET ORAL at 18:11

## 2020-07-16 RX ADMIN — PALIPERIDONE 6 MG: 6 TABLET, EXTENDED RELEASE ORAL at 20:51

## 2020-07-16 RX ADMIN — METFORMIN ER 500 MG 500 MG: 500 TABLET ORAL at 17:07

## 2020-07-16 RX ADMIN — Medication 2 TABLET: at 20:51

## 2020-07-16 RX ADMIN — DIPHENHYDRAMINE HYDROCHLORIDE 50 MG: 50 CAPSULE ORAL at 20:52

## 2020-07-16 RX ADMIN — BENZTROPINE MESYLATE 0.5 MG: 0.5 TABLET ORAL at 08:15

## 2020-07-16 NOTE — PLAN OF CARE
Received email from Rosalba in pt financial stating pt's MA is active and effective 6/1/20- her PMI # is [id number]

## 2020-07-16 NOTE — PLAN OF CARE
Problem: Adult Behavioral Health Plan of Care  Goal: Patient-Specific Goal (Individualization)  Description: Patient will sleep 6-8 hours per night  Patient will eat at 75% of meals daily  Patient will attend >50% of group programming daily  Patient will be compliant with medication  Patient will have appropriate boundaries staff and peers during hospital  ELOPEMENT PRECAUTIONS; Eloped from ICU 6/17/2020 7/16/2020 0003 by Linda Fair, RN  Outcome: Improving  Note: Report received from Chelsie. Rounding complete. Pt up to the lounge for juice and back to her room with no further complaints offered.    0001- Pt observed sleeping in right side lying position with regular and unlabored respirations.    Pt has been in bed with eyes closed and regular respirations. 15 minute and PRN checks all night. No complaints offered. Will continue to monitor.    Pt slept approx 7 hours this NOC shift    Face to face end of shift report communicated to oncoming RN.    July 16, 2020  12:03 AM          Problem: Mental State/Mood Impairment (Psychotic Signs/Symptoms)  Goal: Improved Mental State and Mood (Psychotic Signs/Symptoms)  Description: Patient will have a reality based conversation.  Patient will report a decrease in delusions/hallucinations.  Patient will use coping skills to manage labile behavior.  7/16/2020 0003 by Linda Fair, RN  Outcome: Improving  Note: Pt spoke very little with this writer, but was calm, cooperative, pleasant, and did not make any delusional statements.

## 2020-07-16 NOTE — PLAN OF CARE
"  Problem: Mental State/Mood Impairment (Psychotic Signs/Symptoms)  Goal: Improved Mental State and Mood (Psychotic Signs/Symptoms)  Description: Patient will have a reality based conversation.  Patient will report a decrease in delusions/hallucinations.  Patient will use coping skills to manage labile behavior.  7/16/2020 1634 by Joel Church, RN  Outcome: Improving    Pt was able to have an organized conversation, but does not have insight into her mental health     Problem: Adult Behavioral Health Plan of Care  Goal: Patient-Specific Goal (Individualization)  Description: Patient will sleep 6-8 hours per night  Patient will eat at 75% of meals daily  Patient will attend >50% of group programming daily  Patient will be compliant with medication  Patient will have appropriate boundaries staff and peers during hospital  [facility] 7/16/2020 1634 by Joel Church, RN  Outcome: Improving  Note: Shift Summery:      1530 Face to face rounding complete.  Pt observed in the dayroom and introduced to nursing for the shift.     Pt was in bed until supper this evening. She talked with me about he drug use and not being sober for a long time. She says that she is feeling much better since being in the hospital. She denied hearing voices or having issues with her mood currently.  She is hoping that she will be able to live with her grandparents or her parents after finishing her commitment plan. She smiled often during our discussion.  She is looking forward to the group this evening.    2055 Pt complained of having some abdominal and was given some Tylenol. She told me that she had some relief.      2300 Face to face end of shift report communicated to Night shift RN's along with Pt's fall risk.    Joel Church RN  7/16/2020    Patient's Stated Goal for Shift:  \" Go to group this evening\"    Goal Status:  In Process       "

## 2020-07-16 NOTE — PLAN OF CARE
"  Problem: Adult Behavioral Health Plan of Care  Goal: Patient-Specific Goal (Individualization)  Description: Patient will sleep 6-8 hours per night  Patient will eat at 75% of meals daily  Patient will attend >50% of group programming daily  Patient will be compliant with medication  Patient will have appropriate boundaries staff and peers during hospital  ELOPEMENT PRECAUTIONS; Eloped from [facility] 6/17/2020    Note: Patient withdrawn to bedroom laying down most of morning. Up to unit with peers before lunch. Denies SI/HI, hallucinations and pain this shift. Reports depression and anxiety is \"okay\" this shift. Showered this afternoon and attending groups appropriately. Patient is polite with staff and peers all shift. Maintains appropriate boundaries. Compliant with scheduled medications. Continue to monitor at this time.        Problem: Mental State/Mood Impairment (Psychotic Signs/Symptoms)  Goal: Improved Mental State and Mood (Psychotic Signs/Symptoms)  Description: Patient will have a reality based conversation.  Patient will report a decrease in delusions/hallucinations.  Patient will use coping skills to manage labile behavior.  Note: Continue to monitor at this time.     Face to face end of shift report communicated to nickolas CHRISTOPHER.     Lilia Tracy RN  7/16/2020  2:51 PM         "

## 2020-07-17 PROCEDURE — 25000132 ZZH RX MED GY IP 250 OP 250 PS 637: Performed by: NURSE PRACTITIONER

## 2020-07-17 PROCEDURE — 12400011

## 2020-07-17 PROCEDURE — 99232 SBSQ HOSP IP/OBS MODERATE 35: CPT | Performed by: NURSE PRACTITIONER

## 2020-07-17 RX ADMIN — LITHIUM CARBONATE 600 MG: 300 TABLET, EXTENDED RELEASE ORAL at 08:49

## 2020-07-17 RX ADMIN — NICOTINE POLACRILEX 4 MG: 2 GUM, CHEWING ORAL at 17:18

## 2020-07-17 RX ADMIN — METFORMIN ER 500 MG 500 MG: 500 TABLET ORAL at 17:06

## 2020-07-17 RX ADMIN — PALIPERIDONE 6 MG: 6 TABLET, EXTENDED RELEASE ORAL at 20:10

## 2020-07-17 RX ADMIN — Medication 2 TABLET: at 20:10

## 2020-07-17 RX ADMIN — ACETAMINOPHEN 650 MG: 325 TABLET, FILM COATED ORAL at 23:36

## 2020-07-17 RX ADMIN — BENZTROPINE MESYLATE 0.5 MG: 0.5 TABLET ORAL at 08:49

## 2020-07-17 RX ADMIN — LITHIUM CARBONATE 600 MG: 300 TABLET, EXTENDED RELEASE ORAL at 20:10

## 2020-07-17 ASSESSMENT — ACTIVITIES OF DAILY LIVING (ADL)
HYGIENE/GROOMING: INDEPENDENT
DRESS: SCRUBS (BEHAVIORAL HEALTH)
ORAL_HYGIENE: INDEPENDENT

## 2020-07-17 NOTE — PROGRESS NOTES
"[facility]  Psychiatric Progress Note       Impression:     Jonelle is in bed this morning when I see her. She is pleasant, reports the medications are working. She believes taking Invega at night is better than in the morning adding \"I'm not much of a morning person anyway \". Pt rates her depression as 0/10, anxiety 0/10. Denies AH/VH, delusions or paranoia. Denies SI/HI. Pt did converse w/ writer for a few minutes-asking questions as to weather, date etc. Notes improvement in racing thoughts \"but they are not gone\".     Educated regarding medication indications, risks, benefits, side effects, contraindications and possible interactions. Verbally expressed understanding.        Diagnoses:      Schizoaffective Disorder, bipolar type  Substance Abuse Disorder     Attestation:  Patient has been seen and evaluated by me,  Brynn CELESTIN, CNP          Interim History:   The patient's care was discussed with the treatment team and chart notes were reviewed.     BEHAVIORAL TEAM DISCUSSION     Progress: Fair. Improvement in psychosis and grandiosity though isolative to room    Continued Stay Criteria/Rationale: Switched to Invega, lacks insight into mental health, current plan discharge to [facility], could possibly consider IRTS if further improvement is made and participating in groups.  Medical/Physical: None  Precautions:   Falls precaution?: YES       Behavioral Orders   Procedures     Code 1 - Restrict to Unit     Routine Programming       As clinically indicated     Status 15       Every 15 minutes.     Plan:   Continue Lithium  mg BID  Lithium level 0.7  Continue Invega 6 mg at bedtime  Continue Cogentin 0.5 mg scheduled daily  Continue Propranolol 10 mg TID prn anxiety/akathisia  Continue metformin 500 mg daily with dinner for neuroleptic-induced weight gain  Committed and chisholm granted    Rationale for change in precautions or plan: Medication adjustment to target mood and delusions    Petition " "for Granado including Invega, Zyprexa, Abilify, Risperdal, and Geodon     Participants: Brynn Lai, Nursing, OT, SW        Current Facility-Administered Medications   Medication     acetaminophen (TYLENOL) tablet 650 mg     benztropine (COGENTIN) tablet 0.5 mg     benztropine (COGENTIN) tablet 1 mg     diphenhydrAMINE (BENADRYL) capsule 50 mg    Or     diphenhydrAMINE (BENADRYL) injection 50 mg     haloperidol (HALDOL) tablet 5 mg    Or     haloperidol lactate (HALDOL) injection 5 mg     lithium ER (LITHOBID) CR tablet 600 mg     LORazepam (ATIVAN) tablet 1 mg    Or     LORazepam (ATIVAN) injection 1 mg     magnesium hydroxide (MILK OF MAGNESIA) suspension 30 mL     melatonin 3 mg (with vit B6 10 mg) extended release tablet 2 tablet     metFORMIN (GLUCOPHAGE-XR) 24 hr tablet 500 mg     nicotine (NICORETTE) gum 2-4 mg     paliperidone ER (INVEGA) 24 hr tablet 6 mg     propranolol (INDERAL) tablet 10 mg            10 point ROS negative see H&P       Allergies:   No Known Allergies          Psychiatric Examination:   BP (!) 86/53   Pulse 79   Temp 97.6  F (36.4  C) (Tympanic)   Resp 16   Ht 1.638 m (5' 4.5\")   Wt 72.8 kg (160 lb 6.4 oz)   SpO2 98%   BMI 27.11 kg/m    Weight is 160 lbs 6.4 oz  Body mass index is 27.11 kg/m .     Appearance: awake, alert, dressed in hospital scrubs, somewhat disheveled  Attitude: pleasant though guarded  Eye Contact: fair  Mood: \"good\"  Affect: blunted  Speech: clear, coherent, conversed for a few minutes  Psychomotor Behavior: normal, no evidence of abnormal movements  Thought Process: reality based and linear, no grandiosity noted  Associations: no loosening of associations noted  Thought Content: denies hallucinations, denies suicidal thoughts  Insight:  limited  Judgment: limited  Oriented to:  x3  Attention Span and Concentration: limited  Recent and Remote Memory:  impaired  Fund of Knowledge: delayed  Muscle Strength and Tone: normal  Gait and Station: normal            "  Labs:     No results found for this or any previous visit (from the past 24 hour(s)).

## 2020-07-17 NOTE — PLAN OF CARE
Problem: Adult Behavioral Health Plan of Care  Goal: Patient-Specific Goal (Individualization)  Description: Patient will sleep 6-8 hours per night  Patient will eat at 75% of meals daily  Patient will attend >50% of group programming daily  Patient will be compliant with medication  Patient will have appropriate boundaries staff and peers during hospital  ELOPEMENT PRECAUTIONS; Eloped from ICU 6/17/2020 7/17/2020 1622 by Linda Concepcion RN  Outcome: No Change  Note:        Problem: Mental State/Mood Impairment (Psychotic Signs/Symptoms)  Goal: Improved Mental State and Mood (Psychotic Signs/Symptoms)  Description: Patient will have a reality based conversation.  Patient will report a decrease in delusions/hallucinations.  Patient will use coping skills to manage labile behavior.  7/17/2020 1622 by Linda Concepcion RN  Outcome: No Change   Pt. Has been up and about on unit, slept 7 hours last night, attending at least 50% of group therapy sessions, compliant with taking prescribed medications, has not attempted to elope this shift, eating at least 75% of meals, has shown appropriate boundaries so far this shift, has reality based conversation, denies depression, hallucinations, suicidal ideation and delusional thoughts.    Face to face end of shift report willbe communicated to oncoming night shift RN.     Linda Concepcion RN  7/17/2020  6:47 PM

## 2020-07-17 NOTE — PLAN OF CARE
"Spoke with pt this afternoon- She was in her room resting. Pt states she gets up for breakfast, then goes back to bed and usually gets up for the day in the afternoon. Pt states she attends unit programming. Pt informs staff that she is just waiting to go home. Reminded pt that from the hospital she is going to a CB and explained this to pt again. Pt states she would prefer to go to [facility] and says \"I'm a client of theirs. When I was using I would go to detox and the staff there was really nice and said I should come back when I'm ready for treatment.\" Pt states she would prefer going to inpt treatment vs a [facility]. Informed pt that could potentially be a plan after the CB if she discusses this with her . Pt states she got in touch with her  earlier during the week and says she understands that she will be working with her for the next 6 months. Pt also discusses wanting to go home- says she lives next to [facility] with her grandma, who she takes care of and her 11 yr old brother- she says she just cant stay there when she is using. Pt states she has been in contact with her grandma so she knows she is safe in the hospital.   "

## 2020-07-17 NOTE — PLAN OF CARE
Problem: Adult Behavioral Health Plan of Care  Goal: Patient-Specific Goal (Individualization)  Description: Patient will sleep 6-8 hours per night  Patient will eat at 75% of meals daily  Patient will attend >50% of group programming daily  Patient will be compliant with medication  Patient will have appropriate boundaries staff and peers during hospital  ELOPEMENT PRECAUTIONS; Eloped from ICU 6/17/2020 7/17/2020 0217 by Linda Fair, RN  Outcome: Improving  Note: Report received from Joel. Rounding complete. Pt observed sleeping in right yumi lying position with regular and unlabored respirations.    Pt has been in bed with eyes closed and regular respirations. 15 minute and PRN checks all night. No complaints offered. Will continue to monitor.    Pt slept approx 7 hours    Face to face end of shift report communicated to oncoming RN.    July 17, 2020  2:17 AM          Problem: Mental State/Mood Impairment (Psychotic Signs/Symptoms)  Goal: Improved Mental State and Mood (Psychotic Signs/Symptoms)  Description: Patient will have a reality based conversation.  Patient will report a decrease in delusions/hallucinations.  Patient will use coping skills to manage labile behavior.  7/17/2020 0217 by Linda Fair, RN  Outcome: Improving  Note: Unable to assess due to pt sleeping. No issues or concerns noted at this time.

## 2020-07-17 NOTE — PLAN OF CARE
Face to face end of shift report received from Linda MCGREGOR RN. Rounding completed and patient observed lying in bed with eyes closed, breathing regular and unlabored. She was easily awoken.     14:00 Update: Patient rated depression at 4 of 10 and anxiety at 8 of 10. She denied needing any PRNs. She is quiet and isolates to her room. She does attend a few groups., She is calm and cooperative. Patient showered.       Face to face end of shift report communicated to oncoming RN.      Problem: Adult Behavioral Health Plan of Care  Goal: Patient-Specific Goal (Individualization)  Description: Patient will sleep 6-8 hours per night  Patient will eat at 75% of meals daily  Patient will attend >50% of group programming daily  Patient will be compliant with medication  Patient will have appropriate boundaries staff and peers during hospital  ELOPEMENT PRECAUTIONS; Eloped from [facility] 6/17/2020 7/17/2020 1408 by Adina Gunter RN  Outcome: Improving     Problem: Mental State/Mood Impairment (Psychotic Signs/Symptoms)  Goal: Improved Mental State and Mood (Psychotic Signs/Symptoms)  Description: Patient will have a reality based conversation.  Patient will report a decrease in delusions/hallucinations.  Patient will use coping skills to manage labile behavior.  7/17/2020 1408 by Adina Gunter, RN  Outcome: No Change

## 2020-07-18 PROCEDURE — 12400011

## 2020-07-18 PROCEDURE — 99231 SBSQ HOSP IP/OBS SF/LOW 25: CPT | Performed by: NURSE PRACTITIONER

## 2020-07-18 PROCEDURE — 25000132 ZZH RX MED GY IP 250 OP 250 PS 637: Performed by: NURSE PRACTITIONER

## 2020-07-18 RX ADMIN — METFORMIN ER 500 MG 500 MG: 500 TABLET ORAL at 17:11

## 2020-07-18 RX ADMIN — LITHIUM CARBONATE 600 MG: 300 TABLET, EXTENDED RELEASE ORAL at 20:11

## 2020-07-18 RX ADMIN — PALIPERIDONE 6 MG: 6 TABLET, EXTENDED RELEASE ORAL at 20:11

## 2020-07-18 RX ADMIN — LITHIUM CARBONATE 600 MG: 300 TABLET, EXTENDED RELEASE ORAL at 08:33

## 2020-07-18 RX ADMIN — Medication 2 TABLET: at 20:11

## 2020-07-18 RX ADMIN — BENZTROPINE MESYLATE 0.5 MG: 0.5 TABLET ORAL at 08:33

## 2020-07-18 ASSESSMENT — ACTIVITIES OF DAILY LIVING (ADL)
LAUNDRY: UNABLE TO COMPLETE
DRESS: SCRUBS (BEHAVIORAL HEALTH)
HYGIENE/GROOMING: INDEPENDENT
HYGIENE/GROOMING: INDEPENDENT
ORAL_HYGIENE: INDEPENDENT
DRESS: SCRUBS (BEHAVIORAL HEALTH)
ORAL_HYGIENE: INDEPENDENT

## 2020-07-18 NOTE — PROGRESS NOTES
"[facility]  Psychiatric Progress Note       Impression:     Jonelle is in bed this morning when I see her, she laughes \"you caught me in bed again\". Asked questions re: discharge, I reminded her much of this is out of her control. She will discharge to another facility in attempt to reduce the likelihood of her relapsing. Accepted this w/o further question. Lack of insight remains unchanged. Denies AH/VH, SI/HI, No anxiety or depression. Racing thoughts are \"better-not gone\".  Encouraged group attendance.    Educated regarding medication indications, risks, benefits, side effects, contraindications and possible interactions. Verbally expressed understanding.        Diagnoses:      Schizoaffective Disorder, bipolar type  Substance Abuse Disorder     Attestation:  Patient has been seen and evaluated by me,  Brynn CELESTIN, CNP          Interim History:   The patient's care was discussed with the treatment team and chart notes were reviewed.     BEHAVIORAL TEAM DISCUSSION     Progress: Fair. Improvement in psychosis and grandiosity though isolative to room.    Continued Stay Criteria/Rationale: Switched to Invega, lacks insight into mental health, current plan discharge to [facility], could possibly consider IRTS if further improvement is made and participating in groups.  Medical/Physical: None  Precautions:   Falls precaution?: YES       Behavioral Orders   Procedures     Code 1 - Restrict to Unit     Routine Programming       As clinically indicated     Status 15       Every 15 minutes.     Plan:   Continue Lithium  mg BID  Lithium level 0.7  Continue Invega 6 mg at bedtime  Continue Cogentin 0.5 mg scheduled daily  Continue Propranolol 10 mg TID prn anxiety/akathisia  Continue metformin 500 mg daily with dinner for neuroleptic-induced weight gain  Committed and chisholm granted    Rationale for change in precautions or plan: Medication adjustment to target mood and delusions    Petition for Chisholm " "including Invega, Zyprexa, Abilify, Risperdal, and Geodon     Participants: Brynn Lai, Nursing, OT, SW        Current Facility-Administered Medications   Medication     acetaminophen (TYLENOL) tablet 650 mg     benztropine (COGENTIN) tablet 0.5 mg     benztropine (COGENTIN) tablet 1 mg     diphenhydrAMINE (BENADRYL) capsule 50 mg    Or     diphenhydrAMINE (BENADRYL) injection 50 mg     haloperidol (HALDOL) tablet 5 mg    Or     haloperidol lactate (HALDOL) injection 5 mg     lithium ER (LITHOBID) CR tablet 600 mg     LORazepam (ATIVAN) tablet 1 mg    Or     LORazepam (ATIVAN) injection 1 mg     magnesium hydroxide (MILK OF MAGNESIA) suspension 30 mL     melatonin 3 mg (with vit B6 10 mg) extended release tablet 2 tablet     metFORMIN (GLUCOPHAGE-XR) 24 hr tablet 500 mg     nicotine (NICORETTE) gum 2-4 mg     paliperidone ER (INVEGA) 24 hr tablet 6 mg     propranolol (INDERAL) tablet 10 mg            10 point ROS negative see H&P       Allergies:   No Known Allergies          Psychiatric Examination:   BP (!) 86/53   Pulse 79   Temp 97.6  F (36.4  C) (Tympanic)   Resp 16   Ht 1.638 m (5' 4.5\")   Wt 72.8 kg (160 lb 6.4 oz)   SpO2 98%   BMI 27.11 kg/m    Weight is 160 lbs 6.4 oz  Body mass index is 27.11 kg/m .     Appearance: awake, alert, dressed in hospital scrubs, somewhat disheveled  Attitude: pleasant though guarded  Eye Contact: fair  Mood: \"good\"  Affect: slight improvement, smiled several times  Speech: clear, coherent, conversed for a few minutes  Psychomotor Behavior: normal, no evidence of abnormal movements  Thought Process: reality based and linear, no grandiosity noted  Associations: no loosening of associations noted  Thought Content: denies hallucinations, denies suicidal thoughts  Insight:  limited  Judgment: limited  Oriented to:  x3  Attention Span and Concentration: limited  Recent and Remote Memory:  impaired  Fund of Knowledge: delayed  Muscle Strength and Tone: normal  Gait and " Station: normal             Labs:     No results found for this or any previous visit (from the past 24 hour(s)).

## 2020-07-18 NOTE — PLAN OF CARE
Problem: Adult Behavioral Health Plan of Care  Goal: Patient-Specific Goal (Individualization)  Description: Patient will sleep 6-8 hours per night  Patient will eat at 75% of meals daily  Patient will attend >50% of group programming daily  Patient will be compliant with medication  Patient will have appropriate boundaries staff and peers during hospital  ELOPEMENT PRECAUTIONS; Eloped from ICU 6/17/2020 7/18/2020 1651 by Linda Concepcion RN  Outcome: No Change  Note:        Problem: Mental State/Mood Impairment (Psychotic Signs/Symptoms)  Goal: Improved Mental State and Mood (Psychotic Signs/Symptoms)  Description: Patient will have a reality based conversation.  Patient will report a decrease in delusions/hallucinations.  Patient will use coping skills to manage labile behavior.  7/18/2020 1651 by Linda Concepcion RN  Outcome: No Change   Pt. Has been in bed almost all shift, did wake and ate supper in dayroom, appetite is good, slept 7 hours last night, compliant with treatment team recommendations, has shown appropriate boundaries, denies all criteria, denies pain or any physical problems, has not attempted to elope so far this shift, cooperative with cares.    Face to face end of shift report will be communicated to oncoming night shift RN.     Linda Concepcion RN  7/18/2020  6:06 PM

## 2020-07-18 NOTE — PLAN OF CARE
Problem: Mental State/Mood Impairment (Psychotic Signs/Symptoms)  Goal: Improved Mental State and Mood (Psychotic Signs/Symptoms)  Description: Patient will have a reality based conversation.  Patient will report a decrease in delusions/hallucinations.  Patient will use coping skills to manage labile behavior.  7/18/2020 1445 by Oneyda Lopez, RN  Outcome: Improving   Patient is able to have a brief reality based conversation.  She answers questions minimally.   Denies hallucinations this shift.       Problem: Adult Behavioral Health Plan of Care  Goal: Patient-Specific Goal (Individualization)  Description: Patient will sleep 6-8 hours per night  Patient will eat at 75% of meals daily  Patient will attend >50% of group programming daily  Patient will be compliant with medication  Patient will have appropriate boundaries staff and peers during hospital  ELOPEMENT PRECAUTIONS; Eloped from ICU 6/17/2020 7/18/2020 1445 by Oneyda Lopez, RN  Outcome: Improving  Note:      Patient has been calm, cooperative, and medication compliant this shift.  She was in the lounge for part of the shift playing cards with peers.  Did not attend groups.  No complaints of pain.  VS WNL.  Face to face end of shift report communicated to evening shift ASHWIN.     Oneyda Lopez RN  7/18/2020  2:50 PM

## 2020-07-18 NOTE — PLAN OF CARE
Problem: Adult Behavioral Health Plan of Care  Goal: Patient-Specific Goal (Individualization)  Description: Patient will sleep 6-8 hours per night  Patient will eat at 75% of meals daily  Patient will attend >50% of group programming daily  Patient will be compliant with medication  Patient will have appropriate boundaries staff and peers during hospital  ELOPEMENT PRECAUTIONS; Eloped from ICU 6/17/2020 7/18/2020 0423 by Linda Fair RN  Outcome: Improving  Note: Report received from Linda rice. Pt observed sleeping in right side lying position with regular and unlabored respirations.    2336- Pt requested and was admin 650 mg Tylenol due to c/o 8/10 headache and returned promptly to bed.     0145- Pt up to lounge for snack.    0200- Pt back in bed.    0215- Pt sleeping    Pt has been in bed with eyes closed and regular respirations. 15 minute and PRN checks all night. No complaints offered. Will continue to monitor.    Pt slept approx 7 hours this NOC shift    Face to face end of shift report communicated to oncoming RN.    July 18, 2020  4:23 AM          Problem: Mental State/Mood Impairment (Psychotic Signs/Symptoms)  Goal: Improved Mental State and Mood (Psychotic Signs/Symptoms)  Description: Patient will have a reality based conversation.  Patient will report a decrease in delusions/hallucinations.  Patient will use coping skills to manage labile behavior.  7/18/2020 0423 by Linda Fair, RN  Outcome: Improving  Note: No issues or concerns noted at this time.

## 2020-07-19 PROCEDURE — 25000132 ZZH RX MED GY IP 250 OP 250 PS 637: Performed by: NURSE PRACTITIONER

## 2020-07-19 PROCEDURE — 12400011

## 2020-07-19 PROCEDURE — 99232 SBSQ HOSP IP/OBS MODERATE 35: CPT | Performed by: NURSE PRACTITIONER

## 2020-07-19 RX ADMIN — BENZTROPINE MESYLATE 0.5 MG: 0.5 TABLET ORAL at 08:14

## 2020-07-19 RX ADMIN — LITHIUM CARBONATE 600 MG: 300 TABLET, EXTENDED RELEASE ORAL at 20:17

## 2020-07-19 RX ADMIN — DIPHENHYDRAMINE HYDROCHLORIDE 50 MG: 50 CAPSULE ORAL at 20:20

## 2020-07-19 RX ADMIN — Medication 2 TABLET: at 20:17

## 2020-07-19 RX ADMIN — PALIPERIDONE 6 MG: 6 TABLET, EXTENDED RELEASE ORAL at 20:17

## 2020-07-19 RX ADMIN — LITHIUM CARBONATE 600 MG: 300 TABLET, EXTENDED RELEASE ORAL at 08:14

## 2020-07-19 RX ADMIN — METFORMIN ER 500 MG 500 MG: 500 TABLET ORAL at 17:12

## 2020-07-19 ASSESSMENT — ACTIVITIES OF DAILY LIVING (ADL)
ORAL_HYGIENE: INDEPENDENT
ORAL_HYGIENE: INDEPENDENT
HYGIENE/GROOMING: INDEPENDENT
DRESS: SCRUBS (BEHAVIORAL HEALTH)
DRESS: SCRUBS (BEHAVIORAL HEALTH)
LAUNDRY: UNABLE TO COMPLETE
HYGIENE/GROOMING: INDEPENDENT

## 2020-07-19 NOTE — PROGRESS NOTES
"[facility]  Psychiatric Progress Note       Impression:     Jonelle is a 20 yo female with dual dx of MI/CD. Pt is upbeat and pleasant when we meet. She repeatedly asks about leaving, accepts redirection well. Spends a fair amount of time in her room sleeping. Less grandiose. Endorsing racing thoughts \"lots better\".  I discussed w/ pt her elevated LFT's and the importance of diet. I changed her diet from regular to consistent carbs  4-7 carb units per meal. Pt verbalized understanding.        Educated regarding medication indications, risks, benefits, side effects, contraindications and possible interactions. Verbally expressed understanding.        Diagnoses:      Schizoaffective Disorder, bipolar type  Substance Abuse Disorder     Attestation:  Patient has been seen and evaluated by me,  Brynn CELESTIN, CNP          Interim History:   The patient's care was discussed with the treatment team and chart notes were reviewed.     BEHAVIORAL TEAM DISCUSSION     Progress: Fair. Improvement in psychosis and grandiosity though isolative to room.    Continued Stay Criteria/Rationale: Switched to Invega, lacks insight into mental health, current plan discharge to [facility], could possibly consider IRTS if further improvement is made and participating in groups.    Medical/Physical: LFT's probable fatty liver.  Precautions:   Falls precaution?: YES       Behavioral Orders   Procedures     Code 1 - Restrict to Unit     Routine Programming       As clinically indicated     Status 15       Every 15 minutes.     Plan:   Regular diet changed to constant carb 4-7 units per meal.  Continue Lithium  mg BID  Lithium level 0.7  Continue Invega 6 mg at bedtime  Continue Cogentin 0.5 mg scheduled daily  Continue Propranolol 10 mg TID prn anxiety/akathisia  Continue metformin 500 mg daily with dinner for neuroleptic-induced weight gain  Committed and chisholm granted    Rationale for change in precautions or plan: " "Medication adjustment to target mood and delusions    Petition for Granado including Invega, Zyprexa, Abilify, Risperdal, and Geodon     Participants: Brynn Lai, Nursing, OT, SW        Current Facility-Administered Medications   Medication     acetaminophen (TYLENOL) tablet 650 mg     benztropine (COGENTIN) tablet 0.5 mg     benztropine (COGENTIN) tablet 1 mg     diphenhydrAMINE (BENADRYL) capsule 50 mg    Or     diphenhydrAMINE (BENADRYL) injection 50 mg     haloperidol (HALDOL) tablet 5 mg    Or     haloperidol lactate (HALDOL) injection 5 mg     lithium ER (LITHOBID) CR tablet 600 mg     LORazepam (ATIVAN) tablet 1 mg    Or     LORazepam (ATIVAN) injection 1 mg     magnesium hydroxide (MILK OF MAGNESIA) suspension 30 mL     melatonin 3 mg (with vit B6 10 mg) extended release tablet 2 tablet     metFORMIN (GLUCOPHAGE-XR) 24 hr tablet 500 mg     nicotine (NICORETTE) gum 2-4 mg     paliperidone ER (INVEGA) 24 hr tablet 6 mg     propranolol (INDERAL) tablet 10 mg            10 point ROS negative see H&P       Allergies:   No Known Allergies          Psychiatric Examination:   BP (!) 86/53   Pulse 79   Temp 97.6  F (36.4  C) (Tympanic)   Resp 16   Ht 1.638 m (5' 4.5\")   Wt 72.8 kg (160 lb 6.4 oz)   SpO2 98%   BMI 27.11 kg/m    Weight is 160 lbs 6.4 oz  Body mass index is 27.11 kg/m .     Appearance: awake, alert, dressed in hospital scrubs, somewhat disheveled  Attitude: pleasant though guarded  Eye Contact: fair  Mood: \"good\"  Affect: slight improvement, smiled several times  Speech: clear, coherent, conversed for a few minutes  Psychomotor Behavior: normal, no evidence of abnormal movements  Thought Process: reality based and linear, no grandiosity noted  Associations: no loosening of associations noted  Thought Content: denies hallucinations, denies suicidal thoughts  Insight:  limited  Judgment: limited  Oriented to:  x3  Attention Span and Concentration: limited  Recent and Remote Memory:  " impaired  Fund of Knowledge: delayed  Muscle Strength and Tone: normal  Gait and Station: normal             Labs:     No results found for this or any previous visit (from the past 24 hour(s)).

## 2020-07-19 NOTE — PLAN OF CARE
Problem: Mental State/Mood Impairment (Psychotic Signs/Symptoms)  Goal: Improved Mental State and Mood (Psychotic Signs/Symptoms)  Description: Patient will have a reality based conversation.  Patient will report a decrease in delusions/hallucinations.  Patient will use coping skills to manage labile behavior.  7/19/2020 1207 by Oneyda Lopez, RN  Outcome: Improving   Patient denies all mental health criteria at this time.  Does not appear responding to internal stimuli.        Problem: Adult Behavioral Health Plan of Care  Goal: Patient-Specific Goal (Individualization)  Description: Patient will sleep 6-8 hours per night  Patient will eat at 75% of meals daily  Patient will attend >50% of group programming daily  Patient will be compliant with medication  Patient will have appropriate boundaries staff and peers during hospital  ELOPEMENT PRECAUTIONS; Eloped from ICU 6/17/2020 7/19/2020 1207 by Oneyda Lopez, RN  Outcome: Improving  Note:      Patient has been calm, cooperative, and medication compliant this shift.  She spent much of the shift in her room but [facility] and [facility] calls.  Does not socialize with peers but will talk with staff when approached.  No complaints of pain.  Vs WNL. Face to face end of shift report communicated to evening shift RN.     Oneyda Lopez RN  7/19/2020  12:09 PM

## 2020-07-19 NOTE — PLAN OF CARE
Problem: Adult Behavioral Health Plan of Care  Goal: Patient-Specific Goal (Individualization)  Description: Patient will sleep 6-8 hours per night  Patient will eat at 75% of meals daily  Patient will attend >50% of group programming daily  Patient will be compliant with medication  Patient will have appropriate boundaries staff and peers during hospital  ELOPEMENT PRECAUTIONS; Eloped from ICU 6/17/2020 7/19/2020 0543 by Linda Fair, RN  Outcome: Improving  Note: Report received from Linda rice. Pt observed sleeping in right side lying position with regular and unlabored respirations.    Pt has been in bed with eyes closed and regular respirations. 15 minute and PRN checks all night. No complaints offered. Will continue to monitor.    Pt slept approx 7.5 hours    Face to face end of shift report communicated to oncoming RN.    July 19, 2020  5:43 AM          Problem: Mental State/Mood Impairment (Psychotic Signs/Symptoms)  Goal: Improved Mental State and Mood (Psychotic Signs/Symptoms)  Description: Patient will have a reality based conversation.  Patient will report a decrease in delusions/hallucinations.  Patient will use coping skills to manage labile behavior.  7/19/2020 0543 by Linda Fair, RN  Outcome: Improving  Note: Unable to assess due to pt sleeping. No issues or concerns noted at this time.

## 2020-07-19 NOTE — PLAN OF CARE
Problem: Adult Behavioral Health Plan of Care  Goal: Patient-Specific Goal (Individualization)  Description: Patient will sleep 6-8 hours per night  Patient will eat at 75% of meals daily  Patient will attend >50% of group programming daily  Patient will be compliant with medication  Patient will have appropriate boundaries staff and peers during hospital  ELOPEMENT PRECAUTIONS; Eloped from ICU 6/17/2020 7/19/2020 1627 by Linda Concepcion, RN  Outcome: No Change  Note:        Problem: Mental State/Mood Impairment (Psychotic Signs/Symptoms)  Goal: Improved Mental State and Mood (Psychotic Signs/Symptoms)  Description: Patient will have a reality based conversation.  Patient will report a decrease in delusions/hallucinations.  Patient will use coping skills to manage labile behavior.  7/19/2020 1627 by Linda Concepcion RN  Outcome: No Change   Pt. Is up and about on unit, slept 7.5 hours last night, eating at least 75% of meals, attended group therapy, showing appropriate boundaries, social on unit, has not attempted to elope, cooperative with treatment team recommendations, taking prescribed medications, spending time in dayroom at this time.  2020-  Pt. Requested/received benadryl 50 mg po for sleep.    Face to face end of shift report will be communicated to oncoming night shift RN.     Linda Concepcion RN  7/19/2020  5:51 PM

## 2020-07-20 PROCEDURE — 25000132 ZZH RX MED GY IP 250 OP 250 PS 637: Performed by: NURSE PRACTITIONER

## 2020-07-20 PROCEDURE — 12400011

## 2020-07-20 RX ADMIN — LITHIUM CARBONATE 600 MG: 300 TABLET, EXTENDED RELEASE ORAL at 20:04

## 2020-07-20 RX ADMIN — LORAZEPAM 1 MG: 1 TABLET ORAL at 20:04

## 2020-07-20 RX ADMIN — BENZTROPINE MESYLATE 0.5 MG: 0.5 TABLET ORAL at 08:31

## 2020-07-20 RX ADMIN — Medication 2 TABLET: at 20:04

## 2020-07-20 RX ADMIN — METFORMIN ER 500 MG 500 MG: 500 TABLET ORAL at 16:51

## 2020-07-20 RX ADMIN — PALIPERIDONE 6 MG: 6 TABLET, EXTENDED RELEASE ORAL at 20:04

## 2020-07-20 RX ADMIN — LITHIUM CARBONATE 600 MG: 300 TABLET, EXTENDED RELEASE ORAL at 08:31

## 2020-07-20 ASSESSMENT — ACTIVITIES OF DAILY LIVING (ADL)
LAUNDRY: UNABLE TO COMPLETE
HYGIENE/GROOMING: INDEPENDENT
DRESS: SCRUBS (BEHAVIORAL HEALTH)
ORAL_HYGIENE: INDEPENDENT
HYGIENE/GROOMING: INDEPENDENT
DRESS: SCRUBS (BEHAVIORAL HEALTH);INDEPENDENT
ORAL_HYGIENE: INDEPENDENT
LAUNDRY: UNABLE TO COMPLETE

## 2020-07-20 NOTE — PLAN OF CARE
Problem: Adult Behavioral Health Plan of Care  Goal: Patient-Specific Goal (Individualization)  Description: Patient will sleep 6-8 hours per night  Patient will eat at 75% of meals daily  Patient will attend >50% of group programming daily  Patient will be compliant with medication  Patient will have appropriate boundaries staff and peers during hospital  ELOPEMENT PRECAUTIONS; Eloped from ICU 6/17/2020 7/20/2020 0536 by Linda Fair, RN  Outcome: Improving  Note: Report received from Linda rice. Pt observed sleeping in right side lying position with regular and unlabored respirations.    Pt has been in bed with eyes closed and regular respirations. 15 minute and PRN checks all night. No complaints offered. Will continue to monitor.    Pt slept approx 7 hours    Face to face end of shift report communicated to oncoming RN.    July 20, 2020  5:36 AM          Problem: Mental State/Mood Impairment (Psychotic Signs/Symptoms)  Goal: Improved Mental State and Mood (Psychotic Signs/Symptoms)  Description: Patient will have a reality based conversation.  Patient will report a decrease in delusions/hallucinations.  Patient will use coping skills to manage labile behavior.  7/20/2020 0536 by Linda Fair, RN  Outcome: Improving  Note: No issues or concerns noted at this time. Pt up only briefly for a snack

## 2020-07-20 NOTE — PLAN OF CARE
Received email from pt's ESTER Lane stating she will contact [facility] this morning and have pt added to all of the [facility] lists.

## 2020-07-20 NOTE — PLAN OF CARE
Problem: Adult Behavioral Health Plan of Care  Goal: Patient-Specific Goal (Individualization)  Description: Patient will sleep 6-8 hours per night  Patient will eat at 75% of meals daily  Patient will attend >50% of group programming daily  Patient will be compliant with medication  Patient will have appropriate boundaries staff and peers during hospital  [facility]CA[facility] 7/20/2020 1058 by Chelsie Montiel, RN  Outcome: No Change  Note: Patient denies SI, HI, hallucinations.  Patient is cooperative with medications and assessment.  Her affect is flat.  She does not socialize with peers when she is in the lounge.  Patient has been in bed most of the morning.  Patient was out of bed and in the lounge for breakfast.  She ate 100% of breakfast.  Patient is disheveled and untidy.         Problem: Mental State/Mood Impairment (Psychotic Signs/Symptoms)  Goal: Improved Mental State and Mood (Psychotic Signs/Symptoms)  Description: Patient will have a reality based conversation.  Patient will report a decrease in delusions/hallucinations.  Patient will use coping skills to manage labile behavior.  7/20/2020 1058 by Chelsie Montiel, RN  Outcome: No Change  Note: Patient's mood is calm.  Patient denies delusions and hallucinations.  No noted labile behavior this shift.

## 2020-07-21 PROCEDURE — 25000132 ZZH RX MED GY IP 250 OP 250 PS 637: Performed by: NURSE PRACTITIONER

## 2020-07-21 PROCEDURE — 12400011

## 2020-07-21 PROCEDURE — 99232 SBSQ HOSP IP/OBS MODERATE 35: CPT | Performed by: NURSE PRACTITIONER

## 2020-07-21 RX ORDER — PALIPERIDONE 3 MG/1
3 TABLET, EXTENDED RELEASE ORAL AT BEDTIME
Status: DISCONTINUED | OUTPATIENT
Start: 2020-07-21 | End: 2020-07-26

## 2020-07-21 RX ADMIN — METFORMIN ER 500 MG 500 MG: 500 TABLET ORAL at 17:04

## 2020-07-21 RX ADMIN — LITHIUM CARBONATE 600 MG: 300 TABLET, EXTENDED RELEASE ORAL at 20:15

## 2020-07-21 RX ADMIN — DIPHENHYDRAMINE HYDROCHLORIDE 50 MG: 50 CAPSULE ORAL at 20:17

## 2020-07-21 RX ADMIN — PALIPERIDONE 3 MG: 3 TABLET, EXTENDED RELEASE ORAL at 20:15

## 2020-07-21 RX ADMIN — BENZTROPINE MESYLATE 0.5 MG: 0.5 TABLET ORAL at 08:10

## 2020-07-21 RX ADMIN — LITHIUM CARBONATE 600 MG: 300 TABLET, EXTENDED RELEASE ORAL at 08:10

## 2020-07-21 ASSESSMENT — ACTIVITIES OF DAILY LIVING (ADL)
LAUNDRY: UNABLE TO COMPLETE
DRESS: SCRUBS (BEHAVIORAL HEALTH)
ORAL_HYGIENE: INDEPENDENT
HYGIENE/GROOMING: INDEPENDENT
HYGIENE/GROOMING: INDEPENDENT
ORAL_HYGIENE: INDEPENDENT
DRESS: SCRUBS (BEHAVIORAL HEALTH);INDEPENDENT
LAUNDRY: UNABLE TO COMPLETE

## 2020-07-21 NOTE — PLAN OF CARE
"  Problem: Adult Behavioral Health Plan of Care  Goal: Patient-Specific Goal (Individualization)  Description: Patient will sleep 6-8 hours per night  Patient will eat at 75% of meals daily  Patient will attend >50% of group programming daily  Patient will be compliant with medication  Patient will have appropriate boundaries staff and peers during hospital  [facility] 7/21/2020 0923 by Chelsie Montiel, RN  Outcome: No Change  Note: Patient denies SI, HI, hallucinations, pain, depression, anxiety.  Cooperative with medications and assessment.  Patient's affect is flat and mood appears calm.  She appears disheveled and untidy.  Patient has been in bed the majority of the shift.  She states she did not sleep well last night and woke up multiple times throughout the night.  \"It was because of dreams.\"       Problem: Mental State/Mood Impairment (Psychotic Signs/Symptoms)  Goal: Improved Mental State and Mood (Psychotic Signs/Symptoms)  Description: Patient will have a reality based conversation.  Patient will report a decrease in delusions/hallucinations.  Patient will use coping skills to manage labile behavior.  7/21/2020 0923 by Chelsie Montiel, RN  Outcome: No Change  Note: Patient is able to hold reality based conversation.  Made no delusional statements this shift.  Patient denies hallucinations.  Patient's mood appears calm.       "

## 2020-07-21 NOTE — PLAN OF CARE
Observed pt lying in supine position - eyes closed - non-labored breathing noted.  It is now 0645 and pt has slept all noc without issuelFace to face end of shift report communicated to oncoming RN     Tita Power RN  7/21/2020  6:58 AM

## 2020-07-21 NOTE — PROGRESS NOTES
"[facility]  Psychiatric Progress Note       Impression:     Patient is lying in bed resting.  She reports \"doing alright'.  She denies auditory/visual hallucination and denies suicidal or homicidal thought.  She tells me that melatonin gives her a headache and does not want to continue, she has been sleeping good at night.  We discuss her elevated liver enzymes and inform her we should decrease invega a bit for awhile and monitor for worsening symptoms.  Patient reports she felt fine before they increased it anyway.  Patient is committed with [facility] and is waiting for [facility].      Educated regarding medication indications, risks, benefits, side effects, contraindications and possible interactions. Verbally expressed understanding.        Diagnoses:      Schizoaffective Disorder, bipolar type  Substance Abuse Disorder     Attestation:  Patient has been seen and evaluated by me,  Bree CELESTIN CNP          Interim History:   The patient's care was discussed with the treatment team and chart notes were reviewed.     BEHAVIORAL TEAM DISCUSSION     Progress: Fair. Improvement in psychosis and grandiosity though isolative to room.    Continued Stay Criteria/Rationale: lacks insight into mental health, current plan discharge to [facility], could possibly consider IRTS if further improvement is made and participating in groups.    Medical/Physical: LFT's likely fatty liver.  Precautions:   Falls precaution?: YES       Behavioral Orders   Procedures     Code 1 - Restrict to Unit     Routine Programming       As clinically indicated     Status 15       Every 15 minutes.     Plan:   Regular diet changed to constant carb 4-7 units per meal.  Continue Lithium  mg BID  Lithium level 0.7  Decrease Invega to 3 mg at bedtime  Continue Cogentin 0.5 mg scheduled daily  Continue Propranolol 10 mg TID prn anxiety/akathisia  Continue metformin 500 mg daily with dinner for neuroleptic-induced weight gain  Committed and " "chisholm granted    Rationale for change in precautions or plan: Medication adjustment to target mood and delusions    Petition for Chisholm including Invega, Zyprexa, Abilify, Risperdal, and Geodon     Participants: Brynn Lai, Nursing, OT, SW        Current Facility-Administered Medications   Medication     acetaminophen (TYLENOL) tablet 650 mg     benztropine (COGENTIN) tablet 0.5 mg     benztropine (COGENTIN) tablet 1 mg     diphenhydrAMINE (BENADRYL) capsule 50 mg    Or     diphenhydrAMINE (BENADRYL) injection 50 mg     haloperidol (HALDOL) tablet 5 mg    Or     haloperidol lactate (HALDOL) injection 5 mg     lithium ER (LITHOBID) CR tablet 600 mg     LORazepam (ATIVAN) tablet 1 mg    Or     LORazepam (ATIVAN) injection 1 mg     magnesium hydroxide (MILK OF MAGNESIA) suspension 30 mL     metFORMIN (GLUCOPHAGE-XR) 24 hr tablet 500 mg     nicotine (NICORETTE) gum 2-4 mg     paliperidone ER (INVEGA) 24 hr tablet 3 mg     propranolol (INDERAL) tablet 10 mg            10 point ROS negative see H&P       Allergies:   No Known Allergies          Psychiatric Examination:   BP (!) 86/53   Pulse 79   Temp 97.6  F (36.4  C) (Tympanic)   Resp 16   Ht 1.638 m (5' 4.5\")   Wt 72.8 kg (160 lb 6.4 oz)   SpO2 98%   BMI 27.11 kg/m    Weight is 160 lbs 6.4 oz  Body mass index is 27.11 kg/m .     Appearance: awake, alert, dressed in hospital scrubs, somewhat disheveled  Attitude: pleasant, more spontaneous  Eye Contact: fair  Mood: \"good\"  Affect: slight improvement, smiled several times  Speech: clear, coherent, conversed for a few minutes  Psychomotor Behavior: normal, no evidence of abnormal movements  Thought Process: reality based and linear, no grandiosity noted  Associations: no loosening of associations noted  Thought Content: denies hallucinations, denies suicidal thoughts  Insight:  limited  Judgment: limited  Oriented to:  x3  Attention Span and Concentration: limited  Recent and Remote Memory:  impaired  Fund of " Knowledge: delayed  Muscle Strength and Tone: normal  Gait and Station: normal             Labs:     No results found for this or any previous visit (from the past 24 hour(s)).

## 2020-07-22 PROCEDURE — 25000132 ZZH RX MED GY IP 250 OP 250 PS 637: Performed by: NURSE PRACTITIONER

## 2020-07-22 PROCEDURE — 12400011

## 2020-07-22 RX ADMIN — METFORMIN ER 500 MG 500 MG: 500 TABLET ORAL at 17:57

## 2020-07-22 RX ADMIN — PALIPERIDONE 3 MG: 3 TABLET, EXTENDED RELEASE ORAL at 20:30

## 2020-07-22 RX ADMIN — LITHIUM CARBONATE 600 MG: 300 TABLET, EXTENDED RELEASE ORAL at 08:23

## 2020-07-22 RX ADMIN — BENZTROPINE MESYLATE 0.5 MG: 0.5 TABLET ORAL at 08:23

## 2020-07-22 RX ADMIN — LORAZEPAM 1 MG: 1 TABLET ORAL at 08:23

## 2020-07-22 RX ADMIN — LITHIUM CARBONATE 600 MG: 300 TABLET, EXTENDED RELEASE ORAL at 20:30

## 2020-07-22 ASSESSMENT — ACTIVITIES OF DAILY LIVING (ADL)
HYGIENE/GROOMING: INDEPENDENT
DRESS: SCRUBS (BEHAVIORAL HEALTH);INDEPENDENT
HYGIENE/GROOMING: INDEPENDENT
DRESS: SCRUBS (BEHAVIORAL HEALTH)
ORAL_HYGIENE: INDEPENDENT
ORAL_HYGIENE: INDEPENDENT

## 2020-07-22 NOTE — PLAN OF CARE
Problem: Mental State/Mood Impairment (Psychotic Signs/Symptoms)  Goal: Improved Mental State and Mood (Psychotic Signs/Symptoms)  Description: Patient will have a reality based conversation.  Patient will report a decrease in delusions/hallucinations.  Patient will use coping skills to manage labile behavior.  7/21/2020 1957 by Oneyda Lopez, RN  Outcome: Improving   Patient denies hallucinations at this time.  She is able to have a linear, reality based conversation.        Problem: Adult Behavioral Health Plan of Care  Goal: Patient-Specific Goal (Individualization)  Description: Patient will sleep 6-8 hours per night  Patient will eat at 75% of meals daily  Patient will attend >50% of group programming daily  Patient will be compliant with medication  Patient will have appropriate boundaries staff and peers during hospital  ELOPEMENT PRECAUTIONS; Eloped from ICU 6/17/2020 7/21/2020 1957 by Oneyda Lopez, RN  Outcome: Improving  Note:      Patient has been calm, cooperative, and medication compliant this shift.  She spent all shift in the lounge and attended groups.  She denies all mental health concerns at this time.  No complaints of pain.  VS WNL.  Face to face end of shift report communicated to night shift RN.     Oneyda Lopez RN  7/21/2020  8:01 PM

## 2020-07-22 NOTE — PLAN OF CARE
Observed pt lying in a supine position - eyes closed - non-labored breathing noted.   It is now 0700 and pt has slept all noc.

## 2020-07-22 NOTE — PLAN OF CARE
"  Problem: Adult Behavioral Health Plan of Care  Goal: Patient-Specific Goal (Individualization)  Description: Patient will sleep 6-8 hours per night  Patient will eat at 75% of meals daily  Patient will attend >50% of group programming daily  Patient will be compliant with medication  Patient will have appropriate boundaries staff and peers during hospital  ELOPEMENT PRECAUTIONS; Eloped from [facility] 6/17/2020    Outcome: Improving  Note: Shift Summery:  Patient up for breakfast and then patient complained of anxiety at a level of 6 and requested and given Ativn 1 mg po at 0820. Patient returned to bed and slept. Patient states that the medication helped her anxiety but continued to have anxiety at a level of 5. Patient states that she wants to go to treatment and states that she feels that if she doesn't she would go back to using drugs and she doesn't want that. Patient denies depression and thoughts of suicide. Patient denies auditory and visual hallucinations.    1430 Patient has been isolating self during this shift. Up only for meals.    Face to face end of shift report communicated to 3-11 shift RN.     Luciana Garcia RN  7/22/2020  2:37 PM          Patient's Stated Goal for Shift:  \"no stated goal\"    Goal Status:  In Process       Problem: Mental State/Mood Impairment (Psychotic Signs/Symptoms)  Goal: Improved Mental State and Mood (Psychotic Signs/Symptoms)  Description: Patient will have a reality based conversation.  Patient will report a decrease in delusions/hallucinations.  Patient will use coping skills to manage labile behavior.  Outcome: Improving     "

## 2020-07-22 NOTE — PLAN OF CARE
"Spoke with Marycarmen from [facility] this afternoon- [facility]. [facility] and they are working on placing referrals from June 1st.     Met with pt this afternoon- she was in her room resting and states that she did not sleep well last night. Pt says she feels she is doing well and isn't \"mental\" and then asks when she can leave. Informed pt that staff called [facility] today for an update and there are no beds available at this time and informed pt it would be awhile before they have an opening. Pt states she will go to any type of program anywhere because she feels she is ready to go. Pt then brings up an IRTS program and asks if this is an option. Informed pt that she would need to start getting up during the day and attending group programming for the team to consider this an option. Pt states if she had some incentive to get up she would go. Continued to encourage group programming.   "

## 2020-07-23 PROCEDURE — 25000132 ZZH RX MED GY IP 250 OP 250 PS 637: Performed by: NURSE PRACTITIONER

## 2020-07-23 PROCEDURE — 99232 SBSQ HOSP IP/OBS MODERATE 35: CPT | Performed by: NURSE PRACTITIONER

## 2020-07-23 PROCEDURE — 12400011

## 2020-07-23 RX ORDER — BENZTROPINE MESYLATE 1 MG/1
1 TABLET ORAL DAILY
Status: DISCONTINUED | OUTPATIENT
Start: 2020-07-24 | End: 2020-08-12

## 2020-07-23 RX ADMIN — METFORMIN ER 500 MG 500 MG: 500 TABLET ORAL at 17:05

## 2020-07-23 RX ADMIN — PALIPERIDONE 3 MG: 3 TABLET, EXTENDED RELEASE ORAL at 20:13

## 2020-07-23 RX ADMIN — LITHIUM CARBONATE 600 MG: 300 TABLET, EXTENDED RELEASE ORAL at 20:13

## 2020-07-23 RX ADMIN — BENZTROPINE MESYLATE 0.5 MG: 0.5 TABLET ORAL at 08:49

## 2020-07-23 RX ADMIN — LITHIUM CARBONATE 600 MG: 300 TABLET, EXTENDED RELEASE ORAL at 08:49

## 2020-07-23 RX ADMIN — DIPHENHYDRAMINE HYDROCHLORIDE 50 MG: 50 CAPSULE ORAL at 20:15

## 2020-07-23 RX ADMIN — NICOTINE POLACRILEX 4 MG: 2 GUM, CHEWING ORAL at 15:15

## 2020-07-23 RX ADMIN — PROPRANOLOL HYDROCHLORIDE 10 MG: 10 TABLET ORAL at 22:37

## 2020-07-23 ASSESSMENT — ACTIVITIES OF DAILY LIVING (ADL)
HYGIENE/GROOMING: INDEPENDENT
HYGIENE/GROOMING: INDEPENDENT
DRESS: SCRUBS (BEHAVIORAL HEALTH)
ORAL_HYGIENE: INDEPENDENT
LAUNDRY: UNABLE TO COMPLETE
DRESS: SCRUBS (BEHAVIORAL HEALTH)
ORAL_HYGIENE: INDEPENDENT

## 2020-07-23 NOTE — PLAN OF CARE
"  Problem: Adult Behavioral Health Plan of Care  Goal: Patient-Specific Goal (Individualization)  Description: Patient will sleep 6-8 hours per night  Patient will eat at 75% of meals daily  Patient will attend >50% of group programming daily  Patient will be compliant with medication  Patient will have appropriate boundaries staff and peers during hospital  ELOPEMENT PRECAUTIONS; Eloped from [facility] 6/17/2020 7/23/2020 1058 by Luciana Garcia, RN  Outcome: Improving  Note: Shift Summery:  Patient is alert and up on the unit for breakfast meal. Patient denies problems with sleep. States anxiety is at a 4 on 0/10 scale and depression is at a 3 on 0/10 scale. Pain level a 2. Patient ate well and denies other physical problems. Isolates self in her room.    1400 Patient attending some groups today also.    Face to face end of shift report communicated to 3-11 shift RN.     Luciana Garcia, RN  7/23/2020  1:34 PM        Patient's Stated Goal for Shift:  \"no stated goal\"    Goal Status:  In Process       Problem: Mental State/Mood Impairment (Psychotic Signs/Symptoms)  Goal: Improved Mental State and Mood (Psychotic Signs/Symptoms)  Description: Patient will have a reality based conversation.  Patient will report a decrease in delusions/hallucinations.  Patient will use coping skills to manage labile behavior.  Outcome: Improving     "

## 2020-07-23 NOTE — PLAN OF CARE
Observed pt lying in a prone position - eyes are closed - non-labored breathing noted.   It is now 0700 and pt has slept all noc without issue.Face to face end of shift report communicated to oncoming RN.     Tita Power RN  7/23/2020  7:00 AM

## 2020-07-23 NOTE — PLAN OF CARE
"Problem: Adult Behavioral Health Plan of Care  Goal: Patient-Specific Goal (Individualization)  Description: Patient will sleep 6-8 hours per night  Patient will eat at 75% of meals daily  Patient will attend >50% of group programming daily  Patient will be compliant with medication  Patient will have appropriate boundaries staff and peers during hospital  [facility] 7/22/2020 2311 by Luciana Gonzalez RN  Outcome: No Change  Note: Pt spent the majority of the shift up on the unit - she was social with peers. She participated in group programming. Affect was bright. Pt endorsed anxiety and depression rated \"6/10\". She expressed frustration about continued hospitalization. Speech was pressured and rambling tonight. Pt stated \"(roommate's name) is my mom. 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 She was compliant with her scheduled medication.     Face to face end of shift report communicated to oncoming RN.      "

## 2020-07-23 NOTE — PROGRESS NOTES
"[facility]  Psychiatric Progress Note       Impression:     Patient is up this morning going to groups.  She reports not feeling comfortable being \"cooped up here\" and requests to go home as she has plans to go back to school, work and misses her little brother.  She is aware the current plan for her is going to [facility], however she is encouraged to talk to her  Lillie if there could be an alternative plan such as IRTS.  Patient is encouraged to continue to stay up during the day and attend group programming.  She denies AH/VH and is not making any delusional statements with me today, however earlier nursing notes indicate continue irrational statements.  She denies si/hi, reports stable mood, although restlessness is noted.  Will increase cogentin.    Educated regarding medication indications, risks, benefits, side effects, contraindications and possible interactions. Verbally expressed understanding.        Diagnoses:      Schizoaffective Disorder, bipolar type  Substance Abuse Disorder     Attestation:  Patient has been seen and evaluated by me,  Bree CELESTIN, CNP          Interim History:   The patient's care was discussed with the treatment team and chart notes were reviewed.     BEHAVIORAL TEAM DISCUSSION     Progress: Fair. Improvement in psychosis and grandiosity, attending more group prgramming    Continued Stay Criteria/Rationale: lacks insight into mental health, current plan discharge to [facility], could possibly consider IRTS if further improvement is made and participating in groups.    Medical/Physical: LFT's likely fatty liver.  Precautions:   Falls precaution?: YES       Behavioral Orders   Procedures     Code 1 - Restrict to Unit     Routine Programming       As clinically indicated     Status 15       Every 15 minutes.     Plan:   Regular diet changed to constant carb 4-7 units per meal.  Continue Lithium  mg BID  Lithium level 0.7  Continue Invega to 3 mg at " "bedtime  Increase Cogentin to 1 mg scheduled   Continue Propranolol 10 mg TID prn anxiety/akathisia  Continue metformin 500 mg daily with dinner for neuroleptic-induced weight gain  Committed and chisholm granted    Rationale for change in precautions or plan: Medication adjustment to target mood and delusions    Petition for Chisholm including Invega, Zyprexa, Abilify, Risperdal, and Geodon     Participants: Bree Salazar, Nursing, OT, SW        Current Facility-Administered Medications   Medication     acetaminophen (TYLENOL) tablet 650 mg     benztropine (COGENTIN) tablet 0.5 mg     benztropine (COGENTIN) tablet 1 mg     diphenhydrAMINE (BENADRYL) capsule 50 mg    Or     diphenhydrAMINE (BENADRYL) injection 50 mg     haloperidol (HALDOL) tablet 5 mg    Or     haloperidol lactate (HALDOL) injection 5 mg     lithium ER (LITHOBID) CR tablet 600 mg     LORazepam (ATIVAN) tablet 1 mg    Or     LORazepam (ATIVAN) injection 1 mg     magnesium hydroxide (MILK OF MAGNESIA) suspension 30 mL     metFORMIN (GLUCOPHAGE-XR) 24 hr tablet 500 mg     nicotine (NICORETTE) gum 2-4 mg     paliperidone ER (INVEGA) 24 hr tablet 3 mg     propranolol (INDERAL) tablet 10 mg            10 point ROS negative see H&P       Allergies:   No Known Allergies          Psychiatric Examination:   BP (!) 86/53   Pulse 79   Temp 97.6  F (36.4  C) (Tympanic)   Resp 16   Ht 1.638 m (5' 4.5\")   Wt 72.8 kg (160 lb 6.4 oz)   SpO2 98%   BMI 27.11 kg/m    Weight is 160 lbs 6.4 oz  Body mass index is 27.11 kg/m .     Appearance: awake, alert, dressed in hospital scrubs  Attitude: pleasant, more spontaneous  Eye Contact: fair  Mood: \"good\"  Affect: slight improvement, continues somewhat flat  Speech: clear, coherent, more talkative today  Psychomotor Behavior: some restlessness  Thought Process: reality based and linear, no grandiosity noted  Associations: no loosening of associations noted  Thought Content: denies hallucinations, denies suicidal " thoughts  Insight:  limited  Judgment: limited  Oriented to:  x3  Attention Span and Concentration: limited  Recent and Remote Memory:  impaired  Fund of Knowledge: delayed  Muscle Strength and Tone: normal  Gait and Station: normal             Labs:     No results found for this or any previous visit (from the past 24 hour(s)).

## 2020-07-24 PROCEDURE — 25000132 ZZH RX MED GY IP 250 OP 250 PS 637: Performed by: NURSE PRACTITIONER

## 2020-07-24 PROCEDURE — 12400011

## 2020-07-24 RX ORDER — FAMOTIDINE 10 MG
10 TABLET ORAL 2 TIMES DAILY PRN
Status: DISCONTINUED | OUTPATIENT
Start: 2020-07-24 | End: 2020-07-25

## 2020-07-24 RX ORDER — ALBUTEROL SULFATE 1.25 MG/3ML
1.25 SOLUTION RESPIRATORY (INHALATION) EVERY 6 HOURS PRN
Status: DISCONTINUED | OUTPATIENT
Start: 2020-07-24 | End: 2020-08-26

## 2020-07-24 RX ADMIN — LORAZEPAM 1 MG: 1 TABLET ORAL at 17:33

## 2020-07-24 RX ADMIN — FAMOTIDINE 10 MG: 10 TABLET ORAL at 18:59

## 2020-07-24 RX ADMIN — PALIPERIDONE 3 MG: 3 TABLET, EXTENDED RELEASE ORAL at 20:05

## 2020-07-24 RX ADMIN — METFORMIN ER 500 MG 500 MG: 500 TABLET ORAL at 17:33

## 2020-07-24 RX ADMIN — BENZTROPINE MESYLATE 1 MG: 1 TABLET ORAL at 08:12

## 2020-07-24 RX ADMIN — LITHIUM CARBONATE 600 MG: 300 TABLET, EXTENDED RELEASE ORAL at 20:05

## 2020-07-24 RX ADMIN — LITHIUM CARBONATE 600 MG: 300 TABLET, EXTENDED RELEASE ORAL at 08:12

## 2020-07-24 ASSESSMENT — ACTIVITIES OF DAILY LIVING (ADL)
DRESS: SCRUBS (BEHAVIORAL HEALTH)
HYGIENE/GROOMING: INDEPENDENT
HYGIENE/GROOMING: INDEPENDENT;SHOWER
ORAL_HYGIENE: INDEPENDENT
DRESS: INDEPENDENT;SCRUBS (BEHAVIORAL HEALTH)

## 2020-07-24 NOTE — PLAN OF CARE
At 0030 observed pt lying in a prone position - eyes closed - non-labored breathing noted.  It is now 0730 and pt has slept all noc without issue.Face to face end of shift report communicated to oncoming RN.     Tita Power RN  7/24/2020  7:54 AM

## 2020-07-24 NOTE — PLAN OF CARE
Problem: Mental State/Mood Impairment (Psychotic Signs/Symptoms)  Goal: Improved Mental State and Mood (Psychotic Signs/Symptoms)  Description: Patient will have a reality based conversation.  Patient will report a decrease in delusions/hallucinations.  Patient will use coping skills to manage labile behavior.  7/23/2020 2206 by Oneyda Lopez RN  Outcome: Improving   Patient able to have a reality based conversation but does not have an in depth conversation with staff.      Problem: Adult Behavioral Health Plan of Care  Goal: Patient-Specific Goal (Individualization)  Description: Patient will sleep 6-8 hours per night  Patient will eat at 75% of meals daily  Patient will attend >50% of group programming daily  Patient will be compliant with medication  Patient will have appropriate boundaries staff and peers during hospital  ELOPEMENT PRECAUTIONS; Eloped from ICU 6/17/2020 7/23/2020 2206 by Oneyda Lopez RN  Outcome: Improving   Patient has been calm, cooperative, and medication compliant this shift.  She was in the lounge most of the night and social with peers.  Reported some anxiety and received Benadryl 50 mg at 2020.  Denies hallucinations.  Patient complained of pain in her chest at 2225. States it is a piercing pain.  Patient appear visibly anxious.  Wringing hands and rocking.  /70 and pulse 86.  Call placed to NP [id number].  Will give propranolol 10 mg and monitor symptoms. Update on call NP as needed.   Face to face end of shift report communicated to night shift RN.     Oneyda Lopez RN  7/23/2020  10:12 PM

## 2020-07-24 NOTE — PLAN OF CARE
"Problem: Adult Behavioral Health Plan of Care  Goal: Patient-Specific Goal (Individualization)  Description: Patient will sleep 6-8 hours per night  Patient will eat at 75% of meals daily  Patient will attend >50% of group programming daily  Patient will be compliant with medication  Patient will have appropriate boundaries staff and peers during hospital  [facility]CAUT[facility] 7/24/2020 1750 by Bina Weston, RN  Outcome: No Change    Pt attended group this afternoon and remained up in lounge for supper meal. Reported feeling same chest discomfort as she had last night, described as 6/10 and on the right side, \"pressure from the inside, constant but not anxiety.\" Pt noted it first while eating, but feels it is \"not heartburn,\" adding it would be up in her throat if it was. Pt suggested that since she had asthma as a child maybe an inhaler would help, but said she hasn't had hers refilled for a while. VS rechecked, 121/72 with HR 78, no SOB or distress noted. Pt requested Ativan for this and 1 mg was given at 1733. NP provider Bree notified of pt s/s at 1745 and orders received. Pt remains up in lounge playing cribbage with peer.   1900 - Pt did note some relief from her chest discomfort after Ativan, but reported it was more dispersed and widespread in her chest. Offered and accepted PRN Pepcid.   2015 - Pt reported decreased s/s after taking Pepcid, described as \"eased up.\"    Problem: Mental State/Mood Impairment (Psychotic Signs/Symptoms)  Goal: Improved Mental State and Mood (Psychotic Signs/Symptoms)  Description: Patient will have a reality based conversation.  Patient will report a decrease in delusions/hallucinations.  Patient will use coping skills to manage labile behavior.  7/24/2020 1750 by Bina Weston, RN  Outcome: Improving  Conversation clear, no delusional content noted. Behavior appropriate with staff and peers.      2330 - Face to face end of shift report to " be communicated to oncoming shift RN.     Bina Weston RN  7/24/2020  9:29 PM

## 2020-07-24 NOTE — PLAN OF CARE
"  Problem: Adult Behavioral Health Plan of Care  Goal: Patient-Specific Goal (Individualization)  Description: Patient will sleep 6-8 hours per night  Patient will eat at 75% of meals daily  Patient will attend >50% of group programming daily  Patient will be compliant with medication  Patient will have appropriate boundaries staff and peers during hospital  [facility] 7/24/2020 1103 by Luciana Garcia RN  Outcome: Improving  Note: Shift Summery:  Patient is alert and up on the unit for breakfast meal. Patient states that depression and anxiety are a 3-4 on 0/10 scale. Patient denies auditory and visual hallucinations. Patient endorses low level pain. Declines medication for pain. Patient denies physical problems. Encouraged patient to attend groups but she is resting in bed at this time. Ate well for meals.  Face to face end of shift report communicated to 3-11 shift RN.     Luciana Garcia RN  7/24/2020  1:42 PM        Patient's Stated Goal for Shift:  \"no stated goal\"    Goal Status:  In Process       Problem: Mental State/Mood Impairment (Psychotic Signs/Symptoms)  Goal: Improved Mental State and Mood (Psychotic Signs/Symptoms)  Description: Patient will have a reality based conversation.  Patient will report a decrease in delusions/hallucinations.  Patient will use coping skills to manage labile behavior.  7/24/2020 1103 by Luciana Garcia, RN  Outcome: Improving     "

## 2020-07-25 LAB
ALBUMIN SERPL-MCNC: 3.7 G/DL (ref 3.4–5)
ALP SERPL-CCNC: 57 U/L (ref 40–150)
ALT SERPL W P-5'-P-CCNC: 527 U/L (ref 0–50)
AST SERPL W P-5'-P-CCNC: 255 U/L (ref 0–45)
BASOPHILS # BLD AUTO: 0 10E9/L (ref 0–0.2)
BASOPHILS NFR BLD AUTO: 0.4 %
BILIRUB DIRECT SERPL-MCNC: 0.2 MG/DL (ref 0–0.2)
BILIRUB SERPL-MCNC: 0.8 MG/DL (ref 0.2–1.3)
DIFFERENTIAL METHOD BLD: NORMAL
EOSINOPHIL # BLD AUTO: 0.1 10E9/L (ref 0–0.7)
EOSINOPHIL NFR BLD AUTO: 1.8 %
ERYTHROCYTE [DISTWIDTH] IN BLOOD BY AUTOMATED COUNT: 12.9 % (ref 10–15)
HCT VFR BLD AUTO: 39.8 % (ref 35–47)
HGB BLD-MCNC: 13.3 G/DL (ref 11.7–15.7)
IMM GRANULOCYTES # BLD: 0 10E9/L (ref 0–0.4)
IMM GRANULOCYTES NFR BLD: 0.3 %
LITHIUM SERPL-SCNC: 0.71 MMOL/L (ref 0.6–1.2)
LYMPHOCYTES # BLD AUTO: 2.4 10E9/L (ref 0.8–5.3)
LYMPHOCYTES NFR BLD AUTO: 32.9 %
MCH RBC QN AUTO: 30.2 PG (ref 26.5–33)
MCHC RBC AUTO-ENTMCNC: 33.4 G/DL (ref 31.5–36.5)
MCV RBC AUTO: 91 FL (ref 78–100)
MONOCYTES # BLD AUTO: 0.8 10E9/L (ref 0–1.3)
MONOCYTES NFR BLD AUTO: 11.5 %
NEUTROPHILS # BLD AUTO: 3.8 10E9/L (ref 1.6–8.3)
NEUTROPHILS NFR BLD AUTO: 53.1 %
NRBC # BLD AUTO: 0 10*3/UL
NRBC BLD AUTO-RTO: 0 /100
PLATELET # BLD AUTO: 252 10E9/L (ref 150–450)
PROT SERPL-MCNC: 8.1 G/DL (ref 6.8–8.8)
RBC # BLD AUTO: 4.4 10E12/L (ref 3.8–5.2)
WBC # BLD AUTO: 7.2 10E9/L (ref 4–11)

## 2020-07-25 PROCEDURE — 12400011

## 2020-07-25 PROCEDURE — 85025 COMPLETE CBC W/AUTO DIFF WBC: CPT | Performed by: NURSE PRACTITIONER

## 2020-07-25 PROCEDURE — 25000132 ZZH RX MED GY IP 250 OP 250 PS 637: Performed by: NURSE PRACTITIONER

## 2020-07-25 PROCEDURE — 87522 HEPATITIS C REVRS TRNSCRPJ: CPT | Performed by: NURSE PRACTITIONER

## 2020-07-25 PROCEDURE — 93005 ELECTROCARDIOGRAM TRACING: CPT

## 2020-07-25 PROCEDURE — 86803 HEPATITIS C AB TEST: CPT | Performed by: NURSE PRACTITIONER

## 2020-07-25 PROCEDURE — 80076 HEPATIC FUNCTION PANEL: CPT | Performed by: NURSE PRACTITIONER

## 2020-07-25 PROCEDURE — 87902 NFCT AGT GNTYP ALYS HEP C: CPT | Performed by: NURSE PRACTITIONER

## 2020-07-25 PROCEDURE — 36415 COLL VENOUS BLD VENIPUNCTURE: CPT | Performed by: NURSE PRACTITIONER

## 2020-07-25 PROCEDURE — 99232 SBSQ HOSP IP/OBS MODERATE 35: CPT | Performed by: NURSE PRACTITIONER

## 2020-07-25 PROCEDURE — 80178 ASSAY OF LITHIUM: CPT | Performed by: NURSE PRACTITIONER

## 2020-07-25 PROCEDURE — 93010 ELECTROCARDIOGRAM REPORT: CPT | Performed by: INTERNAL MEDICINE

## 2020-07-25 RX ORDER — FAMOTIDINE 20 MG/1
20 TABLET, FILM COATED ORAL 2 TIMES DAILY
Status: DISCONTINUED | OUTPATIENT
Start: 2020-07-25 | End: 2020-08-12

## 2020-07-25 RX ADMIN — LITHIUM CARBONATE 600 MG: 300 TABLET, EXTENDED RELEASE ORAL at 08:31

## 2020-07-25 RX ADMIN — NICOTINE POLACRILEX 4 MG: 2 GUM, CHEWING ORAL at 18:19

## 2020-07-25 RX ADMIN — METFORMIN ER 500 MG 500 MG: 500 TABLET ORAL at 17:46

## 2020-07-25 RX ADMIN — LITHIUM CARBONATE 600 MG: 300 TABLET, EXTENDED RELEASE ORAL at 20:07

## 2020-07-25 RX ADMIN — BENZTROPINE MESYLATE 1 MG: 1 TABLET ORAL at 08:32

## 2020-07-25 RX ADMIN — DIPHENHYDRAMINE HYDROCHLORIDE 50 MG: 50 CAPSULE ORAL at 18:00

## 2020-07-25 RX ADMIN — FAMOTIDINE 10 MG: 10 TABLET ORAL at 08:35

## 2020-07-25 RX ADMIN — PALIPERIDONE 3 MG: 3 TABLET, EXTENDED RELEASE ORAL at 20:07

## 2020-07-25 RX ADMIN — FAMOTIDINE 20 MG: 20 TABLET, FILM COATED ORAL at 20:07

## 2020-07-25 ASSESSMENT — ACTIVITIES OF DAILY LIVING (ADL)
ORAL_HYGIENE: INDEPENDENT
DRESS: SCRUBS (BEHAVIORAL HEALTH)
HYGIENE/GROOMING: INDEPENDENT

## 2020-07-25 NOTE — PROGRESS NOTES
[facility]  Psychiatric Progress Note       Impression:     Patient resting in bed this morning, reports chest like pain is better, ordered prilosec which seems to help.  Had EKG and more labs drawn this morning, QT//439 otherwise unremarkable.  Labs are also unremarkable except her ALT and AST are again on the rise, will order Hep C Panel and consult with medical after these results.  Overall improvement noted with patient, however we may need to change neuroleptic depending.  She has been going to more group programming and discussion are logical and linear.  She denies si/hi, reports stable mood.      Educated regarding medication indications, risks, benefits, side effects, contraindications and possible interactions. Verbally expressed understanding.        Diagnoses:      Schizoaffective Disorder, bipolar type  Substance Abuse Disorder     Attestation:  Patient has been seen and evaluated by me,  Bree CELESTIN CNP          Interim History:   The patient's care was discussed with the treatment team and chart notes were reviewed.     BEHAVIORAL TEAM DISCUSSION     Progress: Fair. Improvement in psychosis and grandiosity, attending more group prgramming    Continued Stay Criteria/Rationale: lacks insight into mental health, current plan discharge to [facility], could possibly consider IRTS if further improvement is made and participating in groups.    Medical/Physical: LFT's likely fatty liver.  Precautions:   Falls precaution?: YES       Behavioral Orders   Procedures     Code 1 - Restrict to Unit     Routine Programming       As clinically indicated     Status 15       Every 15 minutes.     Plan:   Regular diet changed to constant carb 4-7 units per meal.  Continue Lithium  mg BID  Lithium level 0.7  Continue Invega to 3 mg at bedtime  Increase Cogentin to 1 mg scheduled   Continue Propranolol 10 mg TID prn anxiety/akathisia  Continue metformin 500 mg daily with dinner for  "neuroleptic-induced weight gain  Committed and chisholm granted    Rationale for change in precautions or plan: Medication adjustment to target mood and delusions    Petition for Chisholm including Invega, Zyprexa, Abilify, Risperdal, and Geodon     Participants: Bree Salazar, Nursing, OT, SW        Current Facility-Administered Medications   Medication     acetaminophen (TYLENOL) tablet 650 mg     albuterol (ACCUNEB) nebulizer solution 1.25 mg     benztropine (COGENTIN) tablet 1 mg     diphenhydrAMINE (BENADRYL) capsule 50 mg    Or     diphenhydrAMINE (BENADRYL) injection 50 mg     famotidine (PEPCID) tablet 10 mg     haloperidol (HALDOL) tablet 5 mg    Or     haloperidol lactate (HALDOL) injection 5 mg     lithium ER (LITHOBID) CR tablet 600 mg     LORazepam (ATIVAN) tablet 1 mg    Or     LORazepam (ATIVAN) injection 1 mg     magnesium hydroxide (MILK OF MAGNESIA) suspension 30 mL     metFORMIN (GLUCOPHAGE-XR) 24 hr tablet 500 mg     nicotine (NICORETTE) gum 2-4 mg     paliperidone ER (INVEGA) 24 hr tablet 3 mg     propranolol (INDERAL) tablet 10 mg            10 point ROS negative see H&P       Allergies:   No Known Allergies          Psychiatric Examination:   BP (!) 86/53   Pulse 79   Temp 97.6  F (36.4  C) (Tympanic)   Resp 16   Ht 1.638 m (5' 4.5\")   Wt 72.8 kg (160 lb 6.4 oz)   SpO2 98%   BMI 27.11 kg/m    Weight is 160 lbs 6.4 oz  Body mass index is 27.11 kg/m .     Appearance: awake, alert, dressed in hospital scrubs  Attitude: pleasant, more spontaneous  Eye Contact: fair  Mood: \"good\"  Affect: slight improvement, continues somewhat flat  Speech: clear, coherent, more talkative today  Psychomotor Behavior: some restlessness  Thought Process: reality based and linear, no grandiosity noted  Associations: no loosening of associations noted  Thought Content: denies hallucinations, denies suicidal thoughts  Insight:  limited  Judgment: limited  Oriented to:  x3  Attention Span and Concentration: " limited  Recent and Remote Memory:  impaired  Fund of Knowledge: delayed  Muscle Strength and Tone: normal  Gait and Station: normal             Labs:     Results for orders placed or performed during the hospital encounter of 06/11/20   HCG qualitative urine     Status: None   Result Value Ref Range    HCG Qual Urine Negative NEG^Negative   UA reflex to Microscopic and Culture     Status: Abnormal    Specimen: Midstream Urine   Result Value Ref Range    Color Urine Yellow     Appearance Urine Slightly Cloudy     Glucose Urine Negative NEG^Negative mg/dL    Bilirubin Urine Negative NEG^Negative    Ketones Urine Negative NEG^Negative mg/dL    Specific Gravity Urine 1.030 1.003 - 1.035    Blood Urine Negative NEG^Negative    pH Urine 6.0 4.7 - 8.0 pH    Protein Albumin Urine 30 (A) NEG^Negative mg/dL    Urobilinogen mg/dL Normal 0.0 - 2.0 mg/dL    Nitrite Urine Negative NEG^Negative    Leukocyte Esterase Urine Small (A) NEG^Negative    Source Midstream Urine     RBC Urine 3 (H) 0 - 2 /HPF    WBC Urine 4 0 - 5 /HPF    Bacteria Urine None (A) NEG^Negative /HPF    Squamous Epithelial /HPF Urine 18 (H) 0 - 1 /HPF    Mucous Urine Present (A) NEG^Negative /LPF    Amorphous Crystals Moderate (A) NEG^Negative /HPF   Urine Drugs of Abuse Screen Panel 13     Status: Abnormal   Result Value Ref Range    Cannabinoids (24-hyq-9-carboxy-9-THC) Detected, Abnormal Result (A) NDET^Not Detected ng/mL    Phencyclidine (Phencyclidine) Not Detected NDET^Not Detected ng/mL    Cocaine (Benzoylecgonine) Not Detected NDET^Not Detected ng/mL    Methamphetamine (d-Methamphetamine) Detected, Abnormal Result (A) NDET^Not Detected ng/mL    Opiates (Morphine) Not Detected NDET^Not Detected ng/mL    Amphetamine (d-Amphetamine) Detected, Abnormal Result (A) NDET^Not Detected ng/mL    Benzodiazepines (Nordiazepam) Detected, Abnormal Result (A) NDET^Not Detected ng/mL    Tricyclic Antidepressants (Desipramine) Not Detected NDET^Not Detected ng/mL     Methadone (Methadone) Not Detected NDET^Not Detected ng/mL    Barbiturates (Butalbital) Not Detected NDET^Not Detected ng/mL    Oxycodone (Oxycodone) Not Detected NDET^Not Detected ng/mL    Propoxyphene (Norpropoxyphene) Not Detected NDET^Not Detected ng/mL    Buprenorphine (Buprenorphine) Not Detected NDET^Not Detected ng/mL   CBC with platelets differential     Status: None   Result Value Ref Range    WBC 8.1 4.0 - 11.0 10e9/L    RBC Count 4.69 3.8 - 5.2 10e12/L    Hemoglobin 14.1 11.7 - 15.7 g/dL    Hematocrit 42.1 35.0 - 47.0 %    MCV 90 78 - 100 fl    MCH 30.1 26.5 - 33.0 pg    MCHC 33.5 31.5 - 36.5 g/dL    RDW 12.3 10.0 - 15.0 %    Platelet Count 280 150 - 450 10e9/L    Diff Method Automated Method     % Neutrophils 56.5 %    % Lymphocytes 35.2 %    % Monocytes 7.0 %    % Eosinophils 0.7 %    % Basophils 0.4 %    % Immature Granulocytes 0.2 %    Nucleated RBCs 0 0 /100    Absolute Neutrophil 4.6 1.6 - 8.3 10e9/L    Absolute Lymphocytes 2.8 0.8 - 5.3 10e9/L    Absolute Monocytes 0.6 0.0 - 1.3 10e9/L    Absolute Eosinophils 0.1 0.0 - 0.7 10e9/L    Absolute Basophils 0.0 0.0 - 0.2 10e9/L    Abs Immature Granulocytes 0.0 0 - 0.4 10e9/L    Absolute Nucleated RBC 0.0    Comprehensive metabolic panel     Status: Abnormal   Result Value Ref Range    Sodium 138 133 - 144 mmol/L    Potassium 3.8 3.4 - 5.3 mmol/L    Chloride 108 94 - 109 mmol/L    Carbon Dioxide 26 20 - 32 mmol/L    Anion Gap 4 3 - 14 mmol/L    Glucose 81 70 - 99 mg/dL    Urea Nitrogen 11 7 - 30 mg/dL    Creatinine 0.63 0.52 - 1.04 mg/dL    GFR Estimate >90 >60 mL/min/[1.73_m2]    GFR Estimate If Black >90 >60 mL/min/[1.73_m2]    Calcium 8.8 8.5 - 10.1 mg/dL    Bilirubin Total 0.4 0.2 - 1.3 mg/dL    Albumin 3.9 3.4 - 5.0 g/dL    Protein Total 8.1 6.8 - 8.8 g/dL    Alkaline Phosphatase 62 40 - 150 U/L     (H) 0 - 50 U/L    AST 84 (H) 0 - 45 U/L   TSH with free T4 reflex     Status: None   Result Value Ref Range    TSH 0.85 0.40 - 4.00 mU/L    Asymptomatic COVID-19 Virus (Coronavirus) by PCR     Status: None    Specimen: Nasopharyngeal   Result Value Ref Range    COVID-19 Virus PCR to [facility] - Source Nasopharyngeal     COVID-19 Virus PCR to [facility] - Result       Test received-See reflex to [place] test SARS CoV2 (COVID-19) Virus RT-PCR   SARS-CoV-2 COVID-19 Virus (Coronavirus) RT-PCR Nasopharyngeal     Status: None    Specimen: Nasopharyngeal   Result Value Ref Range    SARS-CoV-2 Virus Specimen Source Nasopharyngeal     SARS-CoV-2 PCR Result NEGATIVE     SARS-CoV-2 PCR Comment       This automated, real-time RT-PCR assay by [employer] on the [employer] has   been given Emergency Use Authorization (EUA) for the in vitro qualitative detection of RNA   from the SARS-CoV2 virus in nasopharyngeal swabs in viral transport medium from patients   with signs and symptoms of infection who are suspected of COVID-19. The performance is   unknown in asymptomatic patients.     Hepatic panel     Status: Abnormal   Result Value Ref Range    Bilirubin Direct <0.1 0.0 - 0.2 mg/dL    Bilirubin Total 0.3 0.2 - 1.3 mg/dL    Albumin 3.5 3.4 - 5.0 g/dL    Protein Total 7.7 6.8 - 8.8 g/dL    Alkaline Phosphatase 63 40 - 150 U/L     (H) 0 - 50 U/L     (H) 0 - 45 U/L   Hepatic panel     Status: Abnormal   Result Value Ref Range    Bilirubin Direct <0.1 0.0 - 0.2 mg/dL    Bilirubin Total 0.2 0.2 - 1.3 mg/dL    Albumin 3.4 3.4 - 5.0 g/dL    Protein Total 7.6 6.8 - 8.8 g/dL    Alkaline Phosphatase 56 40 - 150 U/L     (H) 0 - 50 U/L     (H) 0 - 45 U/L   Lithium level     Status: Abnormal   Result Value Ref Range    Lithium Level 0.48 (L) 0.60 - 1.20 mmol/L   Lithium level     Status: None   Result Value Ref Range    Lithium Level 0.70 0.60 - 1.20 mmol/L   Basic metabolic panel     Status: Abnormal   Result Value Ref Range    Sodium 134 133 - 144 mmol/L    Potassium 3.7 3.4 - 5.3 mmol/L    Chloride 104 94 - 109 mmol/L    Carbon  Dioxide 28 20 - 32 mmol/L    Anion Gap 2 (L) 3 - 14 mmol/L    Glucose 85 70 - 99 mg/dL    Urea Nitrogen 13 7 - 30 mg/dL    Creatinine 0.61 0.52 - 1.04 mg/dL    GFR Estimate >90 >60 mL/min/[1.73_m2]    GFR Estimate If Black >90 >60 mL/min/[1.73_m2]    Calcium 9.2 8.5 - 10.1 mg/dL   Hepatic panel     Status: Abnormal   Result Value Ref Range    Bilirubin Direct 0.1 0.0 - 0.2 mg/dL    Bilirubin Total 0.3 0.2 - 1.3 mg/dL    Albumin 3.7 3.4 - 5.0 g/dL    Protein Total 8.1 6.8 - 8.8 g/dL    Alkaline Phosphatase 59 40 - 150 U/L     (H) 0 - 50 U/L     (H) 0 - 45 U/L   Lithium level     Status: None   Result Value Ref Range    Lithium Level 0.84 0.60 - 1.20 mmol/L   CBC with platelets differential     Status: None   Result Value Ref Range    WBC 7.2 4.0 - 11.0 10e9/L    RBC Count 4.40 3.8 - 5.2 10e12/L    Hemoglobin 13.3 11.7 - 15.7 g/dL    Hematocrit 39.8 35.0 - 47.0 %    MCV 91 78 - 100 fl    MCH 30.2 26.5 - 33.0 pg    MCHC 33.4 31.5 - 36.5 g/dL    RDW 12.9 10.0 - 15.0 %    Platelet Count 252 150 - 450 10e9/L    Diff Method Automated Method     % Neutrophils 53.1 %    % Lymphocytes 32.9 %    % Monocytes 11.5 %    % Eosinophils 1.8 %    % Basophils 0.4 %    % Immature Granulocytes 0.3 %    Nucleated RBCs 0 0 /100    Absolute Neutrophil 3.8 1.6 - 8.3 10e9/L    Absolute Lymphocytes 2.4 0.8 - 5.3 10e9/L    Absolute Monocytes 0.8 0.0 - 1.3 10e9/L    Absolute Eosinophils 0.1 0.0 - 0.7 10e9/L    Absolute Basophils 0.0 0.0 - 0.2 10e9/L    Abs Immature Granulocytes 0.0 0 - 0.4 10e9/L    Absolute Nucleated RBC 0.0    Hepatic panel     Status: Abnormal   Result Value Ref Range    Bilirubin Direct 0.2 0.0 - 0.2 mg/dL    Bilirubin Total 0.8 0.2 - 1.3 mg/dL    Albumin 3.7 3.4 - 5.0 g/dL    Protein Total 8.1 6.8 - 8.8 g/dL    Alkaline Phosphatase 57 40 - 150 U/L     (H) 0 - 50 U/L     (H) 0 - 45 U/L

## 2020-07-25 NOTE — PLAN OF CARE
"Face to face end of shift report received from Luciana GO RN. Rounding completed and patient observed in group.    18:30 Update: Patient rated her depression at 1 of 10 and anxiety at 3 of 10. She denied HI/SI and hallucinations but then asked what it would be like if she was hearing voices. This writer told her it would seem real to her but other people around her couldn't hear it. She replied \"I wouldn't want to ask people if they could hear what I was hearing.\" She did not confirm she was hearing voices but asked how you would get rid of them. She did say she feels her meds are not working and said she thought lithium is not a good med for her. Patient explained \"they cut meth with lithium so it's probably bad.\" She also said she can't stop moving and that's how she knows its not working. This writer talked about akathesia and patient agreed to take 50mg Benadryl at 18:00. Patient reported this was somewhat helpful. Patient denied pain. She was pleasant and cooperative. She took meds without complaint. She participated in groups.      23:30 Update: This writer will continue to provide nursing services to patient throughout the next shift. Rounding completed and patient observed lying in bed with eyes closed, breathing regular and unlimited.     7/26/20 06:30 Update: Patient appeared to sleep soundly throughout the night with position changes noted. Patient slept approximately 7+ hours. No complaints of pain and no requests for PRNs.     Face to face end of shift report communicated to oncoming RN.      Problem: Adult Behavioral Health Plan of Care  Goal: Patient-Specific Goal (Individualization)  Description: Patient will sleep 6-8 hours per night  Patient will eat at 75% of meals daily  Patient will attend >50% of group programming daily  Patient will be compliant with medication  Patient will have appropriate boundaries staff and peers during hospital  ELOPEMENT PRECAUTIONS; Eloped from [facility] " 6/17/2020 7/25/2020 1608 by Adina Gunter RN  Outcome: No Change     Problem: Mental State/Mood Impairment (Psychotic Signs/Symptoms)  Goal: Improved Mental State and Mood (Psychotic Signs/Symptoms)  Description: Patient will have a reality based conversation.  Patient will report a decrease in delusions/hallucinations.  Patient will use coping skills to manage labile behavior.  Outcome: No Change

## 2020-07-25 NOTE — PLAN OF CARE
"  Problem: Adult Behavioral Health Plan of Care  Goal: Patient-Specific Goal (Individualization)  Description: Patient will sleep 6-8 hours per night  Patient will eat at 75% of meals daily  Patient will attend >50% of group programming daily  Patient will be compliant with medication  Patient will have appropriate boundaries staff and peers during hospital  [facility]    Outcome: Improving  Note: Shift Summery:  Patient is alert and up on the unit for breakfast meal. Patient states she is still having some discomfort in her trachea area and she denies radiating pain in shoulder and has no nausea and vomitting. Patient cooperative with ordered EKG and lab draw. Patient given Pepcid 10 mg po this am for discomfort. Instructed patient that she may have less symptoms if she does not lay down after her meal and patient agrees. Patient denies thoughts of suicide. Patient admits to some depression and denies auditory and visual hallucinations.    Face to face end of shift report communicated to 3-11 shift RN.     Luciana Garcia RN  7/25/2020  2:51 PM        Patient's Stated Goal for Shift:  \"no stated goal\"    Goal Status:  In Process       "

## 2020-07-25 NOTE — PLAN OF CARE
Observed pt at 0015 she was lying on her right side - eyes closed - non-labored breathing noted.  It is now 0700 and pt has slept all noc  Without issue.Face to face end of shift report communicated to oncoming staff..     Tita Power RN  7/25/2020  7:09 AM

## 2020-07-26 PROCEDURE — 25000132 ZZH RX MED GY IP 250 OP 250 PS 637: Performed by: NURSE PRACTITIONER

## 2020-07-26 PROCEDURE — 12400011

## 2020-07-26 RX ORDER — ARIPIPRAZOLE 5 MG/1
5 TABLET ORAL DAILY
Status: DISCONTINUED | OUTPATIENT
Start: 2020-07-26 | End: 2020-07-28

## 2020-07-26 RX ADMIN — LITHIUM CARBONATE 600 MG: 300 TABLET, EXTENDED RELEASE ORAL at 08:05

## 2020-07-26 RX ADMIN — LITHIUM CARBONATE 600 MG: 300 TABLET, EXTENDED RELEASE ORAL at 20:43

## 2020-07-26 RX ADMIN — FAMOTIDINE 20 MG: 20 TABLET, FILM COATED ORAL at 20:43

## 2020-07-26 RX ADMIN — BENZTROPINE MESYLATE 1 MG: 1 TABLET ORAL at 08:05

## 2020-07-26 RX ADMIN — METFORMIN ER 500 MG 500 MG: 500 TABLET ORAL at 17:25

## 2020-07-26 RX ADMIN — ARIPIPRAZOLE 5 MG: 5 TABLET ORAL at 11:07

## 2020-07-26 RX ADMIN — ACETAMINOPHEN 650 MG: 325 TABLET, FILM COATED ORAL at 20:49

## 2020-07-26 RX ADMIN — LORAZEPAM 1 MG: 1 TABLET ORAL at 17:31

## 2020-07-26 RX ADMIN — FAMOTIDINE 20 MG: 20 TABLET, FILM COATED ORAL at 08:05

## 2020-07-26 ASSESSMENT — ACTIVITIES OF DAILY LIVING (ADL)
DRESS: INDEPENDENT;SCRUBS (BEHAVIORAL HEALTH)
LAUNDRY: UNABLE TO COMPLETE
HYGIENE/GROOMING: INDEPENDENT
ORAL_HYGIENE: INDEPENDENT

## 2020-07-26 ASSESSMENT — MIFFLIN-ST. JEOR: SCORE: 1627.5

## 2020-07-26 NOTE — PLAN OF CARE
Face to face report received from Adina HLODER RN. Pt. Observed.     Problem: Adult Behavioral Health Plan of Care  Goal: Patient-Specific Goal (Individualization)  Description: Patient will sleep 6-8 hours per night  Patient will eat at 75% of meals daily  Patient will attend >50% of group programming daily  Patient will be compliant with medication  Patient will have appropriate boundaries staff and peers during hospital  [facility]    Outcome: No Change    Pt. Denies HI, SI, depression, and hallucinations. Pt. Reporting mild 2/10 bilateral arm pain. Pt. Denies PRN tylenol at this time. Pt. Reports 5/10 anxiety this a.m. Pt.  In agreement to update staff to thoughts feelings of wanting to harm self or others. Pt. cooperative with medications and nursing assessment. Pt. Out to alunge Pt. Attending unit programing. Pt.  Denies issues with bowel and bladder. Pt. Eating WDL.   Pt. C/o excessive earwax. Pt. Would like provider to order something for her earwax. Provider sticky noted.      Face to face end of shift report to be communicated to oncoming RN.     Julia Doherty RN  7/26/2020  9:39 AM

## 2020-07-26 NOTE — PLAN OF CARE
Face to face end of shift report received from Julia VARELA RN. Rounding completed and patient observed in the lounge. No requests at this time.     20:00 Update: Patient endorsed some anxiety and depression but said it is much improved since admit. Reviewed med changes with her and she acknowledged an understanding. She received 1mg Ativan for this anxiety at 17:33. She is pleasant and cooperative. She denied pain. She attended groups.     Face to face end of shift report communicated to oncoming RN.        Problem: Adult Behavioral Health Plan of Care  Goal: Patient-Specific Goal (Individualization)  Description: Patient will sleep 6-8 hours per night  Patient will eat at 75% of meals daily  Patient will attend >50% of group programming daily  Patient will be compliant with medication  Patient will have appropriate boundaries staff and peers during hospital  ELOPEMENT PRECAUTIONS; Eloped from ICU 6/17/2020 7/26/2020 1712 by Adina Gunter, RN  Outcome: No Change     Problem: Mental State/Mood Impairment (Psychotic Signs/Symptoms)  Goal: Improved Mental State and Mood (Psychotic Signs/Symptoms)  Description: Patient will have a reality based conversation.  Patient will report a decrease in delusions/hallucinations.  Patient will use coping skills to manage labile behavior.  Outcome: No Change

## 2020-07-26 NOTE — PLAN OF CARE
This writer will continue to provide nursing services to patient throughout the next shift. Please see full nursing documentation in previous shift note.

## 2020-07-27 PROCEDURE — 25000132 ZZH RX MED GY IP 250 OP 250 PS 637: Performed by: NURSE PRACTITIONER

## 2020-07-27 PROCEDURE — 12400011

## 2020-07-27 RX ADMIN — LITHIUM CARBONATE 600 MG: 300 TABLET, EXTENDED RELEASE ORAL at 08:38

## 2020-07-27 RX ADMIN — METFORMIN ER 500 MG 500 MG: 500 TABLET ORAL at 17:33

## 2020-07-27 RX ADMIN — ARIPIPRAZOLE 5 MG: 5 TABLET ORAL at 08:38

## 2020-07-27 RX ADMIN — FAMOTIDINE 20 MG: 20 TABLET, FILM COATED ORAL at 20:16

## 2020-07-27 RX ADMIN — FAMOTIDINE 20 MG: 20 TABLET, FILM COATED ORAL at 08:38

## 2020-07-27 RX ADMIN — CARBAMIDE PEROXIDE 6.5% 3 DROP: 6.5 LIQUID AURICULAR (OTIC) at 13:18

## 2020-07-27 RX ADMIN — LITHIUM CARBONATE 600 MG: 300 TABLET, EXTENDED RELEASE ORAL at 20:16

## 2020-07-27 RX ADMIN — BENZTROPINE MESYLATE 1 MG: 1 TABLET ORAL at 08:38

## 2020-07-27 RX ADMIN — ACETAMINOPHEN 650 MG: 325 TABLET, FILM COATED ORAL at 21:26

## 2020-07-27 ASSESSMENT — ACTIVITIES OF DAILY LIVING (ADL)
ORAL_HYGIENE: INDEPENDENT
DRESS: INDEPENDENT
HYGIENE/GROOMING: INDEPENDENT

## 2020-07-27 NOTE — PROGRESS NOTES
Interim History:   The patient's care was discussed with the treatment team and chart notes were reviewed.     BEHAVIORAL TEAM DISCUSSION     Progress: Fair. Improvement in psychosis and grandiosity, attending more group programming, more linear/logical discussions     Continued Stay Criteria/Rationale: lacks insight into mental health, current plan discharge to [facility], could possibly consider IRTS if further improvement is made and participating in groups.     Medical/Physical: LFT's likely fatty liver.  Precautions:   Falls precaution?: YES          Behavioral Orders   Procedures     Code 1 - Restrict to Unit     Routine Programming       As clinically indicated     Status 15       Every 15 minutes.      Plan:   Regular diet changed to constant carb 4-7 units per meal.  Continue Lithium  mg BID  Lithium level 0.7  Discontinue Invega   Start Abilify  Continue Cogentin to 1 mg scheduled   Continue Propranolol 10 mg TID prn anxiety/akathisia  Continue metformin 500 mg daily with dinner for neuroleptic-induced weight gain  Committed and chisholm granted  Ordered Hep C lab - still processing     Rationale for change in precautions or plan: Medication adjustment to target mood and delusions     Petition for Chisholm including Invega, Zyprexa, Abilify, Risperdal, and Geodon     Participants: Bree Salazar, Nursing, OT, SW

## 2020-07-27 NOTE — PLAN OF CARE
Problem: Adult Behavioral Health Plan of Care  Goal: Patient-Specific Goal (Individualization)  Description: Patient will sleep 6-8 hours per night  Patient will eat at 75% of meals daily  Patient will attend >50% of group programming daily  Patient will be compliant with medication  Patient will have appropriate boundaries staff and peers during hospital  ELOPEMENT PRECAUTIONS; Eloped from ICU 6/17/2020 7/27/2020 0236 by Linda Fair, RN  Outcome: Improving  Note: Report received from Adina. Rounding complete. Pt observed sleeping in right side lying position with regular and unlabored respirations.    Pt has been in bed with eyes closed and regular respirations. 15 minute and PRN checks all night. No complaints offered. Will continue to monitor.    Pt slept all night    Face to face end of shift report communicated to oncoming RN.    July 27, 2020  2:36 AM          Problem: Mental State/Mood Impairment (Psychotic Signs/Symptoms)  Goal: Improved Mental State and Mood (Psychotic Signs/Symptoms)  Description: Patient will have a reality based conversation.  Patient will report a decrease in delusions/hallucinations.  Patient will use coping skills to manage labile behavior.  7/27/2020 0236 by Linda Fair, RN  Outcome: Improving  Note: Unable to assess due to pt sleeping. No issues or concerns noted at this time.

## 2020-07-27 NOTE — PLAN OF CARE
Problem: Adult Behavioral Health Plan of Care  Goal: Patient-Specific Goal (Individualization)  Description: Patient will sleep 6-8 hours per night  Patient will eat at 75% of meals daily  Patient will attend >50% of group programming daily  Patient will be compliant with medication  Patient will have appropriate boundaries staff and peers during hospital  [facility]CAUTIONS[facility] 7/27/2020 0921 by Linda Concepcion RN  Outcome: No Change  Note:        Problem: Mental State/Mood Impairment (Psychotic Signs/Symptoms)  Goal: Improved Mental State and Mood (Psychotic Signs/Symptoms)  Description: Patient will have a reality based conversation.  Patient will report a decrease in delusions/hallucinations.  Patient will use coping skills to manage labile behavior.  7/27/2020 0921 by Linda Concepcion RN  Outcome: No Change   Pt. Has been up and about on unit, slept 7.5 hours last night, eating at least 75% of meals, compliant with taking prescribed medications, cooperative with treatment team recommendations, has shown appropriate boundaries so far this shift, denies delusional thoughts and hallucinations, has some reality based conversation, has not attempted to elope, cooperative with cares.  1318-  Pt. Medicated with debrox ear drops, 3 drops to each ear for earwax buildup.    Face to face end of shift report will be communicated to oncoming afternoon shift RNGeraldo Concepcion RN  7/27/2020  11:03 AM

## 2020-07-28 LAB — HCV AB SERPL QL IA: REACTIVE

## 2020-07-28 PROCEDURE — 25000132 ZZH RX MED GY IP 250 OP 250 PS 637: Performed by: NURSE PRACTITIONER

## 2020-07-28 PROCEDURE — 12400011

## 2020-07-28 RX ORDER — ARIPIPRAZOLE 10 MG/1
10 TABLET ORAL DAILY
Status: DISCONTINUED | OUTPATIENT
Start: 2020-07-29 | End: 2020-09-02 | Stop reason: HOSPADM

## 2020-07-28 RX ADMIN — FAMOTIDINE 20 MG: 20 TABLET, FILM COATED ORAL at 20:14

## 2020-07-28 RX ADMIN — METFORMIN ER 500 MG 500 MG: 500 TABLET ORAL at 16:59

## 2020-07-28 RX ADMIN — ARIPIPRAZOLE 5 MG: 5 TABLET ORAL at 08:14

## 2020-07-28 RX ADMIN — LITHIUM CARBONATE 600 MG: 300 TABLET, EXTENDED RELEASE ORAL at 08:14

## 2020-07-28 RX ADMIN — HALOPERIDOL 5 MG: 5 TABLET ORAL at 15:12

## 2020-07-28 RX ADMIN — FAMOTIDINE 20 MG: 20 TABLET, FILM COATED ORAL at 08:14

## 2020-07-28 RX ADMIN — LITHIUM CARBONATE 600 MG: 300 TABLET, EXTENDED RELEASE ORAL at 20:14

## 2020-07-28 RX ADMIN — BENZTROPINE MESYLATE 1 MG: 1 TABLET ORAL at 08:14

## 2020-07-28 ASSESSMENT — ACTIVITIES OF DAILY LIVING (ADL)
LAUNDRY: UNABLE TO COMPLETE
ORAL_HYGIENE: INDEPENDENT
DRESS: SCRUBS (BEHAVIORAL HEALTH);INDEPENDENT
HYGIENE/GROOMING: INDEPENDENT
HYGIENE/GROOMING: INDEPENDENT
DRESS: INDEPENDENT
ORAL_HYGIENE: INDEPENDENT

## 2020-07-28 NOTE — PROGRESS NOTES
Rule 25 Assessment  Background Information   1. Date of Assessment Request  2. Date of Assessment  7/28/2020 3. Date Service Authorized  7/28/2020   4.   ELLEN Lehman   5.  Phone Number   [phone] 6. Referent    [facility] 7. Assessment Site  [facility]     8. Client Name   Jonelle Adams 9. Date of Birth  2000 Age  19 year old 10. Gender  female  11. PMI/ Insurance No.  [id number]   12. Client's Primary Language:  English 13. Do you require special accommodations, such as an  or assistance with written material? No   14. Current Address: [address] [place] [address]   15. Client Phone Numbers: [phone] (home)      16. Tell me what has happened to bring you here today.    Pt states that she went on to meet with a friend and the owner, Called the police.     17. Have you had other rule 25 assessments?     No    DIMENSION I - Acute Intoxication /Withdrawal Potential   1. Chemical use most recent 12 months outside a facility and other significant use history (client self-report)              X = Primary Drug Used   Age of First Use Most Recent Pattern of Use and Duration   Need enough information to show pattern (both frequency and amounts) and to show tolerance for each chemical that has a diagnosis   Date of last use and time, if needed   Withdrawal Potential? Requiring special care Method of use  (oral, smoked, snort, IV, etc)      Alcohol     No use            Marijuana/  Hashish   No use Pt states that she does not use Marijuana. (Pt Drug test came back positive for cannabinoids). Pt states that she might have smoked some marijuana with someone who had some marijuana.   unknown   N/A   Smoke      Cocaine/Crack     No use          Meth/  Amphetamines   17 Pt states that she used between a quarter gram and 1/10 of a gram per day. 6/9/2020 N/A Smoke, IV       Heroin     No use          Other Opiates/  Synthetics   No use           Inhalants     No use          Benzodiazepines       ? Pt states that she does not use Benzodiazepines. (Pt Drug screen came back positive for benzodiazepines)   unknown   N/A   unknown      Hallucinogens     No use          Barbiturates/  Sedatives/  Hypnotics No use          Over-the-Counter Drugs   No use          Other     No use          Nicotine     16 Pt states that she smokes between 1 pack and 1/2 pack per day.    2020   smoke     2. Do you use greater amounts of alcohol/other drugs to feel intoxicated or achieve the desired effect?  Yes.  Or use the same amount and get less of an effect?  Yes.  Example: The Pt stated that she has a tolerance to methamphetamines and uses more of the substance to get the same effect.    3A. Have you ever been to detox?     Yes    3B. When was the first time?         3C. How many times since then?         3D. Date of most recent detox:         4.  Withdrawal symptoms: Have you had any of the following withdrawal symptoms?  Past 12 months Recent (past 30 days)   Sweating (Rapid Pulse)  Shaky / Jittery / Tremors  Unable to Sleep  Agitation  Headache  Muscle Aches  Irritability  Sensitivity to Noise  Diminished Appetite  Fever  Psychosis  Anxiety / Worried   Pt has been in locked  Hospital facility 46 days at time of this assessment.     's Visual Observations and Symptoms: No visible withdrawal symptoms at this time    Based on the above information, is withdrawal likely to require attention as part of treatment participation?  No    Dimension I Ratings   Acute intoxication/Withdrawal potential - The placing authority must use the criteria in Dimension I to determine a client s acute intoxication and withdrawal potential.    RISK DESCRIPTIONS - Severity ratin Client displays full functioning with good ability to tolerate and cope with withdrawal discomfort. No signs or symptoms of intoxication or withdrawal or resolving signs or symptoms.    REASONS  SEVERITY WAS ASSIGNED (What about the amount of the person s use and date of most recent use and history of withdrawal problems suggests the potential of withdrawal symptoms requiring professional assistance? )     Pt states that she does not use Marijuana. (Pt Drug test came back positive for cannabinoids). Pt states that she might have smoked some marijuana with someone who had some marijuana. Pt states that she first tried Methamphetamine at  18 y/o. Pt states that she used between a quarter gram and 1/10 of a gram of methamphetamines per day. Pt states that she does not use Benzodiazepines. (Pt Drug screen came back positive for benzodiazepines). Pt states that she smokes Pt states that she smokes between 1 pack and 1/2 pack per day. Pt is poor historian. The Pt stated that she has a tolerance to methamphetamines and uses more of the substance to get the same effect.Pt states that she has felt these withdrawal symptoms within the past year, Sweating (Rapid Pulse), Shaky / Jittery / Tremors, Unable to Sleep, Agitation,  Headache, Muscle Aches, Irritability, Sensitivity to Noise, Diminished Appetite, Fever,  Psychosis, Anxiety / Worried.  There are no visible withdrawal symptoms at this time.           DIMENSION II - Biomedical Complications and Conditions   1a. Do you have any current health/medical conditions?(Include any infectious diseases, allergies, or chronic or acute pain, history of chronic conditions)       No    1b. On a scale of mild, moderate to severe please specify the severity of the patient's diabetes and/or neuropathy.    The patient denied having a history of being diagnosed with diabetes or neuropathy.    2. Do you have a health care provider? When was your most recent appointment? What concerns were identified?     The patient's PCP is Davi Chisholm.    3. If indicated by answers to items 1 or 2: How do you deal with these concerns? Is that working for you? If you are not receiving care for  this problem, why not?      The patient denied having any current clinical health issues.    4A. List current medication(s) including over-the-counter or herbal supplements--including pain management:       Current Facility-Administered Medications:      acetaminophen (TYLENOL) tablet 650 mg, 650 mg, Oral, Q4H PRN, WoAnastacio negretea Brooke, APRN CNP, 650 mg at 07/27/20 2126     albuterol (ACCUNEB) nebulizer solution 1.25 mg, 1.25 mg, Nebulization, Q6H PRN, Woelperhayder Bree Brooke, APRN CNP     ARIPiprazole (ABILIFY) tablet 5 mg, 5 mg, Oral, Daily, Woelperhayder, Bree Brooke, APRN CNP, 5 mg at 07/28/20 0814     benztropine (COGENTIN) tablet 1 mg, 1 mg, Oral, Daily, Woarelyperhayder, Bree Brooke, APRN CNP, 1 mg at 07/28/20 0814     carbamide peroxide (DEBROX) 6.5 % otic solution 3 drop, 3 drop, Both Ears, BID, Woelpern, Bree Brooke, APRN CNP, 3 drop at 07/28/20 1030     diphenhydrAMINE (BENADRYL) capsule 50 mg, 50 mg, Oral, Q6H PRN, 50 mg at 07/25/20 1800 **OR** diphenhydrAMINE (BENADRYL) injection 50 mg, 50 mg, Intramuscular, Q6H PRN, Woelpern, Bree Brooke, APRN CNP, 50 mg at 06/17/20 1410     famotidine (PEPCID) tablet 20 mg, 20 mg, Oral, BID, Woelpern, Bree Brooke, APRN CNP, 20 mg at 07/28/20 0814     haloperidol (HALDOL) tablet 5 mg, 5 mg, Oral, Q6H PRN, 5 mg at 07/04/20 1911 **OR** haloperidol lactate (HALDOL) injection 5 mg, 5 mg, Intramuscular, Q6H PRN, Woelpern, Bree Brooke, APRN CNP, 5 mg at 06/17/20 1410     lithium ER (LITHOBID) CR tablet 600 mg, 600 mg, Oral, Q12H Hayley FONTANA April Annette, NP, 600 mg at 07/28/20 0814     LORazepam (ATIVAN) tablet 1 mg, 1 mg, Oral, Q6H PRN, 1 mg at 07/26/20 1731 **OR** LORazepam (ATIVAN) injection 1 mg, 1 mg, Intramuscular, Q6H PRN, Mai Johnson, NP     magnesium hydroxide (MILK OF MAGNESIA) suspension 30 mL, 30 mL, Oral, At Bedtime PRN, Bree Leary, APRN CNP     metFORMIN (GLUCOPHAGE-XR) 24 hr tablet 500 mg, 500 mg, Oral, Daily with Hayley webb April Annette, NP,  500 mg at 20 1733     nicotine (NICORETTE) gum 2-4 mg, 2-4 mg, Buccal, Q1H PRN, Bree Leary APRN CNP, 4 mg at 20 1819     propranolol (INDERAL) tablet 10 mg, 10 mg, Oral, TID PRN, Mai Johnson, NP, 10 mg at 20 2237    4B. Do you follow current medical recommendations/take medications as prescribed?     Yes    4C. When did you last take your medication?     Today    4D. Do you need a referral to have a follow up with a primary care physician?    No.    5. Has a health care provider/healer ever recommended that you reduce or quit alcohol/drug use?     No    6. Are you pregnant?     No    7. Have you had any injuries, assaults/violence towards you, accidents, health related issues, overdose(s) or hospitalizations related to your use of alcohol or other drugs:     No    8. Do you have any specific physical needs/accommodations? No    Dimension II Ratings   Biomedical Conditions and Complications - The placing authority must use the criteria in Dimension II to determine a client s biomedical conditions and complications.   RISK DESCRIPTIONS - Severity ratin Client displays full functioning with good ability to cope with physical discomfort.    REASONS SEVERITY WAS ASSIGNED (What physical/medical problems does this person have that would inhibit his or her ability to participate in treatment? What issues does he or she have that require assistance to address?)    The patient denied having any current clinical health issues. The patient denied having a history of being diagnosed with diabetes or neuropathy. The patient's PCP is Davi hCisholm.Pt is currently prescribed:   Current Facility-Administered Medications:      acetaminophen (TYLENOL) tablet 650 mg, 650 mg, Oral, Q4H PRN, Bree eLary APRN CNP, 650 mg at 20 1317     albuterol (ACCUNEB) nebulizer solution 1.25 mg, 1.25 mg, Nebulization, Q6H PRN, Bree Leary APRN CNP     ARIPiprazole (ABILIFY) tablet 10  mg, 10 mg, Oral, Daily, Bree Leary APRN CNP, 10 mg at 07/29/20 0837     benztropine (COGENTIN) tablet 1 mg, 1 mg, Oral, Daily, Bree Leary APRN CNP, 1 mg at 07/29/20 0837     carbamide peroxide (DEBROX) 6.5 % otic solution 3 drop, 3 drop, Both Ears, BID, Bree Leary APRN CNP, 3 drop at 07/29/20 1008     diphenhydrAMINE (BENADRYL) capsule 50 mg, 50 mg, Oral, Q6H PRN, 50 mg at 07/25/20 1800 **OR** diphenhydrAMINE (BENADRYL) injection 50 mg, 50 mg, Intramuscular, Q6H PRN, Bree Leary APRN CNP, 50 mg at 06/17/20 1410     famotidine (PEPCID) tablet 20 mg, 20 mg, Oral, BID, Bree Leary APRMAIK CNP, 20 mg at 07/29/20 0837     haloperidol (HALDOL) tablet 5 mg, 5 mg, Oral, Q6H PRN, 5 mg at 07/28/20 1512 **OR** haloperidol lactate (HALDOL) injection 5 mg, 5 mg, Intramuscular, Q6H PRN, Bree Leary APRMAIK CNP, 5 mg at 06/17/20 1410     lithium ER (LITHOBID) CR tablet 600 mg, 600 mg, Oral, Q12H Hayley FONTANA April Annette, NP, 600 mg at 07/29/20 0837     LORazepam (ATIVAN) tablet 1 mg, 1 mg, Oral, Q6H PRN, 1 mg at 07/26/20 1731 **OR** LORazepam (ATIVAN) injection 1 mg, 1 mg, Intramuscular, Q6H PRN, Mai Johnson NP     magnesium hydroxide (MILK OF MAGNESIA) suspension 30 mL, 30 mL, Oral, At Bedtime PRN, Anastacio Learya Brooke APRN CNP     metFORMIN (GLUCOPHAGE-XR) 24 hr tablet 500 mg, 500 mg, Oral, Daily with Hayley webb April Annette, NP, 500 mg at 07/28/20 1659     nicotine (NICORETTE) gum 2-4 mg, 2-4 mg, Buccal, Q1H PRN, Bree Leary APRN CNP, 4 mg at 07/25/20 1819     propranolol (INDERAL) tablet 10 mg, 10 mg, Oral, TID PRN, Mai Johnson NP, 10 mg at 07/23/20 2237  Pt states that she has been taking prescribed medications as directed while at this facility. Pt states that she has last taken her mediations today. Pt states that        DIMENSION III - Emotional, Behavioral, Cognitive Conditions and Complications   1. (Optional) Tell me  what it was like growing up in your family. (substance use, mental health, discipline, abuse, support)     Chaotic.    2. When was the last time that you had significant problems...  A. with feeling very trapped, lonely, sad, blue, depressed or hopeless  about the future? Past Month    B. with sleep trouble, such as bad dreams, sleeping restlessly, or falling  asleep during the day? Past Month    C. with feeling very anxious, nervous, tense, scared, panicked, or like  something bad was going to happen? Past Month    D. with becoming very distressed and upset when something reminded  you of the past? Never    E. with thinking about ending your life or committing suicide? Never    3. When was the last time that you did the following things two or more times?  A. Lied or conned to get things you wanted or to avoid having to do  something? 1+ years ago    B. Had a hard time paying attention at school, work, or home? 1+ years ago    C. Had a hard time listening to instructions at school, work, or home? Past Month    D. Were a bully or threatened other people? Never    E. Started physical fights with other people? Never      2A.) Pt states that she has felt trapped, lonely,depressed and hopeless within the past 30 days. Pt states this is due to her being in this facility.   2B.) Pt states that she has had sleep trouble within the past 30 days. Pt states that's this is due to her being in this facility.  2C.) Pt states that very anxious, nervous, tense and panicked within the past month. Pt states that this is due to being in this facility.  3C.) Pt states that she has had a hard time listening within the past 30 days. Pt states that she has always had a hard time listening.  Pt states that this has had a negative impact on her life.    4A. If the person has answered item 2E with  in the past year  or  the past month , ask about frequency and history of suicide in the family or someone close and whether they were under the  influence.     The patient denied any family member or someone close to the patient had ever completed suicide.    Any history of suicide in your family? Or someone close to you?     The patient denied any family member or someone close to the patient had ever completed suicide.    4B. If the person answered item 2E  in the past month  ask about  intent, plan, means and access and any other follow-up information  to determine imminent risk. Document any actions taken to intervene  on any identified imminent risk.      The patient denied having any suicide ideation within the past month.    5A. Have you ever been diagnosed with a mental health problem?     Yes, explain: Schizophrenia- Pt does not believe this diagnosis to be correct.     5B. Are you receiving care for any mental health issues? If yes, what is the focus of that care or treatment?  Are you satisfied with the service? Most recent appointment?  How has it been helpful?     The patient denied having any current or past mental health issues that had required treatment.    6. Have you been prescribed medications for emotional/psychological problems?     Yes,   Current Facility-Administered Medications:      acetaminophen (TYLENOL) tablet 650 mg, 650 mg, Oral, Q4H PRN, Bree Leary APRN CNP, 650 mg at 07/27/20 2126     albuterol (ACCUNEB) nebulizer solution 1.25 mg, 1.25 mg, Nebulization, Q6H PRN, Bree Leary APRN CNP     ARIPiprazole (ABILIFY) tablet 5 mg, 5 mg, Oral, Daily, Bree Leary APRN CNP, 5 mg at 07/28/20 0814     benztropine (COGENTIN) tablet 1 mg, 1 mg, Oral, Daily, Bree Leary APRN CNP, 1 mg at 07/28/20 0814     carbamide peroxide (DEBROX) 6.5 % otic solution 3 drop, 3 drop, Both Ears, BID, Bree Leary APRN CNP, 3 drop at 07/28/20 1030     diphenhydrAMINE (BENADRYL) capsule 50 mg, 50 mg, Oral, Q6H PRN, 50 mg at 07/25/20 1800 **OR** diphenhydrAMINE (BENADRYL) injection 50 mg, 50 mg,  Intramuscular, Q6H PRN, Bree Leary APRN CNP, 50 mg at 06/17/20 1410     famotidine (PEPCID) tablet 20 mg, 20 mg, Oral, BID, Bree Leary APRN CNP, 20 mg at 07/28/20 0814     haloperidol (HALDOL) tablet 5 mg, 5 mg, Oral, Q6H PRN, 5 mg at 07/04/20 1911 **OR** haloperidol lactate (HALDOL) injection 5 mg, 5 mg, Intramuscular, Q6H PRN, Bree Leary APRN CNP, 5 mg at 06/17/20 1410     lithium ER (LITHOBID) CR tablet 600 mg, 600 mg, Oral, Q12H MARIZOL, Roxy Hardy NP, 600 mg at 07/28/20 0814     LORazepam (ATIVAN) tablet 1 mg, 1 mg, Oral, Q6H PRN, 1 mg at 07/26/20 1731 **OR** LORazepam (ATIVAN) injection 1 mg, 1 mg, Intramuscular, Q6H PRN, Mai Johnson NP     magnesium hydroxide (MILK OF MAGNESIA) suspension 30 mL, 30 mL, Oral, At Bedtime PRN, Bree Leary APRN CNP     metFORMIN (GLUCOPHAGE-XR) 24 hr tablet 500 mg, 500 mg, Oral, Daily with supper, Roxy Hardy NP, 500 mg at 07/27/20 1733     nicotine (NICORETTE) gum 2-4 mg, 2-4 mg, Buccal, Q1H PRN, Bree Leary APRN CNP, 4 mg at 07/25/20 1819     propranolol (INDERAL) tablet 10 mg, 10 mg, Oral, TID PRN, Mai Johnson NP, 10 mg at 07/23/20 2237    7. Does your MH provider know about your use?     No    8A. Have you ever been verbally, emotionally, physically or sexually abused?      No     Follow up questions to learn current risk, continuing emotional impact.      The patient denied having any history of being verbally, emotionally, physically or sexually abused.    8B. Have you received counseling for abuse?      Yes    9. Have you ever experienced or been part of a group that experienced community violence, historical trauma, rape or assault?     No    10A. [place]:    No    11. Do you have problems with any of the following things in your daily life?    Headaches, Problem Solving, Concentration and Remembering      Note: If the person has any of the above problems, follow up with items  12, 13, and 14. If none of the issues in item 11 are a problem for the person, skip to item 15.    The patient would benefit from developing sober coping skills.    12. Have you been diagnosed with traumatic brain injury or Alzheimer s?  No    13. If the answer to #12 is no, ask the following questions:    Have you ever hit your head or been hit on the head? No    Were you ever seen in the Emergency Room, hospital or by a doctor because of an injury to your head? No    Have you had any significant illness that affected your brain (brain tumor, meningitis, West Nile Virus, stroke or seizure, heart attack, near drowning or near suffocation)? No    14. If the answer to #12 is yes, ask if any of the problems identified in #11 occurred since the head injury or loss of oxygen. No    15A. Highest grade of school completed:     High school graduate/GED    15B. Do you have a learning disability? No    15C. Did you ever have tutoring in Math or English? No    15D. Have you ever been diagnosed with Fetal Alcohol Effects or Fetal Alcohol Syndrome? No    16. If yes to item 15 B, C, or D: How has this affected your use or been affected by your use?     No    Dimension III Ratings   Emotional/Behavioral/Cognitive - The placing authority must use the criteria in Dimension III to determine a client s emotional, behavioral, and cognitive conditions and complications.   RISK DESCRIPTIONS - Severity rating: 3 Client has a severe lack of impulse control and coping skills. Client has frequent thoughts of suicide or harm to others including a plan and the means to carry out the plan. In addition, the client is severely impaired in significant life areas and has severe symptoms of emotional, behavioral, or cognitive problems that interfere with the client ability to participate in treatment activities.    REASONS SEVERITY WAS ASSIGNED - What current issues might with thinking, feelings or behavior pose barriers to participation in a  treatment program? What coping skills or other assets does the person have to offset those issues? Are these problems that can be initially accommodated by a treatment provider? If not, what specialized skills or attributes must a provider have?    Pt states that growing up in her family was chaotic.  Pt states that she has felt trapped, lonely,depressed and hopeless within the past 30 days. Pt states this is due to her being in this facility. Pt states that she has had sleep trouble within the past 30 days. Pt states that's this is due to her being in this facility.Pt states that very anxious, nervous, tense and panicked within the past month. Pt states that this is due to being in this facility. Pt states that she has had a hard time listening within the past 30 days. Pt states that she has always had a hard time listening.  Pt states that this has had a negative impact on her life. The patient denied any family member or someone close to the patient had ever completed suicide. The patient denied having any current or past mental health issues that had required treatment.   Current Facility-Administered Medications:      acetaminophen (TYLENOL) tablet 650 mg, 650 mg, Oral, Q4H PRN, Bree Leary APRN CNP, 650 mg at 07/29/20 1317     albuterol (ACCUNEB) nebulizer solution 1.25 mg, 1.25 mg, Nebulization, Q6H PRN, Bree Leary APRN CNP     ARIPiprazole (ABILIFY) tablet 10 mg, 10 mg, Oral, Daily, Bree Leary APRN CNP, 10 mg at 07/29/20 0837     benztropine (COGENTIN) tablet 1 mg, 1 mg, Oral, Daily, Bree Leary APRN CNP, 1 mg at 07/29/20 0837     carbamide peroxide (DEBROX) 6.5 % otic solution 3 drop, 3 drop, Both Ears, BID, Bree Leary APRN CNP, 3 drop at 07/29/20 1008     diphenhydrAMINE (BENADRYL) capsule 50 mg, 50 mg, Oral, Q6H PRN, 50 mg at 07/25/20 1800 **OR** diphenhydrAMINE (BENADRYL) injection 50 mg, 50 mg, Intramuscular, Q6H PRN, Bree Leary, APRN  CNP, 50 mg at 06/17/20 1410     famotidine (PEPCID) tablet 20 mg, 20 mg, Oral, BID, Bree Leary APRN CNP, 20 mg at 07/29/20 0837     haloperidol (HALDOL) tablet 5 mg, 5 mg, Oral, Q6H PRN, 5 mg at 07/28/20 1512 **OR** haloperidol lactate (HALDOL) injection 5 mg, 5 mg, Intramuscular, Q6H PRN, Bree Leary APRN CNP, 5 mg at 06/17/20 1410     lithium ER (LITHOBID) CR tablet 600 mg, 600 mg, Oral, Q12H MARIZOL, Roxy Hardy NP, 600 mg at 07/29/20 0837     LORazepam (ATIVAN) tablet 1 mg, 1 mg, Oral, Q6H PRN, 1 mg at 07/26/20 1731 **OR** LORazepam (ATIVAN) injection 1 mg, 1 mg, Intramuscular, Q6H PRN, Mai Johnson NP     magnesium hydroxide (MILK OF MAGNESIA) suspension 30 mL, 30 mL, Oral, At Bedtime PRN, Bree Leary APRN CNP     metFORMIN (GLUCOPHAGE-XR) 24 hr tablet 500 mg, 500 mg, Oral, Daily with Hayley webb April Annette, NP, 500 mg at 07/28/20 1659     nicotine (NICORETTE) gum 2-4 mg, 2-4 mg, Buccal, Q1H PRN, Bree Leary APRN CNP, 4 mg at 07/25/20 1819     propranolol (INDERAL) tablet 10 mg, 10 mg, Oral, TID PRN, Mai Johnson NP, 10 mg at 07/23/20 2237  The patient denied having any history of being verbally, emotionally, physically or sexually abused. Pt states that she has daily problems with Headaches, Problem Solving, Concentration and Remembering.  The patient would benefit from developing sober coping skills. Pt states that her highest level of education is High school graduate/GED. Pt states that she has no learning disabilities.                           DIMENSION IV - Readiness for Change   1. You ve told me what brought you here today. (first section) What do you think the problem really is?     Pt states that self medicating is the reason for her use as well as boredom is the reason for her use and being here.    2. Tell me how things are going. Ask enough questions to determine whether the person has use related problems or assets that  "can be built upon in the following areas: Family/friends/relationships; Legal; Financial; Emotional; Educational; Recreational/ leisure; Vocational/employment; Living arrangements (DSM)      Pt states that she works at [employer] but has not been there in weeks. Pt reports that she is doing fine with friends and relationships. Pt states that she is doing good emotionally and states that \"I am not sic anymore\". Pt reports that she would like to go back to school for  Something but she is not sure what.Pt states that she is living with her grandmother but has only been there two times in the past year, [place] and [facility].    3. What activities have you engaged in when using alcohol/other drugs that could be hazardous to you or others (i.e. driving a car/motorcycle/boat, operating machinery, unsafe sex, sharing needles for drugs or tattoos, etc     The patient reported having a history of using IV drugs.    4. How much time do you spend getting, using or getting over using alcohol or drugs? (DSM)     \"All the time\"out of boredom.    5. Reasons for drinking/drug use (Use the space below to record answers. It may not be necessary to ask each item.)  Like the feeling Yes   Trying to forget problems No   To cope with stress No   To relieve physical pain No   To cope with anxiety No   To cope with depression Yes   To relax or unwind Yes   Makes it easier to talk with people No   Partner encourages use No   Most friends drink or use Yes   To cope with family problems No   Afraid of withdrawal symptoms/to feel better No   Other (specify)  No     A. What concerns other people about your alcohol or drug use/Has anyone told you that you use too much? What did they say? (DSM)     N/a    B. What did you think about that/ do you think you have a problem with alcohol or drug use?     N/A    6. What changes are you willing to make? What substance are you willing to stop using? How are you going to do that? Have you tried that " "before? What interfered with your success with that goal?      Willing to  Stop all use.    7. What would be helpful to you in making this change?     Pt states that freedom will be best for her.    Dimension IV Ratings   Readiness for Change - The placing authority must use the criteria in Dimension IV to determine a client s readiness for change.   RISK DESCRIPTIONS - Severity ratin The client is: (A) non-compliant with treatment and has no awareness of addiction or mental disorder and does not want or is unwilling to explore change or is in total denial of the illness and its implications, or (B) dangerously oppositional to the extent that the client is a threat of imminent harm to self and others.    REASONS SEVERITY WAS ASSIGNED - (What information did the person provide that supports your assessment of his or her readiness to change? How aware is the person of problems caused by continued use? How willing is she or he to make changes? What does the person feel would be helpful? What has the person been able to do without help?)      Pt states that self medicating is the reason for her use as well as boredom is the reason for her use and being here. Pt states that she works at [employer] but has not been there in weeks. Pt reports that she is doing fine with friends and relationships. Pt states that she is doing good emotionally and states that \"I am not sic anymore\". Pt reports that she would like to go back to school for  Something but she is not sure what.Pt states that she is living with her grandmother but has only been there two times in the past year, [place] and [facility]. The patient reported having a history of using IV drugs. Pt states that she spends  \"All the time\" getting, using, and getting over using, \"out of boredom\". Pt states that some of the reasons for her use include: liking the feeling, cope with depression, to relax or unwind, and most friends drink or use. Pt has no insight as well " "as no coping skills.           DIMENSION V - Relapse, Continued Use, and Continued Problem Potential   1A. In what ways have you tried to control, cut-down or quit your use? If you have had periods of sobriety, how did you accomplish that? What was helpful? What happened to prevent you from continuing your sobriety? (DSM)     Pt states that she has stated that she did do other drugs and quit them. Unknown if pt understands this process.     1B. What were the circumstances of your most recent relapse with mood altering chemicals?    N/A    2. Have you experienced cravings? If yes, ask follow up questions to determine if the person recognizes triggers and if the person has had any success in dealing with them.     The patient reported having cravings to use mood altering chemicals on an almost daily basis.Heroin.    3. Have you been treated for alcohol/other drug abuse/dependence? No    4. Support group participation: Have you/do you attend support group meetings to reduce/stop your alcohol/drug use? How recently? What was your experience? Are you willing to restart? If the person has not participated, is he or she willing?     N/A    5. What would assist you in staying sober/straight?     \"Being free\"    Dimension V Ratings   Relapse/Continued Use/Continued problem potential - The placing authority must use the criteria in Dimension V to determine a client s relapse, continued use, and continued problem potential.   RISK DESCRIPTIONS - Severity ratin No awareness of the negative impact of mental health problems or substance abuse. No coping skills to arrest mental health or addiction illnesses, or prevent relapse.    REASONS SEVERITY WAS ASSIGNED - (What information did the person provide that indicates his or her understanding of relapse issues? What about the person s experience indicates how prone he or she is to relapse? What coping skills does the person have that decrease relapse potential?)      Pt states " "that she has stated that she did do other drugs and quit them. Unknown if pt understands this process. The patient reported having cravings to use mood altering chemicals on an almost daily basis (Heroin). Pt states that she has never been treated for drugs or alcohol. Pt states that \"Being Free\" would be the best for her. Pt has no coping skills.           DIMENSION VI - Recovery Environment   1. Are you employed/attending school? Tell me about that.     Not at this time..DQ maybe.    2A. Describe a typical day; evening for you. Work, school, social, leisure, volunteer, spiritual practices. Include time spent obtaining, using, recovering from drugs or alcohol. (DSM)     Using out of borbom.    Please describe what leisure activities have been associated with your substance abuse:     all    2B. How often do you spend more time than you planned using or use more than you planned? (DSM)     Pt states that she would use out of bordom when others had drugs.    3. How important is using to your social connections? Do many of your family or friends use?     N/A    4A. Are you currently in a significant relationship?     No    4C. Sexual Orientation:     Heterosexual    5A. Who do you live with?      Pt states that she lives with her grandma at this time.    5B. Tell me about their alcohol/drug use and mental health issues.     N/A    5C. Are you concerned for your safety there? No    5D. Are you concerned about the safety of anyone else who lives with you? No    6A. Do you have children who live with you?     No    6B. Do you have children who do not live with you?     No    7A. Who supports you in making changes in your alcohol or drug use? What are they willing to do to support you? Who is upset or angry about you making changes in your alcohol or drug use? How big a problem is this for you?      Grandma, Siblings    7B. This table is provided to record information about the person s relationships and available support " It is not necessary to ask each item; only to get a comprehensive picture of their support system.  How often can you count on the following people when you need someone?   Partner / Spouse Never supportive   Parent(s)/Aunt(s)/Uncle(s)/Grandparents Always supportive   Sibling(s)/Cousin(s) Always supportive   Child(shyam) The patient doesn't have any children.   Other relative(s) The patient doesn't have any other relatives.   Friend(s)/neighbor(s) The patient doesn't have any current contact with friends.   Child(shyam) s father(s)/mother(s) The patient doesn't have any children.   Support group member(s) The patient denied having any current involvement with 12-step or other support group meetings.   Community of shantanu members The patient denied having any current involvement with community shantanu members.   /counselor/therapist/healer The patient denied having any current involvement with a , counselor, therapist or healer.   Other (specify) No     8A. What is your current living situation?     Pt states that she is living with her grandmother.    8B. What is your long term plan for where you will be living?     Pt states that she would like to live somewhere between [facility] and [place].    8C. Tell me about your living environment/neighborhood? Ask enough follow up questions to determine safety, criminal activity, availability of alcohol and drugs, supportive or antagonistic to the person making changes.      N/A    9. Criminal justice history: Gather current/recent history and any significant history related to substance use--Arrests? Convictions? Circumstances? Alcohol or drug involvement? Sentences? Still on probation or parole? Expectations of the court? Current court order? Any sex offenses - lifetime? What level? (DSM)    No recent. No Shaniqua sex charges in lietime.    10. What obstacles exist to participating in treatment? (Time off work, childcare, funding, transportation, pending FDC  "time, living situation)     The patient denied having any obstacles for participating in substance abuse treatment.    Dimension VI Ratings   Recovery environment - The placing authority must use the criteria in Dimension VI to determine a client s recovery environment.   RISK DESCRIPTIONS - Severity ratin Client has (A) Chronically antagonistic significant other, living environment, family, peer group or long-term criminal justice involvement that is harmful to recovery or treatment progress, or (B) Client has an actively antagonistic significant other, family, work, or living environment with immediate threat to the client's safety and well-being.    REASONS SEVERITY WAS ASSIGNED - (What support does the person have for making changes? What structure/stability does the person have in his or her daily life that will increase the likelihood that changes can be sustained? What problems exist in the person s environment that will jeopardize getting/staying clean and sober?)     Pt states that she may still work at [employer] (this is unlikely). Pt states that a typical day is \"Using out of boredom\". Pt states that she lives with her grandma at this time but she has been there 2 times in the past year and her grandmother has moved. Pt is most likely homeless.Pt states that she would like to live somewhere between [facility] and [place]. Pt again, has no insight and it is likely she did not understand a large portion of this CDA/Rule25.             Client Choice/Exceptions   Would you like services specific to language, age, gender, culture, Evangelical preference, race, ethnicity, sexual orientation or disability?  No    What particular treatment choices and options would you like to have? None    Do you have a preference for a particular treatment program? None    Criteria for Diagnosis     Criteria for Diagnosis  DSM-5 Criteria for Substance Use Disorder  Instructions: Determine whether the client currently meets the " criteria for Substance Use Disorder using the diagnostic criteria in the DSM-V pp.[phone]. Current means during the most recent 12 months outside a facility that controls access to substances    Category of Substance Severity (ICD-10 Code / DSM 5 Code)     Alcohol Use Disorder The patient does not meet the criteria for an Alcohol use disorder.   Cannabis Use Disorder Moderate  (F12.20) (304.30)   Hallucinogen Use Disorder The patient does not meet the criteria for a Hallucinogen use disorder.   Inhalant Use Disorder The patient does not meet the criteria for an Inhalant use disorder.   Opioid Use Disorder The patient does not meet the criteria for an Opioid use disorder.   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       Collateral Contact Summary   Number of contacts made: 1    Contact with referring person:  Yes    If court related records were reviewed, summarize here: No court records had been reviewed at the time of this documentation.    Rule 25 Assessment Summary and Plan     Summary    Referred By: PD  Transported By:PD  Admission Date:2020  Tested Positive For: Cannabinoids, Methamphetamine, Benzodiazepines   Last Date Of use: 2020      Dimension I  Withdrawal Potential- Severity ratin Client displays full functioning with good ability to tolerate and cope with withdrawal discomfort. No signs or symptoms of intoxication or withdrawal or resolving signs or symptoms.  Pt states that she does not use Marijuana. (Pt Drug test came back positive for cannabinoids). Pt states that she might have smoked some marijuana with someone who had some marijuana. Pt states that she first tried Methamphetamine at  17  y/o. Pt states that she used between a quarter gram and 1/10 of a gram of methamphetamines per day. Pt states that she does not use Benzodiazepines. (Pt Drug screen came back positive for benzodiazepines). Pt states that she smokes Pt states that she smokes between 1 pack and 1/2 pack per day. Pt is poor historian. The Pt stated that she has a tolerance to methamphetamines and uses more of the substance to get the same effect.Pt states that she has felt these withdrawal symptoms within the past year, Sweating (Rapid Pulse), Shaky / Jittery / Tremors, Unable to Sleep, Agitation,  Headache, Muscle Aches, Irritability, Sensitivity to Noise, Diminished Appetite, Fever,  Psychosis, Anxiety / Worried.  There are no visible withdrawal symptoms at this time.        Dimension II  Biomedical- Severity ratin Client displays full functioning with good ability to cope with physical discomfort.  The patient denied having any current clinical health issues. The patient denied having a history of being diagnosed with diabetes or neuropathy. The patient's PCP is Davi Chisholm.Pt is currently prescribed:   Current Facility-Administered Medications:      acetaminophen (TYLENOL) tablet 650 mg, 650 mg, Oral, Q4H PRN, Bree Leary APRN CNP, 650 mg at 20 1317     albuterol (ACCUNEB) nebulizer solution 1.25 mg, 1.25 mg, Nebulization, Q6H PRN, Bree Leary APRN CNP     ARIPiprazole (ABILIFY) tablet 10 mg, 10 mg, Oral, Daily, Bree Leary APRN CNP, 10 mg at 20 0837     benztropine (COGENTIN) tablet 1 mg, 1 mg, Oral, Daily, Bree Leary APRN CNP, 1 mg at 20 0837     carbamide peroxide (DEBROX) 6.5 % otic solution 3 drop, 3 drop, Both Ears, BID, Bree Leary APRN CNP, 3 drop at 20 1008     diphenhydrAMINE (BENADRYL) capsule 50 mg, 50 mg, Oral, Q6H PRN, 50 mg at 20 1800 **OR** diphenhydrAMINE (BENADRYL) injection 50 mg, 50 mg, Intramuscular, Q6H PRN, Woelpern,  SABI Erickson CNP, 50 mg at 06/17/20 1410     famotidine (PEPCID) tablet 20 mg, 20 mg, Oral, BID, Bree Leary APRN CNP, 20 mg at 07/29/20 0837     haloperidol (HALDOL) tablet 5 mg, 5 mg, Oral, Q6H PRN, 5 mg at 07/28/20 1512 **OR** haloperidol lactate (HALDOL) injection 5 mg, 5 mg, Intramuscular, Q6H PRN, Bree Leary APRN CNP, 5 mg at 06/17/20 1410     lithium ER (LITHOBID) CR tablet 600 mg, 600 mg, Oral, Q12H MARIZOL, Roxy Hardy NP, 600 mg at 07/29/20 0837     LORazepam (ATIVAN) tablet 1 mg, 1 mg, Oral, Q6H PRN, 1 mg at 07/26/20 1731 **OR** LORazepam (ATIVAN) injection 1 mg, 1 mg, Intramuscular, Q6H PRN, Mai Johnson NP     magnesium hydroxide (MILK OF MAGNESIA) suspension 30 mL, 30 mL, Oral, At Bedtime PRN, Bree Leary APRN CNP     metFORMIN (GLUCOPHAGE-XR) 24 hr tablet 500 mg, 500 mg, Oral, Daily with supperHayley April Annette, NP, 500 mg at 07/28/20 1659     nicotine (NICORETTE) gum 2-4 mg, 2-4 mg, Buccal, Q1H PRN, Bree Leary APRN CNP, 4 mg at 07/25/20 1819     propranolol (INDERAL) tablet 10 mg, 10 mg, Oral, TID PRN, Mai Johnson NP, 10 mg at 07/23/20 2237  Pt states that she has been taking prescribed medications as directed while at this facility. Pt states that she has last taken her mediations today.        Dimension III  Emotional/Behavioral/Cognitive- Severity rating: 3 Client has a severe lack of impulse control and coping skills. Client has frequent thoughts of suicide or harm to others including a plan and the means to carry out the plan. In addition, the client is severely impaired in significant life areas and has severe symptoms of emotional, behavioral, or cognitive problems that interfere with the client ability to participate in treatment activities.  Pt states that growing up in her family was chaotic.  Pt states that she has felt trapped, lonely,depressed and hopeless within the past 30 days. Pt states this is due to  her being in this facility. Pt states that she has had sleep trouble within the past 30 days. Pt states that's this is due to her being in this facility.Pt states that very anxious, nervous, tense and panicked within the past month. Pt states that this is due to being in this facility. Pt states that she has had a hard time listening within the past 30 days. Pt states that she has always had a hard time listening.  Pt states that this has had a negative impact on her life. The patient denied any family member or someone close to the patient had ever completed suicide. The patient denied having any current or past mental health issues that had required treatment.   Current Facility-Administered Medications:      acetaminophen (TYLENOL) tablet 650 mg, 650 mg, Oral, Q4H PRN, Bree Leary APRN CNP, 650 mg at 07/29/20 1317     albuterol (ACCUNEB) nebulizer solution 1.25 mg, 1.25 mg, Nebulization, Q6H PRN, Bree Leary APRN CNP     ARIPiprazole (ABILIFY) tablet 10 mg, 10 mg, Oral, Daily, Bree Leary APRN CNP, 10 mg at 07/29/20 0837     benztropine (COGENTIN) tablet 1 mg, 1 mg, Oral, Daily, Bree Leary APRN CNP, 1 mg at 07/29/20 0837     carbamide peroxide (DEBROX) 6.5 % otic solution 3 drop, 3 drop, Both Ears, BID, Bree Leary APRN CNP, 3 drop at 07/29/20 1008     diphenhydrAMINE (BENADRYL) capsule 50 mg, 50 mg, Oral, Q6H PRN, 50 mg at 07/25/20 1800 **OR** diphenhydrAMINE (BENADRYL) injection 50 mg, 50 mg, Intramuscular, Q6H PRN, Bree Leary APRN CNP, 50 mg at 06/17/20 1410     famotidine (PEPCID) tablet 20 mg, 20 mg, Oral, BID, Bree Leary APRN CNP, 20 mg at 07/29/20 0837     haloperidol (HALDOL) tablet 5 mg, 5 mg, Oral, Q6H PRN, 5 mg at 07/28/20 1512 **OR** haloperidol lactate (HALDOL) injection 5 mg, 5 mg, Intramuscular, Q6H PRN, Bree Leary, SABI CNP, 5 mg at 06/17/20 1410     lithium ER (LITHOBID) CR tablet 600 mg, 600 mg, Oral, Q12H  "MARIZOL, Roxy Hardy, ADDIS, 600 mg at 20 0837     LORazepam (ATIVAN) tablet 1 mg, 1 mg, Oral, Q6H PRN, 1 mg at 20 1731 **OR** LORazepam (ATIVAN) injection 1 mg, 1 mg, Intramuscular, Q6H PRN, Mai Johnson NP     magnesium hydroxide (MILK OF MAGNESIA) suspension 30 mL, 30 mL, Oral, At Bedtime PRN, Bree Leary APRN CNP     metFORMIN (GLUCOPHAGE-XR) 24 hr tablet 500 mg, 500 mg, Oral, Daily with Hayley webb April Annette, NP, 500 mg at 20 1659     nicotine (NICORETTE) gum 2-4 mg, 2-4 mg, Buccal, Q1H PRN, Bree Leary APRN CNP, 4 mg at 20 181     propranolol (INDERAL) tablet 10 mg, 10 mg, Oral, TID PRN, Mai Johnson NP, 10 mg at 20 2237  The patient denied having any history of being verbally, emotionally, physically or sexually abused. Pt states that she has daily problems with Headaches, Problem Solving, Concentration and Remembering.  The patient would benefit from developing sober coping skills. Pt states that her highest level of education is High school graduate/GED. Pt states that she has no learning disabilities.        Dimension IV  Readiness for Change - Severity ratin The client is: (A) non-compliant with treatment and has no awareness of addiction or mental disorder and does not want or is unwilling to explore change or is in total denial of the illness and its implications, or (B) dangerously oppositional to the extent that the client is a threat of imminent harm to self and others.  Pt states that self medicating is the reason for her use as well as boredom is the reason for her use and being here. Pt states that she works at [employer] but has not been there in weeks. Pt reports that she is doing fine with friends and relationships. Pt states that she is doing good emotionally and states that \"I am not sic anymore\". Pt reports that she would like to go back to school for  Something but she is not sure what.Pt states that she is " "living with her grandmother but has only been there two times in the past year, [place] and [facility]. The patient reported having a history of using IV drugs. Pt states that she spends  \"All the time\" getting, using, and getting over using, \"out of boredom\". Pt states that some of the reasons for her use include: liking the feeling, cope with depression, to relax or unwind, and most friends drink or use. Pt has no insight as well as no coping skills.          Dimension V  Relapse Potential- Severity ratin No awareness of the negative impact of mental health problems or substance abuse. No coping skills to arrest mental health or addiction illnesses, or prevent relapse.  Pt states that she has stated that she did do other drugs and quit them. Unknown if pt understands this process. The patient reported having cravings to use mood altering chemicals on an almost daily basis (Heroin). Pt states that she has never been treated for drugs or alcohol. Pt states that \"Being Free\" would be the best for her. Pt has no coping skills.          Dimension VI  Recovery Environment  - Severity ratin Client has (A) Chronically antagonistic significant other, living environment, family, peer group or long-term criminal justice involvement that is harmful to recovery or treatment progress, or (B) Client has an actively antagonistic significant other, family, work, or living environment with immediate threat to the client's safety and well-being.  Pt states that she may still work at [employer] (this is unlikely). Pt states that a typical day is \"Using out of boredom\". Pt states that she lives with her grandma at this time but she has been there 2 times in the past year and her grandmother has moved. Pt is most likely homeless.Pt states that she would like to live somewhere between [facility]. Pt again, has no insight and it is likely she did not understand a large portion of this CDA/Rule25.            Assessors note: " Although there are obvious Chemical Dependency issues with this Pt. She is not appropriate for CD treatment. She may however be best severed at a facility like [facility] or a [facility]. It was clear to me that during this assessment the pt was not fully understanding what was being asked at times and at other times pt became very tangential making loose associations etc. After a request was given to Occupational Therapy to do some cognitive testing the results from the [facility] Cognitive Level Screen were that the pt is in need of 26%cognitive assistance, daily assistance and check problem solving effectiveness. The testing also showed there is a lack in attention as well as short term memory and visual/spacial executive function (Pre Planning).    Assessors Recommendation    1. Attend a [facility] or like facility.    2. Complete DA (Diagnostic Assessment)    3. See therapist to help enhance coping skills as well as decision making abilities.    4. Pt would be best served with community services (i.e. [facility], , Housing etc.)       Collateral Contacts     Name:       Relationship:       Phone Number:     Releases:    No     Collateral Contacts     Name:       Relationship:       Phone Number:       Releases:    No   ollateral Contacts      A problematic pattern of alcohol/drug use leading to clinically significant impairment or distress, as manifested by at least two of the following, occurring within a 12-month period:    1.) Alcohol/drug is often taken in larger amounts or over a longer period than was intended.  2.) There is a persistent desire or unsuccessful efforts to cut down or control alcohol/drug use  3.) A great deal of time is spent in activities necessary to obtain alcohol, use alcohol, or recover from its effects.  4.) Craving, or a strong desire or urge to use alcohol/drug  5.) Recurrent alcohol/drug use resulting in a failure to fulfill major role obligations at work, school or home.  6.) Continued  alcohol use despite having persistent or recurrent social or interpersonal problems caused or exacerbated by the effects of alcohol/drug.  7.) Important social, occupational, or recreational activities are given up or reduced because of alcohol/drug use.  8.) Recurrent alcohol/drug use in situations in which it is physically hazardous.  9.) Alcohol/drug use is continued despite knowledge of having a persistent or recurrent physical or psychological problem that is likely to have been caused or exacerbated by alcohol.  10.) Tolerance, as defined by either of the following: A need for markedly increased amounts of alcohol/drug to achieve intoxication or desired effect.  11.) Withdrawal, as manifested by either of the following: The characteristic withdrawal syndrome for alcohol/drug (refer to Criteria A and B of the criteria set for alcohol/drug withdrawal).    Pt has been in locked Hospital facility since 6/11/2020.       Severe: Presence of 6 or more symptoms

## 2020-07-28 NOTE — PLAN OF CARE
Problem: Adult Behavioral Health Plan of Care  Goal: Patient-Specific Goal (Individualization)  Description: Patient will sleep 6-8 hours per night  Patient will eat at 75% of meals daily  Patient will attend >50% of group programming daily  Patient will be compliant with medication  Patient will have appropriate boundaries staff and peers during hospital  ELOPEMENT PRECAUTIONS; Eloped from ICU 6/17/2020 7/28/2020 0015 by Kiki Castle, RN  Outcome: No Change  Note:      Face to face shift report received from Adina CHRISTOPHER. Rounding completed, pt observed.     Pt appeared to be sleeping most of this shift. Pt did not have any elopement attempts.    Face to face report will be communicated to oncoming RN.    Kiki Castle, RN  7/28/2020  5:49 AM

## 2020-07-28 NOTE — PLAN OF CARE
Face to face end of shift report received from Linda BERRY RN. Rounding completed and patient observed in the lounge. No requests at this time.     Patient endorsed some anxiety and depression. She denied HI/SI and hallucinations. She is somewhat withdrawn but she attends groups and sits in the lounge with peers. She endorsed pain in her right upper back that she rated 8 of 10 and received 650mg tylenol at 21:26. Patient denied needing any other PRNs. She took her meds without complaint. She maintained appropriate boundaries. She is disheveled and doesn't appear to be showering regularly.      Face to face end of shift report communicated to oncromelia RN.      Problem: Adult Behavioral Health Plan of Care  Goal: Patient-Specific Goal (Individualization)  Description: Patient will sleep 6-8 hours per night  Patient will eat at 75% of meals daily  Patient will attend >50% of group programming daily  Patient will be compliant with medication  Patient will have appropriate boundaries staff and peers during hospital  [facility] 7/27/2020 2135 by Adina Gunter, RN  Outcome: No Change     Problem: Mental State/Mood Impairment (Psychotic Signs/Symptoms)  Goal: Improved Mental State and Mood (Psychotic Signs/Symptoms)  Description: Patient will have a reality based conversation.  Patient will report a decrease in delusions/hallucinations.  Patient will use coping skills to manage labile behavior.  7/27/2020 2135 by Adina Gunter, RN  Outcome: Improving

## 2020-07-28 NOTE — PLAN OF CARE
"  Problem: Adult Behavioral Health Plan of Care  Goal: Patient-Specific Goal (Individualization)  Description: Patient will sleep 6-8 hours per night  Patient will eat at 75% of meals daily  Patient will attend >50% of group programming daily  Patient will be compliant with medication  Patient will have appropriate boundaries staff and peers during hospital  [facility] 7/28/2020 1651 by Mary Decker, RN  Note: Pt was cooperative with nursing assessment and medications.  She denies depression and 3/10 anxiety.  Pt stated that she was having auditory hallucination and that she received 5 mg Haldol and that helped with the voices, but she still has \"noise in my head and it feels like there's a cloud in my head.\"   Pt reports this is the first time she has had hallucinations.  She sat in day room watching tv.  Pt ate well.         Problem: Mental State/Mood Impairment (Psychotic Signs/Symptoms)  Goal: Improved Mental State and Mood (Psychotic Signs/Symptoms)  Description: Patient will have a reality based conversation.  Patient will report a decrease in delusions/hallucinations.  Patient will use coping skills to manage labile behavior.  7/28/2020 1651 by Mary Decker, RN  Note: Pt reported hallucinations.  She wasn't labile this evening shift.      "

## 2020-07-28 NOTE — PLAN OF CARE
"  Problem: Adult Behavioral Health Plan of Care  Goal: Patient-Specific Goal (Individualization)  Description: Patient will sleep 6-8 hours per night  Patient will eat at 75% of meals daily  Patient will attend >50% of group programming daily  Patient will be compliant with medication  Patient will have appropriate boundaries staff and peers during hospital  [facility] 7/28/2020 0901 by Linda Concepcion RN  Outcome: Improving  Note:        Problem: Mental State/Mood Impairment (Psychotic Signs/Symptoms)  Goal: Improved Mental State and Mood (Psychotic Signs/Symptoms)  Description: Patient will have a reality based conversation.  Patient will report a decrease in delusions/hallucinations.  Patient will use coping skills to manage labile behavior.  7/28/2020 0901 by Linda Concepcion RN  Outcome: Improving   Pt. Was up and ate breakfast in room, taking prescribed medications, denies any side effects from medications, slept 7 hours last night, plus has been napping in bed this morning, has shown appropriate boundaries so far this shift, has reality based conversation, but does not initiate or carry on much conversation, denies depression, anxiety and suicidal ideation, denies hallucinations or any delusional thinking, has not attempted to elope.  1513-  Pt requested/received haldol 5 mg po for \"I'm hearing things, my head isn't feeling right\"    Face to face end of shift report will be communicated to oncoming afternoon shift LISA Concepcion RN  7/28/2020  11:06 AM        "

## 2020-07-29 PROCEDURE — 12400011

## 2020-07-29 PROCEDURE — 25000132 ZZH RX MED GY IP 250 OP 250 PS 637: Performed by: NURSE PRACTITIONER

## 2020-07-29 PROCEDURE — 99232 SBSQ HOSP IP/OBS MODERATE 35: CPT | Mod: 95 | Performed by: PSYCHIATRY & NEUROLOGY

## 2020-07-29 RX ADMIN — ARIPIPRAZOLE 10 MG: 10 TABLET ORAL at 08:37

## 2020-07-29 RX ADMIN — ACETAMINOPHEN 650 MG: 325 TABLET, FILM COATED ORAL at 13:17

## 2020-07-29 RX ADMIN — BENZTROPINE MESYLATE 1 MG: 1 TABLET ORAL at 08:37

## 2020-07-29 RX ADMIN — FAMOTIDINE 20 MG: 20 TABLET, FILM COATED ORAL at 20:33

## 2020-07-29 RX ADMIN — FAMOTIDINE 20 MG: 20 TABLET, FILM COATED ORAL at 08:37

## 2020-07-29 RX ADMIN — LITHIUM CARBONATE 600 MG: 300 TABLET, EXTENDED RELEASE ORAL at 08:37

## 2020-07-29 RX ADMIN — LITHIUM CARBONATE 600 MG: 300 TABLET, EXTENDED RELEASE ORAL at 20:33

## 2020-07-29 RX ADMIN — METFORMIN ER 500 MG 500 MG: 500 TABLET ORAL at 17:35

## 2020-07-29 RX ADMIN — NICOTINE POLACRILEX 4 MG: 2 GUM, CHEWING ORAL at 19:55

## 2020-07-29 ASSESSMENT — ACTIVITIES OF DAILY LIVING (ADL)
LAUNDRY: UNABLE TO COMPLETE
DRESS: INDEPENDENT;SCRUBS (BEHAVIORAL HEALTH)
ORAL_HYGIENE: INDEPENDENT
DRESS: INDEPENDENT;SCRUBS (BEHAVIORAL HEALTH)
HYGIENE/GROOMING: INDEPENDENT
HYGIENE/GROOMING: INDEPENDENT;SHOWER

## 2020-07-29 NOTE — PLAN OF CARE
Problem: Adult Behavioral Health Plan of Care  Goal: Patient-Specific Goal (Individualization)  Description: Patient will sleep 6-8 hours per night  Patient will eat at 75% of meals daily  Patient will attend >50% of group programming daily  Patient will be compliant with medication  Patient will have appropriate boundaries staff and peers during hospital  ELOPEMENT PRECAUTIONS; Eloped from ICU 6/17/2020 7/28/2020 2342 by Kiki Castle, RN  Outcome: No Change  Note:      Face to face shift report received from Mary CHRISTOPHER. Rounding completed, pt observed.     Pt appeared to be sleeping most of this shift. Pt did not have any elopement attempts this shift.    Face to face report will be communicated to oncoming RN.    Kiki Castle, ASHWIN  7/29/2020  6:18 AM

## 2020-07-29 NOTE — PLAN OF CARE
Face to face received from Kiki SANCHES RN.  Pt observed in [facility].         Problem: Adult Behavioral Health Plan of Care  Goal: Patient-Specific Goal (Individualization)  Description: Patient will sleep 6-8 hours per night  Patient will eat at 75% of meals daily  Patient will attend >50% of group programming daily  Patient will be compliant with medication  Patient will have appropriate boundaries staff and peers during hospital  [facility] 7/29/2020 1137 by Louise King, RN  Outcome: Improving  Note:   Pt is pleasant and cooperative with writer.  She admits to some anxiety.  When asked about hallucinations pt states her head is noisy on and off.  Denies all other criteria to nursing assessment. Did attend group this morning.  1317- Pt c/o headache and requested PRN tylenol 650 mg. Pt later reported tylenol effective. Has been napping in her room this afternoon.  Cooperative with all scheduled medications.  Makes needs known.      Problem: Mental State/Mood Impairment (Psychotic Signs/Symptoms)  Goal: Improved Mental State and Mood (Psychotic Signs/Symptoms)  Description: Patient will have a reality based conversation.  Patient will report a decrease in delusions/hallucinations.  Patient will use coping skills to manage labile behavior.  Outcome: Improving   Pt is able to have a reality based conversation.     Face to face end of shift report communicated to oncoming RN.     Louise King RN  7/29/2020

## 2020-07-29 NOTE — PLAN OF CARE
Emailed pt's CM Ariana informing her of [facility]'s meeting with pt yesterday. Informed Ariana that OT staff would be meeting with pt next for a cog eval.

## 2020-07-29 NOTE — PROGRESS NOTES
"Patient seen via telemedicine.  Care discussed with treatment team staff..  Team meeting occurred  Blood pressure 94/66, pulse 73, temperature 97.7  F (36.5  C), temperature source Tympanic, resp. rate 14, height 1.638 m (5' 4.5\"), weight 86.5 kg (190 lb 9.6 oz), SpO2 99 %, not currently breastfeeding.    Patient Active Problem List   Diagnosis     Pelvic pain in female     Contact with or exposure to viral disease     Abnormal uterine bleeding (AUB)     Vaginal odor     Paranoid delusion (H)   and substance use    By report, seems concrete, cognitive issues.  Rule 25 process occurring    Alert.  Affect fair.  Speech decreased.  Eye contact good.  Psychomotor behavior and gait  Normal, no abnormal movements.  Some auditory hallucinations.  Thoughtsconcrete.  Not suicidal.    Plan: cognitive eval      Current Facility-Administered Medications:      acetaminophen (TYLENOL) tablet 650 mg, 650 mg, Oral, Q4H PRN, Bree Leary APRN CNP, 650 mg at 07/29/20 1317     albuterol (ACCUNEB) nebulizer solution 1.25 mg, 1.25 mg, Nebulization, Q6H PRN, Bree Leary APRN CNP     ARIPiprazole (ABILIFY) tablet 10 mg, 10 mg, Oral, Daily, Bree Leary APRN CNP, 10 mg at 07/29/20 0837     benztropine (COGENTIN) tablet 1 mg, 1 mg, Oral, Daily, Bree Leary APRN CNP, 1 mg at 07/29/20 0837     carbamide peroxide (DEBROX) 6.5 % otic solution 3 drop, 3 drop, Both Ears, BID, Bree Leary APRN CNP, 3 drop at 07/29/20 1008     diphenhydrAMINE (BENADRYL) capsule 50 mg, 50 mg, Oral, Q6H PRN, 50 mg at 07/25/20 1800 **OR** diphenhydrAMINE (BENADRYL) injection 50 mg, 50 mg, Intramuscular, Q6H PRN, rBee Leary APRN CNP, 50 mg at 06/17/20 1410     famotidine (PEPCID) tablet 20 mg, 20 mg, Oral, BID, Woelperhayder Bree Brooke, APRN CNP, 20 mg at 07/29/20 0837     haloperidol (HALDOL) tablet 5 mg, 5 mg, Oral, Q6H PRN, 5 mg at 07/28/20 1512 **OR** haloperidol lactate (HALDOL) injection 5 mg, 5 mg, " Intramuscular, Q6H PRN, Bree Leary APRN CNP, 5 mg at 06/17/20 1410     lithium ER (LITHOBID) CR tablet 600 mg, 600 mg, Oral, Q12H MARIZOL, Roxy Hardy NP, 600 mg at 07/29/20 0837     LORazepam (ATIVAN) tablet 1 mg, 1 mg, Oral, Q6H PRN, 1 mg at 07/26/20 1731 **OR** LORazepam (ATIVAN) injection 1 mg, 1 mg, Intramuscular, Q6H PRN, Mai Johnson NP     magnesium hydroxide (MILK OF MAGNESIA) suspension 30 mL, 30 mL, Oral, At Bedtime PRN, Bree Leary APRN CNP     metFORMIN (GLUCOPHAGE-XR) 24 hr tablet 500 mg, 500 mg, Oral, Daily with supper, Roxy Hardy NP, 500 mg at 07/28/20 1659     nicotine (NICORETTE) gum 2-4 mg, 2-4 mg, Buccal, Q1H PRN, Bree Leary APRN CNP, 4 mg at 07/25/20 1819     propranolol (INDERAL) tablet 10 mg, 10 mg, Oral, TID PRN, Mai Johnson NP, 10 mg at 07/23/20 2237  Recent Results (from the past 168 hour(s))   CBC with platelets differential    Collection Time: 07/25/20  9:02 AM   Result Value Ref [place]    WBC 7.2 4.0 - 11.0 10e9/L    RBC Count 4.40 3.8 - 5.2 10e12/L    Hemoglobin 13.3 11.7 - 15.7 g/dL    Hematocrit 39.8 35.0 - 47.0 %    MCV 91 78 - 100 fl    MCH 30.2 26.5 - 33.0 pg    MCHC 33.4 31.5 - 36.5 g/dL    RDW 12.9 10.0 - 15.0 %    Platelet Count 252 150 - 450 10e9/L    Diff Method Automated Method     % Neutrophils 53.1 %    % Lymphocytes 32.9 %    % Monocytes 11.5 %    % Eosinophils 1.8 %    % Basophils 0.4 %    % Immature Granulocytes 0.3 %    Nucleated RBCs 0 0 /100    Absolute Neutrophil 3.8 1.6 - 8.3 10e9/L    Absolute Lymphocytes 2.4 0.8 - 5.3 10e9/L    Absolute Monocytes 0.8 0.0 - 1.3 10e9/L    Absolute Eosinophils 0.1 0.0 - 0.7 10e9/L    Absolute Basophils 0.0 0.0 - 0.2 10e9/L    Abs Immature Granulocytes 0.0 0 - 0.4 10e9/L    Absolute Nucleated RBC 0.0    Hepatic panel    Collection Time: 07/25/20  9:02 AM   Result Value Ref [place]    Bilirubin Direct 0.2 0.0 - 0.2 mg/dL    Bilirubin Total 0.8 0.2 - 1.3 mg/dL     Albumin 3.7 3.4 - 5.0 g/dL    Protein Total 8.1 6.8 - 8.8 g/dL    Alkaline Phosphatase 57 40 - 150 U/L     (H) 0 - 50 U/L     (H) 0 - 45 U/L   Hepatitis C Screen Reflex to HCV RNA Quant and Genotype    Collection Time: 07/25/20  9:02 AM   Result Value Ref [facility]    Hepatitis C Antibody Reactive (A) NR^Nonreactive   Lithium level    Collection Time: 07/25/20  7:51 PM   Result Value Ref [place]    Lithium Level 0.71 0.60 - 1.20 mmol/L   Telemedicine Visit: The patient's condition can be safely assessed and treated via synchronous audio and visual telemedicine encounter.      Start Time: 1340  Stop Time: 1400    Reason for Telemedicine Visit: covid    Originating Site (Patient Location): [place]    Distant Site (Provider Location): Provider Remote Setting    Consent:  The patient/guardian has verbally consented to: the potential risks and benefits of telemedicine (video visit) versus in person care; bill my insurance or make self-payment for services provided; and responsibility for payment of non-covered services.     Mode of Communication:  Video Conference via [employer]    As the provider I attest to compliance with applicable laws and regulations related to telemedicine.

## 2020-07-30 ENCOUNTER — APPOINTMENT (OUTPATIENT)
Dept: OCCUPATIONAL THERAPY | Facility: HOSPITAL | Age: 20
End: 2020-07-30
Attending: PSYCHIATRY & NEUROLOGY
Payer: MEDICAID

## 2020-07-30 LAB
HCV RNA SERPL NAA+PROBE-ACNC: ABNORMAL [IU]/ML
HCV RNA SERPL NAA+PROBE-LOG IU: 6.3 LOG IU/ML

## 2020-07-30 PROCEDURE — 25000132 ZZH RX MED GY IP 250 OP 250 PS 637: Performed by: NURSE PRACTITIONER

## 2020-07-30 PROCEDURE — 12400011

## 2020-07-30 PROCEDURE — 97165 OT EVAL LOW COMPLEX 30 MIN: CPT | Mod: GO

## 2020-07-30 RX ADMIN — METFORMIN ER 500 MG 500 MG: 500 TABLET ORAL at 17:49

## 2020-07-30 RX ADMIN — FAMOTIDINE 20 MG: 20 TABLET, FILM COATED ORAL at 08:21

## 2020-07-30 RX ADMIN — ARIPIPRAZOLE 10 MG: 10 TABLET ORAL at 08:21

## 2020-07-30 RX ADMIN — BENZTROPINE MESYLATE 1 MG: 1 TABLET ORAL at 08:22

## 2020-07-30 RX ADMIN — ACETAMINOPHEN 650 MG: 325 TABLET, FILM COATED ORAL at 17:50

## 2020-07-30 RX ADMIN — FAMOTIDINE 20 MG: 20 TABLET, FILM COATED ORAL at 20:29

## 2020-07-30 RX ADMIN — LITHIUM CARBONATE 600 MG: 300 TABLET, EXTENDED RELEASE ORAL at 08:20

## 2020-07-30 RX ADMIN — NICOTINE POLACRILEX 4 MG: 2 GUM, CHEWING ORAL at 18:12

## 2020-07-30 RX ADMIN — LITHIUM CARBONATE 600 MG: 300 TABLET, EXTENDED RELEASE ORAL at 20:29

## 2020-07-30 ASSESSMENT — ACTIVITIES OF DAILY LIVING (ADL)
HYGIENE/GROOMING: INDEPENDENT;SHOWER
DRESS: INDEPENDENT;SCRUBS (BEHAVIORAL HEALTH)
HYGIENE/GROOMING: INDEPENDENT
LAUNDRY: UNABLE TO COMPLETE
ORAL_HYGIENE: INDEPENDENT
DRESS: SCRUBS (BEHAVIORAL HEALTH);INDEPENDENT

## 2020-07-30 NOTE — PLAN OF CARE
Spoke with pt this afternoon- She asks for any update on treatment. Informed pt that after her rule 25 treatment is not appropriate at this time so she remains on the [facility] list. Pt states she should just be able to go home because she is an adult. Informed pt that because she is under a commitment the team and county would not recommend that plan. Pt asked if she could fight the commitment- informed pt she could speak with her  about this.     Spoke with Sabiha at [facility] this afternoon for update on the [facility] list- pt was referred July 1st and they are working on referrals from the beginning of June. Sabiha states they received all of the updated notes and MAR that were sent yesterday.

## 2020-07-30 NOTE — PROGRESS NOTES
Pt labs, chart reviewed. Vitals stable. Hepatitis C antibody is reactive and Hep C RNA quantitative high at 1,870,691. Hep C genotype is in process. Informed Nurse Lilia Razo that pt would need referral to GI at discharge.   To clarify Nurse Lilia Razos note. Nursing supervisor was not contacted by medical NP Rosio Rocha.  Hospitalist Ghazala Mckeon, NP was consulted about pt Hep C status and agreed that pt would need GI referral to determine if pt would be candidate for treatment with pts drug abuse history and unstable mental health status. Hep C treatment requires closes follow up. Also, to clarify Nurse Rodrigues note treatment was not discussed with the pt and pt did not refuse treatment. It was conveyed to Nurse Rodrigues that unless pt became symptomatic during stay then a referral out patient would be sufficient . At this time pt remains asymptomatic. Nurse to monitor for abdominal pain, vomiting, nausea and report symptoms.

## 2020-07-30 NOTE — PLAN OF CARE
Problem: Adult Behavioral Health Plan of Care  Goal: Patient-Specific Goal (Individualization)  Description: Patient will sleep 6-8 hours per night  Patient will eat at 75% of meals daily  Patient will attend >50% of group programming daily  Patient will be compliant with medication  Patient will have appropriate boundaries staff and peers during hospital  ELOPEMENT PRECAUTIONS; Eloped from ICU 6/17/2020 7/29/2020 2588 by Kiki Castle, RN  Outcome: No Change  Note:      Face to face shift report received from Bina CHRISTOPHER. Rounding completed, pt observed.     Pt appeared to be sleeping most of this shift. Pt did not have any elopement attempts.    Face to face report will be communicated to oncromelia RN.    Kiki Castle RN  7/30/2020  6:12 AM

## 2020-07-30 NOTE — PLAN OF CARE
Behavioral Health Occupational Therapy Eval      Name: Jonelle Adams MRN# [id number]   Age: 19 year old YOB: 2000     Date of Consultation: July 30, 2020  Primary care provider: ALIA Simms    Referring Physician: Dr. Grossman  Orders: Eval only   Medical Diagnosis: Paranoid Delusion r/o substance related  Substance Abuse Disorder  Onset of Illness/Injury: Admit date 6/11/20    Prior Level of Function: Indicates that she was managing own cares. Didn't have income. Has a history of previous self injury. Was admitted due to showing up to [facility] and stating that she wanted to kill herself. At time of admit patient was not sure where she was living and believed that she was a missing person since the age of 6.     Current Level of Function: Patient presents with limited insight to why she was admitted and the impact of her chemical use on her life.   ACL completed with a score of.4.8/5.8 which indicates that the person can live alone with daily assistance to monitor safety and check problem solving effectiveness. The person may get to regularly schedule community activities without assistance. With a  the person may succeed in supportive employment. 26% minimum cognitive assistance is required to setup new activities and verify results.   The following are recommendations to prevent common safety problems at this level of functioning: Prevent poor compliance with taking medication by setting up medications and or handing measured liquids and pills to the person. Assist with medication refills. Supply with daily spending money and manage all other finances. Supervise when showing how to do a new activity, no more than 3 simple steps at a time. Post reminders or simple notes at eye level.    SLUMS completed with a score of 18/30 which places her in the dementia scoring range. During this test patient not as attentive and unsure whether this is due to difficulty with sustained  attention or internal stimuli.   Patient lost points in orientation, short term memory, attention, and visuospatial/executive function.     Patient/Family Goal: Patient willing to participate in cognitive testing 'so I can get out of here sooner'.     Fall Screen:   Have you fallen 2 or more times in the last year? No  Have you fallen and had an injury in the last year? No  Timed up & go: NT  Is patient a fall risk? No    Past Medical History:   No past medical history on file.    Past Surgical History:  Past Surgical History:   Procedure Laterality Date     TONSILLECTOMY         Medications:   Current Facility-Administered Medications   Medication     acetaminophen (TYLENOL) tablet 650 mg     albuterol (ACCUNEB) nebulizer solution 1.25 mg     ARIPiprazole (ABILIFY) tablet 10 mg     benztropine (COGENTIN) tablet 1 mg     carbamide peroxide (DEBROX) 6.5 % otic solution 3 drop     diphenhydrAMINE (BENADRYL) capsule 50 mg    Or     diphenhydrAMINE (BENADRYL) injection 50 mg     famotidine (PEPCID) tablet 20 mg     haloperidol (HALDOL) tablet 5 mg    Or     haloperidol lactate (HALDOL) injection 5 mg     lithium ER (LITHOBID) CR tablet 600 mg     LORazepam (ATIVAN) tablet 1 mg    Or     LORazepam (ATIVAN) injection 1 mg     magnesium hydroxide (MILK OF MAGNESIA) suspension 30 mL     metFORMIN (GLUCOPHAGE-XR) 24 hr tablet 500 mg     nicotine (NICORETTE) gum 2-4 mg     propranolol (INDERAL) tablet 10 mg       Reason for OT Referral:  Mental Health History: Unknown.    Signs/Symptoms of compliant: Per LADC delay noted during Rule 25 assessment.   Aggravating factors/Current Life Stressors: No income or place to live. Committed.   Current Services:     Personal Information:  Family Structure: Has a grandmother and sibling as supports. Single, no children.   Living Arrangement: homeless  Finances: none  Medication Management: reports not being on medications prior to hospitalization.   Support System: Has a  grandmother and sibling as supports.     Physical Presentation:   Mobility: WNL      Cognition:  Orientation:  time, place and person  Memory: Impaired   Safety awareness: Impaired   Attention: Diminished  Motivation: Diminished  Judgement/Insight: Diminished  Speech/Language: Normal  Mood: Neutral  Affect: Appropriate    Goals: Patient willing to participate in cognitive testing 'so I can get out of here sooner'.       Planned Interventions: Cognitive testing.     Clinical Impressions:  Criteria for Skilled Therapeutic Intervention Met: Yes  OT Diagnosis: cognitive impairment  Influenced by the following impairments: decompensation leading to inpatient hospitalization, limited insight, paranoia  Functional limitations due to impairment: Inability to care for self  Clinical presentation: Evolving/Changing  Clinical presentation rationale: Patient is improved since admission, insight remains limited. Patient does present with some cognitive impairments and could benefit from further support.   Clinical Decision making (complexity): Low Complexity  Predicted Duration of Therapy Intervention (days/wks): eval only  Risks and Benefits of therapy have been explained: Yes  Patient, Family & other staff in agreement with plan of care: Yes  Comments: Informed treatment team of above testing results and recommendations.     Total Evaluation Time: 38

## 2020-07-30 NOTE — PLAN OF CARE
"Problem: Adult Behavioral Health Plan of Care  Goal: Patient-Specific Goal (Individualization)  Description: Patient will sleep 6-8 hours per night  Patient will eat at 75% of meals daily  Patient will attend >50% of group programming daily  Patient will be compliant with medication  Patient will have appropriate boundaries staff and peers during hospital  [facility] 7/29/2020 1936 by Bina Weston, RN  Outcome: No Change    Pt has been up in the lounge and attending groups. Reported nausea earlier this afternoon that was relieved by napping. Ate with peers in lounge and showered this evening. Denied having any AH today, anxiety is low and manageable. No H/A, but noted a \"fullness\" feeling in her head. Behavior is appropriate with peers on unit. Pt noted to have showered multiple times on this shift.     Problem: Mental State/Mood Impairment (Psychotic Signs/Symptoms)  Goal: Improved Mental State and Mood (Psychotic Signs/Symptoms)  Description: Patient will have a reality based conversation.  Patient will report a decrease in delusions/hallucinations.  Patient will use coping skills to manage labile behavior.  7/29/2020 1936 by Bina Weston, RN  Outcome: Improving  Conversation is clear, no delusional content noted.    2330 -Face to face end of shift report to be communicated to oncoming shift RN.     Bina Weston RN  7/29/2020  7:58 PM             "

## 2020-07-30 NOTE — PLAN OF CARE
Report received from Kiki ASNCHES RN.  Rounding completed, and pt observed in bed at this time appears to be sleeping.     Pt calm and cooperative taking medications this morning. Pt denies SI, HI, AH, and VH this shift. Pt denies pain this shift.     Pt's labs showing reactive to Hep C, AST and ALT increasing. Medical NP Rosio BERRY Notified. Pt's attending NP, Dr. Grossman notified and stated would like an order put in for a GI consult. This writer discussed how put order in a GI consult, and 3 nurse let this writer know usually pt would need to be followed and treated outpatient as only able to put in for a surgical GI consult here at [place].   Medical NP states she discussed Hep C treatment with Hospitalist Ghazala,  Hospitalist stated pt would need to followed after discharge once pt is mentally stable before being able to start Hep C treatment, and treatment  should only be started if pt becomes symptotic.     Problem: Adult Behavioral Health Plan of Care  Goal: Patient-Specific Goal (Individualization)  Description: Patient will sleep 6-8 hours per night  Patient will eat at 75% of meals daily  Patient will attend >50% of group programming daily  Patient will be compliant with medication  Patient will have appropriate boundaries staff and peers during hospital  ELOPEMENT PRECAUTIONS; Eloped from  ICU 6/17/2020 7/30/2020 0751 by Lilia Razo RN  Outcome: Improving  Note:        Problem: Mental State/Mood Impairment (Psychotic Signs/Symptoms)  Goal: Improved Mental State and Mood (Psychotic Signs/Symptoms)  Description: Patient will have a reality based conversation.  Patient will report a decrease in delusions/hallucinations.  Patient will use coping skills to manage labile behavior.  7/30/2020 0751 by Lilia Razo RN  Outcome: Improving     Face to face end of shift report to be communicated to oncoming RN.     Lilia Razo RN  7/30/2020  7:53 AM

## 2020-07-31 PROBLEM — B19.20 HEPATITIS C: Status: ACTIVE | Noted: 2020-07-31

## 2020-07-31 PROCEDURE — 25000132 ZZH RX MED GY IP 250 OP 250 PS 637: Performed by: NURSE PRACTITIONER

## 2020-07-31 PROCEDURE — 99233 SBSQ HOSP IP/OBS HIGH 50: CPT | Performed by: NURSE PRACTITIONER

## 2020-07-31 PROCEDURE — 12400011

## 2020-07-31 PROCEDURE — 99231 SBSQ HOSP IP/OBS SF/LOW 25: CPT | Performed by: NURSE PRACTITIONER

## 2020-07-31 RX ADMIN — FAMOTIDINE 20 MG: 20 TABLET, FILM COATED ORAL at 20:11

## 2020-07-31 RX ADMIN — LITHIUM CARBONATE 600 MG: 300 TABLET, EXTENDED RELEASE ORAL at 20:11

## 2020-07-31 RX ADMIN — DIPHENHYDRAMINE HYDROCHLORIDE 50 MG: 50 CAPSULE ORAL at 20:11

## 2020-07-31 RX ADMIN — METFORMIN ER 500 MG 500 MG: 500 TABLET ORAL at 17:05

## 2020-07-31 RX ADMIN — ARIPIPRAZOLE 10 MG: 10 TABLET ORAL at 08:25

## 2020-07-31 RX ADMIN — LITHIUM CARBONATE 600 MG: 300 TABLET, EXTENDED RELEASE ORAL at 08:25

## 2020-07-31 RX ADMIN — FAMOTIDINE 20 MG: 20 TABLET, FILM COATED ORAL at 08:25

## 2020-07-31 RX ADMIN — BENZTROPINE MESYLATE 1 MG: 1 TABLET ORAL at 08:25

## 2020-07-31 ASSESSMENT — ACTIVITIES OF DAILY LIVING (ADL)
DRESS: SCRUBS (BEHAVIORAL HEALTH)
LAUNDRY: UNABLE TO COMPLETE
ORAL_HYGIENE: INDEPENDENT
HYGIENE/GROOMING: INDEPENDENT
LAUNDRY: UNABLE TO COMPLETE
DRESS: SCRUBS (BEHAVIORAL HEALTH)
HYGIENE/GROOMING: INDEPENDENT
ORAL_HYGIENE: INDEPENDENT

## 2020-07-31 NOTE — PLAN OF CARE
Spoke with pt this afternoon- She was in her room resting and asks staff for any update on placement. Did inform pt that [facility] CD treatment was discussed but staff is unsure if a referral has been made. Informed pt that she also remains on the [facility] list and will discharge when the first bed becomes available. Pt informs staff that she called and left a message for her  yesterday because she wants to have the commitment dropped. Pt denies any further questions or concerns at this time.

## 2020-07-31 NOTE — PLAN OF CARE
Problem: Adult Behavioral Health Plan of Care  Goal: Patient-Specific Goal (Individualization)  Description: Patient will sleep 6-8 hours per night  Patient will eat at 75% of meals daily  Patient will attend >50% of group programming daily  Patient will be compliant with medication  Patient will have appropriate boundaries staff and peers during hospital      7/30/2020 2348 by Kiki Castle, RN  Outcome: No Change  Note:      Face to face shift report received from Bina CHRISTOPHER. Rounding completed, pt observed.     Pt appeared to be sleeping most of this shift.     Face to face report will be communicated to oncoming RN.    Kiki Castle RN  7/31/2020  6:12 AM   Hospitalist

## 2020-07-31 NOTE — PROGRESS NOTES
[facility]  Psychiatric Progress Note       Impression:     Jonelle is a 18 yo female with dual dx of MI/CD. Pt was talkative when I met with her this am. She repeatedly asks about leaving, I shared the date is pending based upon bed availability. This she accepted well. Continues to spend the majority of her time in her room-will come out on occasion. Denies depression, anxiety, racing thoughts, or mood lability.      Educated regarding medication indications, risks, benefits, side effects, contraindications and possible interactions. Verbally expressed understanding.        Diagnoses:      Schizoaffective Disorder, bipolar type  Substance Abuse Disorder     Attestation:  Patient has been seen and evaluated by me,  Brynn CELESTIN, CNP          Interim History:   The patient's care was discussed with the treatment team and chart notes were reviewed.      BEHAVIORAL TEAM DISCUSSION     Progress: Fair. Improvement in psychosis and grandiosity, attending more group programming, more linear/logical discussions     Continued Stay Criteria/Rationale: lacks insight into mental health, current plan discharge to [facility], could possibly consider IRTS if further improvement is made and participating in groups.     Medical/Physical: LFT's likely fatty liver and Hep C  Precautions:   Falls precaution?: YES          Behavioral Orders   Procedures     Code 1 - Restrict to Unit     Routine Programming       As clinically indicated     Status 15       Every 15 minutes.      Plan:   Regular diet changed to constant carb 4-7 units per meal.  Continue Lithium  mg BID  Lithium level 0.7  Discontinue Invega   Start Abilify  Continue Cogentin to 1 mg scheduled   Continue Propranolol 10 mg TID prn anxiety/akathisia  Continue metformin 500 mg daily with dinner for neuroleptic-induced weight gain  Committed and chisholm granted  Hepatitis C positive     Rationale for change in precautions or plan: Medication adjustment to  "target mood and delusions     Petition for [place] including Invega, Zyprexa, Abilify, Risperdal, and Geodon     Participants: Brynn Lai APRN, CNP, Nursing, OT, SW      Current Facility-Administered Medications   Medication     acetaminophen (TYLENOL) tablet 650 mg     albuterol (ACCUNEB) nebulizer solution 1.25 mg     ARIPiprazole (ABILIFY) tablet 10 mg     benztropine (COGENTIN) tablet 1 mg     carbamide peroxide (DEBROX) 6.5 % otic solution 3 drop     diphenhydrAMINE (BENADRYL) capsule 50 mg    Or     diphenhydrAMINE (BENADRYL) injection 50 mg     famotidine (PEPCID) tablet 20 mg     haloperidol (HALDOL) tablet 5 mg    Or     haloperidol lactate (HALDOL) injection 5 mg     lithium ER (LITHOBID) CR tablet 600 mg     LORazepam (ATIVAN) tablet 1 mg    Or     LORazepam (ATIVAN) injection 1 mg     magnesium hydroxide (MILK OF MAGNESIA) suspension 30 mL     metFORMIN (GLUCOPHAGE-XR) 24 hr tablet 500 mg     nicotine (NICORETTE) gum 2-4 mg     propranolol (INDERAL) tablet 10 mg            10 point ROS negative see H&P       Allergies:   No Known Allergies          Psychiatric Examination:   BP (!) 86/53   Pulse 79   Temp 97.6  F (36.4  C) (Tympanic)   Resp 16   Ht 1.638 m (5' 4.5\")   Wt 72.8 kg (160 lb 6.4 oz)   SpO2 98%   BMI 27.11 kg/m    Weight is 160 lbs 6.4 oz  Body mass index is 27.11 kg/m .     Appearance: awake, alert, dressed in hospital scrubs, somewhat disheveled  Attitude: pleasant though guarded  Eye Contact: fair  Mood: \"good\"  Affect: slight improvement, smiled several times  Speech: clear, coherent, conversed for a few minutes  Psychomotor Behavior: normal, no evidence of abnormal movements  Thought Process: reality based and linear, no grandiosity noted  Associations: no loosening of associations noted  Thought Content: denies hallucinations, denies suicidal thoughts  Insight:  limited  Judgment: limited  Oriented to:  x3  Attention Span and Concentration: limited  Recent and Remote Memory:  " impaired  Fund of Knowledge: delayed  Muscle Strength and Tone: normal  Gait and Station: normal             Labs:     No results found for this or any previous visit (from the past 24 hour(s)).

## 2020-07-31 NOTE — PLAN OF CARE
Problem: Adult Behavioral Health Plan of Care  Goal: Patient-Specific Goal (Individualization)  Description: Patient will sleep 6-8 hours per night  Patient will eat at 75% of meals daily  Patient will attend >50% of group programming daily  Patient will be compliant with medication  Patient will have appropriate boundaries staff and peers during hospital  7/30/2020 2107 by Bina Weston, RN  Outcome: No Change    Pt denied anxiety and depression today, denied further AH and does not appear to be responding to internal stimuli. Conversation is appropriate, but she repeated questions that are noted by  earlier today. Pt out in lounge watching TV and visiting with peers, not in group this evening. Is taking medication as ordered. Requested and was given Tylenol for sinus H/A at 1750, rated 10/10 but reported relief after napping. Denied nausea or any other physical complaint.     Problem: Mental State/Mood Impairment (Psychotic Signs/Symptoms)  Goal: Improved Mental State and Mood (Psychotic Signs/Symptoms)  Description: Patient will have a reality based conversation.  Patient will report a decrease in delusions/hallucinations.  Patient will use coping skills to manage labile behavior.  7/30/2020 2107 by Bina Weston RN  Outcome: Improving  Behavior has been appropriate with staff and peers.    2330 - Face to face end of shift report to be communicated to oncoming shift RN.     Bina Weston RN  7/30/2020  9:47 PM

## 2020-07-31 NOTE — PROGRESS NOTES
"[facility]    Medical Services Progress Note    Date of Service (when I saw the patient): 07/31/2020    Assessment & Plan     Principal Problem:    Paranoid delusion (H)    Active Medical Problem:  Hepatitis C - abnormal LFT- , . Hepatitis C antibody is reactive and Hep C RNA quantitative high at 1,870,691. Hep C genotype is in process. Informed pt of her hep C status. Pt stated \"oh Okay\" pt did not seem overly concerned. Pt did state she was not surprised due to history of IV drug use. Pt admitted to using dirty needles when she used. Consulted with [facility] Hospitalist yesterday and pt will need a follow up appointment with PCP and consult with GI at discharge. Pt is asymptomatic, denies abdominal pain, vomiting, itchy skin, dark colored urine, rash. Pt reports she is nauseated occasionally but that it usually subsides quickly. Instructed pt to report symptoms to nurse. Pt verbalized understanding. Also instructed pt of the importance of following up with PCP and GI at discharge to determine if treatment is recommended. Pt Verbalized understanding.       Code Status: Full Code.    Rosio Rocha CNP      -Data reviewed today: I reviewed all new labs and imaging results over the last 24 hours.     Physical Exam   Temp: 97.4  F (36.3  C) Temp src: Tympanic BP: 94/68 Pulse: 76   Resp: 16 SpO2: 98 % O2 Device: None (Room air)    Vitals:    07/05/20 0800 07/12/20 0900 07/26/20 0700   Weight: 84.2 kg (185 lb 11.2 oz) 83.8 kg (184 lb 12.8 oz) 86.5 kg (190 lb 9.6 oz)     Vital Signs with Ranges  Temp:  [97.4  F (36.3  C)-97.7  F (36.5  C)] 97.4  F (36.3  C)  Pulse:  [76-83] 76  Resp:  [16] 16  BP: ()/(68-70) 94/68  SpO2:  [98 %] 98 %  No intake/output data recorded.    Constitutional: awake, alert, cooperative, no apparent distress, and appears stated age  Eyes: Lids and lashes normal, pupils equal, round and reactive to light, extra ocular muscles intact, sclera clear, conjunctiva " normal  ENT: Normocephalic, without obvious abnormality, atraumatic, sinuses nontender on palpation, external ears without lesions, oral pharynx with moist mucous membranes, tonsils without erythema or exudates, gums normal and good dentition.  Hematologic / Lymphatic: no cervical lymphadenopathy  Respiratory: No increased work of breathing, good air exchange, clear to auscultation bilaterally, no crackles or wheezing  Cardiovascular: Normal apical impulse, regular rate and rhythm, normal S1 and S2, no S3 or S4, and no murmur noted  GI: No scars, normal bowel sounds, soft, non-distended, non-tender, no masses palpated, no hepatosplenomegally  Genitounirinary: deferred  Skin: no bruising or bleeding, normal skin color, texture, turgor, no redness, warmth, or swelling, no rashes, no lesions and no jaundice  Musculoskeletal: There is no redness, warmth, or swelling of the joints.  Full range of motion noted.  Motor strength is 5 out of 5 all extremities bilaterally.  Tone is normal. Gait is normal.   Neurologic: Awake, alert, oriented to name, place and time.  Neuropsychiatric: General: normal, calm and normal eye contact    Medications     ARIPiprazole  10 mg Oral Daily     benztropine  1 mg Oral Daily     carbamide peroxide  3 drop Both Ears BID     famotidine  20 mg Oral BID     lithium ER  600 mg Oral Q12H MARIZOL     metFORMIN  500 mg Oral Daily with supper       Data   Recent Labs   Lab 07/25/20  0902   WBC 7.2   HGB 13.3   MCV 91      ALBUMIN 3.7   PROTTOTAL 8.1   BILITOTAL 0.8   ALKPHOS 57   *   *       No results found for this or any previous visit (from the past 24 hour(s)).

## 2020-07-31 NOTE — PLAN OF CARE
Problem: Adult Behavioral Health Plan of Care  Goal: Patient-Specific Goal (Individualization)  Description: Patient will sleep 6-8 hours per night  Patient will eat at 75% of meals daily  Patient will attend >50% of group programming daily  Patient will be compliant with medication  Patient will have appropriate boundaries staff and peers during hospital      7/31/2020 1131 by Jazmine Crowley, RN  Note:  Up for breakfast than stayed up for entire shift. Is social with peers.  Denies all criteria. Attends some groups during day shift.  Takes all medications as ordered.  NP talked with patient regarding Hep., C.  Will need appointment set up with primary physician - Davi Simms at discharge.   No complaints of pain or nausea.   Maintains appropriate boundaries with staff & peers.  Eating and sleeping with no problems.      End of shift report given to nickolas afternoon RN.

## 2020-08-01 PROCEDURE — 99232 SBSQ HOSP IP/OBS MODERATE 35: CPT | Performed by: NURSE PRACTITIONER

## 2020-08-01 PROCEDURE — 25000132 ZZH RX MED GY IP 250 OP 250 PS 637: Performed by: NURSE PRACTITIONER

## 2020-08-01 PROCEDURE — 12400011

## 2020-08-01 RX ADMIN — BENZTROPINE MESYLATE 1 MG: 1 TABLET ORAL at 08:26

## 2020-08-01 RX ADMIN — ARIPIPRAZOLE 10 MG: 10 TABLET ORAL at 08:26

## 2020-08-01 RX ADMIN — METFORMIN ER 500 MG 500 MG: 500 TABLET ORAL at 17:03

## 2020-08-01 RX ADMIN — NICOTINE POLACRILEX 4 MG: 2 GUM, CHEWING ORAL at 20:49

## 2020-08-01 RX ADMIN — FAMOTIDINE 20 MG: 20 TABLET, FILM COATED ORAL at 20:24

## 2020-08-01 RX ADMIN — FAMOTIDINE 20 MG: 20 TABLET, FILM COATED ORAL at 08:26

## 2020-08-01 RX ADMIN — LITHIUM CARBONATE 600 MG: 300 TABLET, EXTENDED RELEASE ORAL at 20:24

## 2020-08-01 RX ADMIN — LITHIUM CARBONATE 600 MG: 300 TABLET, EXTENDED RELEASE ORAL at 08:26

## 2020-08-01 ASSESSMENT — ACTIVITIES OF DAILY LIVING (ADL)
LAUNDRY: UNABLE TO COMPLETE
DRESS: SCRUBS (BEHAVIORAL HEALTH)
ORAL_HYGIENE: INDEPENDENT
HYGIENE/GROOMING: INDEPENDENT

## 2020-08-01 NOTE — PLAN OF CARE
"Face to face shift report received from Jazmine TONG RN.       Problem: Adult Behavioral Health Plan of Care  Goal: Patient-Specific Goal (Individualization)  Description: Patient will sleep 6-8 hours per night  Patient will eat at 75% of meals daily  Patient will attend >50% of group programming daily  Patient will be compliant with medication  Patient will have appropriate boundaries staff and peers during hospital      8/1/2020 1800 by Sabiha Gibson RN  Outcome: Improving  Note: Calm, cooperative, and medication compliant. Denies mental health issues. Reports \"I don't like the lithium. It makes me feel like I'm not myself.\" Spends majority of shift in lounge area. Social and appropriate with peers and staff. Pleasant during conversation. No reports of pain.          Problem: Mental State/Mood Impairment (Psychotic Signs/Symptoms)  Goal: Improved Mental State and Mood (Psychotic Signs/Symptoms)  Description: Patient will have a reality based conversation.  Patient will report a decrease in delusions/hallucinations.  Patient will use coping skills to manage labile behavior.  Outcome: Improving         Face to face end of shift report to be communicated to oncoming RN.     "

## 2020-08-01 NOTE — PLAN OF CARE
Problem: Mental State/Mood Impairment (Psychotic Signs/Symptoms)  Goal: Improved Mental State and Mood (Psychotic Signs/Symptoms)  Description: Patient will have a reality based conversation.  Patient will report a decrease in delusions/hallucinations.  Patient will use coping skills to manage labile behavior.  Outcome: Improving   Patient denies hallucinations and delusional thoughts at this time.  She is able to have a linear, reality based conversation with this writer.        Problem: Adult Behavioral Health Plan of Care  Goal: Patient-Specific Goal (Individualization)  Description: Patient will sleep 6-8 hours per night  Patient will eat at 75% of meals daily  Patient will attend >50% of group programming daily  Patient will be compliant with medication  Patient will have appropriate boundaries staff and peers during hospital      7/31/2020 0345 by Oneyda Lopez, RN  Outcome: Improving   Patient has been calm, cooperative, and medication compliant this shift.  She attended groups and was social with peers and staff.  Denies all mental health criteria.  Requested Benadryl for sleep with HS medications and received 50 mg at 2011.  No complaints of pain.  VS WNL.  Face to face end of shift report communicated to night shift RN.     Oneyda Lopez RN  7/31/2020  10:42 PM

## 2020-08-01 NOTE — PLAN OF CARE
Problem: Adult Behavioral Health Plan of Care  Goal: Patient-Specific Goal (Individualization)  Description: Patient will sleep 6-8 hours per night  Patient will eat at 75% of meals daily  Patient will attend >50% of group programming daily  Patient will be compliant with medication  Patient will have appropriate boundaries staff and peers during hospital      7/31/2020 2355 by Kiki Castle, RN  Outcome: No Change  Note:      Face to face shift report received from Oneyda CHRISTOPHER. Rounding completed, pt observed.     Pt appeared to be sleeping most of this shift.     Face to face report will be communicated to oncoming RN.    Kiki Castle RN  8/1/2020  6:16 AM

## 2020-08-01 NOTE — PLAN OF CARE
Problem: Adult Behavioral Health Plan of Care  Goal: Patient-Specific Goal (Individualization)  Description: Patient will sleep 6-8 hours per night  Patient will eat at 75% of meals daily  Patient will attend >50% of group programming daily  Patient will be compliant with medication  Patient will have appropriate boundaries staff and peers during hospital      8/1/2020 1133 by Jazmine Crowley, RN  Outcome: Improving  Note:      Up for breakfast. Social with peers, eating and sleeping with no problems. Takes medications as prescribed.  Attends groups. Denies anxiety, denies depression.  Pleasant and cooperative with nursing assessment.    End of shift report given to oncoming afternoon RN.

## 2020-08-01 NOTE — PROGRESS NOTES
"[facility]  Psychiatric Progress Note       Impression:     Jonelle is a 18 yo female with dual dx of MI/CD. Pt was talkative when I met with her this am. Pt was alittle teary today when we talked, she asked about seeing a therapist when \"this is all over\" adding that she will sit and think about things and suddenly becomes very sad. She wants to have a better life and become a better person. She questions if her medications are correct, as she does not feel as \"up\" as she once had. Discussed the importance of therapy and encouraged her to follow through at sometime in her treatment. Discussed her medications reassured her they are adequately controlling her symptoms. Her emotions today may be related to many different areas-encouraged group which she did agree to.    Educated regarding medication indications, risks, benefits, side effects, contraindications and possible interactions. Verbally expressed understanding.        Diagnoses:      Schizoaffective Disorder, bipolar type  Substance Abuse Disorder     Attestation:  Patient has been seen and evaluated by me,  Brynn CELESTIN, CNP          Interim History:   The patient's care was discussed with the treatment team and chart notes were reviewed.      BEHAVIORAL TEAM DISCUSSION     Progress: Fair. Improvement in psychosis and grandiosity, attending more group programming, more linear/logical discussions     Continued Stay Criteria/Rationale: lacks insight into mental health, current plan discharge to [facility], could possibly consider IRTS if further improvement is made and participating in groups.     Medical/Physical: LFT's likely fatty liver and Hep C  Precautions:   Falls precaution?: YES          Behavioral Orders   Procedures     Code 1 - Restrict to Unit     Routine Programming       As clinically indicated     Status 15       Every 15 minutes.      Plan:   Regular diet changed to constant carb 4-7 units per meal.  Continue Lithium  mg " "BID  Lithium level 0.7  Discontinue Invega   Start Abilify  Continue Cogentin to 1 mg scheduled   Continue Propranolol 10 mg TID prn anxiety/akathisia  Continue metformin 500 mg daily with dinner for neuroleptic-induced weight gain  Committed and chisholm granted  Hepatitis C positive     Rationale for change in precautions or plan: Medication adjustment to target mood and delusions     Petition for Chisholm including Invega, Zyprexa, Abilify, Risperdal, and Geodon     Participants: Brynn Lai APRN, CNP, Nursing, OT, SW      Current Facility-Administered Medications   Medication     acetaminophen (TYLENOL) tablet 650 mg     albuterol (ACCUNEB) nebulizer solution 1.25 mg     ARIPiprazole (ABILIFY) tablet 10 mg     benztropine (COGENTIN) tablet 1 mg     diphenhydrAMINE (BENADRYL) capsule 50 mg    Or     diphenhydrAMINE (BENADRYL) injection 50 mg     famotidine (PEPCID) tablet 20 mg     haloperidol (HALDOL) tablet 5 mg    Or     haloperidol lactate (HALDOL) injection 5 mg     lithium ER (LITHOBID) CR tablet 600 mg     LORazepam (ATIVAN) tablet 1 mg    Or     LORazepam (ATIVAN) injection 1 mg     magnesium hydroxide (MILK OF MAGNESIA) suspension 30 mL     metFORMIN (GLUCOPHAGE-XR) 24 hr tablet 500 mg     nicotine (NICORETTE) gum 2-4 mg     propranolol (INDERAL) tablet 10 mg            10 point ROS negative see H&P       Allergies:   No Known Allergies          Psychiatric Examination:   BP (!) 86/53   Pulse 79   Temp 97.6  F (36.4  C) (Tympanic)   Resp 16   Ht 1.638 m (5' 4.5\")   Wt 72.8 kg (160 lb 6.4 oz)   SpO2 98%   BMI 27.11 kg/m    Weight is 160 lbs 6.4 oz  Body mass index is 27.11 kg/m .     Appearance: awake, alert, dressed in hospital scrubs, somewhat disheveled  Attitude: pleasant though guarded  Eye Contact: fair  Mood: \"good\"  Affect: slight improvement, smiled several times  Speech: clear, coherent, conversed for a few minutes  Psychomotor Behavior: normal, no evidence of abnormal movements  Thought " Process: reality based and linear, no grandiosity noted  Associations: no loosening of associations noted  Thought Content: denies hallucinations, denies suicidal thoughts  Insight:  limited  Judgment: limited  Oriented to:  x3  Attention Span and Concentration: limited  Recent and Remote Memory:  impaired  Fund of Knowledge: delayed  Muscle Strength and Tone: normal  Gait and Station: normal             Labs:     No results found for this or any previous visit (from the past 24 hour(s)).

## 2020-08-02 PROCEDURE — 25000132 ZZH RX MED GY IP 250 OP 250 PS 637: Performed by: NURSE PRACTITIONER

## 2020-08-02 PROCEDURE — 99231 SBSQ HOSP IP/OBS SF/LOW 25: CPT | Performed by: NURSE PRACTITIONER

## 2020-08-02 PROCEDURE — 12400011

## 2020-08-02 RX ADMIN — FAMOTIDINE 20 MG: 20 TABLET, FILM COATED ORAL at 08:22

## 2020-08-02 RX ADMIN — NICOTINE POLACRILEX 4 MG: 2 GUM, CHEWING ORAL at 13:25

## 2020-08-02 RX ADMIN — ARIPIPRAZOLE 10 MG: 10 TABLET ORAL at 08:22

## 2020-08-02 RX ADMIN — METFORMIN ER 500 MG 500 MG: 500 TABLET ORAL at 16:33

## 2020-08-02 RX ADMIN — FAMOTIDINE 20 MG: 20 TABLET, FILM COATED ORAL at 20:06

## 2020-08-02 RX ADMIN — BENZTROPINE MESYLATE 1 MG: 1 TABLET ORAL at 08:22

## 2020-08-02 RX ADMIN — LITHIUM CARBONATE 600 MG: 300 TABLET, EXTENDED RELEASE ORAL at 08:22

## 2020-08-02 RX ADMIN — LORAZEPAM 1 MG: 1 TABLET ORAL at 18:21

## 2020-08-02 RX ADMIN — LITHIUM CARBONATE 600 MG: 300 TABLET, EXTENDED RELEASE ORAL at 20:06

## 2020-08-02 ASSESSMENT — MIFFLIN-ST. JEOR: SCORE: 1619.78

## 2020-08-02 ASSESSMENT — ACTIVITIES OF DAILY LIVING (ADL)
HYGIENE/GROOMING: INDEPENDENT
LAUNDRY: UNABLE TO COMPLETE
DRESS: SCRUBS (BEHAVIORAL HEALTH)
ORAL_HYGIENE: INDEPENDENT

## 2020-08-02 NOTE — PLAN OF CARE
Problem: Adult Behavioral Health Plan of Care  Goal: Patient-Specific Goal (Individualization)  Description: Patient will sleep 6-8 hours per night  Patient will eat at 75% of meals daily  Patient will attend >50% of group programming daily  Patient will be compliant with medication  Patient will have appropriate boundaries staff and peers during hospital      8/2/2020 1033 by Jazmine Crowley, RN  Outcome: Improving  Note:   Up for breakfast, then returned to bed for nap.  Does attend groups and is complaint with medications.  Did ask practitioner if she could get her Lithium decreased. Plan was to have a discussion with practitioner.  Does socialize with peers and sit in lounge area watching television.    End of shift report given to nickolas afternoon RN

## 2020-08-02 NOTE — PROGRESS NOTES
"[facility]  Psychiatric Progress Note       Impression:     Jonelle is a 20 yo female with dual dx of MI/CD.   Pt was talkative again this morning. Reflective of her past and the importance of her \"doing better this time\". Worries about her younger brother, who will care for him if she is not around, she feels that she needs to be better for her grandmother. Reports that her mood is good, denies anxiety. Wants to \"get going to treatment\". Encouraged group attendance for socialization.   Educated regarding medication indications, risks, benefits, side effects, contraindications and possible interactions. Verbally expressed understanding.        Diagnoses:      Schizoaffective Disorder, bipolar type  Substance Abuse Disorder     Attestation:  Patient has been seen and evaluated by me,  Brynn CELESTIN, CNP          Interim History:   The patient's care was discussed with the treatment team and chart notes were reviewed.      BEHAVIORAL TEAM DISCUSSION     Progress: Fair. Improvement in psychosis and grandiosity, attending more group programming, more linear/logical discussions     Continued Stay Criteria/Rationale: lacks insight into mental health, current plan discharge to [facility], could possibly consider IRTS if further improvement is made and participating in groups.     Medical/Physical: LFT's likely fatty liver and Hep C  Precautions:   Falls precaution?: YES          Behavioral Orders   Procedures     Code 1 - Restrict to Unit     Routine Programming       As clinically indicated     Status 15       Every 15 minutes.      Plan:   Regular diet changed to constant carb 4-7 units per meal.  Continue Lithium  mg BID  Lithium level 0.7  Discontinue Invega   Start Abilify  Continue Cogentin to 1 mg scheduled   Continue Propranolol 10 mg TID prn anxiety/akathisia  Continue metformin 500 mg daily with dinner for neuroleptic-induced weight gain  Committed and chisholm granted  Hepatitis C " "positive     Rationale for change in precautions or plan: Medication adjustment to target mood and delusions     Petition for [place] including Invega, Zyprexa, Abilify, Risperdal, and Geodon     Participants: Brynn CELESTIN, CNP, Nursing, OT, SW      Current Facility-Administered Medications   Medication     acetaminophen (TYLENOL) tablet 650 mg     albuterol (ACCUNEB) nebulizer solution 1.25 mg     ARIPiprazole (ABILIFY) tablet 10 mg     benztropine (COGENTIN) tablet 1 mg     diphenhydrAMINE (BENADRYL) capsule 50 mg    Or     diphenhydrAMINE (BENADRYL) injection 50 mg     famotidine (PEPCID) tablet 20 mg     haloperidol (HALDOL) tablet 5 mg    Or     haloperidol lactate (HALDOL) injection 5 mg     lithium ER (LITHOBID) CR tablet 600 mg     LORazepam (ATIVAN) tablet 1 mg    Or     LORazepam (ATIVAN) injection 1 mg     magnesium hydroxide (MILK OF MAGNESIA) suspension 30 mL     metFORMIN (GLUCOPHAGE-XR) 24 hr tablet 500 mg     nicotine (NICORETTE) gum 2-4 mg     propranolol (INDERAL) tablet 10 mg            10 point ROS negative see H&P       Allergies:   No Known Allergies          Psychiatric Examination:   BP (!) 86/53   Pulse 79   Temp 97.6  F (36.4  C) (Tympanic)   Resp 16   Ht 1.638 m (5' 4.5\")   Wt 72.8 kg (160 lb 6.4 oz)   SpO2 98%   BMI 27.11 kg/m    Weight is 160 lbs 6.4 oz  Body mass index is 27.11 kg/m .     Appearance: awake, alert, dressed in hospital scrubs, somewhat disheveled  Attitude: pleasant though guarded  Eye Contact: fair  Mood: \"good\"  Affect: slight improvement, smiled several times  Speech: clear, coherent, conversed for a few minutes  Psychomotor Behavior: normal, no evidence of abnormal movements  Thought Process: reality based and linear, no grandiosity noted  Associations: no loosening of associations noted  Thought Content: denies hallucinations, denies suicidal thoughts  Insight:  limited  Judgment: limited  Oriented to:  x3  Attention Span and Concentration: " limited  Recent and Remote Memory:  impaired  Fund of Knowledge: delayed  Muscle Strength and Tone: normal  Gait and Station: normal             Labs:     No results found for this or any previous visit (from the past 24 hour(s)).

## 2020-08-02 NOTE — PLAN OF CARE
Problem: Adult Behavioral Health Plan of Care  Goal: Patient-Specific Goal (Individualization)  Description: Patient will sleep 6-8 hours per night  Patient will eat at 75% of meals daily  Patient will attend >50% of group programming daily  Patient will be compliant with medication  Patient will have appropriate boundaries staff and peers during hospital      8/1/2020 2346 by Kiki Castle, RN  Outcome: No Change  Note:      Face to face shift report received from Sabiha CHRISTOPHER. Rounding completed, pt observed.     Pt appeared to be sleeping most of this shift.    Face to face report will be communicated to oncoming RN.    Kiki Castle RN  8/2/2020  6:31 AM

## 2020-08-02 NOTE — PLAN OF CARE
Face to face shift report received from JENNA Lucero RN. Rounding completed, pt observed.   Problem: Adult Behavioral Health Plan of Care  Goal: Plan of Care Review  Outcome: No Change     Problem: Adult Behavioral Health Plan of Care  Goal: Patient-Specific Goal (Individualization)  Description: Patient will sleep 6-8 hours per night  Patient will eat at 75% of meals daily  Patient will attend >50% of group programming daily  Patient will be compliant with medication  Patient will have appropriate boundaries staff and peers during hospital      8/2/2020 1548 by Ping Sierra RN  Outcome: No Change  Note: Shift Summery:    1821, Pt utilized prn Ativan for 7/10 anxiety. Pt  Reported prn was effective.  Pt has been present in the unit mileu attending groups and participating. Pt mood is bright and affect is congruent with behavior. Pt was compliant with medication administration. Overall appearance is clean with unkept hair. Pt appears to be getting along well with her peers during social interaction. Nursing will continue to monitor pt for any changes in behavior.     Face to face report will be communicated to oncoming RN.    Ping Sierra RN  8/2/2020  3:53 PM   Problem: Adult Behavioral Health Plan of Care  Goal: Adheres to Safety Considerations for Self and Others  Outcome: No Change     Problem: Adult Behavioral Health Plan of Care  Goal: Optimized Coping Skills in Response to Life Stressors  Outcome: No Change     Problem: Mental State/Mood Impairment (Psychotic Signs/Symptoms)  Goal: Improved Mental State and Mood (Psychotic Signs/Symptoms)  Description: Patient will have a reality based conversation.  Patient will report a decrease in delusions/hallucinations.  Patient will use coping skills to manage labile behavior.  Outcome: No Change

## 2020-08-03 PROCEDURE — 25000132 ZZH RX MED GY IP 250 OP 250 PS 637: Performed by: NURSE PRACTITIONER

## 2020-08-03 PROCEDURE — 12400011

## 2020-08-03 PROCEDURE — 99231 SBSQ HOSP IP/OBS SF/LOW 25: CPT | Performed by: NURSE PRACTITIONER

## 2020-08-03 RX ADMIN — ARIPIPRAZOLE 10 MG: 10 TABLET ORAL at 08:20

## 2020-08-03 RX ADMIN — DIPHENHYDRAMINE HYDROCHLORIDE 50 MG: 50 CAPSULE ORAL at 20:23

## 2020-08-03 RX ADMIN — METFORMIN ER 500 MG 500 MG: 500 TABLET ORAL at 17:04

## 2020-08-03 RX ADMIN — FAMOTIDINE 20 MG: 20 TABLET, FILM COATED ORAL at 08:20

## 2020-08-03 RX ADMIN — BENZTROPINE MESYLATE 1 MG: 1 TABLET ORAL at 08:19

## 2020-08-03 RX ADMIN — LITHIUM CARBONATE 600 MG: 300 TABLET, EXTENDED RELEASE ORAL at 20:19

## 2020-08-03 RX ADMIN — LORAZEPAM 1 MG: 1 TABLET ORAL at 20:23

## 2020-08-03 RX ADMIN — FAMOTIDINE 20 MG: 20 TABLET, FILM COATED ORAL at 20:19

## 2020-08-03 RX ADMIN — LITHIUM CARBONATE 600 MG: 300 TABLET, EXTENDED RELEASE ORAL at 08:19

## 2020-08-03 ASSESSMENT — ACTIVITIES OF DAILY LIVING (ADL)
ORAL_HYGIENE: INDEPENDENT
DRESS: SCRUBS (BEHAVIORAL HEALTH);INDEPENDENT
HYGIENE/GROOMING: INDEPENDENT
LAUNDRY: UNABLE TO COMPLETE

## 2020-08-03 NOTE — PLAN OF CARE
Problem: Adult Behavioral Health Plan of Care  Goal: Patient-Specific Goal (Individualization)  Description: Patient will sleep 6-8 hours per night  Patient will eat at 75% of meals daily  Patient will attend >50% of group programming daily  Patient will be compliant with medication  Patient will have appropriate boundaries staff and peers during hospital      8/2/2020 2346 by Kiki Castle, RN  Outcome: No Change  Note:        Problem: Mental State/Mood Impairment (Psychotic Signs/Symptoms)  Goal: Improved Mental State and Mood (Psychotic Signs/Symptoms)  Description: Patient will have a reality based conversation.  Patient will report a decrease in delusions/hallucinations.  Patient will use coping skills to manage labile behavior.  8/2/2020 2346 by Kiki Castle RN  Outcome: No Change     Face to face shift report received from Jacklyn CHRISTOPHER. Rounding completed, pt observed.   Pt appeared to be sleeping most of this shift.  Face to face report will be communicated to oncoming RN.    Kiki Castle RN  8/3/2020  6:08 AM

## 2020-08-03 NOTE — PLAN OF CARE
" Face to face end of shift report received from Kiki SANCHES RN.  Rounding completed, pt observed in room at this time.      Pt denies pain this shift.  Pt states no requests this shift besides wondering if she will be discharging Wednesday. Let pt know per SW that would be getting an update tomorrow regarding open placement for [facility]. Pt denies SI, HI, AH, and VH this shift. Pt calm and cooperative this shift. Pt attends groups this shift, but per [facility] pt is \"disorganized, and offers little conversation\" during group sessions.   Pt in bed laying down for a nap this afternoon.     Problem: Adult Behavioral Health Plan of Care  Goal: Patient-Specific Goal (Individualization)  Description: Patient will sleep 6-8 hours per night  Patient will eat at 75% of meals daily  Patient will attend >50% of group programming daily  Patient will be compliant with medication  Patient will have appropriate boundaries staff and peers during hospital      8/3/2020 0920 by Lilia Razo RN  Outcome: Improving  Note: Shift Summery:      Patient's Stated Goal for Shift:     Goal Status:        Problem: Mental State/Mood Impairment (Psychotic Signs/Symptoms)  Goal: Improved Mental State and Mood (Psychotic Signs/Symptoms)  Description: Patient will have a reality based conversation.  Patient will report a decrease in delusions/hallucinations.  Patient will use coping skills to manage labile behavior.  8/3/2020 0920 by Lilia Razo RN  Outcome: Improving    Face to face end of shift report to be communicated to oncoming RN.    Lilia Razo RN  8/3/2020  9:21 AM          "

## 2020-08-03 NOTE — PLAN OF CARE
Face to face shift report received from MAIK Rodrigues RN. Rounding completed, pt observed.   Problem: Adult Behavioral Health Plan of Care  Goal: Plan of Care Review  Outcome: No Change     Problem: Adult Behavioral Health Plan of Care  Goal: Patient-Specific Goal (Individualization)  Description: Patient will sleep 6-8 hours per night  Patient will eat at 75% of meals daily  Patient will attend >50% of group programming daily  Patient will be compliant with medication  Patient will have appropriate boundaries staff and peers during hospital      8/3/2020 1832 by Ping Sierra RN  Outcome: No Change  Note: Shift Summery:    Pt denies pain this shift and HI. Pt has no plans.   Pt has been present in the unit milieu, she attended groups. Pt mood is pleasant with brighter affect. Speech is clear with safe behaviors. She ate 75% of meals . Pt has been medication compliant. Nursing will continue to monitor pt for any changes in behavior.        Face to face report will be communicated to oncoming RN.    Ping Sierra RN  8/3/2020  6:34 PM   Problem: Adult Behavioral Health Plan of Care  Goal: Adheres to Safety Considerations for Self and Others  Outcome: No Change     Problem: Mental State/Mood Impairment (Psychotic Signs/Symptoms)  Goal: Improved Mental State and Mood (Psychotic Signs/Symptoms)  Description: Patient will have a reality based conversation.  Patient will report a decrease in delusions/hallucinations.  Patient will use coping skills to manage labile behavior.  8/3/2020 1832 by Ping Sierra RN  Outcome: No Change

## 2020-08-03 NOTE — PROGRESS NOTES
"[facility]    Medical Services Progress Note    Date of Service (when I saw the patient): 08/03/2020    Assessment & Plan     Principal Problem:    Paranoid delusion (H)  Active Problems:    Hepatitis C    Active Medical Problem:  Hepatitis C - abnormal LFT- , . Hepatitis C antibody is reactive and Hep C RNA quantitative high at 1,[id number],691. Hep C genotype is in process. Informed pt of her hep C status. Pt stated \"oh Okay\" pt did not seem overly concerned. Pt did state she was not surprised due to history of IV drug use. Pt admitted to using dirty needles when she used. Consulted with [facility] Hospitalist yesterday and pt will need a follow up appointment with PCP and consult with GI at discharge. Pt is asymptomatic, denies abdominal pain, vomiting, itchy skin, dark colored urine, rash. Pt reports she is nauseated occasionally but that it usually subsides quickly. Instructed pt to report symptoms to nurse. Pt verbalized understanding. Also instructed pt of the importance of following up with PCP and GI at discharge to determine if treatment is recommended. Pt Verbalized understanding.   8/3/20- Pt remains asymptomatic. pt denies any abdominal pain, diarrhea, nausea, vomiting. Pt verbalized understanding about following up with PCP at discharge for Hep C management.     covid screening- negative     Pt medically stable, no acute medical concerns. Chronic medical problems stable. Will sign off. Please consult for any new medical issues or concerns.         Code Status: Full Code.    Rosio Rocha CNP      -Data reviewed today: I reviewed all new labs and imaging results over the last 24 hours.     Physical Exam   Temp: 97.4  F (36.3  C) Temp src: Tympanic BP: 97/66 Pulse: 75   Resp: 14 SpO2: 98 % O2 Device: None (Room air)    Vitals:    07/12/20 0900 07/26/20 0700 08/02/20 0800   Weight: 83.8 kg (184 lb 12.8 oz) 86.5 kg (190 lb 9.6 oz) 85.7 kg (188 lb 14.4 oz)     Vital Signs with Ranges  Temp:  " [97.4  F (36.3  C)-99.2  F (37.3  C)] 97.4  F (36.3  C)  Pulse:  [75-85] 75  Resp:  [14-16] 14  BP: ()/(64-66) 97/66  SpO2:  [98 %] 98 %  No intake/output data recorded.    Constitutional: awake, alert, cooperative, no apparent distress, and appears stated age  Eyes: Lids and lashes normal, pupils equal, round and reactive to light, extra ocular muscles intact, sclera clear, conjunctiva normal  ENT: Normocephalic, without obvious abnormality, atraumatic, sinuses nontender on palpation, external ears without lesions, oral pharynx with moist mucous membranes, no erythema or exudates  Hematologic / Lymphatic: no cervical lymphadenopathy  Respiratory: No increased work of breathing, good air exchange, clear to auscultation bilaterally, no crackles or wheezing  Cardiovascular: Normal apical impulse, regular rate and rhythm, normal S1 and S2, no S3 or S4, and no murmur noted  GI: No scars, normal bowel sounds, soft, non-distended, non-tender, no masses palpated, no hepatosplenomegally  Genitounirinary: deferred  Skin: no bruising or bleeding, normal skin color, texture, turgor, no redness, warmth, or swelling, no rashes, no lesions and no jaundice  Musculoskeletal: There is no redness, warmth, or swelling of the joints.  Full range of motion noted.  Motor strength is 5 out of 5 all extremities bilaterally.  Tone is normal. Gait is normal.   Neurologic: Awake, alert, oriented to name, place and time.  Neuropsychiatric: General: normal, calm and normal eye contact    Medications     ARIPiprazole  10 mg Oral Daily     benztropine  1 mg Oral Daily     famotidine  20 mg Oral BID     lithium ER  600 mg Oral Q12H MARIZOL     metFORMIN  500 mg Oral Daily with supper       Data   No lab results found in last 7 days.    No results found for this or any previous visit (from the past 24 hour(s)).

## 2020-08-04 PROCEDURE — 12400011

## 2020-08-04 PROCEDURE — 25000132 ZZH RX MED GY IP 250 OP 250 PS 637: Performed by: NURSE PRACTITIONER

## 2020-08-04 PROCEDURE — 99232 SBSQ HOSP IP/OBS MODERATE 35: CPT | Mod: 95 | Performed by: PSYCHIATRY & NEUROLOGY

## 2020-08-04 RX ORDER — DIPHENHYDRAMINE HCL 50 MG
50 CAPSULE ORAL EVERY 6 HOURS PRN
Status: DISCONTINUED | OUTPATIENT
Start: 2020-08-04 | End: 2020-08-15

## 2020-08-04 RX ADMIN — BENZTROPINE MESYLATE 1 MG: 1 TABLET ORAL at 08:53

## 2020-08-04 RX ADMIN — FAMOTIDINE 20 MG: 20 TABLET, FILM COATED ORAL at 08:53

## 2020-08-04 RX ADMIN — FAMOTIDINE 20 MG: 20 TABLET, FILM COATED ORAL at 20:17

## 2020-08-04 RX ADMIN — LITHIUM CARBONATE 600 MG: 300 TABLET, EXTENDED RELEASE ORAL at 20:17

## 2020-08-04 RX ADMIN — LITHIUM CARBONATE 600 MG: 300 TABLET, EXTENDED RELEASE ORAL at 08:52

## 2020-08-04 RX ADMIN — ARIPIPRAZOLE 10 MG: 10 TABLET ORAL at 08:53

## 2020-08-04 RX ADMIN — METFORMIN ER 500 MG 500 MG: 500 TABLET ORAL at 17:13

## 2020-08-04 ASSESSMENT — ACTIVITIES OF DAILY LIVING (ADL)
ORAL_HYGIENE: INDEPENDENT
DRESS: SCRUBS (BEHAVIORAL HEALTH);INDEPENDENT
LAUNDRY: UNABLE TO COMPLETE
HYGIENE/GROOMING: INDEPENDENT

## 2020-08-04 NOTE — PLAN OF CARE
Face to face shift report received from Lilia PLEITEZ RN. Rounding completed, pt observed.   Problem: Adult Behavioral Health Plan of Care  Goal: Plan of Care Review  Outcome: No Change     Problem: Adult Behavioral Health Plan of Care  Goal: Patient-Specific Goal (Individualization)  Description: Patient will sleep 6-8 hours per night  Patient will eat at 75% of meals daily  Patient will attend >50% of group programming daily  Patient will be compliant with medication  Patient will have appropriate boundaries staff and peers during hospital      8/4/2020 1634 by Ping Sierra RN  Outcome: No Change  Note: Shift Summery:      Pt has been present in the unit milieu, she attended groups and socialized with peers. Pt mood is pleasant with blunted affect. Speech is clear with reality based conversation. Pt continues to display safe behaviors. Pt denies pain this shift and HI. She reported no plans. Pt has been medication compliant and ate 100% of meals and snacks . Nursing will continue to monitor pt for any changes in behavior.        Face to face report will be communicated to oncoming RN.    Ping Sierra RN  8/4/2020  4:35 PM   Problem: Adult Behavioral Health Plan of Care  Goal: Adheres to Safety Considerations for Self and Others  Outcome: No Change     Problem: Adult Behavioral Health Plan of Care  Goal: Optimized Coping Skills in Response to Life Stressors  Outcome: No Change     Problem: Mental State/Mood Impairment (Psychotic Signs/Symptoms)  Goal: Improved Mental State and Mood (Psychotic Signs/Symptoms)  Description: Patient will have a reality based conversation.  Patient will report a decrease in delusions/hallucinations.  Patient will use coping skills to manage labile behavior.  8/4/2020 1634 by Ping Sierra RN  Outcome: No Change

## 2020-08-04 NOTE — PROGRESS NOTES
"Patient seen via telemedicine.  Care discussed with treatment team staff.  Blood pressure 95/62, pulse 80, temperature 97.3  F (36.3  C), temperature source Tympanic, resp. rate 14, height 1.638 m (5' 4.5\"), weight 85.7 kg (188 lb 14.4 oz), SpO2 99 %, not currently breastfeeding.    Stabilizing, looking at [facility] or treatment settings.  Acute psychotic symptoms better, still not sleeping well.  [facility] is a consideration, I feel she would be a good fit there, but they might not be admitting women now due to COVID (using the women's wing as isolation if needed).    Patient Active Problem List   Diagnosis     Pelvic pain in female     Contact with or exposure to viral disease     Abnormal uterine bleeding (AUB)     Vaginal odor     Paranoid delusion (H)     Hepatitis C     Alert.  Affect good.  Speech normal.  Eye contact good.  Psychomotor behavior and gait  Normal, no abnormal movements.  No delusions or hallucinations noted today.  Thoughts logical.  Associations intact. Cognitions concrete.  Not suicidal.  States she sleep-walked last night and has occasionally since childhood.      Current Facility-Administered Medications:      acetaminophen (TYLENOL) tablet 650 mg, 650 mg, Oral, Q4H PRN, Bree Leary APRN CNP, 650 mg at 07/30/20 1750     albuterol (ACCUNEB) nebulizer solution 1.25 mg, 1.25 mg, Nebulization, Q6H PRN, Bree Leary APRN CNP     ARIPiprazole (ABILIFY) tablet 10 mg, 10 mg, Oral, Daily, Bree Leary APRN CNP, 10 mg at 08/04/20 0853     benztropine (COGENTIN) tablet 1 mg, 1 mg, Oral, Daily, Bree Leary APRN CNP, 1 mg at 08/04/20 0853     diphenhydrAMINE (BENADRYL) capsule 50 mg, 50 mg, Oral, Q6H PRN, Davi Grossman MD     diphenhydrAMINE (BENADRYL) capsule 50 mg, 50 mg, Oral, Q6H PRN, 50 mg at 08/03/20 2023 **OR** diphenhydrAMINE (BENADRYL) injection 50 mg, 50 mg, Intramuscular, Q6H PRN, Bree Leary, APRN CNP, 50 mg at 06/17/20 1410     famotidine " (PEPCID) tablet 20 mg, 20 mg, Oral, BID, WoeldatnBree APRN CNP, 20 mg at 08/04/20 0853     haloperidol (HALDOL) tablet 5 mg, 5 mg, Oral, Q6H PRN, 5 mg at 07/28/20 1512 **OR** haloperidol lactate (HALDOL) injection 5 mg, 5 mg, Intramuscular, Q6H PRN, Bree Leary APRN CNP, 5 mg at 06/17/20 1410     lithium ER (LITHOBID) CR tablet 600 mg, 600 mg, Oral, Q12H MARIZOL, Roxy Hardy NP, 600 mg at 08/04/20 0852     LORazepam (ATIVAN) tablet 1 mg, 1 mg, Oral, Q6H PRN, 1 mg at 08/03/20 2023 **OR** LORazepam (ATIVAN) injection 1 mg, 1 mg, Intramuscular, Q6H PRN, Mai Johnson NP     magnesium hydroxide (MILK OF MAGNESIA) suspension 30 mL, 30 mL, Oral, At Bedtime PRN, Bree Leary APRN CNP     metFORMIN (GLUCOPHAGE-XR) 24 hr tablet 500 mg, 500 mg, Oral, Daily with supperHayley April Annette, NP, 500 mg at 08/03/20 1704     nicotine (NICORETTE) gum 2-4 mg, 2-4 mg, Buccal, Q1H PRN, Bree Leary APRN CNP, 4 mg at 08/02/20 1325     propranolol (INDERAL) tablet 10 mg, 10 mg, Oral, TID PRN, Mai Johnson NP, 10 mg at 07/23/20 2237  No results found for this or any previous visit (from the past 168 hour(s)).     Plan: add the indication of insomnia to the Benadryl PRN order    Telemedicine Visit: The patient's condition can be safely assessed and treated via synchronous audio and visual telemedicine encounter.      Start Time: 1200  Stop Time: 1220    Reason for Telemedicine Visit: covid    Originating Site (Patient Location): [facility]    Distant Site (Provider Location): Provider Remote Setting    Consent:  The patient/guardian has verbally consented to: the potential risks and benefits of telemedicine (video visit) versus in person care; bill my insurance or make self-payment for services provided; and responsibility for payment of non-covered services.     Mode of Communication:  Video Conference via [employer]    As the provider I attest to compliance with applicable laws  and regulations related to telemedicine.

## 2020-08-04 NOTE — PLAN OF CARE
Spoke 1:1 with pt to give them an update. Let pt know that I will be calling [facility] tomorrow to get an update on the [facility] list and that the LADC will be making a referral today to [facility] with the recommendation of  put into the referral. Pt states she would prefer to go to treatment at [facility] than go to a [facility], although this will depend on bed availability and if accepted. Pt is aware of this. Pt does not need anything else from the  at this time.

## 2020-08-04 NOTE — PROGRESS NOTES
Intake packet left with Pt for [facility] facility. Pt asleep when this writer tried to speak w/pt. Will follow up.

## 2020-08-04 NOTE — PLAN OF CARE
Face to face end of shift report will be communicated to oncoming RN.    Problem: Adult Behavioral Health Plan of Care  Goal: Patient-Specific Goal (Individualization)  Description: Patient will sleep 6-8 hours per night  Patient will eat at 75% of meals daily  Patient will attend >50% of group programming daily  Patient will be compliant with medication  Patient will have appropriate boundaries staff and peers during hospital      8/4/2020 0026 by Marbella Renteria RN  Outcome: No Change     Problem: Mental State/Mood Impairment (Psychotic Signs/Symptoms)  Goal: Improved Mental State and Mood (Psychotic Signs/Symptoms)  Description: Patient will have a reality based conversation.  Patient will report a decrease in delusions/hallucinations.  Patient will use coping skills to manage labile behavior.  8/4/2020 0026 by Marbella Renteria RN  Outcome: No Change   Face to face end of shift report obtained from ASHWIN Feng. Pt observed resting in bed.  Pt appears to be sleeping in bed with eyes closed, having regular respirations, and position changes. 15 minutes and PRN safety checks completed with no noted complains. Will continue to monitor.   0550- Pt appears to have slept 7 hours.

## 2020-08-04 NOTE — PLAN OF CARE
Face to face report received from Marbella KELLY RN.   Rounding completed, pt observed this morning in bed appears to be sleeping.    Pt calm and cooperative this morning taking AM medications.   Pt in attending morning group sessions this morning.    Pt meet with Dr. Grossman on iPad today. Pt did state to provider has been sleep walking, provider does not believe this to be a side effects to current medications. Provider did schedule Benadryl 50 mg to help with sleep.   No other complaints at this time.     Pt denies SI, HI, AH, and VH this shift.      Problem: Adult Behavioral Health Plan of Care  Goal: Patient-Specific Goal (Individualization)  Description: Patient will sleep 6-8 hours per night  Patient will eat at 75% of meals daily  Patient will attend >50% of group programming daily  Patient will be compliant with medication  Patient will have appropriate boundaries staff and peers during hospital      8/4/2020 1127 by Lilia Razo RN  Outcome: Improving  Note:        Problem: Mental State/Mood Impairment (Psychotic Signs/Symptoms)  Goal: Improved Mental State and Mood (Psychotic Signs/Symptoms)  Description: Patient will have a reality based conversation.  Patient will report a decrease in delusions/hallucinations.  Patient will use coping skills to manage labile behavior.  8/4/2020 1127 by Lilia Razo RN  Outcome: Improving

## 2020-08-05 PROCEDURE — 12400011

## 2020-08-05 PROCEDURE — 25000132 ZZH RX MED GY IP 250 OP 250 PS 637: Performed by: NURSE PRACTITIONER

## 2020-08-05 RX ADMIN — LITHIUM CARBONATE 600 MG: 300 TABLET, EXTENDED RELEASE ORAL at 20:19

## 2020-08-05 RX ADMIN — ARIPIPRAZOLE 10 MG: 10 TABLET ORAL at 08:09

## 2020-08-05 RX ADMIN — LITHIUM CARBONATE 600 MG: 300 TABLET, EXTENDED RELEASE ORAL at 08:09

## 2020-08-05 RX ADMIN — FAMOTIDINE 20 MG: 20 TABLET, FILM COATED ORAL at 08:09

## 2020-08-05 RX ADMIN — BENZTROPINE MESYLATE 1 MG: 1 TABLET ORAL at 08:09

## 2020-08-05 RX ADMIN — FAMOTIDINE 20 MG: 20 TABLET, FILM COATED ORAL at 20:19

## 2020-08-05 RX ADMIN — NICOTINE POLACRILEX 4 MG: 2 GUM, CHEWING ORAL at 17:49

## 2020-08-05 RX ADMIN — METFORMIN ER 500 MG 500 MG: 500 TABLET ORAL at 16:26

## 2020-08-05 ASSESSMENT — ACTIVITIES OF DAILY LIVING (ADL)
ORAL_HYGIENE: INDEPENDENT
DRESS: SCRUBS (BEHAVIORAL HEALTH)
LAUNDRY: UNABLE TO COMPLETE
HYGIENE/GROOMING: INDEPENDENT

## 2020-08-05 NOTE — PLAN OF CARE
Face to face end of shift report will be communicated to oncoming RN.     Problem: Adult Behavioral Health Plan of Care  Goal: Patient-Specific Goal (Individualization)  Description: Patient will sleep 6-8 hours per night  Patient will eat at 75% of meals daily  Patient will attend >50% of group programming daily  Patient will be compliant with medication  Patient will have appropriate boundaries staff and peers during hospital      8/5/2020 0010 by Marbella Renteria RN  Outcome: No Change     Problem: Mental State/Mood Impairment (Psychotic Signs/Symptoms)  Goal: Improved Mental State and Mood (Psychotic Signs/Symptoms)  Description: Patient will have a reality based conversation.  Patient will report a decrease in delusions/hallucinations.  Patient will use coping skills to manage labile behavior.  8/5/2020 0010 by Marbella Renteria RN  Outcome: No Change   Face to face end of shift report obtained from ASHWIN Feng. Pt observed resting in bed.  Pt appears to be sleeping in bed with eyes closed, having regular respirations, and position changes. 15 minutes and PRN safety checks completed with no noted complains. Will continue to monitor.   0545- Pt appeared to have slept 5.5 hours.

## 2020-08-05 NOTE — PLAN OF CARE
Problem: Mental State/Mood Impairment (Psychotic Signs/Symptoms)  Goal: Improved Mental State and Mood (Psychotic Signs/Symptoms)  Description: Patient will have a reality based conversation.  Patient will report a decrease in delusions/hallucinations.  Patient will use coping skills to manage labile behavior.  8/5/2020 1122 by Oneyda Lopez RN  Outcome: Improving   Patient is able to have a linear, reality based conversation.  Denies hallucinations and delusions at this time.        Problem: Adult Behavioral Health Plan of Care  Goal: Patient-Specific Goal (Individualization)  Description: Patient will sleep 6-8 hours per night  Patient will eat at 75% of meals daily  Patient will attend >50% of group programming daily  Patient will be compliant with medication  Patient will have appropriate boundaries staff and peers during hospital      8/5/2020 1122 by Oneyda Lopez RN  Outcome: Improving  Note:      Patient has been calm, cooperative, and medication compliant this shift.  Came out to [facility] for breakfast but was isolative to her room for much of the day.  Denies all mental health criteria.  No complaints of pain.  VS WNL.  Face to face end of shift report communicated to evening shift ASHWIN.     Oneyda Lopez RN  8/5/2020  11:25 AM

## 2020-08-05 NOTE — PLAN OF CARE
Called [facility] to get a weekly update on pt. Pt is currently on the list for all [facility] sites. Waiting list is undetermined at this time due to the amount of high priority cases and unable to get an exact date for placement. Waitlist can change day to day per [facility] worker.    Faxed weekly update paperwork to [facility].

## 2020-08-06 PROCEDURE — 25000132 ZZH RX MED GY IP 250 OP 250 PS 637: Performed by: NURSE PRACTITIONER

## 2020-08-06 PROCEDURE — 99232 SBSQ HOSP IP/OBS MODERATE 35: CPT | Performed by: NURSE PRACTITIONER

## 2020-08-06 PROCEDURE — 12400011

## 2020-08-06 RX ORDER — QUETIAPINE FUMARATE 50 MG/1
50-100 TABLET, FILM COATED ORAL
Status: DISCONTINUED | OUTPATIENT
Start: 2020-08-06 | End: 2020-09-02 | Stop reason: HOSPADM

## 2020-08-06 RX ADMIN — LITHIUM CARBONATE 600 MG: 300 TABLET, EXTENDED RELEASE ORAL at 08:19

## 2020-08-06 RX ADMIN — LORAZEPAM 1 MG: 1 TABLET ORAL at 17:47

## 2020-08-06 RX ADMIN — ARIPIPRAZOLE 10 MG: 10 TABLET ORAL at 08:19

## 2020-08-06 RX ADMIN — FAMOTIDINE 20 MG: 20 TABLET, FILM COATED ORAL at 20:15

## 2020-08-06 RX ADMIN — FAMOTIDINE 20 MG: 20 TABLET, FILM COATED ORAL at 08:19

## 2020-08-06 RX ADMIN — METFORMIN ER 500 MG 500 MG: 500 TABLET ORAL at 17:40

## 2020-08-06 RX ADMIN — QUETIAPINE 50 MG: 50 TABLET, FILM COATED ORAL at 20:18

## 2020-08-06 RX ADMIN — BENZTROPINE MESYLATE 1 MG: 1 TABLET ORAL at 08:19

## 2020-08-06 RX ADMIN — NICOTINE POLACRILEX 4 MG: 2 GUM, CHEWING ORAL at 15:34

## 2020-08-06 RX ADMIN — LITHIUM CARBONATE 600 MG: 300 TABLET, EXTENDED RELEASE ORAL at 20:15

## 2020-08-06 ASSESSMENT — ACTIVITIES OF DAILY LIVING (ADL)
HYGIENE/GROOMING: INDEPENDENT
DRESS: SCRUBS (BEHAVIORAL HEALTH);INDEPENDENT
ORAL_HYGIENE: INDEPENDENT

## 2020-08-06 NOTE — PLAN OF CARE
"Problem: Adult Behavioral Health Plan of Care  Goal: Patient-Specific Goal (Individualization)  Description: Patient will sleep 6-8 hours per night  Patient will eat at 75% of meals daily  Patient will attend >50% of group programming daily  Patient will be compliant with medication  Patient will have appropriate boundaries staff and peers during hospital  8/6/2020 1816 by Luciana Gonzalez, RN  Outcome: Improving  Note: Pt had a blunted affect. Pt verbalized that she is feeling \"bored\" and having cravings for IV meth and heroin. She would like to talk to provider about what she can do about her cravings. She participated in milieu activities. Ate 100% of supper. She was compliant with her scheduled medications.     1747 - Pt requested and rec'd 1 mg of PRN Ativan for high anxiety. Good effect reported.    2018 - Pt requested and rec'd 50 mg of PRN Seroquel for sleep.     Face to face end of shift report communicated to oncoming RN.     Problem: Mental State/Mood Impairment (Psychotic Signs/Symptoms)  Goal: Improved Mental State and Mood (Psychotic Signs/Symptoms)  Description: Patient will have a reality based conversation.  Patient will report a decrease in delusions/hallucinations.  Patient will use coping skills to manage labile behavior.  8/6/2020 1816 by Luciana Gonzalez, RN  Outcome: Improving  Note: Pt denied hallucinations. No evidence of delusional thinking noted. No mood lability noted. Pt was able to have a reality based conversation.      "

## 2020-08-06 NOTE — PROGRESS NOTES
Contacted [facility] treatment facility and left message with April in admitting asking where women can be referred rather thatn [facility]. Will follow up.

## 2020-08-06 NOTE — PLAN OF CARE
Problem: Adult Behavioral Health Plan of Care  Goal: Patient-Specific Goal (Individualization)  Description: Patient will sleep 6-8 hours per night  Patient will eat at 75% of meals daily  Patient will attend >50% of group programming daily  Patient will be compliant with medication  Patient will have appropriate boundaries staff and peers during hospital    Pt appears to be sleeping comfortably with regular respirations. Pt slept approx. 7 hours this shift. No complaints at this time. Will continue to monitor.    Outcome: Improving    Problem: Mental State/Mood Impairment (Psychotic Signs/Symptoms)  Goal: Improved Mental State and Mood (Psychotic Signs/Symptoms)  Description: Patient will have a reality based conversation.  Patient will report a decrease in delusions/hallucinations.  Patient will use coping skills to manage labile behavior.    Outcome: Improving    Face to face end of shift report communicated to oncromelia RN.     8/6/2020  6:02 AM

## 2020-08-06 NOTE — PLAN OF CARE
Face to face shift report received from Bibiana MARINO RN.       Problem: Adult Behavioral Health Plan of Care  Goal: Patient-Specific Goal (Individualization)  Description: Patient will sleep 6-8 hours per night  Patient will eat at 75% of meals daily  Patient will attend >50% of group programming daily  Patient will be compliant with medication  Patient will have appropriate boundaries staff and peers during hospital      8/6/2020 0926 by Sabiha Gibson, RN  Outcome: Improving  Note: Calm, cooperative, and medication compliant. Denies mental health issues. Reports poor sleep last noc. Pleasant and appropriate during conversation. Withdrawn to room majority of shift. No reports of pain.       Problem: Mental State/Mood Impairment (Psychotic Signs/Symptoms)  Goal: Improved Mental State and Mood (Psychotic Signs/Symptoms)  Description: Patient will have a reality based conversation.  Patient will report a decrease in delusions/hallucinations.  Patient will use coping skills to manage labile behavior.  8/6/2020 0926 by Sabiha Gibson RN  Outcome: Improving            Face to face end of shift report to be communicated to oncoming RN.

## 2020-08-06 NOTE — PROGRESS NOTES
"[facility]  Psychiatric Progress Note       Impression:     Patient resting in her room after breakfast.  She appears alert, cooperative and holds linear discussion.  However, she continues to have lack of insight, stating she does not even have a mental illness so why is she still here.  She also denies she has a CD problem now, states \"that's done now and I don't have cravings or temptation\".  We discuss her plan is still [facility] or possibly CD treatment - she is accepting of this.  She reports stable mood, anxiety because \"I'm not out there playing or laying in the sun\", and she reports not sleeping well, \"tossing and turning\".  We agree to utilize seroquel which has helped her with sleep in the past.  She denies si/hi.    Educated regarding medication indications, risks, benefits, side effects, contraindications and possible interactions. Verbally expressed understanding.        Diagnoses:      Schizoaffective Disorder, bipolar type  Substance Abuse Disorder     Attestation:  Patient has been seen and evaluated by me,  Bree CELESTIN, CNP          Interim History:   The patient's care was discussed with the treatment team and chart notes were reviewed.     BEHAVIORAL TEAM DISCUSSION     Progress: Fair. Improvement in psychosis and grandiosity, attending more group prgramming    Continued Stay Criteria/Rationale: lacks insight into mental health, current plan discharge to [facility] or possibly CD treatment    Medical/Physical:Hep C  Precautions:   Falls precaution?: YES       Behavioral Orders   Procedures     Code 1 - Restrict to Unit     Routine Programming       As clinically indicated     Status 15       Every 15 minutes.     Plan:   Continue Lithium  mg BID  Lithium level 0.7  Continue Abilify 10mg  Continue Cogentin 1 mg scheduled daily  Continue Propranolol 10 mg TID prn anxiety/akathisia  Continue metformin 500 mg daily with dinner for neuroleptic-induced weight gain  Adding Seroquel " "50-100mg at hs  Committed and chisholm granted    Rationale for change in precautions or plan: Medication adjustment to target mood and delusions    Petition for Chisholm including Invega, Zyprexa, Abilify, Risperdal, and Geodon     Participants: Bree Salazar, Nursing, OT, SW        Current Facility-Administered Medications   Medication     acetaminophen (TYLENOL) tablet 650 mg     albuterol (ACCUNEB) nebulizer solution 1.25 mg     ARIPiprazole (ABILIFY) tablet 10 mg     benztropine (COGENTIN) tablet 1 mg     diphenhydrAMINE (BENADRYL) capsule 50 mg     diphenhydrAMINE (BENADRYL) capsule 50 mg    Or     diphenhydrAMINE (BENADRYL) injection 50 mg     famotidine (PEPCID) tablet 20 mg     haloperidol (HALDOL) tablet 5 mg    Or     haloperidol lactate (HALDOL) injection 5 mg     lithium ER (LITHOBID) CR tablet 600 mg     LORazepam (ATIVAN) tablet 1 mg    Or     LORazepam (ATIVAN) injection 1 mg     magnesium hydroxide (MILK OF MAGNESIA) suspension 30 mL     metFORMIN (GLUCOPHAGE-XR) 24 hr tablet 500 mg     nicotine (NICORETTE) gum 2-4 mg     propranolol (INDERAL) tablet 10 mg            10 point ROS negative see H&P       Allergies:   No Known Allergies          Psychiatric Examination:   BP (!) 86/53   Pulse 79   Temp 97.6  F (36.4  C) (Tympanic)   Resp 16   Ht 1.638 m (5' 4.5\")   Wt 72.8 kg (160 lb 6.4 oz)   SpO2 98%   BMI 27.11 kg/m    Weight is 160 lbs 6.4 oz  Body mass index is 27.11 kg/m .     Appearance: awake, alert, dressed in hospital scrubs  Attitude: pleasant, more spontaneous  Eye Contact: fair  Mood: \"good\"  Affect: slight improvement, continues somewhat flat  Speech: clear, coherent, more talkative today  Psychomotor Behavior: some restlessness  Thought Process: reality based and linear, no grandiosity noted  Associations: no loosening of associations noted  Thought Content: denies hallucinations, denies suicidal thoughts  Insight:  limited  Judgment: limited  Oriented to:  x3  Attention Span and " Concentration: limited  Recent and Remote Memory:  impaired  Fund of Knowledge: delayed  Muscle Strength and Tone: normal  Gait and Station: normal             Labs:     Results for orders placed or performed during the hospital encounter of 06/11/20   HCG qualitative urine     Status: None   Result Value Ref Range    HCG Qual Urine Negative NEG^Negative   UA reflex to Microscopic and Culture     Status: Abnormal    Specimen: Midstream Urine   Result Value Ref Range    Color Urine Yellow     Appearance Urine Slightly Cloudy     Glucose Urine Negative NEG^Negative mg/dL    Bilirubin Urine Negative NEG^Negative    Ketones Urine Negative NEG^Negative mg/dL    Specific Gravity Urine 1.030 1.003 - 1.035    Blood Urine Negative NEG^Negative    pH Urine 6.0 4.7 - 8.0 pH    Protein Albumin Urine 30 (A) NEG^Negative mg/dL    Urobilinogen mg/dL Normal 0.0 - 2.0 mg/dL    Nitrite Urine Negative NEG^Negative    Leukocyte Esterase Urine Small (A) NEG^Negative    Source Midstream Urine     RBC Urine 3 (H) 0 - 2 /HPF    WBC Urine 4 0 - 5 /HPF    Bacteria Urine None (A) NEG^Negative /HPF    Squamous Epithelial /HPF Urine 18 (H) 0 - 1 /HPF    Mucous Urine Present (A) NEG^Negative /LPF    Amorphous Crystals Moderate (A) NEG^Negative /HPF   Urine Drugs of Abuse Screen Panel 13     Status: Abnormal   Result Value Ref Range    Cannabinoids (77-xro-0-carboxy-9-THC) Detected, Abnormal Result (A) NDET^Not Detected ng/mL    Phencyclidine (Phencyclidine) Not Detected NDET^Not Detected ng/mL    Cocaine (Benzoylecgonine) Not Detected NDET^Not Detected ng/mL    Methamphetamine (d-Methamphetamine) Detected, Abnormal Result (A) NDET^Not Detected ng/mL    Opiates (Morphine) Not Detected NDET^Not Detected ng/mL    Amphetamine (d-Amphetamine) Detected, Abnormal Result (A) NDET^Not Detected ng/mL    Benzodiazepines (Nordiazepam) Detected, Abnormal Result (A) NDET^Not Detected ng/mL    Tricyclic Antidepressants (Desipramine) Not Detected NDET^Not  Detected ng/mL    Methadone (Methadone) Not Detected NDET^Not Detected ng/mL    Barbiturates (Butalbital) Not Detected NDET^Not Detected ng/mL    Oxycodone (Oxycodone) Not Detected NDET^Not Detected ng/mL    Propoxyphene (Norpropoxyphene) Not Detected NDET^Not Detected ng/mL    Buprenorphine (Buprenorphine) Not Detected NDET^Not Detected ng/mL   CBC with platelets differential     Status: None   Result Value Ref Range    WBC 8.1 4.0 - 11.0 10e9/L    RBC Count 4.69 3.8 - 5.2 10e12/L    Hemoglobin 14.1 11.7 - 15.7 g/dL    Hematocrit 42.1 35.0 - 47.0 %    MCV 90 78 - 100 fl    MCH 30.1 26.5 - 33.0 pg    MCHC 33.5 31.5 - 36.5 g/dL    RDW 12.3 10.0 - 15.0 %    Platelet Count 280 150 - 450 10e9/L    Diff Method Automated Method     % Neutrophils 56.5 %    % Lymphocytes 35.2 %    % Monocytes 7.0 %    % Eosinophils 0.7 %    % Basophils 0.4 %    % Immature Granulocytes 0.2 %    Nucleated RBCs 0 0 /100    Absolute Neutrophil 4.6 1.6 - 8.3 10e9/L    Absolute Lymphocytes 2.8 0.8 - 5.3 10e9/L    Absolute Monocytes 0.6 0.0 - 1.3 10e9/L    Absolute Eosinophils 0.1 0.0 - 0.7 10e9/L    Absolute Basophils 0.0 0.0 - 0.2 10e9/L    Abs Immature Granulocytes 0.0 0 - 0.4 10e9/L    Absolute Nucleated RBC 0.0    Comprehensive metabolic panel     Status: Abnormal   Result Value Ref Range    Sodium 138 133 - 144 mmol/L    Potassium 3.8 3.4 - 5.3 mmol/L    Chloride 108 94 - 109 mmol/L    Carbon Dioxide 26 20 - 32 mmol/L    Anion Gap 4 3 - 14 mmol/L    Glucose 81 70 - 99 mg/dL    Urea Nitrogen 11 7 - 30 mg/dL    Creatinine 0.63 0.52 - 1.04 mg/dL    GFR Estimate >90 >60 mL/min/[1.73_m2]    GFR Estimate If Black >90 >60 mL/min/[1.73_m2]    Calcium 8.8 8.5 - 10.1 mg/dL    Bilirubin Total 0.4 0.2 - 1.3 mg/dL    Albumin 3.9 3.4 - 5.0 g/dL    Protein Total 8.1 6.8 - 8.8 g/dL    Alkaline Phosphatase 62 40 - 150 U/L     (H) 0 - 50 U/L    AST 84 (H) 0 - 45 U/L   TSH with free T4 reflex     Status: None   Result Value Ref Range    TSH 0.85 0.40 -  4.00 mU/L   Asymptomatic COVID-19 Virus (Coronavirus) by PCR     Status: None    Specimen: Nasopharyngeal   Result Value Ref Range    COVID-19 Virus PCR to [facility] - Source Nasopharyngeal     COVID-19 Virus PCR to [facility] - Result       Test received-See reflex to [place] test SARS CoV2 (COVID-19) Virus RT-PCR   SARS-CoV-2 COVID-19 Virus (Coronavirus) RT-PCR Nasopharyngeal     Status: None    Specimen: Nasopharyngeal   Result Value Ref Range    SARS-CoV-2 Virus Specimen Source Nasopharyngeal     SARS-CoV-2 PCR Result NEGATIVE     SARS-CoV-2 PCR Comment       This automated, real-time RT-PCR assay by [employer] on the [employer] has   been given Emergency Use Authorization (EUA) for the in vitro qualitative detection of RNA   from the SARS-CoV2 virus in nasopharyngeal swabs in viral transport medium from patients   with signs and symptoms of infection who are suspected of COVID-19. The performance is   unknown in asymptomatic patients.     Hepatic panel     Status: Abnormal   Result Value Ref Range    Bilirubin Direct <0.1 0.0 - 0.2 mg/dL    Bilirubin Total 0.3 0.2 - 1.3 mg/dL    Albumin 3.5 3.4 - 5.0 g/dL    Protein Total 7.7 6.8 - 8.8 g/dL    Alkaline Phosphatase 63 40 - 150 U/L     (H) 0 - 50 U/L     (H) 0 - 45 U/L   Hepatic panel     Status: Abnormal   Result Value Ref Range    Bilirubin Direct <0.1 0.0 - 0.2 mg/dL    Bilirubin Total 0.2 0.2 - 1.3 mg/dL    Albumin 3.4 3.4 - 5.0 g/dL    Protein Total 7.6 6.8 - 8.8 g/dL    Alkaline Phosphatase 56 40 - 150 U/L     (H) 0 - 50 U/L     (H) 0 - 45 U/L   Lithium level     Status: Abnormal   Result Value Ref Range    Lithium Level 0.48 (L) 0.60 - 1.20 mmol/L   Lithium level     Status: None   Result Value Ref Range    Lithium Level 0.70 0.60 - 1.20 mmol/L   Basic metabolic panel     Status: Abnormal   Result Value Ref Range    Sodium 134 133 - 144 mmol/L    Potassium 3.7 3.4 - 5.3 mmol/L    Chloride 104 94 - 109 mmol/L     Carbon Dioxide 28 20 - 32 mmol/L    Anion Gap 2 (L) 3 - 14 mmol/L    Glucose 85 70 - 99 mg/dL    Urea Nitrogen 13 7 - 30 mg/dL    Creatinine 0.61 0.52 - 1.04 mg/dL    GFR Estimate >90 >60 mL/min/[1.73_m2]    GFR Estimate If Black >90 >60 mL/min/[1.73_m2]    Calcium 9.2 8.5 - 10.1 mg/dL   Hepatic panel     Status: Abnormal   Result Value Ref Range    Bilirubin Direct 0.1 0.0 - 0.2 mg/dL    Bilirubin Total 0.3 0.2 - 1.3 mg/dL    Albumin 3.7 3.4 - 5.0 g/dL    Protein Total 8.1 6.8 - 8.8 g/dL    Alkaline Phosphatase 59 40 - 150 U/L     (H) 0 - 50 U/L     (H) 0 - 45 U/L   Lithium level     Status: None   Result Value Ref Range    Lithium Level 0.84 0.60 - 1.20 mmol/L   CBC with platelets differential     Status: None   Result Value Ref Range    WBC 7.2 4.0 - 11.0 10e9/L    RBC Count 4.40 3.8 - 5.2 10e12/L    Hemoglobin 13.3 11.7 - 15.7 g/dL    Hematocrit 39.8 35.0 - 47.0 %    MCV 91 78 - 100 fl    MCH 30.2 26.5 - 33.0 pg    MCHC 33.4 31.5 - 36.5 g/dL    RDW 12.9 10.0 - 15.0 %    Platelet Count 252 150 - 450 10e9/L    Diff Method Automated Method     % Neutrophils 53.1 %    % Lymphocytes 32.9 %    % Monocytes 11.5 %    % Eosinophils 1.8 %    % Basophils 0.4 %    % Immature Granulocytes 0.3 %    Nucleated RBCs 0 0 /100    Absolute Neutrophil 3.8 1.6 - 8.3 10e9/L    Absolute Lymphocytes 2.4 0.8 - 5.3 10e9/L    Absolute Monocytes 0.8 0.0 - 1.3 10e9/L    Absolute Eosinophils 0.1 0.0 - 0.7 10e9/L    Absolute Basophils 0.0 0.0 - 0.2 10e9/L    Abs Immature Granulocytes 0.0 0 - 0.4 10e9/L    Absolute Nucleated RBC 0.0    Hepatic panel     Status: Abnormal   Result Value Ref Range    Bilirubin Direct 0.2 0.0 - 0.2 mg/dL    Bilirubin Total 0.8 0.2 - 1.3 mg/dL    Albumin 3.7 3.4 - 5.0 g/dL    Protein Total 8.1 6.8 - 8.8 g/dL    Alkaline Phosphatase 57 40 - 150 U/L     (H) 0 - 50 U/L     (H) 0 - 45 U/L   Hepatitis C Screen Reflex to HCV RNA Quant and Genotype     Status: Abnormal   Result Value Ref Range     Hepatitis C Antibody Reactive (A) NR^Nonreactive   Lithium level     Status: None   Result Value Ref Range    Lithium Level 0.71 0.60 - 1.20 mmol/L   Hepatitis C RNA Quantitative     Status: Abnormal   Result Value Ref Range    HCV RNA Quant IU/ml 1,870,691 (A) HCVND^HCV RNA Not Detected [IU]/mL    Log of HCV RNA Qt 6.3 (H) <1.2 Log IU/mL

## 2020-08-06 NOTE — PLAN OF CARE
Face to face shift report received from ASHWIN Call. Rounding completed, pt observed.   Problem: Adult Behavioral Health Plan of Care  Goal: Plan of Care Review  Outcome: No Change     Problem: Adult Behavioral Health Plan of Care  Goal: Patient-Specific Goal (Individualization)  Description: Patient will sleep 6-8 hours per night  Patient will eat at 75% of meals daily  Patient will attend >50% of group programming daily  Patient will be compliant with medication  Patient will have appropriate boundaries staff and peers during hospital      8/5/2020 2032 by Ping Sierra RN  Outcome: No Change  Note: Shift Summery:    Pt denies pain this shift and HI. Pt has no plans.   Pt has been present in the unit milieu, she attended groups. Pt mood is pleasant with brighter affect. Speech is clear with safe behaviors. She ate 75% of meals . Pt has been medication compliant. Nursing will continue to monitor pt for any changes in behavior.        Face to face report will be communicated to oncromelia RN.    Ping Sierra RN  8/5/2020  8:34 PM   Problem: Adult Behavioral Health Plan of Care  Goal: Adheres to Safety Considerations for Self and Others  Outcome: No Change     Problem: Adult Behavioral Health Plan of Care  Goal: Optimized Coping Skills in Response to Life Stressors  Outcome: No Change     Problem: Adult Behavioral Health Plan of Care  Goal: Develops/Participates in Therapeutic [place] to Support Successful Transition  Outcome: No Change     Problem: Mental State/Mood Impairment (Psychotic Signs/Symptoms)  Goal: Improved Mental State and Mood (Psychotic Signs/Symptoms)  Description: Patient will have a reality based conversation.  Patient will report a decrease in delusions/hallucinations.  Patient will use coping skills to manage labile behavior.  8/5/2020 2032 by Ping Sierra RN  Outcome: No Change

## 2020-08-07 LAB — HCV GENTYP SERPL NAA+PROBE: NORMAL

## 2020-08-07 PROCEDURE — 25000132 ZZH RX MED GY IP 250 OP 250 PS 637: Performed by: NURSE PRACTITIONER

## 2020-08-07 PROCEDURE — 12400011

## 2020-08-07 RX ADMIN — FAMOTIDINE 20 MG: 20 TABLET, FILM COATED ORAL at 20:19

## 2020-08-07 RX ADMIN — ARIPIPRAZOLE 10 MG: 10 TABLET ORAL at 08:30

## 2020-08-07 RX ADMIN — METFORMIN ER 500 MG 500 MG: 500 TABLET ORAL at 17:44

## 2020-08-07 RX ADMIN — LITHIUM CARBONATE 600 MG: 300 TABLET, EXTENDED RELEASE ORAL at 08:29

## 2020-08-07 RX ADMIN — LITHIUM CARBONATE 600 MG: 300 TABLET, EXTENDED RELEASE ORAL at 20:19

## 2020-08-07 RX ADMIN — BENZTROPINE MESYLATE 1 MG: 1 TABLET ORAL at 08:29

## 2020-08-07 RX ADMIN — FAMOTIDINE 20 MG: 20 TABLET, FILM COATED ORAL at 08:29

## 2020-08-07 RX ADMIN — QUETIAPINE 100 MG: 50 TABLET, FILM COATED ORAL at 20:19

## 2020-08-07 ASSESSMENT — ACTIVITIES OF DAILY LIVING (ADL)
HYGIENE/GROOMING: INDEPENDENT
DRESS: SCRUBS (BEHAVIORAL HEALTH);INDEPENDENT
ORAL_HYGIENE: INDEPENDENT
LAUNDRY: UNABLE TO COMPLETE

## 2020-08-07 NOTE — PLAN OF CARE
Talked 1:1 with pt to give and update and gather insight on how they have been doing. Let pt know they are still on the [facility] list and is hard to tell when a bed will be available due to the number of high priority pts they have. Let pt know that [facility] was going to reach out to [facility] to see where they were referring pts to at this time. Pt does not need anything further from  at this time.

## 2020-08-07 NOTE — PLAN OF CARE
Problem: Adult Behavioral Health Plan of Care  Goal: Patient-Specific Goal (Individualization)  Description: Patient will sleep 6-8 hours per night  Patient will eat at 75% of meals daily  Patient will attend >50% of group programming daily  Patient will be compliant with medication  Patient will have appropriate boundaries staff and peers during hospital    Pt appears to be sleeping comfortably with regular respirations. Pt slept approx. 6.5 hours this shift. No complaints at this time. Will continue to monitor.    Outcome: Improving    Problem: Mental State/Mood Impairment (Psychotic Signs/Symptoms)  Goal: Improved Mental State and Mood (Psychotic Signs/Symptoms)  Description: Patient will have a reality based conversation.  Patient will report a decrease in delusions/hallucinations.  Patient will use coping skills to manage labile behavior.    Outcome: Improving    Face to face end of shift report communicated to oncromelia RN.     8/7/2020  5:56 AM

## 2020-08-07 NOTE — PLAN OF CARE
Face to face report received from Bibiana MARINO RN.   Rounding completed, pt seen in bed at this time appears to be sleeping.    Pt cooperative taking medications at this time, pt denies pain at this time.       Problem: Adult Behavioral Health Plan of Care  Goal: Patient-Specific Goal (Individualization)  Description: Patient will sleep 6-8 hours per night  Patient will eat at 75% of meals daily  Patient will attend >50% of group programming daily  Patient will be compliant with medication  Patient will have appropriate boundaries staff and peers during hospital      8/7/2020 0748 by Lilia Razo RN  Outcome: Improving  Note:        Problem: Mental State/Mood Impairment (Psychotic Signs/Symptoms)  Goal: Improved Mental State and Mood (Psychotic Signs/Symptoms)  Description: Patient will have a reality based conversation.  Patient will report a decrease in delusions/hallucinations.  Patient will use coping skills to manage labile behavior.  8/7/2020 0748 by Lilia Razo RN  Outcome: Improving      Face to face end of shift report  to be communicated to oncoming RN.     Lilia Razo RN  8/7/2020  7:51 AM

## 2020-08-07 NOTE — PLAN OF CARE
"  Problem: Adult Behavioral Health Plan of Care  Goal: Patient-Specific Goal (Individualization)  Description: Patient will sleep 6-8 hours per night  Patient will eat at 75% of meals daily  Patient will attend >50% of group programming daily  Patient will be compliant with medication  Patient will have appropriate boundaries staff and peers during hospital      8/7/2020 1753 by Lilia Tracy, RN  Note: Patient laying in bed resting/napping in beginning of shift. Up to unit with peers before dinner. Bright affect, clear speech and friendly during conversation. Denies any \"cravings\" this shift and states, \"they come and go\". Denies SI/HI, hallucinations and pain. In lounge watching television and attending groups for majority of shift. Maintains appropriate boundaries with staff and peers. Compliant with scheduled medications.   2019- Received PRN Seroquel 100 mg per request for sleep. Laying in bed resting shortly after for remainder of shift. Continue to monitor at this time.   Patient reported toenail falling off stating, \"it got caught in my sock\". Right foot, 5th metatarsal's toenail is removed. Small amount of bleeding, antibiotic cream and band aide applied.         Problem: Mental State/Mood Impairment (Psychotic Signs/Symptoms)  Goal: Improved Mental State and Mood (Psychotic Signs/Symptoms)  Description: Patient will have a reality based conversation.  Patient will report a decrease in delusions/hallucinations.  Patient will use coping skills to manage labile behavior.  8/7/2020 1753 by Lilia Tracy, RN  Note: Continue to monitor at this time.      Face to face end of shift report communicated to oncoming RN.     Lilia Tracy RN  8/7/2020  10:28 PM        "

## 2020-08-08 PROCEDURE — 25000132 ZZH RX MED GY IP 250 OP 250 PS 637: Performed by: NURSE PRACTITIONER

## 2020-08-08 PROCEDURE — 12400011

## 2020-08-08 PROCEDURE — 99231 SBSQ HOSP IP/OBS SF/LOW 25: CPT | Performed by: NURSE PRACTITIONER

## 2020-08-08 RX ADMIN — QUETIAPINE 100 MG: 50 TABLET, FILM COATED ORAL at 20:25

## 2020-08-08 RX ADMIN — FAMOTIDINE 20 MG: 20 TABLET, FILM COATED ORAL at 20:25

## 2020-08-08 RX ADMIN — METFORMIN ER 500 MG 500 MG: 500 TABLET ORAL at 17:27

## 2020-08-08 RX ADMIN — BENZTROPINE MESYLATE 1 MG: 1 TABLET ORAL at 08:08

## 2020-08-08 RX ADMIN — LITHIUM CARBONATE 600 MG: 300 TABLET, EXTENDED RELEASE ORAL at 20:25

## 2020-08-08 RX ADMIN — LITHIUM CARBONATE 600 MG: 300 TABLET, EXTENDED RELEASE ORAL at 08:09

## 2020-08-08 RX ADMIN — NICOTINE POLACRILEX 4 MG: 2 GUM, CHEWING ORAL at 17:50

## 2020-08-08 RX ADMIN — FAMOTIDINE 20 MG: 20 TABLET, FILM COATED ORAL at 08:08

## 2020-08-08 RX ADMIN — ARIPIPRAZOLE 10 MG: 10 TABLET ORAL at 08:09

## 2020-08-08 NOTE — PLAN OF CARE
Face to face report received from Bibiana MARINO RN.   Rounding completed, pt observed in bed at this time appears to be sleeping.       Pt cooperative taking medications this morning. Pt denies pain this morning.    Pt denies SI, HI, AH and VH this shift.  Pt isolative and in room napping today besides out quick for meals and groups.    Problem: Adult Behavioral Health Plan of Care  Goal: Patient-Specific Goal (Individualization)  Description: Patient will sleep 6-8 hours per night  Patient will eat at 75% of meals daily  Patient will attend >50% of group programming daily  Patient will be compliant with medication  Patient will have appropriate boundaries staff and peers during hospital      8/8/2020 0742 by Lilia Razo, RN  Outcome: Improving  Note:        Problem: Mental State/Mood Impairment (Psychotic Signs/Symptoms)  Goal: Improved Mental State and Mood (Psychotic Signs/Symptoms)  Description: Patient will have a reality based conversation.  Patient will report a decrease in delusions/hallucinations.  Patient will use coping skills to manage labile behavior.  8/8/2020 0742 by Lilia Razo, RN  Outcome: Improving       Face to face end of shift report to be communicated to oncoming RN.    Lilia Razo RN  8/8/2020  7:53 AM

## 2020-08-08 NOTE — PLAN OF CARE
Problem: Adult Behavioral Health Plan of Care  Goal: Patient-Specific Goal (Individualization)  Description: Patient will sleep 6-8 hours per night  Patient will eat at 75% of meals daily  Patient will attend >50% of group programming daily  Patient will be compliant with medication  Patient will have appropriate boundaries staff and peers during hospital    Pt appears to be sleeping comfortably with regular respirations. Pt slept approx. 7 hours this shift. No complaints at this time. Will continue to monitor.    Outcome: Improving    Problem: Mental State/Mood Impairment (Psychotic Signs/Symptoms)  Goal: Improved Mental State and Mood (Psychotic Signs/Symptoms)  Description: Patient will have a reality based conversation.  Patient will report a decrease in delusions/hallucinations.  Patient will use coping skills to manage labile behavior.    Outcome: Improving    Face to face end of shift report communicated to oncromelia RN.     8/8/2020  5:55 AM

## 2020-08-08 NOTE — PROGRESS NOTES
[facility]  Psychiatric Progress Note       Impression:   Patient reports sleeping better with Seroquel at night.  She denies any problems or issues, denies suicidal or homicidal thoughts, and denies changes in mood.  She admits she has not been going to many groups lately, staying in room again quite a bit - encourage her to try and attend them again.  Patient presents as cooperative, pleasant, and able to have linear discussion.  No changes in medications today.      Educated regarding medication indications, risks, benefits, side effects, contraindications and possible interactions. Verbally expressed understanding.        Diagnoses:      Schizoaffective Disorder, bipolar type  Substance Abuse Disorder     Attestation:  Patient has been seen and evaluated by me,  Bree CELESTIN, CNP          Interim History:   The patient's care was discussed with the treatment team and chart notes were reviewed.     BEHAVIORAL TEAM DISCUSSION     Progress: Fair. Improvement in psychosis and grandiosity, encourage attending more group prgramming    Continued Stay Criteria/Rationale: lacks insight into mental health, current plan discharge to [facility] or possibly CD treatment    Medical/Physical:Hep C  Precautions:   Falls precaution?: YES       Behavioral Orders   Procedures     Code 1 - Restrict to Unit     Routine Programming       As clinically indicated     Status 15       Every 15 minutes.     Plan:   Continue Lithium  mg BID  Lithium level 0.7  Continue Abilify 10mg  Continue Cogentin 1 mg scheduled daily  Continue Propranolol 10 mg TID prn anxiety/akathisia  Continue metformin 500 mg daily with dinner for neuroleptic-induced weight gain  Adding Seroquel 50-100mg at hs  Committed and chisholm granted    Rationale for change in precautions or plan: Medication adjustment to target mood and delusions    Petition for Chisholm including Invega, Zyprexa, Abilify, Risperdal, and Geodon     Participants: Bree  "Marie, Nursing, OT, SW        Current Facility-Administered Medications   Medication     acetaminophen (TYLENOL) tablet 650 mg     albuterol (ACCUNEB) nebulizer solution 1.25 mg     ARIPiprazole (ABILIFY) tablet 10 mg     benztropine (COGENTIN) tablet 1 mg     diphenhydrAMINE (BENADRYL) capsule 50 mg     diphenhydrAMINE (BENADRYL) capsule 50 mg    Or     diphenhydrAMINE (BENADRYL) injection 50 mg     famotidine (PEPCID) tablet 20 mg     haloperidol (HALDOL) tablet 5 mg    Or     haloperidol lactate (HALDOL) injection 5 mg     lithium ER (LITHOBID) CR tablet 600 mg     LORazepam (ATIVAN) tablet 1 mg    Or     LORazepam (ATIVAN) injection 1 mg     magnesium hydroxide (MILK OF MAGNESIA) suspension 30 mL     metFORMIN (GLUCOPHAGE-XR) 24 hr tablet 500 mg     nicotine (NICORETTE) gum 2-4 mg     propranolol (INDERAL) tablet 10 mg     QUEtiapine (SEROquel) tablet  mg            10 point ROS negative see H&P       Allergies:   No Known Allergies          Psychiatric Examination:   BP (!) 86/53   Pulse 79   Temp 97.6  F (36.4  C) (Tympanic)   Resp 16   Ht 1.638 m (5' 4.5\")   Wt 72.8 kg (160 lb 6.4 oz)   SpO2 98%   BMI 27.11 kg/m    Weight is 160 lbs 6.4 oz  Body mass index is 27.11 kg/m .     Appearance: awake, alert, dressed in hospital scrubs  Attitude: pleasant, more spontaneous  Eye Contact: fair  Mood: \"good\"  Affect: slight improvement, continues somewhat flat  Speech: clear, coherent, more talkative today  Psychomotor Behavior: some restlessness  Thought Process: reality based and linear, no grandiosity noted  Associations: no loosening of associations noted  Thought Content: denies hallucinations, denies suicidal thoughts  Insight:  limited  Judgment: limited  Oriented to:  x3  Attention Span and Concentration: limited  Recent and Remote Memory:  impaired  Fund of Knowledge: delayed  Muscle Strength and Tone: normal  Gait and Station: normal             Labs:     Results for orders placed or performed " during the hospital encounter of 06/11/20   HCG qualitative urine     Status: None   Result Value Ref Range    HCG Qual Urine Negative NEG^Negative   UA reflex to Microscopic and Culture     Status: Abnormal    Specimen: Midstream Urine   Result Value Ref Range    Color Urine Yellow     Appearance Urine Slightly Cloudy     Glucose Urine Negative NEG^Negative mg/dL    Bilirubin Urine Negative NEG^Negative    Ketones Urine Negative NEG^Negative mg/dL    Specific Gravity Urine 1.030 1.003 - 1.035    Blood Urine Negative NEG^Negative    pH Urine 6.0 4.7 - 8.0 pH    Protein Albumin Urine 30 (A) NEG^Negative mg/dL    Urobilinogen mg/dL Normal 0.0 - 2.0 mg/dL    Nitrite Urine Negative NEG^Negative    Leukocyte Esterase Urine Small (A) NEG^Negative    Source Midstream Urine     RBC Urine 3 (H) 0 - 2 /HPF    WBC Urine 4 0 - 5 /HPF    Bacteria Urine None (A) NEG^Negative /HPF    Squamous Epithelial /HPF Urine 18 (H) 0 - 1 /HPF    Mucous Urine Present (A) NEG^Negative /LPF    Amorphous Crystals Moderate (A) NEG^Negative /HPF   Urine Drugs of Abuse Screen Panel 13     Status: Abnormal   Result Value Ref Range    Cannabinoids (86-sth-1-carboxy-9-THC) Detected, Abnormal Result (A) NDET^Not Detected ng/mL    Phencyclidine (Phencyclidine) Not Detected NDET^Not Detected ng/mL    Cocaine (Benzoylecgonine) Not Detected NDET^Not Detected ng/mL    Methamphetamine (d-Methamphetamine) Detected, Abnormal Result (A) NDET^Not Detected ng/mL    Opiates (Morphine) Not Detected NDET^Not Detected ng/mL    Amphetamine (d-Amphetamine) Detected, Abnormal Result (A) NDET^Not Detected ng/mL    Benzodiazepines (Nordiazepam) Detected, Abnormal Result (A) NDET^Not Detected ng/mL    Tricyclic Antidepressants (Desipramine) Not Detected NDET^Not Detected ng/mL    Methadone (Methadone) Not Detected NDET^Not Detected ng/mL    Barbiturates (Butalbital) Not Detected NDET^Not Detected ng/mL    Oxycodone (Oxycodone) Not Detected NDET^Not Detected ng/mL     Propoxyphene (Norpropoxyphene) Not Detected NDET^Not Detected ng/mL    Buprenorphine (Buprenorphine) Not Detected NDET^Not Detected ng/mL   CBC with platelets differential     Status: None   Result Value Ref Range    WBC 8.1 4.0 - 11.0 10e9/L    RBC Count 4.69 3.8 - 5.2 10e12/L    Hemoglobin 14.1 11.7 - 15.7 g/dL    Hematocrit 42.1 35.0 - 47.0 %    MCV 90 78 - 100 fl    MCH 30.1 26.5 - 33.0 pg    MCHC 33.5 31.5 - 36.5 g/dL    RDW 12.3 10.0 - 15.0 %    Platelet Count 280 150 - 450 10e9/L    Diff Method Automated Method     % Neutrophils 56.5 %    % Lymphocytes 35.2 %    % Monocytes 7.0 %    % Eosinophils 0.7 %    % Basophils 0.4 %    % Immature Granulocytes 0.2 %    Nucleated RBCs 0 0 /100    Absolute Neutrophil 4.6 1.6 - 8.3 10e9/L    Absolute Lymphocytes 2.8 0.8 - 5.3 10e9/L    Absolute Monocytes 0.6 0.0 - 1.3 10e9/L    Absolute Eosinophils 0.1 0.0 - 0.7 10e9/L    Absolute Basophils 0.0 0.0 - 0.2 10e9/L    Abs Immature Granulocytes 0.0 0 - 0.4 10e9/L    Absolute Nucleated RBC 0.0    Comprehensive metabolic panel     Status: Abnormal   Result Value Ref Range    Sodium 138 133 - 144 mmol/L    Potassium 3.8 3.4 - 5.3 mmol/L    Chloride 108 94 - 109 mmol/L    Carbon Dioxide 26 20 - 32 mmol/L    Anion Gap 4 3 - 14 mmol/L    Glucose 81 70 - 99 mg/dL    Urea Nitrogen 11 7 - 30 mg/dL    Creatinine 0.63 0.52 - 1.04 mg/dL    GFR Estimate >90 >60 mL/min/[1.73_m2]    GFR Estimate If Black >90 >60 mL/min/[1.73_m2]    Calcium 8.8 8.5 - 10.1 mg/dL    Bilirubin Total 0.4 0.2 - 1.3 mg/dL    Albumin 3.9 3.4 - 5.0 g/dL    Protein Total 8.1 6.8 - 8.8 g/dL    Alkaline Phosphatase 62 40 - 150 U/L     (H) 0 - 50 U/L    AST 84 (H) 0 - 45 U/L   TSH with free T4 reflex     Status: None   Result Value Ref Range    TSH 0.85 0.40 - 4.00 mU/L   Asymptomatic COVID-19 Virus (Coronavirus) by PCR     Status: None    Specimen: Nasopharyngeal   Result Value Ref Range    COVID-19 Virus PCR to [facility] - Source Nasopharyngeal     COVID-19 Virus  PCR to [facility] - Result       Test received-See reflex to [place] test SARS CoV2 (COVID-19) Virus RT-PCR   SARS-CoV-2 COVID-19 Virus (Coronavirus) RT-PCR Nasopharyngeal     Status: None    Specimen: Nasopharyngeal   Result Value Ref Range    SARS-CoV-2 Virus Specimen Source Nasopharyngeal     SARS-CoV-2 PCR Result NEGATIVE     SARS-CoV-2 PCR Comment       This automated, real-time RT-PCR assay by [employer] on the [employer] has   been given Emergency Use Authorization (EUA) for the in vitro qualitative detection of RNA   from the SARS-CoV2 virus in nasopharyngeal swabs in viral transport medium from patients   with signs and symptoms of infection who are suspected of COVID-19. The performance is   unknown in asymptomatic patients.     Hepatic panel     Status: Abnormal   Result Value Ref Range    Bilirubin Direct <0.1 0.0 - 0.2 mg/dL    Bilirubin Total 0.3 0.2 - 1.3 mg/dL    Albumin 3.5 3.4 - 5.0 g/dL    Protein Total 7.7 6.8 - 8.8 g/dL    Alkaline Phosphatase 63 40 - 150 U/L     (H) 0 - 50 U/L     (H) 0 - 45 U/L   Hepatic panel     Status: Abnormal   Result Value Ref Range    Bilirubin Direct <0.1 0.0 - 0.2 mg/dL    Bilirubin Total 0.2 0.2 - 1.3 mg/dL    Albumin 3.4 3.4 - 5.0 g/dL    Protein Total 7.6 6.8 - 8.8 g/dL    Alkaline Phosphatase 56 40 - 150 U/L     (H) 0 - 50 U/L     (H) 0 - 45 U/L   Lithium level     Status: Abnormal   Result Value Ref Range    Lithium Level 0.48 (L) 0.60 - 1.20 mmol/L   Lithium level     Status: None   Result Value Ref Range    Lithium Level 0.70 0.60 - 1.20 mmol/L   Basic metabolic panel     Status: Abnormal   Result Value Ref Range    Sodium 134 133 - 144 mmol/L    Potassium 3.7 3.4 - 5.3 mmol/L    Chloride 104 94 - 109 mmol/L    Carbon Dioxide 28 20 - 32 mmol/L    Anion Gap 2 (L) 3 - 14 mmol/L    Glucose 85 70 - 99 mg/dL    Urea Nitrogen 13 7 - 30 mg/dL    Creatinine 0.61 0.52 - 1.04 mg/dL    GFR Estimate >90 >60 mL/min/[1.73_m2]     GFR Estimate If Black >90 >60 mL/min/[1.73_m2]    Calcium 9.2 8.5 - 10.1 mg/dL   Hepatic panel     Status: Abnormal   Result Value Ref Range    Bilirubin Direct 0.1 0.0 - 0.2 mg/dL    Bilirubin Total 0.3 0.2 - 1.3 mg/dL    Albumin 3.7 3.4 - 5.0 g/dL    Protein Total 8.1 6.8 - 8.8 g/dL    Alkaline Phosphatase 59 40 - 150 U/L     (H) 0 - 50 U/L     (H) 0 - 45 U/L   Lithium level     Status: None   Result Value Ref Range    Lithium Level 0.84 0.60 - 1.20 mmol/L   CBC with platelets differential     Status: None   Result Value Ref Range    WBC 7.2 4.0 - 11.0 10e9/L    RBC Count 4.40 3.8 - 5.2 10e12/L    Hemoglobin 13.3 11.7 - 15.7 g/dL    Hematocrit 39.8 35.0 - 47.0 %    MCV 91 78 - 100 fl    MCH 30.2 26.5 - 33.0 pg    MCHC 33.4 31.5 - 36.5 g/dL    RDW 12.9 10.0 - 15.0 %    Platelet Count 252 150 - 450 10e9/L    Diff Method Automated Method     % Neutrophils 53.1 %    % Lymphocytes 32.9 %    % Monocytes 11.5 %    % Eosinophils 1.8 %    % Basophils 0.4 %    % Immature Granulocytes 0.3 %    Nucleated RBCs 0 0 /100    Absolute Neutrophil 3.8 1.6 - 8.3 10e9/L    Absolute Lymphocytes 2.4 0.8 - 5.3 10e9/L    Absolute Monocytes 0.8 0.0 - 1.3 10e9/L    Absolute Eosinophils 0.1 0.0 - 0.7 10e9/L    Absolute Basophils 0.0 0.0 - 0.2 10e9/L    Abs Immature Granulocytes 0.0 0 - 0.4 10e9/L    Absolute Nucleated RBC 0.0    Hepatic panel     Status: Abnormal   Result Value Ref Range    Bilirubin Direct 0.2 0.0 - 0.2 mg/dL    Bilirubin Total 0.8 0.2 - 1.3 mg/dL    Albumin 3.7 3.4 - 5.0 g/dL    Protein Total 8.1 6.8 - 8.8 g/dL    Alkaline Phosphatase 57 40 - 150 U/L     (H) 0 - 50 U/L     (H) 0 - 45 U/L   Hepatitis C Screen Reflex to HCV RNA Quant and Genotype     Status: Abnormal   Result Value Ref Range    Hepatitis C Antibody Reactive (A) NR^Nonreactive   Lithium level     Status: None   Result Value Ref Range    Lithium Level 0.71 0.60 - 1.20 mmol/L   Hepatitis C RNA Quantitative     Status: Abnormal    Result Value Ref Range    HCV RNA Quant IU/ml 1,870,691 (A) HCVND^HCV RNA Not Detected [IU]/mL    Log of HCV RNA Qt 6.3 (H) <1.2 Log IU/mL   Hepatitis C High Resolution Genotype     Status: None   Result Value Ref Range    Hepatitis C High Resolution 1a

## 2020-08-08 NOTE — PLAN OF CARE
"  Problem: Adult Behavioral Health Plan of Care  Goal: Patient-Specific Goal (Individualization)  Description: Patient will sleep 6-8 hours per night  Patient will eat at 75% of meals daily  Patient will attend >50% of group programming daily  Patient will be compliant with medication  Patient will have appropriate boundaries staff and peers during hospital      8/8/2020 1547 by Lilia Tracy, RN  Note: Patient up on unit with peers and participating in groups all shift. Reports sleeping well last night and states, \"I feel pretty good\". Continues to state, \"I think the Seroquel works better than any other meds they've given me and it's a neuroleptic too.\" Affect appears brighter, clear speech and friendly during conversations. Holding reality based conversations and socializing with peers well. Denies all assessment criteria. Compliant with scheduled medications.   2025- Received PRN Seroquel 100 mg per request for sleep.   Patient laying down to rest before 2100 for remainder of shift. Continue to monitor at this time.        Problem: Mental State/Mood Impairment (Psychotic Signs/Symptoms)  Goal: Improved Mental State and Mood (Psychotic Signs/Symptoms)  Description: Patient will have a reality based conversation.  Patient will report a decrease in delusions/hallucinations.  Patient will use coping skills to manage labile behavior.  8/8/2020 1547 by Lilia Tracy, RN  Note: Continue to monitor at this time.      Face to face end of shift report communicated to nickolas CHRISTOPHER.     Lilia Tracy, RN  8/8/2020  10:37 PM        "

## 2020-08-09 PROCEDURE — 25000132 ZZH RX MED GY IP 250 OP 250 PS 637: Performed by: NURSE PRACTITIONER

## 2020-08-09 PROCEDURE — 12400011

## 2020-08-09 RX ADMIN — METFORMIN ER 500 MG 500 MG: 500 TABLET ORAL at 17:19

## 2020-08-09 RX ADMIN — FAMOTIDINE 20 MG: 20 TABLET, FILM COATED ORAL at 20:49

## 2020-08-09 RX ADMIN — BENZTROPINE MESYLATE 1 MG: 1 TABLET ORAL at 08:22

## 2020-08-09 RX ADMIN — LITHIUM CARBONATE 600 MG: 300 TABLET, EXTENDED RELEASE ORAL at 20:49

## 2020-08-09 RX ADMIN — LITHIUM CARBONATE 600 MG: 300 TABLET, EXTENDED RELEASE ORAL at 08:22

## 2020-08-09 RX ADMIN — FAMOTIDINE 20 MG: 20 TABLET, FILM COATED ORAL at 08:21

## 2020-08-09 RX ADMIN — QUETIAPINE 100 MG: 50 TABLET, FILM COATED ORAL at 20:49

## 2020-08-09 RX ADMIN — ARIPIPRAZOLE 10 MG: 10 TABLET ORAL at 08:22

## 2020-08-09 ASSESSMENT — ACTIVITIES OF DAILY LIVING (ADL)
LAUNDRY: UNABLE TO COMPLETE
HYGIENE/GROOMING: INDEPENDENT
DRESS: SCRUBS (BEHAVIORAL HEALTH);INDEPENDENT
ORAL_HYGIENE: INDEPENDENT

## 2020-08-09 ASSESSMENT — MIFFLIN-ST. JEOR: SCORE: 1639.29

## 2020-08-09 NOTE — PLAN OF CARE
Face to face received from Bibiana MARINO RN.   Rounding completed, pt observed in bed at this time appears to be sleeping.     Pt out in lounge area this morning for breakfast and then back quick into room. Pt states bored and not attempting  to be withdrawn or isolative at this time. Pt denies thoughts of SI or HI this morning. Pt denies AH, VH and does not appear to be responding. Pt denies pain this morning, and states she feels rested today and no trouble falling or staying asleep last night.     Pt did not participate in group sessions today. Pt only out of room this shift quick for meals.     Problem: Adult Behavioral Health Plan of Care  Goal: Patient-Specific Goal (Individualization)  Description: Patient will sleep 6-8 hours per night  Patient will eat at 75% of meals daily  Patient will attend >50% of group programming daily  Patient will be compliant with medication  Patient will have appropriate boundaries staff and peers during hospital      8/9/2020 0940 by Lilia Razo RN  Outcome: Improving  Note:        Problem: Mental State/Mood Impairment (Psychotic Signs/Symptoms)  Goal: Improved Mental State and Mood (Psychotic Signs/Symptoms)  Description: Patient will have a reality based conversation.  Patient will report a decrease in delusions/hallucinations.  Patient will use coping skills to manage labile behavior.  8/9/2020 0940 by Lilia Razo RN  Outcome: Improving    Face to face end of shift report  to be communicated to oncoming RN.     Lilia Razo RN  8/9/2020  9:56 AM

## 2020-08-09 NOTE — PLAN OF CARE
"  Problem: Adult Behavioral Health Plan of Care  Goal: Patient-Specific Goal (Individualization)  Description: Patient will sleep 6-8 hours per night  Patient will eat at 75% of meals daily  Patient will attend >50% of group programming daily  Patient will be compliant with medication  Patient will have appropriate boundaries staff and peers during hospital      8/9/2020 1635 by Lilia Tracy, RN  Note: Patient showered in beginning of shift. Up to unit watching television and attending all groups this shift. Bright affect, clear speech and friendly with staff and peers. Continues to socialize with peers more and reports feeling \"good\". States, \"I feel good, I've just been here a long time and am bored.\" Denies all assessment criteria. Compliant with scheduled medications.   2049- Received PRN Seroquel 100 mg per request for sleep. Laying in bed resting shortly after for remainder of shift. Continue to monitor at this time.        Problem: Mental State/Mood Impairment (Psychotic Signs/Symptoms)  Goal: Improved Mental State and Mood (Psychotic Signs/Symptoms)  Description: Patient will have a reality based conversation.  Patient will report a decrease in delusions/hallucinations.  Patient will use coping skills to manage labile behavior.  8/9/2020 1635 by Lilia Tracy, RN  Note: Continue to monitor at this time.      Face to face end of shift report communicated to nickolas CHRISTOPHER.     Lilia Tracy, ASHWIN  8/9/2020  10:56 PM        "

## 2020-08-09 NOTE — PLAN OF CARE
Problem: Adult Behavioral Health Plan of Care  Goal: Patient-Specific Goal (Individualization)  Description: Patient will sleep 6-8 hours per night  Patient will eat at 75% of meals daily  Patient will attend >50% of group programming daily  Patient will be compliant with medication  Patient will have appropriate boundaries staff and peers during hospital    Pt appears to be sleeping comfortably with regular respirations. Up x1 for a snack. Pt slept approx. 6.5 hours this shift. No complaints at this time. Will continue to monitor.    Outcome: Improving    Problem: Mental State/Mood Impairment (Psychotic Signs/Symptoms)  Goal: Improved Mental State and Mood (Psychotic Signs/Symptoms)  Description: Patient will have a reality based conversation.  Patient will report a decrease in delusions/hallucinations.  Patient will use coping skills to manage labile behavior.    Outcome: Improving    Face to face end of shift report communicated to oncoming RN.     8/9/2020  6:02 AM

## 2020-08-10 PROCEDURE — 25000132 ZZH RX MED GY IP 250 OP 250 PS 637: Performed by: NURSE PRACTITIONER

## 2020-08-10 PROCEDURE — 12400011

## 2020-08-10 PROCEDURE — 99231 SBSQ HOSP IP/OBS SF/LOW 25: CPT | Performed by: NURSE PRACTITIONER

## 2020-08-10 RX ADMIN — FAMOTIDINE 20 MG: 20 TABLET, FILM COATED ORAL at 08:18

## 2020-08-10 RX ADMIN — ARIPIPRAZOLE 10 MG: 10 TABLET ORAL at 08:18

## 2020-08-10 RX ADMIN — QUETIAPINE 100 MG: 50 TABLET, FILM COATED ORAL at 20:15

## 2020-08-10 RX ADMIN — BENZTROPINE MESYLATE 1 MG: 1 TABLET ORAL at 08:18

## 2020-08-10 RX ADMIN — FAMOTIDINE 20 MG: 20 TABLET, FILM COATED ORAL at 20:15

## 2020-08-10 RX ADMIN — LITHIUM CARBONATE 600 MG: 300 TABLET, EXTENDED RELEASE ORAL at 20:15

## 2020-08-10 RX ADMIN — METFORMIN ER 500 MG 500 MG: 500 TABLET ORAL at 17:42

## 2020-08-10 RX ADMIN — LITHIUM CARBONATE 600 MG: 300 TABLET, EXTENDED RELEASE ORAL at 08:18

## 2020-08-10 ASSESSMENT — ACTIVITIES OF DAILY LIVING (ADL)
LAUNDRY: UNABLE TO COMPLETE
DRESS: SCRUBS (BEHAVIORAL HEALTH);INDEPENDENT
HYGIENE/GROOMING: INDEPENDENT
ORAL_HYGIENE: INDEPENDENT

## 2020-08-10 NOTE — PLAN OF CARE
Problem: Adult Behavioral Health Plan of Care  Goal: Patient-Specific Goal (Individualization)  Description: Patient will sleep 6-8 hours per night  Patient will eat at 75% of meals daily  Patient will attend >50% of group programming daily  Patient will be compliant with medication  Patient will have appropriate boundaries staff and peers during hospital      8/10/2020 1635 by Lilia Tracy, RN  Note: Patient up on unit all shift. Affect continues to be brighter in the afternoons and attending all groups. Socializes with peers well and friendly with all staff interactions. Denies all assessment criteria. Showered this shift. Reports eating well and still sleeping well with Seroquel.   2015- Received PRN Seroquel 100 mg per request for sleep. Compliant with scheduled medications. Laying in bed to rest before 2100 for remainder of shift. Continue to monitor at this time.      Problem: Mental State/Mood Impairment (Psychotic Signs/Symptoms)  Goal: Improved Mental State and Mood (Psychotic Signs/Symptoms)  Description: Patient will have a reality based conversation.  Patient will report a decrease in delusions/hallucinations.  Patient will use coping skills to manage labile behavior.  8/10/2020 1635 by Lilia Tracy, RN  Note: Continue to monitor at this time.      Face to face end of shift report communicated to oncromelia CHRISTOPHER.     Lilia Tracy RN  8/10/2020  10:29 PM

## 2020-08-10 NOTE — PLAN OF CARE
Face to face shift report received from Marbella KELLY RN. Patient observed.      Problem: Adult Behavioral Health Plan of Care  Goal: Patient-Specific Goal (Individualization)  Description: Patient will sleep 6-8 hours per night  Patient will eat at 75% of meals daily  Patient will attend >50% of group programming daily  Patient will be compliant with medication  Patient will have appropriate boundaries staff and peers during hospital      Outcome: Improving  Note: Patient is sleeping in bed this morning when nurse approaches her. Pt denies SI, HI, depression, anxiety, hallucinations, and cravings. Pt reports she feels she is doing well today. She states she wishes she could go home and feels she would continue sobriety. Pt is social with peers she doesn't attend groups. Pt does spend the majority of time in bed. She reports there is  nothing to do. Pt is cooperative with nursing assessments and medications. Pt does get up and walks the hallway.        Problem: Mental State/Mood Impairment (Psychotic Signs/Symptoms)  Goal: Improved Mental State and Mood (Psychotic Signs/Symptoms)  Description: Patient will have a reality based conversation.  Patient will report a decrease in delusions/hallucinations.  Patient will use coping skills to manage labile behavior.  Outcome: Improving  Note: Patient is appropriate in conversations.    The face to face report will be communicated to on coming RN.

## 2020-08-11 PROCEDURE — 25000132 ZZH RX MED GY IP 250 OP 250 PS 637: Performed by: NURSE PRACTITIONER

## 2020-08-11 PROCEDURE — 12400011

## 2020-08-11 RX ADMIN — BENZTROPINE MESYLATE 1 MG: 1 TABLET ORAL at 08:09

## 2020-08-11 RX ADMIN — ACETAMINOPHEN 650 MG: 325 TABLET, FILM COATED ORAL at 21:18

## 2020-08-11 RX ADMIN — FAMOTIDINE 20 MG: 20 TABLET, FILM COATED ORAL at 20:13

## 2020-08-11 RX ADMIN — METFORMIN ER 500 MG 500 MG: 500 TABLET ORAL at 17:22

## 2020-08-11 RX ADMIN — QUETIAPINE 100 MG: 50 TABLET, FILM COATED ORAL at 20:13

## 2020-08-11 RX ADMIN — LITHIUM CARBONATE 600 MG: 300 TABLET, EXTENDED RELEASE ORAL at 20:13

## 2020-08-11 RX ADMIN — FAMOTIDINE 20 MG: 20 TABLET, FILM COATED ORAL at 08:09

## 2020-08-11 RX ADMIN — ARIPIPRAZOLE 10 MG: 10 TABLET ORAL at 08:09

## 2020-08-11 RX ADMIN — LITHIUM CARBONATE 600 MG: 300 TABLET, EXTENDED RELEASE ORAL at 08:09

## 2020-08-11 ASSESSMENT — ACTIVITIES OF DAILY LIVING (ADL)
HYGIENE/GROOMING: INDEPENDENT
ORAL_HYGIENE: INDEPENDENT
LAUNDRY: UNABLE TO COMPLETE
DRESS: SCRUBS (BEHAVIORAL HEALTH)

## 2020-08-11 NOTE — PLAN OF CARE
Problem: Adult Behavioral Health Plan of Care  Goal: Patient-Specific Goal (Individualization)  Description: Patient will sleep 6-8 hours per night  Patient will eat at 75% of meals daily  Patient will attend >50% of group programming daily  Patient will be compliant with medication  Patient will have appropriate boundaries staff and peers during hospital      8/11/2020 1826 by Lilia Tracy, RN  Note: Patient continues to be up on unit more in the afternoon. Affect continues to be bright and socializing with peers well. Laying down to nap after dinner for about two hours. Compliant with scheduled medications.   2013- Received PRN Seroquel 100 mg per request for sleep.   Patient up later this evening, attending groups and watching television with peers.   2118- Received PRN Tylenol 650 mg for generalized pain rated  8/10. Laying in bed to rest before 2200 for remainder of shift. Continue to monitor at this time.       Problem: Mental State/Mood Impairment (Psychotic Signs/Symptoms)  Goal: Improved Mental State and Mood (Psychotic Signs/Symptoms)  Description: Patient will have a reality based conversation.  Patient will report a decrease in delusions/hallucinations.  Patient will use coping skills to manage labile behavior.  8/11/2020 1826 by Lilia Tracy, RN  Note: Continue to monitor at this time.     Face to face end of shift report communicated to oncoming RN.     Lilia Tracy, RN  8/11/2020  10:46 PM

## 2020-08-11 NOTE — PLAN OF CARE
"Face to face shift report received from Linda MCGREGOR RN. Patient observed.      Problem: Adult Behavioral Health Plan of Care  Goal: Patient-Specific Goal (Individualization)  Description: Patient will sleep 6-8 hours per night  Patient will eat at 75% of meals daily  Patient will attend >50% of group programming daily  Patient will be compliant with medication  Patient will have appropriate boundaries staff and peers during hospital      8/11/2020 0951 by Izzy Luo RN  Outcome: Improving  Note: Patient is laying in bed this morning, she is cooperative with nursing assessment. Pt denies SI, HI, depression, anxiety, and hallucinations. Pt is compliant with medications. She reports no plan to attend groups this today. She reports the groups go against her beliefs, when asked what her beliefs are, she then states, \"it's just not my way of learning\" pt reports her plan is to sleep throughout the day and attend group at 7:30pm\". Nurse does encourage her to get up and be active today. Pt states \"maybe later\". Pt does get up and eats breakfast, she is social with peers.        Problem: Mental State/Mood Impairment (Psychotic Signs/Symptoms)  Goal: Improved Mental State and Mood (Psychotic Signs/Symptoms)  Description: Patient will have a reality based conversation.  Patient will report a decrease in delusions/hallucinations.  Patient will use coping skills to manage labile behavior.    Patient is reality based in conversation. She denies hallucinations does not talk of delusions.   8/11/2020 0951 by Izzy Luo RN  Outcome: Improving   The face to face report will be communicated to on coming RN.     "

## 2020-08-11 NOTE — PLAN OF CARE
Problem: Adult Behavioral Health Plan of Care  Goal: Patient-Specific Goal (Individualization)  Description: Patient will sleep 6-8 hours per night  Patient will eat at 75% of meals daily  Patient will attend >50% of group programming daily  Patient will be compliant with medication  Patient will have appropriate boundaries staff and peers during hospital      8/11/2020 0324 by Linda Fair, RN  Outcome: Improving  Note: Report received from Lilia rice. Pt observed sleeping in supine position with regular and unlabored respirations.    Pt got up briefly for a snack and returned to bed right after.    Pt has been in bed with eyes closed and regular respirations. 15 minute and PRN checks all night. No complaints offered. Will continue to monitor.    Pt slept approx 7 hours    Face to face end of shift report communicated to oncoming RN.    August 11, 2020  3:25 AM          Problem: Mental State/Mood Impairment (Psychotic Signs/Symptoms)  Goal: Improved Mental State and Mood (Psychotic Signs/Symptoms)  Description: Patient will have a reality based conversation.  Patient will report a decrease in delusions/hallucinations.  Patient will use coping skills to manage labile behavior.  8/11/2020 0324 by Linda Fair, RN  Outcome: Improving  Note: No issues or concerns noted at this time.

## 2020-08-12 PROCEDURE — 25000132 ZZH RX MED GY IP 250 OP 250 PS 637: Performed by: NURSE PRACTITIONER

## 2020-08-12 PROCEDURE — 99232 SBSQ HOSP IP/OBS MODERATE 35: CPT | Performed by: NURSE PRACTITIONER

## 2020-08-12 PROCEDURE — 12400011

## 2020-08-12 RX ORDER — FAMOTIDINE 20 MG/1
20 TABLET, FILM COATED ORAL 2 TIMES DAILY PRN
Status: DISCONTINUED | OUTPATIENT
Start: 2020-08-12 | End: 2020-09-02 | Stop reason: HOSPADM

## 2020-08-12 RX ORDER — BENZTROPINE MESYLATE 1 MG/1
1 TABLET ORAL DAILY PRN
Status: DISCONTINUED | OUTPATIENT
Start: 2020-08-12 | End: 2020-09-02 | Stop reason: HOSPADM

## 2020-08-12 RX ADMIN — LITHIUM CARBONATE 600 MG: 300 TABLET, EXTENDED RELEASE ORAL at 20:48

## 2020-08-12 RX ADMIN — ARIPIPRAZOLE 10 MG: 10 TABLET ORAL at 08:14

## 2020-08-12 RX ADMIN — QUETIAPINE 100 MG: 50 TABLET, FILM COATED ORAL at 20:50

## 2020-08-12 RX ADMIN — BENZTROPINE MESYLATE 1 MG: 1 TABLET ORAL at 08:14

## 2020-08-12 RX ADMIN — PROPRANOLOL HYDROCHLORIDE 10 MG: 10 TABLET ORAL at 17:41

## 2020-08-12 RX ADMIN — NICOTINE POLACRILEX 4 MG: 2 GUM, CHEWING ORAL at 16:16

## 2020-08-12 RX ADMIN — METFORMIN ER 500 MG 500 MG: 500 TABLET ORAL at 17:32

## 2020-08-12 RX ADMIN — LITHIUM CARBONATE 600 MG: 300 TABLET, EXTENDED RELEASE ORAL at 08:14

## 2020-08-12 RX ADMIN — NICOTINE POLACRILEX 4 MG: 2 GUM, CHEWING ORAL at 19:14

## 2020-08-12 RX ADMIN — FAMOTIDINE 20 MG: 20 TABLET, FILM COATED ORAL at 08:14

## 2020-08-12 ASSESSMENT — ACTIVITIES OF DAILY LIVING (ADL)
DRESS: SCRUBS (BEHAVIORAL HEALTH);INDEPENDENT
HYGIENE/GROOMING: INDEPENDENT
ORAL_HYGIENE: INDEPENDENT
HYGIENE/GROOMING: INDEPENDENT

## 2020-08-12 NOTE — PLAN OF CARE
Problem: Adult Behavioral Health Plan of Care  Goal: Patient-Specific Goal (Individualization)  Description: Patient will sleep 6-8 hours per night  Patient will eat at 75% of meals daily  Patient will attend >50% of group programming daily  Patient will be compliant with medication  Patient will have appropriate boundaries staff and peers during hospital    Pt in bed at start of shift. According to charting, pt slept approx. 7.5 hours last night. Ate 100% of meals. Compliant with medications and assessment. Pleasant. Pt denies criteria this am. Does ask if there are any updates regarding [facility] list. Informed pt no updates yet this morning. Will continue to monitor.    Outcome: Improving    Problem: Mental State/Mood Impairment (Psychotic Signs/Symptoms)  Goal: Improved Mental State and Mood (Psychotic Signs/Symptoms)  Description: Patient will have a reality based conversation.  Patient will report a decrease in delusions/hallucinations.  Patient will use coping skills to manage labile behavior.  Outcome: Improving     Face to face end of shift report communicated to oncoming RN.     8/12/2020

## 2020-08-12 NOTE — PLAN OF CARE
"Called and spoke with Marycarmen from [facility] this morning for update on the [facility] list- She states pt was referred on July 1st and they are working on referrals from June 22nd and there are currently 21 priority pt's.     Met with pt this morning- Pt was resting in her room. She states she is doing \"fine\" but is \"bored.\" Provided pt with update that writer got from [facility] this morning. Pt states the wait doesn't sound as bad as the last time staff provided her with update. Pt denies any other questions or concerns at this time.   "

## 2020-08-12 NOTE — PLAN OF CARE
Problem: Adult Behavioral Health Plan of Care  Goal: Patient-Specific Goal (Individualization)  Description: Patient will sleep 6-8 hours per night  Patient will eat at 75% of meals daily  Patient will attend >50% of group programming daily  Patient will be compliant with medication  Patient will have appropriate boundaries staff and peers during hospital  8/12/2020 1846 by Luciana Gonzalez, RN  Outcome: Improving  Note: Pt had a bright affect. She has been up on the unit and social with peers. 1741 - Pt rec'd 10 mg of PRN Propranolol for anxiety/akathisia - good effect reported. 2050 - Pt requested and rec'd 100 mg of PRN Seroquel for sleep.    Faec to face end of shift report communicated to oncoming RN.      Problem: Mental State/Mood Impairment (Psychotic Signs/Symptoms)  Goal: Improved Mental State and Mood (Psychotic Signs/Symptoms)  Description: Patient will have a reality based conversation.  Patient will report a decrease in delusions/hallucinations.  Patient will use coping skills to manage labile behavior.  8/12/2020 1846 by Luciana Gonzalez, RN  Outcome: Improving  Note: Pt denied hallucinations. No evidence of delusional thinking noted. Mood was calm.

## 2020-08-12 NOTE — PLAN OF CARE
Observed pt lying in a supine position - eyes closed - non-labored breathing noted. Will leave a sticky note for provider asking to consider making serequel an order as pt using prn nightly and pt relayed to afternoon nurse that she feels it is helping her.  It is now 0530 and pt has slept all noc.Face to face end of shift report communicated to oncoming RN.     Tita Power RN  8/12/2020  5:29 AM

## 2020-08-12 NOTE — PROGRESS NOTES
[facility]  Psychiatric Progress Note       Impression:   Patient is sitting up in lounge area, watching tv with peers.  She reports doing good, tells me she heard she has moved up on the list and continues to be anxious to leave.  Patient has been sleeping good taking seroquel prn.  She requests to have the pepcid and cogentin prn.  She denies any suicidal thoughts, denies hallucinations. Denies side effects from any of her medications.  Remains appropriate on the unit, tendency to isolate to her room - encourage to attend group programming.      Educated regarding medication indications, risks, benefits, side effects, contraindications and possible interactions. Verbally expressed understanding.        Diagnoses:      Schizoaffective Disorder, bipolar type  Substance Abuse Disorder     Attestation:  Patient has been seen and evaluated by me,  Bree CELESTIN CNP          Interim History:   The patient's care was discussed with the treatment team and chart notes were reviewed.     BEHAVIORAL TEAM DISCUSSION     Progress: Fair. Improvement in psychosis and grandiosity, not attending groups though out in lounge at times    Continued Stay Criteria/Rationale: lacks insight into mental health, current plan discharge to [facility] or possibly  treatment    Medical/Physical:Hep C  Precautions:   Falls precaution?: YES       Behavioral Orders   Procedures     Code 1 - Restrict to Unit     Routine Programming       As clinically indicated     Status 15       Every 15 minutes.     Plan:   Continue Lithium  mg BID  Lithium level 0.7  Continue Abilify 10mg  Continue Cogentin 1 mg - changed to prn  Continue Propranolol 10 mg TID prn anxiety/akathisia  Continue metformin 500 mg daily with dinner for neuroleptic-induced weight gain  Continue Seroquel 50-100mg at bedtime  Committed and chisholm granted    Rationale for change in precautions or plan:   Mood and psychotic symptoms improved, no changes  "today    Petition for [place] including Invega, Zyprexa, Abilify, Risperdal, and Geodon     Participants: Bree Leary, Nursing, OT, SW        Current Facility-Administered Medications   Medication     acetaminophen (TYLENOL) tablet 650 mg     albuterol (ACCUNEB) nebulizer solution 1.25 mg     ARIPiprazole (ABILIFY) tablet 10 mg     benztropine (COGENTIN) tablet 1 mg     diphenhydrAMINE (BENADRYL) capsule 50 mg     diphenhydrAMINE (BENADRYL) capsule 50 mg    Or     diphenhydrAMINE (BENADRYL) injection 50 mg     famotidine (PEPCID) tablet 20 mg     haloperidol (HALDOL) tablet 5 mg    Or     haloperidol lactate (HALDOL) injection 5 mg     lithium ER (LITHOBID) CR tablet 600 mg     LORazepam (ATIVAN) tablet 1 mg    Or     LORazepam (ATIVAN) injection 1 mg     magnesium hydroxide (MILK OF MAGNESIA) suspension 30 mL     metFORMIN (GLUCOPHAGE-XR) 24 hr tablet 500 mg     nicotine (NICORETTE) gum 2-4 mg     propranolol (INDERAL) tablet 10 mg     QUEtiapine (SEROquel) tablet  mg            10 point ROS - denies current concerns       Allergies:   No Known Allergies          Psychiatric Examination:   BP (!) 86/53   Pulse 79   Temp 97.6  F (36.4  C) (Tympanic)   Resp 16   Ht 1.638 m (5' 4.5\")   Wt 72.8 kg (160 lb 6.4 oz)   SpO2 98%   BMI 27.11 kg/m    Weight is 160 lbs 6.4 oz  Body mass index is 27.11 kg/m .     Appearance: awake, alert, dressed in hospital scrubs, casually groomed  Attitude: pleasant, cooperative  Eye Contact: fair  Mood: \"good\"  Affect: euthymic  Speech: clear, coherent  Psychomotor Behavior: no restlessness reported or observed  Thought Process: reality based and linear, no grandiosity noted  Associations: no loosening of associations noted  Thought Content: denies hallucinations, denies suicidal thoughts  Insight:  limited  Judgment: limited  Oriented to:  x3  Attention Span and Concentration: limited  Recent and Remote Memory:  impaired  Fund of Knowledge: delayed  Muscle Strength and " Tone: normal  Gait and Station: normal             Labs:     No results found for this or any previous visit (from the past 24 hour(s)).

## 2020-08-13 PROCEDURE — 25000132 ZZH RX MED GY IP 250 OP 250 PS 637: Performed by: NURSE PRACTITIONER

## 2020-08-13 PROCEDURE — 12400011

## 2020-08-13 PROCEDURE — 99231 SBSQ HOSP IP/OBS SF/LOW 25: CPT | Performed by: NURSE PRACTITIONER

## 2020-08-13 RX ADMIN — QUETIAPINE 50 MG: 50 TABLET, FILM COATED ORAL at 20:27

## 2020-08-13 RX ADMIN — LITHIUM CARBONATE 600 MG: 300 TABLET, EXTENDED RELEASE ORAL at 08:21

## 2020-08-13 RX ADMIN — NICOTINE POLACRILEX 4 MG: 2 GUM, CHEWING ORAL at 18:27

## 2020-08-13 RX ADMIN — LITHIUM CARBONATE 600 MG: 300 TABLET, EXTENDED RELEASE ORAL at 20:27

## 2020-08-13 RX ADMIN — METFORMIN ER 500 MG 500 MG: 500 TABLET ORAL at 17:16

## 2020-08-13 RX ADMIN — ARIPIPRAZOLE 10 MG: 10 TABLET ORAL at 08:21

## 2020-08-13 ASSESSMENT — ACTIVITIES OF DAILY LIVING (ADL)
DRESS: SCRUBS (BEHAVIORAL HEALTH);INDEPENDENT
HYGIENE/GROOMING: INDEPENDENT
HYGIENE/GROOMING: INDEPENDENT
ORAL_HYGIENE: INDEPENDENT

## 2020-08-13 NOTE — PLAN OF CARE
Problem: Adult Behavioral Health Plan of Care  Goal: Patient-Specific Goal (Individualization)  Description: Patient will sleep 6-8 hours per night  Patient will eat at 75% of meals daily  Patient will attend >50% of group programming daily  Patient will be compliant with medication  Patient will have appropriate boundaries staff and peers during hospital      8/13/2020 0348 by Linda Fair, RN  Outcome: Improving  Note: Report received from Luciana rice. Pt observed sleeping in supine position with regular and unlabored respirations.    Pt has been in bed with eyes closed and regular respirations. 15 minute and PRN checks all night. No complaints offered. Will continue to monitor.    Pt slept approx 8 hours    Face to face end of shift report communicated to oncoming RN.    August 13, 2020  3:48 AM          Problem: Mental State/Mood Impairment (Psychotic Signs/Symptoms)  Goal: Improved Mental State and Mood (Psychotic Signs/Symptoms)  Description: Patient will have a reality based conversation.  Patient will report a decrease in delusions/hallucinations.  Patient will use coping skills to manage labile behavior.  8/13/2020 0348 by Linda Fair, RN  Outcome: Improving  Note: Unable to assess due to pt sleeping. No issues or concerns noted at this time.

## 2020-08-13 NOTE — PROGRESS NOTES
"[facility]  Psychiatric Progress Note       Impression:     Jonelle is resting in bed when I see her this morning. She reports feeling \"pretty good\" and asks when she will be able to leave. In review of notes she has been up in the lounge and social with peers throughout the afternoon and evening. She is sleeping well at night. Denies any side effects from medications. Denies any suicidal thoughts or hallucinations. Encouraged her to attend some of the groups during the day.    Educated regarding medication indications, risks, benefits, side effects, contraindications and possible interactions. Verbally expressed understanding.        Diagnoses:      Schizoaffective Disorder, bipolar type  Substance Abuse Disorder     Attestation:  Patient has been seen and evaluated by me,  Bree CELESTIN CNP          Interim History:   The patient's care was discussed with the treatment team and chart notes were reviewed.     BEHAVIORAL TEAM DISCUSSION     Progress: Fair. Improvement in psychosis and grandiosity, not attending groups though out in lounge for longer periods of time    Continued Stay Criteria/Rationale: lacks insight into mental health, current plan discharge to [facility] or possibly  treatment    Medical/Physical:Hep C  Precautions:   Falls precaution?: YES       Behavioral Orders   Procedures     Code 1 - Restrict to Unit     Routine Programming       As clinically indicated     Status 15       Every 15 minutes.     Plan:   Continue Lithium  mg BID  Continue Abilify 10mg  Continue Cogentin 1 mg - changed to prn  Continue Propranolol 10 mg TID prn anxiety/akathisia  Continue metformin 500 mg daily with dinner for neuroleptic-induced weight gain  Continue Seroquel 50-100mg at bedtime  Committed and chisholm granted  Current plan [facility]    Rationale for change in precautions or plan:   Mood and psychotic symptoms improved, no changes today    Petition for Ch[place] including Invega, Zyprexa, Abilify, " "Risperdal, and Geodon     Participants: Bree Leary, Nursing, OT, SW        Current Facility-Administered Medications   Medication     acetaminophen (TYLENOL) tablet 650 mg     albuterol (ACCUNEB) nebulizer solution 1.25 mg     ARIPiprazole (ABILIFY) tablet 10 mg     benztropine (COGENTIN) tablet 1 mg     diphenhydrAMINE (BENADRYL) capsule 50 mg     diphenhydrAMINE (BENADRYL) capsule 50 mg    Or     diphenhydrAMINE (BENADRYL) injection 50 mg     famotidine (PEPCID) tablet 20 mg     haloperidol (HALDOL) tablet 5 mg    Or     haloperidol lactate (HALDOL) injection 5 mg     lithium ER (LITHOBID) CR tablet 600 mg     LORazepam (ATIVAN) tablet 1 mg    Or     LORazepam (ATIVAN) injection 1 mg     magnesium hydroxide (MILK OF MAGNESIA) suspension 30 mL     metFORMIN (GLUCOPHAGE-XR) 24 hr tablet 500 mg     nicotine (NICORETTE) gum 2-4 mg     propranolol (INDERAL) tablet 10 mg     QUEtiapine (SEROquel) tablet  mg            10 point ROS - denies current concerns       Allergies:   No Known Allergies          Psychiatric Examination:   BP (!) 86/53   Pulse 79   Temp 97.6  F (36.4  C) (Tympanic)   Resp 16   Ht 1.638 m (5' 4.5\")   Wt 72.8 kg (160 lb 6.4 oz)   SpO2 98%   BMI 27.11 kg/m    Weight is 160 lbs 6.4 oz  Body mass index is 27.11 kg/m .     Appearance: awake, alert, dressed in hospital scrubs, casually groomed  Attitude: pleasant, cooperative  Eye Contact: fair  Mood: \"pretty good\"  Affect: appropriate  Speech: clear, coherent  Psychomotor Behavior: no restlessness reported or observed  Thought Process: reality based and linear, no grandiosity noted  Associations: no loosening of associations noted  Thought Content: denies hallucinations, denies suicidal thoughts  Insight:  limited  Judgment: limited  Oriented to:  x3  Attention Span and Concentration: limited  Recent and Remote Memory:  impaired  Fund of Knowledge: delayed  Muscle Strength and Tone: normal  Gait and Station: normal             Labs: "     No results found for this or any previous visit (from the past 24 hour(s)).

## 2020-08-13 NOTE — PLAN OF CARE
Problem: Adult Behavioral Health Plan of Care  Goal: Patient-Specific Goal (Individualization)  Description: Patient will sleep 6-8 hours per night  Patient will eat at 75% of meals daily  Patient will attend >50% of group programming daily  Patient will be compliant with medication  Patient will have appropriate boundaries staff and peers during hospital    [facility]. According to charting, pt slept approx. 8 hours last night. Ate 100% of breakfast and lunch. Compliant with medications and assessment. Denies all criteria at this time. Denies pain. States was a bit restless last night sleeping but doesn't want any medication changes. Not attending groups. No complaints at this time. Will continue to monitor.    Outcome: Improving    Problem: Mental State/Mood Impairment (Psychotic Signs/Symptoms)  Goal: Improved Mental State and Mood (Psychotic Signs/Symptoms)  Description: Patient will have a reality based conversation.  Patient will report a decrease in delusions/hallucinations.  Patient will use coping skills to manage labile behavior.    Outcome: Improving    Face to face end of shift report communicated to oncoming RN.     8/13/2020

## 2020-08-13 NOTE — PLAN OF CARE
Problem: Adult Behavioral Health Plan of Care  Goal: Patient-Specific Goal (Individualization)  Description: Patient will sleep 6-8 hours per night  Patient will eat at 75% of meals daily  Patient will attend >50% of group programming daily  Patient will be compliant with medication  Patient will have appropriate boundaries staff and peers during hospital  8/13/2020 1650 by Luciana Gonzalez, RN  Outcome: Improving  Note: 1635 -  Pt c/o restless legs. PRN Propranolol not given d/t order parameters not being met. BP was 96/62. Will reassess later.     1650 - Pt showered prior to supper.    Pt had a bright affect and calm mood. She has been up on the unit and social with peers; however, she did not participate in group programming. She is hopeful that a bed will open up at a [facility] soon.     2027 - Pt was given 50 mg of PRN Seroquel for sleep/restlessness.     Face to face end of shift report communicated to oncoming RN.    Problem: Mental State/Mood Impairment (Psychotic Signs/Symptoms)  Goal: Improved Mental State and Mood (Psychotic Signs/Symptoms)  Description: Patient will have a reality based conversation.  Patient will report a decrease in delusions/hallucinations.  Patient will use coping skills to manage labile behavior.  8/13/2020 1650 by Luciana Gonzalez, RN  Outcome: Improving  Note: Pt denied hallucinations. Thought process was logical and linear. No mood lability noted.

## 2020-08-14 PROCEDURE — 25000132 ZZH RX MED GY IP 250 OP 250 PS 637: Performed by: NURSE PRACTITIONER

## 2020-08-14 PROCEDURE — 12400011

## 2020-08-14 RX ADMIN — QUETIAPINE 50 MG: 50 TABLET, FILM COATED ORAL at 20:44

## 2020-08-14 RX ADMIN — LITHIUM CARBONATE 600 MG: 300 TABLET, EXTENDED RELEASE ORAL at 20:44

## 2020-08-14 RX ADMIN — NICOTINE POLACRILEX 4 MG: 2 GUM, CHEWING ORAL at 19:28

## 2020-08-14 RX ADMIN — LITHIUM CARBONATE 600 MG: 300 TABLET, EXTENDED RELEASE ORAL at 08:09

## 2020-08-14 RX ADMIN — METFORMIN ER 500 MG 500 MG: 500 TABLET ORAL at 17:12

## 2020-08-14 RX ADMIN — ARIPIPRAZOLE 10 MG: 10 TABLET ORAL at 08:09

## 2020-08-14 ASSESSMENT — ACTIVITIES OF DAILY LIVING (ADL)
DRESS: INDEPENDENT;SCRUBS (BEHAVIORAL HEALTH)
HYGIENE/GROOMING: INDEPENDENT
HYGIENE/GROOMING: INDEPENDENT
LAUNDRY: UNABLE TO COMPLETE
ORAL_HYGIENE: INDEPENDENT
DRESS: SCRUBS (BEHAVIORAL HEALTH);INDEPENDENT
ORAL_HYGIENE: INDEPENDENT

## 2020-08-14 NOTE — PLAN OF CARE
Problem: Adult Behavioral Health Plan of Care  Goal: Patient-Specific Goal (Individualization)  Description: Patient will sleep 6-8 hours per night  Patient will eat at 75% of meals daily  Patient will attend >50% of group programming daily  Patient will be compliant with medication  Patient will have appropriate boundaries staff and peers during hospital      8/14/2020 0143 by Linda Fair, RN  Outcome: Improving  Note: Report received from Luciana rice. Pt observed sleeping in supine position with regular and unlabored respirations.    Pt has been in bed with eyes closed and regular respirations. 15 minute and PRN checks all night. No complaints offered. Will continue to monitor.    Pt slept all NOC shift    Face to face end of shift report communicated to oncoming RN.    August 14, 2020  1:43 AM          Problem: Mental State/Mood Impairment (Psychotic Signs/Symptoms)  Goal: Improved Mental State and Mood (Psychotic Signs/Symptoms)  Description: Patient will have a reality based conversation.  Patient will report a decrease in delusions/hallucinations.  Patient will use coping skills to manage labile behavior.  8/14/2020 0143 by Linda Fair, RN  Outcome: Improving  Note: Unable to assess due to pt sleeping. No issues or concerns noted at this time.

## 2020-08-14 NOTE — PLAN OF CARE
"Denies SI, HI, and hallucinations. Has been resting much of the day. Didn't sleep well last night \"my mind was racing.\" Though per vinnie pt slept 8 hours. Denies anxiety and depression. No complaints of pain. Encouraged to attend group. Compliant with prescribed medication. Will continue to monitor.   Problem: Adult Behavioral Health Plan of Care  Goal: Patient-Specific Goal (Individualization)  Description: Patient will sleep 6-8 hours per night  Patient will eat at 75% of meals daily  Patient will attend >50% of group programming daily  Patient will be compliant with medication  Patient will have appropriate boundaries staff and peers during hospital      8/14/2020 1129 by Doreen Rodriguez, RN  Outcome: Improving  Note:        Problem: Mental State/Mood Impairment (Psychotic Signs/Symptoms)  Goal: Improved Mental State and Mood (Psychotic Signs/Symptoms)  Description: Patient will have a reality based conversation.  Patient will report a decrease in delusions/hallucinations.  Patient will use coping skills to manage labile behavior.  8/14/2020 1129 by Doreen Rodriguez, RN  Outcome: Improving     "

## 2020-08-15 PROCEDURE — 25000132 ZZH RX MED GY IP 250 OP 250 PS 637: Performed by: NURSE PRACTITIONER

## 2020-08-15 PROCEDURE — 99232 SBSQ HOSP IP/OBS MODERATE 35: CPT | Performed by: NURSE PRACTITIONER

## 2020-08-15 PROCEDURE — 12400011

## 2020-08-15 RX ORDER — PRAMIPEXOLE DIHYDROCHLORIDE 0.12 MG/1
0.25 TABLET ORAL AT BEDTIME
Status: DISCONTINUED | OUTPATIENT
Start: 2020-08-15 | End: 2020-08-15

## 2020-08-15 RX ADMIN — PROPRANOLOL HYDROCHLORIDE 10 MG: 10 TABLET ORAL at 18:00

## 2020-08-15 RX ADMIN — ARIPIPRAZOLE 10 MG: 10 TABLET ORAL at 08:25

## 2020-08-15 RX ADMIN — METFORMIN ER 500 MG 500 MG: 500 TABLET ORAL at 17:20

## 2020-08-15 RX ADMIN — QUETIAPINE 100 MG: 50 TABLET, FILM COATED ORAL at 21:01

## 2020-08-15 RX ADMIN — LITHIUM CARBONATE 600 MG: 300 TABLET, EXTENDED RELEASE ORAL at 21:01

## 2020-08-15 RX ADMIN — LITHIUM CARBONATE 600 MG: 300 TABLET, EXTENDED RELEASE ORAL at 08:24

## 2020-08-15 ASSESSMENT — ACTIVITIES OF DAILY LIVING (ADL)
ORAL_HYGIENE: INDEPENDENT
HYGIENE/GROOMING: INDEPENDENT
ORAL_HYGIENE: INDEPENDENT
HYGIENE/GROOMING: INDEPENDENT
DRESS: INDEPENDENT
DRESS: SCRUBS (BEHAVIORAL HEALTH);INDEPENDENT

## 2020-08-15 NOTE — PLAN OF CARE
"Face to face shift report received from Linda PEREZ RN. Rounding completed, pt observed.  Xi Go RN  8/15/2020  7:59 AM    Problem: Adult Behavioral Health Plan of Care  Goal: Patient-Specific Goal (Individualization)  Description: Patient will sleep 6-8 hours per night  Patient will eat at 75% of meals daily  Patient will attend >50% of group programming daily  Patient will be compliant with medication  Patient will have appropriate boundaries staff and peers during hospital      8/15/2020 0757 by Xi Go RN  Note: Shift Summery:  Patient was in bed at the start of this shift.  Patient  Awakened easily for breakfast.  Patient denies any issues with appetite or sleep.  Patient states she `feel irritable\" and relates it to being still hspiitalized.  Patient does not present as irritable, smiles when stating her irritability.  Feelings acknowledged.  Patient was encouraged to attend groups.  Denies any unwanted side effects.       Problem: Mental State/Mood Impairment (Psychotic Signs/Symptoms)  Goal: Improved Mental State and Mood (Psychotic Signs/Symptoms)  Description: Patient will have a reality based conversation.  Patient will report a decrease in delusions/hallucinations.  Patient will use coping skills to manage labile behavior.  8/15/2020 0757 by Xi Go RN  Outcome: Improving  Conversations are reality based and relevant.  Patient can easily make her needs known to staff.     Face to face report will be communicated to oncoming RN.        "

## 2020-08-15 NOTE — PLAN OF CARE
"Problem: Adult Behavioral Health Plan of Care  Goal: Patient-Specific Goal (Individualization)  Description: Patient will sleep 6-8 hours per night  Patient will eat at 75% of meals daily  Patient will attend >50% of group programming daily  Patient will be compliant with medication  Patient will have appropriate boundaries staff and peers during hospital  8/15/2020 1833 by Luciana Gonzalez, RN  Outcome: Improving  Note: Pt slept until supper. She ate 100%. Affect was bright. 1800 - She reported restlessness in her legs and rec'd 10 mg of PRN Propranolol. /71 and Pulse 85 prior to medication administration. Pt reported relief for \"only about two hours.\" Pt showered tonight. She was compliant with her scheduled medications. She participated in group programming tonight. She was given 100 mg of PRN Seroquel for sleep/restlessness with her bedtime medications.     Face to face end of shift report communicated to oncoming RN.     Problem: Mental State/Mood Impairment (Psychotic Signs/Symptoms)  Goal: Improved Mental State and Mood (Psychotic Signs/Symptoms)  Description: Patient will have a reality based conversation.  Patient will report a decrease in delusions/hallucinations.  Patient will use coping skills to manage labile behavior.  8/15/2020 1833 by Luciana Gonzalez, RN  Outcome: Improving  Note: Pt  had a logical and linear thought process. She denied hallucinations. No mood lability noted.      "

## 2020-08-15 NOTE — PLAN OF CARE
Problem: Adult Behavioral Health Plan of Care  Goal: Patient-Specific Goal (Individualization)  Description: Patient will sleep 6-8 hours per night  Patient will eat at 75% of meals daily  Patient will attend >50% of group programming daily  Patient will be compliant with medication  Patient will have appropriate boundaries staff and peers during hospital      8/15/2020 0350 by Linda Fair, RN  Outcome: Improving  Note: Report received from Luciana rice. Pt observed sleeping in supine position with regular and unlabored respirations.    Pt has been in bed with eyes closed and regular respirations. 15 minute and PRN checks all night. No complaints offered. Will continue to monitor.    Pt slept 8 hours last night    Face to face end of shift report communicated to oncoming RN.    August 15, 2020  3:50 AM          Problem: Mental State/Mood Impairment (Psychotic Signs/Symptoms)  Goal: Improved Mental State and Mood (Psychotic Signs/Symptoms)  Description: Patient will have a reality based conversation.  Patient will report a decrease in delusions/hallucinations.  Patient will use coping skills to manage labile behavior.  8/15/2020 0350 by Linda Fair, RN  Outcome: Improving  Note: Unable to assess due to pt sleeping. No issues or concerns noted at this time.

## 2020-08-15 NOTE — PROGRESS NOTES
"[facility]  Psychiatric Progress Note       Impression:   Jonelle is a 18 yo female with dual dx of MI/CD. Pt was talkative when I met with her this this afternoon. She had multiple questions re: discharge and what [facility] she was going to. Her affect was bright, she is anxious to go \"get things going\" to get better. Jonelle requested a medication to stop her legs from moving at night. She finds this very irritating. She feels as though her medications are effective, she denies S/E other than RLS.Will have staff give propranolol tonight and see if improvement is noted. If so, will schedule. She is more visible out on the unit. Will socialize with peers. Denies AH/VH, denies SI/HI.     Educated regarding medication indications, risks, benefits, side effects, contraindications and possible interactions. Verbally expressed understanding.        Diagnoses:      Schizoaffective Disorder, bipolar type  Substance Abuse Disorder     Attestation:  Patient has been seen and evaluated by me,  Brynn CELESTIN, CNP          Interim History:   The patient's care was discussed with the treatment team and chart notes were reviewed.     BEHAVIORAL TEAM DISCUSSION     Progress: Fair. Improvement in psychosis and grandiosity, not attending groups though out in lounge for longer periods of time    Continued Stay Criteria/Rationale: lacks insight into mental health, current plan discharge to [facility] or possibly CD treatment    Medical/Physical:Hep C  Precautions:   Falls precaution?: YES       Behavioral Orders   Procedures     Code 1 - Restrict to Unit     Routine Programming       As clinically indicated     Status 15       Every 15 minutes.     Plan:   Continue Lithium  mg BID  Continue Abilify 10mg  Continue Cogentin 1 mg - changed to prn  Continue Propranolol 10 mg TID prn anxiety/akathisia  Continue metformin 500 mg daily with dinner for neuroleptic-induced weight gain  Continue Seroquel 50-100mg at " "bedtime  Committed and chisholm granted  Current plan [facility]    Rationale for change in precautions or plan:   Mood and psychotic symptoms improved, no changes today    Petition for Ch[place]olm including Invega, Zyprexa, Abilify, Risperdal, and Geodon     Participants: Bree Leary, Nursing, OT, SW        Current Facility-Administered Medications   Medication     acetaminophen (TYLENOL) tablet 650 mg     albuterol (ACCUNEB) nebulizer solution 1.25 mg     ARIPiprazole (ABILIFY) tablet 10 mg     benztropine (COGENTIN) tablet 1 mg     diphenhydrAMINE (BENADRYL) capsule 50 mg    Or     diphenhydrAMINE (BENADRYL) injection 50 mg     famotidine (PEPCID) tablet 20 mg     haloperidol (HALDOL) tablet 5 mg    Or     haloperidol lactate (HALDOL) injection 5 mg     lithium ER (LITHOBID) CR tablet 600 mg     LORazepam (ATIVAN) tablet 1 mg    Or     LORazepam (ATIVAN) injection 1 mg     magnesium hydroxide (MILK OF MAGNESIA) suspension 30 mL     metFORMIN (GLUCOPHAGE-XR) 24 hr tablet 500 mg     nicotine (NICORETTE) gum 2-4 mg     pramipexole (MIRAPEX) tablet 0.25 mg     propranolol (INDERAL) tablet 10 mg     QUEtiapine (SEROquel) tablet  mg            10 point ROS - denies current concerns       Allergies:   No Known Allergies          Psychiatric Examination:   BP (!) 86/53   Pulse 79   Temp 97.6  F (36.4  C) (Tympanic)   Resp 16   Ht 1.638 m (5' 4.5\")   Wt 72.8 kg (160 lb 6.4 oz)   SpO2 98%   BMI 27.11 kg/m    Weight is 160 lbs 6.4 oz  Body mass index is 27.11 kg/m .     Appearance: awake, alert, dressed in hospital scrubs, casually groomed  Attitude: pleasant, cooperative  Eye Contact: fair  Mood: \"pretty good\"  Affect: appropriate  Speech: clear, coherent  Psychomotor Behavior: no restlessness reported or observed  Thought Process: reality based and linear, no grandiosity noted  Associations: no loosening of associations noted  Thought Content: denies hallucinations, denies suicidal thoughts  Insight:  " limited  Judgment: limited  Oriented to:  x3  Attention Span and Concentration: limited  Recent and Remote Memory:  impaired  Fund of Knowledge: delayed  Muscle Strength and Tone: normal  Gait and Station: normal             Labs:     No results found for this or any previous visit (from the past 24 hour(s)).

## 2020-08-16 PROCEDURE — 99232 SBSQ HOSP IP/OBS MODERATE 35: CPT | Performed by: NURSE PRACTITIONER

## 2020-08-16 PROCEDURE — 25000132 ZZH RX MED GY IP 250 OP 250 PS 637: Performed by: NURSE PRACTITIONER

## 2020-08-16 PROCEDURE — 12400011

## 2020-08-16 RX ORDER — PROPRANOLOL HYDROCHLORIDE 10 MG/1
10 TABLET ORAL 2 TIMES DAILY
Status: DISCONTINUED | OUTPATIENT
Start: 2020-08-16 | End: 2020-08-19

## 2020-08-16 RX ADMIN — QUETIAPINE 50 MG: 50 TABLET, FILM COATED ORAL at 20:24

## 2020-08-16 RX ADMIN — LITHIUM CARBONATE 600 MG: 300 TABLET, EXTENDED RELEASE ORAL at 20:24

## 2020-08-16 RX ADMIN — LITHIUM CARBONATE 600 MG: 300 TABLET, EXTENDED RELEASE ORAL at 08:27

## 2020-08-16 RX ADMIN — METFORMIN ER 500 MG 500 MG: 500 TABLET ORAL at 17:37

## 2020-08-16 RX ADMIN — ARIPIPRAZOLE 10 MG: 10 TABLET ORAL at 08:27

## 2020-08-16 ASSESSMENT — ACTIVITIES OF DAILY LIVING (ADL)
ORAL_HYGIENE: INDEPENDENT
DRESS: SCRUBS (BEHAVIORAL HEALTH);INDEPENDENT
HYGIENE/GROOMING: INDEPENDENT
ORAL_HYGIENE: INDEPENDENT
HYGIENE/GROOMING: INDEPENDENT
DRESS: INDEPENDENT

## 2020-08-16 ASSESSMENT — MIFFLIN-ST. JEOR: SCORE: 1640.94

## 2020-08-16 NOTE — PLAN OF CARE
Face to face shift report received from Linda MCGREGOR RN. Rounding completed, pt observed.  Xi Go RN  8/16/2020  8:11 AM    Problem: Adult Behavioral Health Plan of Care  Goal: Patient-Specific Goal (Individualization)  Description: Patient will sleep 6-8 hours per night  Patient will eat at 75% of meals daily  Patient will attend >50% of group programming daily  Patient will be compliant with medication  Patient will have appropriate boundaries staff and peers during hospital      8/16/2020 0808 by Xi Go RN  Outcome: Improving  Note: Shift Summery:  Patient was in bed at the start of this shift.  Patient easily prompted up for breakfast.  Patient back to bed after eating.  Discussed possible akithisia as is on scheduled Abilify and has been requesting Seroquel at  for sleep.  Patient states that she feels more restless when out in the lounge.  Patient stated that it may just be anxiety.  Brynn RIVERA NP updated and to see patient to discuss medication options. Patient denies pain.  Compliant with medications.     Problem: Mental State/Mood Impairment (Psychotic Signs/Symptoms)  Goal: Improved Mental State and Mood (Psychotic Signs/Symptoms)  Description: Patient will have a reality based conversation.  Patient will report a decrease in delusions/hallucinations.  Patient will use coping skills to manage labile behavior.  8/16/2020 0808 by Xi Go RN  Outcome: Improving  Conversations are reality based and relevant.  Patient is cooperative with assessment, using good eye contact and expresses a genuine desire to remain stable.     Face to face report will be communicated to oncoming RN.

## 2020-08-16 NOTE — PLAN OF CARE
Face to face shift report received from Linda MCGREGOR RN. Rounding completed, pt observed.    Xi Go RN  8/16/2020  8:05 AM    Problem: Adult Behavioral Health Plan of Care  Goal: Patient-Specific Goal (Individualization)  Description: Patient will sleep 6-8 hours per night  Patient will eat at 75% of meals daily  Patient will attend >50% of group programming daily  Patient will be compliant with medication  Patient will have appropriate boundaries staff and peers during hospital      8/16/2020 0804 by Xi Go RN  Outcome: Improving  Note: Shift Summery:           Problem: Mental State/Mood Impairment (Psychotic Signs/Symptoms)  Goal: Improved Mental State and Mood (Psychotic Signs/Symptoms)  Description: Patient will have a reality based conversation.  Patient will report a decrease in delusions/hallucinations.  Patient will use coping skills to manage labile behavior.  8/16/2020 0804 by Xi Go RN  Outcome: Improving   Face to face report will be communicated to oncoming RN.

## 2020-08-16 NOTE — PLAN OF CARE
Problem: Adult Behavioral Health Plan of Care  Goal: Patient-Specific Goal (Individualization)  Description: Patient will sleep 6-8 hours per night  Patient will eat at 75% of meals daily  Patient will attend >50% of group programming daily  Patient will be compliant with medication  Patient will have appropriate boundaries staff and peers during hospital      8/16/2020 0450 by Linda Fair, RN  Outcome: Improving  Note: Report received from Luciana rice. Pt observed sleeping in right side lying position with regular and unlabored respirations.    Pt has been in bed with eyes closed and regular respirations. 15 minute and PRN checks all night. No complaints offered. Will continue to monitor.    Pt slept approx 8 hours    Face to face end of shift report communicated to oncoming RN.    August 16, 2020  4:50 AM          Problem: Mental State/Mood Impairment (Psychotic Signs/Symptoms)  Goal: Improved Mental State and Mood (Psychotic Signs/Symptoms)  Description: Patient will have a reality based conversation.  Patient will report a decrease in delusions/hallucinations.  Patient will use coping skills to manage labile behavior.  8/16/2020 0450 by Linda Fair RN  Outcome: Improving  Note: Unable to assess due to pt sleeping. No issues or concerns noted at this time.

## 2020-08-16 NOTE — PROGRESS NOTES
[facility]  Psychiatric Progress Note       Impression:   Jonelle is a 20 yo female with dual dx of MI/CD. Pt was talkative again when I met with her this this afternoon. We discussed her foot movement, RN reported she observed it during the day also and pt reported she was anxious. Will schedule propranolol 10 mg BID and monitor for improvement. Very pleasant, reported she knows she is getting better and will have to work at keeping it better.  She is more visible out on the unit. Will socialize with peers. Denies AH/VH, denies SI/HI.     Educated regarding medication indications, risks, benefits, side effects, contraindications and possible interactions. Verbally expressed understanding.        Diagnoses:      Schizoaffective Disorder, bipolar type  Substance Abuse Disorder     Attestation:  Patient has been seen and evaluated by me,  Brynn CELESTIN, CNP          Interim History:   The patient's care was discussed with the treatment team and chart notes were reviewed.     BEHAVIORAL TEAM DISCUSSION     Progress: Fair. Improvement in psychosis and grandiosity, not attending groups though out in lounge for longer periods of time    Continued Stay Criteria/Rationale: lacks insight into mental health, current plan discharge to [facility] or possibly CD treatment    Medical/Physical:Hep C  Precautions:   Falls precaution?: YES       Behavioral Orders   Procedures     Code 1 - Restrict to Unit     Routine Programming       As clinically indicated     Status 15       Every 15 minutes.     Plan:   Continue Lithium  mg BID  Continue Abilify 10mg  Continue Cogentin 1 mg - changed to prn  Change to Propranolol 10 mg TID to BID scheduled.  Continue metformin 500 mg daily with dinner for neuroleptic-induced weight gain  Continue Seroquel 50-100mg at bedtime  Committed and chisholm granted  Current plan [facility]    Rationale for change in precautions or plan:   Mood and psychotic symptoms improved, no changes  "today    Petition for [place] including Invega, Zyprexa, Abilify, Risperdal, and Geodon     Participants: Bree Leary, Nursing, OT, SW        Current Facility-Administered Medications   Medication     acetaminophen (TYLENOL) tablet 650 mg     albuterol (ACCUNEB) nebulizer solution 1.25 mg     ARIPiprazole (ABILIFY) tablet 10 mg     benztropine (COGENTIN) tablet 1 mg     diphenhydrAMINE (BENADRYL) capsule 50 mg    Or     diphenhydrAMINE (BENADRYL) injection 50 mg     famotidine (PEPCID) tablet 20 mg     haloperidol (HALDOL) tablet 5 mg    Or     haloperidol lactate (HALDOL) injection 5 mg     lithium ER (LITHOBID) CR tablet 600 mg     LORazepam (ATIVAN) tablet 1 mg    Or     LORazepam (ATIVAN) injection 1 mg     magnesium hydroxide (MILK OF MAGNESIA) suspension 30 mL     metFORMIN (GLUCOPHAGE-XR) 24 hr tablet 500 mg     nicotine (NICORETTE) gum 2-4 mg     propranolol (INDERAL) tablet 10 mg     QUEtiapine (SEROquel) tablet  mg            10 point ROS - denies current concerns       Allergies:   No Known Allergies          Psychiatric Examination:   BP (!) 86/53   Pulse 79   Temp 97.6  F (36.4  C) (Tympanic)   Resp 16   Ht 1.638 m (5' 4.5\")   Wt 72.8 kg (160 lb 6.4 oz)   SpO2 98%   BMI 27.11 kg/m    Weight is 160 lbs 6.4 oz  Body mass index is 27.11 kg/m .     Appearance: awake, alert, dressed in hospital scrubs, casually groomed  Attitude: pleasant, cooperative  Eye Contact: fair  Mood: \"pretty good\"  Affect: appropriate  Speech: clear, coherent  Psychomotor Behavior: no restlessness reported or observed  Thought Process: reality based and linear, no grandiosity noted  Associations: no loosening of associations noted  Thought Content: denies hallucinations, denies suicidal thoughts  Insight:  limited  Judgment: limited  Oriented to:  x3  Attention Span and Concentration: limited  Recent and Remote Memory:  impaired  Fund of Knowledge: delayed  Muscle Strength and Tone: normal  Gait and Station: " normal             Labs:     No results found for this or any previous visit (from the past 24 hour(s)).

## 2020-08-17 PROCEDURE — 25000132 ZZH RX MED GY IP 250 OP 250 PS 637: Performed by: NURSE PRACTITIONER

## 2020-08-17 PROCEDURE — 12400011

## 2020-08-17 RX ADMIN — LITHIUM CARBONATE 600 MG: 300 TABLET, EXTENDED RELEASE ORAL at 08:27

## 2020-08-17 RX ADMIN — NICOTINE POLACRILEX 4 MG: 2 GUM, CHEWING ORAL at 13:11

## 2020-08-17 RX ADMIN — NICOTINE POLACRILEX 4 MG: 2 GUM, CHEWING ORAL at 17:39

## 2020-08-17 RX ADMIN — LITHIUM CARBONATE 600 MG: 300 TABLET, EXTENDED RELEASE ORAL at 20:23

## 2020-08-17 RX ADMIN — NICOTINE POLACRILEX 4 MG: 2 GUM, CHEWING ORAL at 15:48

## 2020-08-17 RX ADMIN — METFORMIN ER 500 MG 500 MG: 500 TABLET ORAL at 17:21

## 2020-08-17 RX ADMIN — ARIPIPRAZOLE 10 MG: 10 TABLET ORAL at 08:27

## 2020-08-17 RX ADMIN — PROPRANOLOL HYDROCHLORIDE 10 MG: 10 TABLET ORAL at 08:27

## 2020-08-17 RX ADMIN — PROPRANOLOL HYDROCHLORIDE 10 MG: 10 TABLET ORAL at 19:18

## 2020-08-17 RX ADMIN — QUETIAPINE 100 MG: 50 TABLET, FILM COATED ORAL at 20:23

## 2020-08-17 RX ADMIN — LORAZEPAM 1 MG: 1 TABLET ORAL at 14:26

## 2020-08-17 ASSESSMENT — ACTIVITIES OF DAILY LIVING (ADL)
HYGIENE/GROOMING: INDEPENDENT
DRESS: INDEPENDENT
LAUNDRY: UNABLE TO COMPLETE
ORAL_HYGIENE: INDEPENDENT

## 2020-08-17 NOTE — PLAN OF CARE
Face to face end of shift report will be communicated to oncoming RN.    Problem: Adult Behavioral Health Plan of Care  Goal: Patient-Specific Goal (Individualization)  Description: Patient will sleep 6-8 hours per night  Patient will eat at 75% of meals daily  Patient will attend >50% of group programming daily  Patient will be compliant with medication  Patient will have appropriate boundaries staff and peers during hospital      8/17/2020 0030 by Marbella Renteria RN  Outcome: No Change     Problem: Mental State/Mood Impairment (Psychotic Signs/Symptoms)  Goal: Improved Mental State and Mood (Psychotic Signs/Symptoms)  Description: Patient will have a reality based conversation.  Patient will report a decrease in delusions/hallucinations.  Patient will use coping skills to manage labile behavior.  8/17/2020 0030 by Marbella Renteria RN  Outcome: No Change   Face to face end of shift report obtained from Bel PEREZ RN. Pt observed resting in bed.  Pt appears to be sleeping in bed with eyes closed, having regular respirations, and position changes. 15 minutes and PRN safety checks completed with no noted complains. Will continue to monitor.   0545- Pt slept 6 hours so far this shift.

## 2020-08-17 NOTE — PLAN OF CARE
Face to face end of shift report received from Suzanne MARINO RN. Rounding completed and patient observed in the lounge. Requested sherly gum.     20:00 Update: Patient has been pleasant and cooperative. She maintains appropriate boundaries with staff and peers. Her affect is bright. She rated her anxiety at 6 of 10 and her depression at 1 of 10. She denied needing any PRNs for anxiety and reported the Ativan she had earlier was very effective. She denied HI/SI and hallucinations. She denied pain. She requested to have her propanolol changed to 8am and 8pm because she said she feels like it helps. Patient requested 100mg Seroquel at bedtime.     Face to face end of shift report communicated to oncoming RN.       Problem: Adult Behavioral Health Plan of Care  Goal: Patient-Specific Goal (Individualization)  Description: Patient will sleep 6-8 hours per night  Patient will eat at 75% of meals daily  Patient will attend >50% of group programming daily  Patient will be compliant with medication  Patient will have appropriate boundaries staff and peers during hospital      8/17/2020 1624 by Adina Gunter, RN  Outcome: Improving     Problem: Mental State/Mood Impairment (Psychotic Signs/Symptoms)  Goal: Improved Mental State and Mood (Psychotic Signs/Symptoms)  Description: Patient will have a reality based conversation.  Patient will report a decrease in delusions/hallucinations.  Patient will use coping skills to manage labile behavior.  8/17/2020 1624 by Adina Gunter, RN  Outcome: Improving

## 2020-08-17 NOTE — PLAN OF CARE
Problem: Adult Behavioral Health Plan of Care  Goal: Patient-Specific Goal (Individualization)  Description: Patient will sleep 6-8 hours per night  Patient will eat at 75% of meals daily  Patient will attend >50% of group programming daily  Patient will be compliant with medication  Patient will have appropriate boundaries staff and peers during hospital  8/16/2020 1953 by Luciana Gonzalez, RN  Outcome: Improving  Note: Pt had a full range affect and calm mood. She participated in milieu activities. Pt verbalized that she is anxious to discharge. Akathisia noted while up in the lounge - pt had a lot of restless leg movements. Scheduled propranolol not given d/t systolic BP <100. 2024 - Pt rec'd 50 mg of PRN Seroquel for sleep.     Face to face end of shift report communicated to oncoming RN.     Problem: Mental State/Mood Impairment (Psychotic Signs/Symptoms)  Goal: Improved Mental State and Mood (Psychotic Signs/Symptoms)  Description: Patient will have a reality based conversation.  Patient will report a decrease in delusions/hallucinations.  Patient will use coping skills to manage labile behavior.  8/16/2020 1953 by Luciana Gonzalez, RN  Outcome: Improving  Note: Pt had a logical and linear thought process. She denied hallucinations. No mood lability noted.

## 2020-08-17 NOTE — PLAN OF CARE
Face to face shift report received from Marbella KELLY RN. Rounding completed, pt observed.    Patient in bed at the start of shift. Patient did come out to lounge for breakfast. Patient denies SI, HI, anxiety and depression. Patient did go back to bed after breakfast.   Patient came up to nurses station around 1420 reporting anxiety. Patient states she feels it in her chest and just wants to stay out in the lounge. Patient reports she feels there are a lot of people which seems to be increasing her anxiety. Patient reported anxiety of roughly a 5/7. Patient was given 1 mg Ativan at 1426.   1500-Patient reported that she is doing better and the Ativan has helped.     Face to face report will be communicated to oncoming RN.    Suzanne Lamb RN  8/17/2020  2:31 PM    Problem: Adult Behavioral Health Plan of Care  Goal: Patient-Specific Goal (Individualization)  Description: Patient will sleep 6-8 hours per night  Patient will eat at 75% of meals daily  Patient will attend >50% of group programming daily  Patient will be compliant with medication  Patient will have appropriate boundaries staff and peers during hospital      8/17/2020 0740 by Suzanne Lamb, RN  Outcome: Improving  Note:        Problem: Mental State/Mood Impairment (Psychotic Signs/Symptoms)  Goal: Improved Mental State and Mood (Psychotic Signs/Symptoms)  Description: Patient will have a reality based conversation.  Patient will report a decrease in delusions/hallucinations.  Patient will use coping skills to manage labile behavior.  8/17/2020 0740 by Suzanne Lamb, RN  Outcome: Improving

## 2020-08-18 PROCEDURE — 25000132 ZZH RX MED GY IP 250 OP 250 PS 637: Performed by: NURSE PRACTITIONER

## 2020-08-18 PROCEDURE — 99231 SBSQ HOSP IP/OBS SF/LOW 25: CPT | Performed by: NURSE PRACTITIONER

## 2020-08-18 PROCEDURE — 12400011

## 2020-08-18 RX ADMIN — NICOTINE POLACRILEX 4 MG: 2 GUM, CHEWING ORAL at 13:40

## 2020-08-18 RX ADMIN — LITHIUM CARBONATE 600 MG: 300 TABLET, EXTENDED RELEASE ORAL at 08:26

## 2020-08-18 RX ADMIN — ARIPIPRAZOLE 10 MG: 10 TABLET ORAL at 08:26

## 2020-08-18 RX ADMIN — LORAZEPAM 1 MG: 1 TABLET ORAL at 17:20

## 2020-08-18 RX ADMIN — METFORMIN ER 500 MG 500 MG: 500 TABLET ORAL at 17:18

## 2020-08-18 RX ADMIN — PROPRANOLOL HYDROCHLORIDE 10 MG: 10 TABLET ORAL at 19:57

## 2020-08-18 RX ADMIN — QUETIAPINE 100 MG: 50 TABLET, FILM COATED ORAL at 19:56

## 2020-08-18 RX ADMIN — NICOTINE POLACRILEX 4 MG: 2 GUM, CHEWING ORAL at 16:19

## 2020-08-18 RX ADMIN — LITHIUM CARBONATE 600 MG: 300 TABLET, EXTENDED RELEASE ORAL at 19:57

## 2020-08-18 ASSESSMENT — ACTIVITIES OF DAILY LIVING (ADL)
LAUNDRY: UNABLE TO COMPLETE
DRESS: INDEPENDENT
HYGIENE/GROOMING: INDEPENDENT
ORAL_HYGIENE: INDEPENDENT

## 2020-08-18 NOTE — PLAN OF CARE
"Face to face end of shift report received from Suzanne MARINO RN. Rounding completed and patient observed in the lounge. Requested sherly gum and received.     17:20 Update: Patient reported high anxiety and asked for 1mg Ativan. She asked to know if anything had been determined about her discharge plan. When told no, she became anxious.        20:00 Update: Patient has been pleasant and cooperative. She maintains appropriate boundaries with staff and peers. Her affect is bright. She endorsed some anxiety and depression. She denied HI/SI and hallucinations. She denied pain. She requested to have 100mg Seroquel at bedtime and was given it at 19:56. She made a statement about the time she's been here and said \"I've really grown up since coming here.\" When asked to explain she did not go into detail but the staff in the room believed she was talking about being a child and growing up physically. Unable to determine if this was delusional or figurative.     Face to face end of shift report communicated to oncoming RN.       Problem: Adult Behavioral Health Plan of Care  Goal: Patient-Specific Goal (Individualization)  Description: Patient will sleep 6-8 hours per night  Patient will eat at 75% of meals daily  Patient will attend >50% of group programming daily  Patient will be compliant with medication  Patient will have appropriate boundaries staff and peers during hospital      8/18/2020 1642 by Adina Gunter RN  Outcome: Improving     Problem: Mental State/Mood Impairment (Psychotic Signs/Symptoms)  Goal: Improved Mental State and Mood (Psychotic Signs/Symptoms)  Description: Patient will have a reality based conversation.  Patient will report a decrease in delusions/hallucinations.  Patient will use coping skills to manage labile behavior.  8/18/2020 1642 by Adina Gunter, RN  Outcome: Improving     "

## 2020-08-18 NOTE — PLAN OF CARE
Face to face shift report received from Marbella KELLY RN. Rounding completed, pt observed.    Patient in bed at the start of shift.   Patient denies SI, HI depression or pain. Patient does report some anxiety. Patient rated anxiety at 3/10 currently. Propranolol was held in AM due to Systolic BP under 100. BP was retaken before med pass and BP at 99/58.   Patient did request Nicotine gum 4 mg at 1340.  Patient did report that her anxiety is not quite as bad today.     Face to face report will be communicated to oncoming RN.    Suzanne Lamb RN  8/18/2020  2:46 PM    Problem: Adult Behavioral Health Plan of Care  Goal: Patient-Specific Goal (Individualization)  Description: Patient will sleep 6-8 hours per night  Patient will eat at 75% of meals daily  Patient will attend >50% of group programming daily  Patient will be compliant with medication  Patient will have appropriate boundaries staff and peers during hospital      8/18/2020 0741 by Suzanne Lamb, RN  Outcome: Improving  Note:        Problem: Mental State/Mood Impairment (Psychotic Signs/Symptoms)  Goal: Improved Mental State and Mood (Psychotic Signs/Symptoms)  Description: Patient will have a reality based conversation.  Patient will report a decrease in delusions/hallucinations.  Patient will use coping skills to manage labile behavior.  8/18/2020 0741 by Suzanne Lamb RN  Outcome: Improving

## 2020-08-18 NOTE — PROGRESS NOTES
"[facility]  Psychiatric Progress Note       Impression:   Jonelle is a 18 yo female with dual dx of MI/CD.    Jonelle is resting in bed when I see her today. She reports she is \"fine\" and denies having any questions or concerns. Denies feeling restless, I believe that propranolol was started this weekend for anxiety and possible akathisia. Denies hallucinations or suicidal thoughts. Reports sleeping well at night.     Educated regarding medication indications, risks, benefits, side effects, contraindications and possible interactions. Verbally expressed understanding.        Diagnoses:      Schizoaffective Disorder, bipolar type  Substance Abuse Disorder     Attestation:  Patient has been seen and evaluated by me,  Mai Johnson APRN, CNP          Interim History:   The patient's care was discussed with the treatment team and chart notes were reviewed.     BEHAVIORAL TEAM DISCUSSION     Progress: Fair. No psychotic symptoms noted, affect is brighter. Not attending groups though out in lounge.    Continued Stay Criteria/Rationale: lacks insight into mental health, current plan discharge to [facility] or possibly CD treatment    Medical/Physical: Hep C+  Precautions:   Falls precaution?: YES       Behavioral Orders   Procedures     Code 1 - Restrict to Unit     Routine Programming       As clinically indicated     Status 15       Every 15 minutes.     Plan:   Continue Lithium  mg BID  Continue Abilify 10 mg  Continue Cogentin 1 mg - changed to prn  Change to Propranolol 10 mg BID scheduled  Continue metformin 500 mg daily with dinner for neuroleptic-induced weight gain  Continue Seroquel  mg PRN at bedtime  Committed and chisholm granted  Current plan [facility]    Rationale for change in precautions or plan:   Mood and psychotic symptoms improved, no changes today    Petition for Chisholm including Invega, Zyprexa, Abilify, Risperdal, and Geodon     Participants: Mai Johnson, Nursing, OT, SW, " "[facility] [place]        Current Facility-Administered Medications   Medication     acetaminophen (TYLENOL) tablet 650 mg     albuterol (ACCUNEB) nebulizer solution 1.25 mg     ARIPiprazole (ABILIFY) tablet 10 mg     benztropine (COGENTIN) tablet 1 mg     diphenhydrAMINE (BENADRYL) capsule 50 mg    Or     diphenhydrAMINE (BENADRYL) injection 50 mg     famotidine (PEPCID) tablet 20 mg     haloperidol (HALDOL) tablet 5 mg    Or     haloperidol lactate (HALDOL) injection 5 mg     lithium ER (LITHOBID) CR tablet 600 mg     LORazepam (ATIVAN) tablet 1 mg    Or     LORazepam (ATIVAN) injection 1 mg     magnesium hydroxide (MILK OF MAGNESIA) suspension 30 mL     metFORMIN (GLUCOPHAGE-XR) 24 hr tablet 500 mg     nicotine (NICORETTE) gum 2-4 mg     propranolol (INDERAL) tablet 10 mg     QUEtiapine (SEROquel) tablet  mg            10 point ROS - denies current concerns       Allergies:   No Known Allergies          Psychiatric Examination:   BP (!) 86/53   Pulse 79   Temp 97.6  F (36.4  C) (Tympanic)   Resp 16   Ht 1.638 m (5' 4.5\")   Wt 72.8 kg (160 lb 6.4 oz)   SpO2 98%   BMI 27.11 kg/m    Weight is 160 lbs 6.4 oz  Body mass index is 27.11 kg/m .     Appearance: awake, alert, dressed in hospital scrubs, casually groomed  Attitude: pleasant, cooperative  Eye Contact: fair  Mood: \"fine\"  Affect: appropriate  Speech: clear, coherent  Psychomotor Behavior: no restlessness reported or observed  Thought Process: reality based and linear, no grandiosity noted  Associations: no loosening of associations noted  Thought Content: denies hallucinations, denies suicidal thoughts  Insight:  limited  Judgment: limited  Oriented to:  x3  Attention Span and Concentration: limited  Recent and Remote Memory:  impaired  Fund of Knowledge: delayed  Muscle Strength and Tone: normal  Gait and Station: normal             Labs:     No results found for this or any previous visit (from the past 24 hour(s)).        "

## 2020-08-18 NOTE — PLAN OF CARE
Face to face end of shift report will be communicated to oncoming RN.      Problem: Adult Behavioral Health Plan of Care  Goal: Patient-Specific Goal (Individualization)  Description: Patient will sleep 6-8 hours per night  Patient will eat at 75% of meals daily  Patient will attend >50% of group programming daily  Patient will be compliant with medication  Patient will have appropriate boundaries staff and peers during hospital      8/18/2020 0032 by Marbella Renteria RN  Outcome: No Change     Problem: Mental State/Mood Impairment (Psychotic Signs/Symptoms)  Goal: Improved Mental State and Mood (Psychotic Signs/Symptoms)  Description: Patient will have a reality based conversation.  Patient will report a decrease in delusions/hallucinations.  Patient will use coping skills to manage labile behavior.  8/18/2020 0032 by Marbella Renteria RN  Outcome: No Change   Face to face end of shift report obtained from ASHWIN Holden. Pt observed resting in bed.   Pt appears to be sleeping in bed with eyes closed, having regular respirations, and position changes. 15 minutes and PRN safety checks completed with no noted complains. Will continue to monitor.   0550- Pt appeared to have slept 6.5 hours so far this shift.

## 2020-08-19 PROCEDURE — 25000132 ZZH RX MED GY IP 250 OP 250 PS 637: Performed by: NURSE PRACTITIONER

## 2020-08-19 PROCEDURE — 99232 SBSQ HOSP IP/OBS MODERATE 35: CPT | Performed by: NURSE PRACTITIONER

## 2020-08-19 PROCEDURE — 12400011

## 2020-08-19 RX ORDER — PROPRANOLOL HYDROCHLORIDE 10 MG/1
10 TABLET ORAL 2 TIMES DAILY
Status: DISCONTINUED | OUTPATIENT
Start: 2020-08-19 | End: 2020-08-30

## 2020-08-19 RX ADMIN — QUETIAPINE 100 MG: 50 TABLET, FILM COATED ORAL at 19:41

## 2020-08-19 RX ADMIN — METFORMIN ER 500 MG 500 MG: 500 TABLET ORAL at 16:42

## 2020-08-19 RX ADMIN — PROPRANOLOL HYDROCHLORIDE 10 MG: 10 TABLET ORAL at 16:42

## 2020-08-19 RX ADMIN — LITHIUM CARBONATE 600 MG: 300 TABLET, EXTENDED RELEASE ORAL at 08:35

## 2020-08-19 RX ADMIN — NICOTINE POLACRILEX 4 MG: 2 GUM, CHEWING ORAL at 16:43

## 2020-08-19 RX ADMIN — NICOTINE POLACRILEX 4 MG: 2 GUM, CHEWING ORAL at 18:50

## 2020-08-19 RX ADMIN — ARIPIPRAZOLE 10 MG: 10 TABLET ORAL at 08:35

## 2020-08-19 RX ADMIN — LITHIUM CARBONATE 600 MG: 300 TABLET, EXTENDED RELEASE ORAL at 20:32

## 2020-08-19 RX ADMIN — PROPRANOLOL HYDROCHLORIDE 10 MG: 10 TABLET ORAL at 08:35

## 2020-08-19 ASSESSMENT — ACTIVITIES OF DAILY LIVING (ADL)
HYGIENE/GROOMING: INDEPENDENT
DRESS: SCRUBS (BEHAVIORAL HEALTH);INDEPENDENT
ORAL_HYGIENE: INDEPENDENT
ORAL_HYGIENE: INDEPENDENT
HYGIENE/GROOMING: INDEPENDENT
DRESS: INDEPENDENT

## 2020-08-19 NOTE — PROGRESS NOTES
"[facility]  Psychiatric Progress Note      Impression:   Jonelle is a 19 year old female with dual dx of MI/CD.    I found Jonelle sitting on the edge of her bed this morning. She asks to change the administration time of her \"orange pill\" to the afternoon because she doesn't feel like it's helping with her anxiety/restlessness when taken at bedtime. She asks me about a timeline for discharge and I explained that there are some priority patients ahead of her, but she should be getting reviewed for placement soon. She is understandably frustrated by her 68 day long hospital stay and desires to stabilize enough to live independently in the future. She was encouraged to touch base with her SW if more specific questions arise. She otherwise denies any medication side effects, mood issues, suicidal/homicidal ideation, hallucinations or delusions, and indicated she is sleeping and eating well.     Educated regarding medication indications, risks, benefits, side effects, contraindications and possible interactions. Verbally expressed understanding.        Diagnoses:     Schizoaffective Disorder, bipolar type  Substance Abuse Disorder      Attestation:  Patient has been seen and evaluated by me,  SABI Carlson CNP          Interim History:   The patient's care was discussed with the treatment team and chart notes were reviewed.    BEHAVIORAL TEAM DISCUSSION     Progress: Fair. No psychotic symptoms noted, affect is brighter. Not attending groups, but socializes with others in the lounge.    Continued Stay Criteria/Rationale: lacks insight into mental health issues, current plan discharge to [facility] or possibly CD treatment     Medical/Physical: Hep C+  Precautions:   Falls precaution?: YES            Behavioral Orders   Procedures     Code 1 - Restrict to Unit     Routine Programming       As clinically indicated     Status 15       Every 15 minutes.      Plan:   Continue Lithium  mg BID  Continue " "Abilify 10 mg  Continue Cogentin 1 mg PRN  Change administration time of Propranolol 10 mg BID from HS to 1700   Continue Metformin 500 mg daily with dinner for neuroleptic-induced weight gain  Continue Seroquel  mg PRN at bedtime  Committed and chisholm granted  Current plan [facility]     Rationale for change in precautions or plan: HS Propranolol dose not helping with evening anxiety.      Petition for Chisholm including Invega, Zyprexa, Abilify, Risperdal, and Geodon     Participants: Bree Leary, NP, Nursing, OT, SW, [facility]          Medications:   I have reviewed this patient's current medications    Current Facility-Administered Medications   Medication     acetaminophen (TYLENOL) tablet 650 mg     albuterol (ACCUNEB) nebulizer solution 1.25 mg     ARIPiprazole (ABILIFY) tablet 10 mg     benztropine (COGENTIN) tablet 1 mg     diphenhydrAMINE (BENADRYL) capsule 50 mg     Or     diphenhydrAMINE (BENADRYL) injection 50 mg     famotidine (PEPCID) tablet 20 mg     haloperidol (HALDOL) tablet 5 mg     Or     haloperidol lactate (HALDOL) injection 5 mg     lithium ER (LITHOBID) CR tablet 600 mg     LORazepam (ATIVAN) tablet 1 mg     Or     LORazepam (ATIVAN) injection 1 mg     magnesium hydroxide (MILK OF MAGNESIA) suspension 30 mL     metFORMIN (GLUCOPHAGE-XR) 24 hr tablet 500 mg     nicotine (NICORETTE) gum 2-4 mg     propranolol (INDERAL) tablet 10 mg     QUEtiapine (SEROquel) tablet  mg      10 point ROS negative: denies concerns.          Allergies:   No Known Allergies         Psychiatric Examination:   /65   Pulse 75   Temp 97.6  F (36.4  C) (Tympanic)   Resp 14   Ht 1.638 m (5' 4.5\")   Wt 87.8 kg (193 lb 9 oz)   SpO2 97%   BMI 32.71 kg/m    Weight is 193 lbs 9.02 oz  Body mass index is 32.71 kg/m .    Appearance:  awake, alert, adequately groomed and dressed in hospital scrubs  Attitude:  cooperative  Eye Contact:  fair  Mood:  \"good\"  Affect:  appropriate and in normal range  Speech:  clear, " coherent  Psychomotor Behavior:  no evidence of tardive dyskinesia, dystonia, or tics  Thought Process:  logical and linear  Associations:  no loose associations  Thought Content:  no evidence of suicidal ideation or homicidal ideation and no evidence of psychotic thought  Insight:  limited  Judgment:  limited  Oriented to:  time, person, and place  Attention Span and Concentration:  limited  Recent and Remote Memory:  limited  Fund of Knowledge: delayed  Muscle Strength and Tone: normal  Gait and Station: Normal           Labs:     No results found for this or any previous visit (from the past 24 hour(s)).

## 2020-08-19 NOTE — PLAN OF CARE
Problem: Adult Behavioral Health Plan of Care  Goal: Patient-Specific Goal (Individualization)  Description: Patient will sleep 6-8 hours per night  Patient will eat at 75% of meals daily  Patient will attend >50% of group programming daily  Patient will be compliant with medication  Patient will have appropriate boundaries staff and peers during hospital      8/19/2020 0044 by Yvette Eastman, ASHWIN  Outcome: Improving   Face to face shift report received from Adina RN. Rounding completed, pt observed in bed appears to be sleeping, in no apparent distress, respirations are even and non labored.      Pt appeared to sleep well, approx. 6 hours.    Face to face report will be communicated to oncoming RN.    Yvette Eastman RN  8/19/2020  6:12 AM

## 2020-08-19 NOTE — PLAN OF CARE
Spoke with Angella from [facility] this morning for update- She states pt was referred July 1st and they are still working on one referral from June 22 and there are 25 priority pt's but 11 of them have been sent off for review.     Emailed pt's ESTER Lane with update from [facility].     Met with pt this afternoon and provided her with update from [facility]. Pt says she spoke with her  yesterday and asked about going to an IRTS program or possibly staying with her grandfather and doing the PHP program here. Pt states her  said she had to check with the team- informed pt that writer would bring this up with the team again.

## 2020-08-19 NOTE — PLAN OF CARE
Face to face shift report received from Tita zamudio RN. Rounding completed, pt observed.  Xi Go RN  8/19/2020  8:04 AM    Problem: Adult Behavioral Health Plan of Care  Goal: Patient-Specific Goal (Individualization)  Description: Patient will sleep 6-8 hours per night  Patient will eat at 75% of meals daily  Patient will attend >50% of group programming daily  Patient will be compliant with medication  Patient will have appropriate boundaries staff and peers during hospital    8/19/2020 0803 by Xi Go RN  Outcome: Improving  Note: Shift Summery:  Patient was in bed at the start of this shift.  Easily prompted up for breakfast.  Patient continues to complain of anxiety when in lounge or groups.  Compliant with medications.  Denies unwanted side effects.  Expresses frustration with still being here.    Problem: Mental State/Mood Impairment (Psychotic Signs/Symptoms)  Goal: Improved Mental State and Mood (Psychotic Signs/Symptoms)  Description: Patient will have a reality based conversation.  Patient will report a decrease in delusions/hallucinations.  Patient will use coping skills to manage labile behavior.  Outcome: Improving     Face to face report will be communicated to oncoming RN.

## 2020-08-20 PROCEDURE — 25000132 ZZH RX MED GY IP 250 OP 250 PS 637: Performed by: NURSE PRACTITIONER

## 2020-08-20 PROCEDURE — 12400011

## 2020-08-20 RX ADMIN — LITHIUM CARBONATE 600 MG: 300 TABLET, EXTENDED RELEASE ORAL at 08:14

## 2020-08-20 RX ADMIN — PROPRANOLOL HYDROCHLORIDE 10 MG: 10 TABLET ORAL at 17:20

## 2020-08-20 RX ADMIN — ARIPIPRAZOLE 10 MG: 10 TABLET ORAL at 08:14

## 2020-08-20 RX ADMIN — PROPRANOLOL HYDROCHLORIDE 10 MG: 10 TABLET ORAL at 08:14

## 2020-08-20 RX ADMIN — QUETIAPINE 100 MG: 50 TABLET, FILM COATED ORAL at 20:42

## 2020-08-20 RX ADMIN — METFORMIN ER 500 MG 500 MG: 500 TABLET ORAL at 17:21

## 2020-08-20 RX ADMIN — LITHIUM CARBONATE 600 MG: 300 TABLET, EXTENDED RELEASE ORAL at 20:42

## 2020-08-20 ASSESSMENT — ACTIVITIES OF DAILY LIVING (ADL)
DRESS: SCRUBS (BEHAVIORAL HEALTH);INDEPENDENT
DRESS: INDEPENDENT
HYGIENE/GROOMING: INDEPENDENT
ORAL_HYGIENE: INDEPENDENT
ORAL_HYGIENE: INDEPENDENT
HYGIENE/GROOMING: INDEPENDENT

## 2020-08-20 NOTE — PLAN OF CARE
Problem: Adult Behavioral Health Plan of Care  Goal: Patient-Specific Goal (Individualization)  Description: Patient will sleep 6-8 hours per night  Patient will eat at 75% of meals daily  Patient will attend >50% of group programming daily  Patient will be compliant with medication  Patient will have appropriate boundaries staff and peers during hospital  8/19/2020 1928 by Luciana Gonzalez, RN  Outcome: Improving  Note: Pt spent the majority of the shift up on the unit. She was pleasant and cooperative. She ate 100% of supper. Pt continues to have restless leg movements. 1941 - She requested and rec'd 100 mg of PRN Seroquel for restlessness and sleep. She endorsed anxiety and depression. Pt expressed frustration about length of wait for a [facility]. She is hoping that treatment team approves an alternative plan.     Face to face end of shift report communicated to oncoming RN.     Problem: Mental State/Mood Impairment (Psychotic Signs/Symptoms)  Goal: Improved Mental State and Mood (Psychotic Signs/Symptoms)  Description: Patient will have a reality based conversation.  Patient will report a decrease in delusions/hallucinations.  Patient will use coping skills to manage labile behavior.  8/19/2020 1928 by Luciana Gonzalez, RN  Outcome: Improving  Note: Pt denied hallucinations. No mood lability noted. Thought process was logical, linear and goal oriented.

## 2020-08-20 NOTE — PLAN OF CARE
"Face to face shift report received from Julia VARELA RN. Rounding completed, pt observed.    Xi Go RN  8/20/2020  8:03 AM    Problem: Adult Behavioral Health Plan of Care  Goal: Patient-Specific Goal (Individualization)  Description: Patient will sleep 6-8 hours per night  Patient will eat at 75% of meals daily  Patient will attend >50% of group programming daily  Patient will be compliant with medication  Patient will have appropriate boundaries staff and peers during hospital      8/20/2020 0802 by Xi Go RN  Outcome: Improving  Note: Shift Summery:  Patient was prompted up for breakfast.  Patient is calm and cooperative.  Patient states that she feels \"shakey on the inside\".  Discussed possible akathisia and that the scheduled Propanolol would be helpful for either anxiety or akathisia.  Patient states she is hoping her CCM will agree to allow her to go to an IRTS  Program vs [facility].  Patient's appetite is good, sleeping at night and mood is cooperative       Problem: Mental State/Mood Impairment (Psychotic Signs/Symptoms)  Goal: Improved Mental State and Mood (Psychotic Signs/Symptoms)  Description: Patient will have a reality based conversation.  Patient will report a decrease in delusions/hallucinations.  Patient will use coping skills to manage labile behavior.  8/20/2020 0802 by Xi Go RN  Outcome: Improving  Conversations are reality based and relevant.     Face to face report will be communicated to oncoming RN.      "

## 2020-08-20 NOTE — PLAN OF CARE
Face to face report received from Luciana MCGREGOR RN. Pt. Observed.     Problem: Adult Behavioral Health Plan of Care  Goal: Patient-Specific Goal (Individualization)  Description: Patient will sleep 6-8 hours per night  Patient will eat at 75% of meals daily  Patient will attend >50% of group programming daily  Patient will be compliant with medication  Patient will have appropriate boundaries staff and peers during hospital  8/20/2020 0430 by Julia Doherty RN  Outcome: No Change   Pt has been in bed with eyes closed and regular respirations x 7 hours this noc shift. 15 minute and PRN checks all night. No complaints offered. Will continue to monitor.      Face to face end of shift report to be communicated to oncoming RN.     Julia Doherty RN  8/20/2020

## 2020-08-20 NOTE — PLAN OF CARE
Emailed pt's ESTER Lane this morning regarding an alternative discharge plan of an IRTS.     Faxed updated MAR to Ariana as requested. Ariana states she is in agreement to IRTS referrals being sent out.    Faxed referral info to [facility] in [place] and [facility] in [place] this afternoon. Spoke with pt this afternoon and updated her on referrals that were sent.

## 2020-08-21 ENCOUNTER — APPOINTMENT (OUTPATIENT)
Dept: OCCUPATIONAL THERAPY | Facility: HOSPITAL | Age: 20
End: 2020-08-21
Attending: NURSE PRACTITIONER
Payer: MEDICAID

## 2020-08-21 PROCEDURE — 25000132 ZZH RX MED GY IP 250 OP 250 PS 637: Performed by: NURSE PRACTITIONER

## 2020-08-21 PROCEDURE — 97165 OT EVAL LOW COMPLEX 30 MIN: CPT | Mod: GO

## 2020-08-21 PROCEDURE — 12400011

## 2020-08-21 PROCEDURE — 99231 SBSQ HOSP IP/OBS SF/LOW 25: CPT | Performed by: NURSE PRACTITIONER

## 2020-08-21 RX ADMIN — ARIPIPRAZOLE 10 MG: 10 TABLET ORAL at 08:25

## 2020-08-21 RX ADMIN — NICOTINE POLACRILEX 4 MG: 2 GUM, CHEWING ORAL at 16:30

## 2020-08-21 RX ADMIN — PROPRANOLOL HYDROCHLORIDE 10 MG: 10 TABLET ORAL at 16:30

## 2020-08-21 RX ADMIN — NICOTINE POLACRILEX 4 MG: 2 GUM, CHEWING ORAL at 19:15

## 2020-08-21 RX ADMIN — QUETIAPINE 100 MG: 50 TABLET, FILM COATED ORAL at 20:30

## 2020-08-21 RX ADMIN — NICOTINE POLACRILEX 4 MG: 2 GUM, CHEWING ORAL at 20:34

## 2020-08-21 RX ADMIN — METFORMIN ER 500 MG 500 MG: 500 TABLET ORAL at 16:30

## 2020-08-21 RX ADMIN — PROPRANOLOL HYDROCHLORIDE 10 MG: 10 TABLET ORAL at 08:25

## 2020-08-21 RX ADMIN — LITHIUM CARBONATE 600 MG: 300 TABLET, EXTENDED RELEASE ORAL at 08:25

## 2020-08-21 RX ADMIN — LITHIUM CARBONATE 600 MG: 300 TABLET, EXTENDED RELEASE ORAL at 20:30

## 2020-08-21 ASSESSMENT — ACTIVITIES OF DAILY LIVING (ADL): HYGIENE/GROOMING: INDEPENDENT

## 2020-08-21 NOTE — PLAN OF CARE
Behavioral Health Occupational Therapy Eval      Name: Jonelle Adams MRN# [id number]   Age: 19 year old YOB: 2000     Date of Consultation: August 21, 2020  Primary care provider: ALIA Simms    Referring Physician: Bree Leary NP  Orders: Eval only   Medical Diagnosis: Paranoid Delusion r/o substance related  Substance Abuse Disorder  Onset of Illness/Injury: Admit date 6/11/20    Prior Level of Function: Indicates that she was managing own cares. Didn't have income. Has a history of previous self injury. Was admitted due to showing up to [facility] and stating that she wanted to kill herself. At time of admit patient was not sure where she was living and believed that she was a missing person since the age of 6.     Current Level of Function: Due to improvement noted on the unit with continued stabilization in the hospital re-testing of patient's cognition indicated and thus orders received. Re-tested patient using the ACLs (which was used on 7/30/20) and the MOCA (which wasn't used on 7/30/20 to rule out test/re-test learning)   ACLs same score of 4.8/5.8 as on 7/30/20.   ACL completed with a score of.4.8/5.8 which indicates that the person can live alone with daily assistance to monitor safety and check problem solving effectiveness. The person may get to regularly schedule community activities without assistance. With a  the person may succeed in supportive employment. 26% minimum cognitive assistance is required to setup new activities and verify results.   The following are recommendations to prevent common safety problems at this level of functioning: Prevent poor compliance with taking medication by setting up medications and or handing measured liquids and pills to the person. Assist with medication refills. Supply with daily spending money and manage all other finances. Supervise when showing how to do a new activity, no more than 3 simple steps at a time.  Post reminders or simple notes at eye level.     MOCA version 7.1 completed on this date with a score of 22/30 which is less than normal cognition.   Visuospatial/ Executive Functioning: 3/5  Trail making (alternating letters and numbers):   Shape copy:   Clock drawin/3  Naming: 3/3  Immediate Recall (not scored):  Patient accurately verbalized 5 /5 non-related words  Attention:    Number repetition:   Number reversal:  Sustained attention: Patient identifying  10/11 targets with x5 false positives  Serial subtraction: Patient completed serial 7 subtractions 4/5 calculations  Language: 1/3  Sentence repetition: 0/2 when asked to repeat a sentence word-for-word  Divergent naming: Patient named 12 items beginning with the letter /f/ within one minute  Abstract Thinkin/2   Similarities between 2 items (abstract thinking): 0/2  Delayed Recall: 3  Patient recalled  3/5 words after approx 5 minute delay without cues.  Orientation:   Patient oriented to date, month, year, day, place, city.     TOTAL SCORE: 22/30      Based on re-testing patient has shown improvement in attention and orientation. Short term memory continues to show deficits as well as visuospatial/executive functioning. Due to testing it is recommended that the person can live alone with daily assistance to monitor safety and check problem solving effectiveness.    Patient/Family Goal: Patient wanting to move onto next step, discharge.     Fall Screen:   Have you fallen 2 or more times in the last year? No  Have you fallen and had an injury in the last year? No  Timed up & go: NT  Is patient a fall risk? No    Past Medical History:   Past Medical History:   Diagnosis Date     Hepatitis C 2020       Past Surgical History:  Past Surgical History:   Procedure Laterality Date     TONSILLECTOMY         Medications:   Current Facility-Administered Medications   Medication     acetaminophen (TYLENOL) tablet 650 mg     albuterol  (ACCUNEB) nebulizer solution 1.25 mg     ARIPiprazole (ABILIFY) tablet 10 mg     benztropine (COGENTIN) tablet 1 mg     diphenhydrAMINE (BENADRYL) capsule 50 mg    Or     diphenhydrAMINE (BENADRYL) injection 50 mg     famotidine (PEPCID) tablet 20 mg     haloperidol (HALDOL) tablet 5 mg    Or     haloperidol lactate (HALDOL) injection 5 mg     lithium ER (LITHOBID) CR tablet 600 mg     LORazepam (ATIVAN) tablet 1 mg    Or     LORazepam (ATIVAN) injection 1 mg     magnesium hydroxide (MILK OF MAGNESIA) suspension 30 mL     metFORMIN (GLUCOPHAGE-XR) 24 hr tablet 500 mg     nicotine (NICORETTE) gum 2-4 mg     propranolol (INDERAL) tablet 10 mg     QUEtiapine (SEROquel) tablet  mg       Reason for OT Referral:  Mental Health History: unknown  Signs/Symptoms of compliant: improvement noted in cognition per staff report which led to another evaluation.   Aggravating factors/Current Life Stressors: No income or place to live. Committed.   Current Services:     Personal Information:  Family Structure: Has a grandmother and sibling as supports. Single, no children.   Living Arrangement: Homeless  Finances: none  Medication Management: Has a grandmother and sibling as supports. Single, no children.   Support System: Has a grandmother and siblings as supports.     Physical Presentation:   Mobility: WNL      Cognition:  Orientation:  Time, place, person  Memory: Impaired   Safety awareness: Impaired   Attention: improved  Motivation: Normal   Judgement/Insight: Diminished  Speech/Language: Normal  Mood: Neutral  Affect: Appropriate      Goals:   Patient wanting to discharge to the next step. Per team: CM is ok with IRTS programming.     Planned Interventions: eval only.     Clinical Impressions:  Criteria for Skilled Therapeutic Intervention Met: Yes, eval only.  OT Diagnosis: Cognition.   Influenced by the following impairments: decompensation leading to inpatient hospitalization, limited insight,  paranoia  Functional limitations due to impairment: Inability to care for self  Clinical presentation: Evolving/Changing  Clinical presentation rationale: Patient is improved since admission. Patient does present with some cognitive impairments and could benefit from further support.  Clinical Decision making (complexity): Low Complexity  Predicted Duration of Therapy Intervention (days/wks): eval only.   Risks and Benefits of therapy have been explained: Yes  Patient, Family & other staff in agreement with plan of care: Yes  Comments: Informed treatment team of above testing results and recommendations.    Total Evaluation Time: 37

## 2020-08-21 NOTE — PLAN OF CARE
Problem: Adult Behavioral Health Plan of Care  Goal: Patient-Specific Goal (Individualization)  Description: Patient will sleep 6-8 hours per night  Patient will eat at 75% of meals daily  Patient will attend >50% of group programming daily  Patient will be compliant with medication  Patient will have appropriate boundaries staff and peers during hospital    [facility]. Ate 100% of meals. According to charting, pt slept approx. 7 hours last night. Compliant with medication and assessment. Denies all criteria; does not endorse pain, anxiety. No complaints at this time. Will continue to monitor.    Outcome: Improving    Problem: Mental State/Mood Impairment (Psychotic Signs/Symptoms)  Goal: Improved Mental State and Mood (Psychotic Signs/Symptoms)  Description: Patient will have a reality based conversation.  Patient will report a decrease in delusions/hallucinations.  Patient will use coping skills to manage labile behavior.    Outcome: Improving    Face to face end of shift report communicated to oncoming RN.     8/21/2020

## 2020-08-21 NOTE — PLAN OF CARE
Face to face shift report received from Luciana MCGREGOR RN. Rounding completed, pt observed.    Pt has been in bed with eyes closed and regular respirations x 7 hours this noc shift. 15 minute and PRN checks all night. No complaints offered. Will continue to monitor.    Face to face report will be communicated to oncoming RN.    Suzanne Lamb, ASHWIN  8/21/2020  6:51 AM    Problem: Adult Behavioral Health Plan of Care  Goal: Patient-Specific Goal (Individualization)  Description: Patient will sleep 6-8 hours per night  Patient will eat at 75% of meals daily  Patient will attend >50% of group programming daily  Patient will be compliant with medication  Patient will have appropriate boundaries staff and peers during hospital      8/21/2020 0015 by Suzanne Lamb, RN  Outcome: Improving  Note:

## 2020-08-21 NOTE — PLAN OF CARE
Spoke with Karina from [facility]- she states she received the referral and will be passing it along to the team for review.     Received call back from Karina who states the team reviewed the referral and in order for them to accept pt the pt would have to agree to engage in all group programming and be screened for CD and possibly attend some outpt treatment. Karina asks staff to speak with pt about these two recommendations and then send updated notes on Monday.  Updated pt's nurse.     Spoke with pt this morning- informed her of conversation with Karina from [facility]. Pt states she will get up and attend as many groups as she can.

## 2020-08-21 NOTE — PLAN OF CARE
Face to face end of shift report received from Bibiana MARINO RN. Rounding completed and patient observed lying in bed with eyes closed, breathing regular and unlabored.     18:30 Update: Patient reported mild anxiety and depression. She denied HI/SI and hallucinations.  She denied pain. Her affect was bright and she is pleasant with peers and staff. She attended all groups and made a point to come to this writer and ask if this was being recorded. She took meds without complaint.       Face to face end of shift report communicated to oncoming RN.      Problem: Adult Behavioral Health Plan of Care  Goal: Patient-Specific Goal (Individualization)  Description: Patient will sleep 6-8 hours per night  Patient will eat at 75% of meals daily  Patient will attend >50% of group programming daily  Patient will be compliant with medication  Patient will have appropriate boundaries staff and peers during hospital      8/21/2020 1623 by Adina Gunter RN  Outcome: No Change     Problem: Mental State/Mood Impairment (Psychotic Signs/Symptoms)  Goal: Improved Mental State and Mood (Psychotic Signs/Symptoms)  Description: Patient will have a reality based conversation.  Patient will report a decrease in delusions/hallucinations.  Patient will use coping skills to manage labile behavior.  8/21/2020 1623 by Adina Gunter RN  Outcome: No Change

## 2020-08-21 NOTE — PLAN OF CARE
Problem: Adult Behavioral Health Plan of Care  Goal: Patient-Specific Goal (Individualization)  Description: Patient will sleep 6-8 hours per night  Patient will eat at 75% of meals daily  Patient will attend >50% of group programming daily  Patient will be compliant with medication  Patient will have appropriate boundaries staff and peers during hospital  Outcome: Improving  Note: Pt had a bright affect and calm mood. She continues to endorse anxiety and restlessness. She was compliant with her scheduled medications. She participated in milieu activities and group programming. She ate 100% of supper. Pt is hopeful that she will be accepted at either [facility] or [facility]. She is frustrated by her length of hospitalization. 2042 - Pt was given 100 mg of PRN Seroquel for sleep.     Face to face end of shift report communicated to oncoming RN.     Problem: Mental State/Mood Impairment (Psychotic Signs/Symptoms)  Goal: Improved Mental State and Mood (Psychotic Signs/Symptoms)  Description: Patient will have a reality based conversation.  Patient will report a decrease in delusions/hallucinations.  Patient will use coping skills to manage labile behavior.  Outcome: Improving  Note: Pt had a logical, linear and goal oriented thought process. Mood was calm.

## 2020-08-21 NOTE — PROGRESS NOTES
"[facility]  Psychiatric Progress Note      Impression:   Jonelle is a 19 year old female with dual dx of MI/CD.    Jonelle is resting in bed when I see her today. The only question she has today is \"when can I leave?\" We briefly discussed IRTS referrals had been sent, though she would need to be up more and attending groups throughout the day in order for them to accept her. She does agree to try this. Does feel that Propranolol in afternoon helps more than at bedtime. Denies any restlessness at this time. Denies any hallucinations or paranoia. No suicidal thoughts and denies any side effects from medication.    Educated regarding medication indications, risks, benefits, side effects, contraindications and possible interactions. Verbally expressed understanding.        Diagnoses:     Schizoaffective Disorder, bipolar type  Substance Abuse Disorder      Attestation:  Patient has been seen and evaluated by me,  Mai Johnson NP          Interim History:   The patient's care was discussed with the treatment team and chart notes were reviewed.    BEHAVIORAL TEAM DISCUSSION     Progress: Fair. No psychotic symptoms noted, affect is brighter. Encouraged to attend more groups so IRTS can be considered for placement    Continued Stay Criteria/Rationale: lacks insight into mental health issues, current plan discharge to [facility] or IRTS     Medical/Physical: Hep C+  Precautions:   Falls precaution?: YES            Behavioral Orders   Procedures     Code 1 - Restrict to Unit     Routine Programming       As clinically indicated     Status 15       Every 15 minutes.      Plan:   Continue Lithium  mg BID  Continue Abilify 10 mg  Continue Cogentin 1 mg PRN  Continue Propanolol 10 mg BID  Continue Metformin 500 mg daily with dinner for neuroleptic-induced weight gain  Continue Seroquel  mg PRN at bedtime  Kavita and phan granted  Current plan [facility] versus IRTS     Rationale for change in precautions or " "plan:   No changes today     Petition for Granado including Invega, Zyprexa, Abilify, Risperdal, and Geodon     Participants: Mai Johnson, NP, Nursing, OT, SW          Medications:   I have reviewed this patient's current medications    Current Facility-Administered Medications   Medication     acetaminophen (TYLENOL) tablet 650 mg     albuterol (ACCUNEB) nebulizer solution 1.25 mg     ARIPiprazole (ABILIFY) tablet 10 mg     benztropine (COGENTIN) tablet 1 mg     diphenhydrAMINE (BENADRYL) capsule 50 mg     Or     diphenhydrAMINE (BENADRYL) injection 50 mg     famotidine (PEPCID) tablet 20 mg     haloperidol (HALDOL) tablet 5 mg     Or     haloperidol lactate (HALDOL) injection 5 mg     lithium ER (LITHOBID) CR tablet 600 mg     LORazepam (ATIVAN) tablet 1 mg     Or     LORazepam (ATIVAN) injection 1 mg     magnesium hydroxide (MILK OF MAGNESIA) suspension 30 mL     metFORMIN (GLUCOPHAGE-XR) 24 hr tablet 500 mg     nicotine (NICORETTE) gum 2-4 mg     propranolol (INDERAL) tablet 10 mg     QUEtiapine (SEROquel) tablet  mg      10 point ROS negative: denies concerns.          Allergies:   No Known Allergies         Psychiatric Examination:   /67   Pulse 82   Temp 97.3  F (36.3  C) (Tympanic)   Resp 16   Ht 1.638 m (5' 4.5\")   Wt 87.8 kg (193 lb 9 oz)   SpO2 97%   BMI 32.71 kg/m    Weight is 193 lbs 9.02 oz  Body mass index is 32.71 kg/m .    Appearance:  awake, alert, adequately groomed and dressed in hospital scrubs  Attitude:  cooperative, pleasant  Eye Contact:  fair  Mood: \"fine\"  Affect:  appropriate and in normal range  Speech:  Clear, coherent  Psychomotor Behavior:  no evidence of tardive dyskinesia, dystonia, or tics  Thought Process:  logical and linear, more goal oriented  Associations:  no loose associations  Thought Content:  Denies any suicidal thoughts, denies hallucinations  Insight:  limited  Judgment:  limited  Oriented to:  time, person, and place  Attention Span and " Concentration:  limited  Recent and Remote Memory:  limited  Fund of Knowledge: delayed  Muscle Strength and Tone: normal  Gait and Station: Normal           Labs:     No results found for this or any previous visit (from the past 24 hour(s)).

## 2020-08-22 PROCEDURE — 12400011

## 2020-08-22 PROCEDURE — 25000132 ZZH RX MED GY IP 250 OP 250 PS 637: Performed by: NURSE PRACTITIONER

## 2020-08-22 RX ADMIN — PROPRANOLOL HYDROCHLORIDE 10 MG: 10 TABLET ORAL at 17:06

## 2020-08-22 RX ADMIN — METFORMIN ER 500 MG 500 MG: 500 TABLET ORAL at 17:06

## 2020-08-22 RX ADMIN — ARIPIPRAZOLE 10 MG: 10 TABLET ORAL at 08:25

## 2020-08-22 RX ADMIN — LITHIUM CARBONATE 600 MG: 300 TABLET, EXTENDED RELEASE ORAL at 20:06

## 2020-08-22 RX ADMIN — BENZTROPINE MESYLATE 1 MG: 1 TABLET ORAL at 20:09

## 2020-08-22 RX ADMIN — LITHIUM CARBONATE 600 MG: 300 TABLET, EXTENDED RELEASE ORAL at 08:25

## 2020-08-22 RX ADMIN — NICOTINE POLACRILEX 4 MG: 2 GUM, CHEWING ORAL at 15:18

## 2020-08-22 RX ADMIN — NICOTINE POLACRILEX 4 MG: 2 GUM, CHEWING ORAL at 17:57

## 2020-08-22 ASSESSMENT — ACTIVITIES OF DAILY LIVING (ADL)
DRESS: INDEPENDENT;SCRUBS (BEHAVIORAL HEALTH)
LAUNDRY: UNABLE TO COMPLETE
ORAL_HYGIENE: INDEPENDENT
HYGIENE/GROOMING: INDEPENDENT
DRESS: SCRUBS (BEHAVIORAL HEALTH);INDEPENDENT
ORAL_HYGIENE: INDEPENDENT
HYGIENE/GROOMING: INDEPENDENT

## 2020-08-22 NOTE — PLAN OF CARE
Face to face shift report received from Adina OSBORNE RN. Rounding completed, pt observed.     Patient was in bed at the start of shift and appeared to be sleeping for remainder of shift.     Pt has been in bed with eyes closed and regular respirations. 15 minute and PRN checks all night. No complaints offered. Will continue to monitor.    Suzanne Lamb RN  8/22/2020  3:59 AM    Problem: Adult Behavioral Health Plan of Care  Goal: Patient-Specific Goal (Individualization)  Description: Patient will sleep 6-8 hours per night  Patient will eat at 75% of meals daily  Patient will attend >50% of group programming daily  Patient will be compliant with medication  Patient will have appropriate boundaries staff and peers during hospital      8/22/2020 0005 by Suzanne Lamb RN  Outcome: Improving    Problem: Mental State/Mood Impairment (Psychotic Signs/Symptoms)  Goal: Improved Mental State and Mood (Psychotic Signs/Symptoms)  Description: Patient will have a reality based conversation.  Patient will report a decrease in delusions/hallucinations.  Patient will use coping skills to manage labile behavior.  8/22/2020 0005 by Suzanne Lamb, RN  Outcome: No Change

## 2020-08-22 NOTE — PLAN OF CARE
"BREE LINDSEY RN  8/22/2020  7:50 AM  Face to face shift report received from ASHWIN Palacios. Rounding completed, pt observed.     1003-Ate breakfast in lounge.  Propranolol was not given as BP did not meet parameters at 92/59.  States \"it keeps me focused, like adderall does.\"  Requested BP to be rechecked later.  Pt attending morning group.  Very willing to do what is expected before her discharge.       1356-Pt was napping during group time.  Reminded pt group was going on, pt stated \"it's exercise group and I don't go to those\".  Writer went to observe group, and it was not exercise group as wrote on white board for pt- it was \"open art time\".  Pt did attend rest of group.       Problem: Mental State/Mood Impairment (Psychotic Signs/Symptoms)  Goal: Improved Mental State and Mood (Psychotic Signs/Symptoms)  Description: Patient will have a reality based conversation.  Patient will report a decrease in delusions/hallucinations.  Patient will use coping skills to manage labile behavior.  8/22/2020 0749 by Bree Lindsey, RN  Outcome: No Change     Problem: Adult Behavioral Health Plan of Care  Goal: Patient-Specific Goal (Individualization)  Description: Patient will sleep 6-8 hours per night  Patient will eat at 75% of meals daily  Patient will attend >50% of group programming daily  Patient will be compliant with medication  Patient will have appropriate boundaries staff and peers during hospital  8/22/2020 0749 by Bree Lindsey, RN  Outcome: Improving    Face to face end of shift report communicated to oncoming shift RN.           "

## 2020-08-23 PROCEDURE — 99231 SBSQ HOSP IP/OBS SF/LOW 25: CPT | Performed by: NURSE PRACTITIONER

## 2020-08-23 PROCEDURE — 25000132 ZZH RX MED GY IP 250 OP 250 PS 637: Performed by: NURSE PRACTITIONER

## 2020-08-23 PROCEDURE — 12400011

## 2020-08-23 RX ORDER — ALBUTEROL SULFATE 90 UG/1
1-2 AEROSOL, METERED RESPIRATORY (INHALATION) EVERY 6 HOURS PRN
Status: DISCONTINUED | OUTPATIENT
Start: 2020-08-23 | End: 2020-08-26

## 2020-08-23 RX ADMIN — LITHIUM CARBONATE 600 MG: 300 TABLET, EXTENDED RELEASE ORAL at 20:19

## 2020-08-23 RX ADMIN — NICOTINE POLACRILEX 4 MG: 2 GUM, CHEWING ORAL at 12:55

## 2020-08-23 RX ADMIN — LITHIUM CARBONATE 600 MG: 300 TABLET, EXTENDED RELEASE ORAL at 08:17

## 2020-08-23 RX ADMIN — PROPRANOLOL HYDROCHLORIDE 10 MG: 10 TABLET ORAL at 17:01

## 2020-08-23 RX ADMIN — BENZTROPINE MESYLATE 1 MG: 1 TABLET ORAL at 11:49

## 2020-08-23 RX ADMIN — LORAZEPAM 1 MG: 1 TABLET ORAL at 18:26

## 2020-08-23 RX ADMIN — METFORMIN ER 500 MG 500 MG: 500 TABLET ORAL at 17:01

## 2020-08-23 RX ADMIN — NICOTINE POLACRILEX 4 MG: 2 GUM, CHEWING ORAL at 15:40

## 2020-08-23 RX ADMIN — ALBUTEROL SULFATE 2 PUFF: 90 AEROSOL, METERED RESPIRATORY (INHALATION) at 18:26

## 2020-08-23 RX ADMIN — ARIPIPRAZOLE 10 MG: 10 TABLET ORAL at 08:17

## 2020-08-23 ASSESSMENT — ACTIVITIES OF DAILY LIVING (ADL)
HYGIENE/GROOMING: INDEPENDENT
ORAL_HYGIENE: INDEPENDENT
LAUNDRY: UNABLE TO COMPLETE
DRESS: INDEPENDENT;SCRUBS (BEHAVIORAL HEALTH)
ORAL_HYGIENE: INDEPENDENT
HYGIENE/GROOMING: INDEPENDENT
DRESS: SCRUBS (BEHAVIORAL HEALTH);INDEPENDENT

## 2020-08-23 ASSESSMENT — MIFFLIN-ST. JEOR: SCORE: 1635.66

## 2020-08-23 NOTE — PROGRESS NOTES
[facility]  Psychiatric Progress Note      Impression:   Jonelle is a 19 year old female with dual dx of MI/CD.    Jonelle has been up and attending groups this weekend. She reports that she has been sleeping well at night. Denies any suicidal thoughts. She does state that she is worried about weight gain being on Abilify. However in reviewing with her she has been taking Metformin once a day and states that she has not gained any weight since being placed on Abilify about a month ago. She denies any hallucinations. Is hopeful to be considered at an IRTS facility for placement at discharge versus going to [facility].     Educated regarding medication indications, risks, benefits, side effects, contraindications and possible interactions. Verbally expressed understanding.        Diagnoses:     Schizoaffective Disorder, bipolar type  Substance Abuse Disorder      Attestation:  Patient has been seen and evaluated by me,  Mai Johnson NP          Interim History:   The patient's care was discussed with the treatment team and chart notes were reviewed.    BEHAVIORAL TEAM DISCUSSION     Progress: Good. No psychotic symptoms noted, affect is brighter. Has been attending groups, is hopeful for IRTS placement    Continued Stay Criteria/Rationale: lacks insight into mental health issues, current plan discharge to [facility] or IRTS     Medical/Physical: Hep C+  Precautions:   Falls precaution?: YES            Behavioral Orders   Procedures     Code 1 - Restrict to Unit     Routine Programming       As clinically indicated     Status 15       Every 15 minutes.      Plan:   Albuterol inhaler PRN in place of nebs  Continue Lithium  mg BID  Continue Abilify 10 mg  Continue Cogentin 1 mg PRN  Continue Propanolol 10 mg BID  Continue Metformin 500 mg daily with dinner for neuroleptic-induced weight gain  Continue Seroquel  mg PRN at bedtime  Committed and chisholm granted  Current plan [facility] versus IRTS     Rationale for  "change in precautions or plan:   No changes today     Petition for Granado including Invega, Zyprexa, Abilify, Risperdal, and Geodon     Participants: Mai Johnson NP, Nursing          Medications:   I have reviewed this patient's current medications    Current Facility-Administered Medications   Medication     acetaminophen (TYLENOL) tablet 650 mg     albuterol (ACCUNEB) nebulizer solution 1.25 mg     ARIPiprazole (ABILIFY) tablet 10 mg     benztropine (COGENTIN) tablet 1 mg     diphenhydrAMINE (BENADRYL) capsule 50 mg     Or     diphenhydrAMINE (BENADRYL) injection 50 mg     famotidine (PEPCID) tablet 20 mg     haloperidol (HALDOL) tablet 5 mg     Or     haloperidol lactate (HALDOL) injection 5 mg     lithium ER (LITHOBID) CR tablet 600 mg     LORazepam (ATIVAN) tablet 1 mg     Or     LORazepam (ATIVAN) injection 1 mg     magnesium hydroxide (MILK OF MAGNESIA) suspension 30 mL     metFORMIN (GLUCOPHAGE-XR) 24 hr tablet 500 mg     nicotine (NICORETTE) gum 2-4 mg     propranolol (INDERAL) tablet 10 mg     QUEtiapine (SEROquel) tablet  mg      10 point ROS negative: denies concerns.          Allergies:   No Known Allergies         Psychiatric Examination:   BP 95/61   Pulse 78   Temp 98  F (36.7  C) (Tympanic)   Resp 15   Ht 1.638 m (5' 4.5\")   Wt 87.3 kg (192 lb 6.4 oz)   SpO2 98%   BMI 32.52 kg/m    Weight is 192 lbs 6.4 oz  Body mass index is 32.52 kg/m .    Appearance:  awake, alert, adequately groomed and dressed in hospital scrubs  Attitude:  cooperative, pleasant  Eye Contact: good  Mood: \"fine\", mild anxiety related to discharge planning  Affect:  appropriate and in normal range  Speech:  Clear, coherent  Psychomotor Behavior:  no evidence of tardive dyskinesia, dystonia, or tics  Thought Process:  logical and linear, more goal oriented  Associations:  no loose associations  Thought Content:  Denies any suicidal thoughts, denies hallucinations  Insight:  limited  Judgment:  limited  Oriented " to:  time, person, and place  Attention Span and Concentration:  limited  Recent and Remote Memory:  limited  Fund of Knowledge: delayed  Muscle Strength and Tone: normal  Gait and Station: Normal           Labs:     No results found for this or any previous visit (from the past 24 hour(s)).

## 2020-08-23 NOTE — PLAN OF CARE
Face to face shift report received from Yessi BERRY RN. Rounding completed, pt observed.    Patient in bed at the start of the shift. Patient appears to be sleeping    Pt has been in bed with eyes closed and regular respirations. 15 minute and PRN checks all night. No complaints offered. Will continue to monitor.      Suzanne Lamb RN  8/23/2020  3:19 AM  Face to face report will be communicated to oncoming RN.    Suzanne Lamb RN  8/23/2020  7:20 AM    Problem: Adult Behavioral Health Plan of Care  Goal: Patient-Specific Goal (Individualization)  Description: Patient will sleep 6-8 hours per night  Patient will eat at 75% of meals daily  Patient will attend >50% of group programming daily  Patient will be compliant with medication  Patient will have appropriate boundaries staff and peers during hospital      8/23/2020 0013 by Suzanne Lamb, RN  Outcome: Improving     Problem: Mental State/Mood Impairment (Psychotic Signs/Symptoms)  Goal: Improved Mental State and Mood (Psychotic Signs/Symptoms)  Description: Patient will have a reality based conversation.  Patient will report a decrease in delusions/hallucinations.  Patient will use coping skills to manage labile behavior.  8/23/2020 0013 by Suzanne Lamb, RN  Outcome: Improving

## 2020-08-23 NOTE — PLAN OF CARE
BREE LINDSEY RN  8/23/2020  7:49 AM  Face to face shift report received from ASHWIN Palacios. Rounding completed, pt observed.     0835-Propranolol held this morning due to BP 95/61.  Compliant with meds.  Reports sleeping good.  Denies all criteria.  Ate breakfast in the lounge.  Plans on attending groups today.  Went back to bed after breakfast.    1009-Pt attending group.  1150-Pt requested and given cogentin for restless legs.  1300-Pt reports cogentin only helping a little for her restless legs.  Requesting inhaler to be ordered instead of neb if needed.  Updated Mai of pt request.  Ate lunch in lounge.    1333-Pt attending group.     Problem: Adult Behavioral Health Plan of Care  Goal: Patient-Specific Goal (Individualization)  Description: Patient will sleep 6-8 hours per night  Patient will eat at 75% of meals daily  Patient will attend >50% of group programming daily  Patient will be compliant with medication  Patient will have appropriate boundaries staff and peers during hospital  8/23/2020 0748 by Bree Lindsey, RN  Outcome: Improving     Problem: Mental State/Mood Impairment (Psychotic Signs/Symptoms)  Goal: Improved Mental State and Mood (Psychotic Signs/Symptoms)  Description: Patient will have a reality based conversation.  Patient will report a decrease in delusions/hallucinations.  Patient will use coping skills to manage labile behavior.  8/23/2020 0748 by Bree Lindsey, RN  Outcome: Improving     Face to face end of shift report communicated to oncoming shift RN.

## 2020-08-23 NOTE — PLAN OF CARE
"PT calm, and cooperative with a flat affect.   PT denies pain.   PT reported SOB and had a Neb treatment from respiratory, PT reported that she used to have an inhaler, note placed to provider.  PT reported relief after NEB treatment.   PT attended all groups this shift.  PT reporting that her medications make her \"not feel like myself in my mind, like flat, like not happy or sad, not like myself.\"  PT denies AH/VH and does not appear to be responding to any stimuli.  PT had cogentin PRN for s/e restless/agitated feeling in her legs and body not relieved by propranolol.   PT tidy and has good hygiene. PT compliant with medications.     Face to face end of shift report communicated to oncoming RN.    Yessi Pandey RN  8/22/2020  10:24 PM          Problem: Adult Behavioral Health Plan of Care  Goal: Patient-Specific Goal (Individualization)  Description: Patient will sleep 6-8 hours per night  Patient will eat at 75% of meals daily  Patient will attend >50% of group programming daily  Patient will be compliant with medication  Patient will have appropriate boundaries staff and peers during hospital      8/22/2020 2105 by Yessi Pandey, RN  Outcome: Improving  Note:        Problem: Mental State/Mood Impairment (Psychotic Signs/Symptoms)  Goal: Improved Mental State and Mood (Psychotic Signs/Symptoms)  Description: Patient will have a reality based conversation.  Patient will report a decrease in delusions/hallucinations.  Patient will use coping skills to manage labile behavior.  8/22/2020 2105 by Yessi Pandey, RN  Outcome: Improving     "

## 2020-08-24 PROCEDURE — 25000132 ZZH RX MED GY IP 250 OP 250 PS 637: Performed by: NURSE PRACTITIONER

## 2020-08-24 PROCEDURE — 99232 SBSQ HOSP IP/OBS MODERATE 35: CPT | Performed by: NURSE PRACTITIONER

## 2020-08-24 PROCEDURE — 12400011

## 2020-08-24 RX ADMIN — PROPRANOLOL HYDROCHLORIDE 10 MG: 10 TABLET ORAL at 17:17

## 2020-08-24 RX ADMIN — LORAZEPAM 1 MG: 1 TABLET ORAL at 19:28

## 2020-08-24 RX ADMIN — LITHIUM CARBONATE 600 MG: 300 TABLET, EXTENDED RELEASE ORAL at 20:30

## 2020-08-24 RX ADMIN — METFORMIN HCL 500 MG: 500 TABLET ORAL at 17:17

## 2020-08-24 RX ADMIN — ALBUTEROL SULFATE 2 PUFF: 90 AEROSOL, METERED RESPIRATORY (INHALATION) at 08:34

## 2020-08-24 RX ADMIN — LITHIUM CARBONATE 600 MG: 300 TABLET, EXTENDED RELEASE ORAL at 08:32

## 2020-08-24 RX ADMIN — NICOTINE POLACRILEX 2 MG: 2 GUM, CHEWING ORAL at 19:28

## 2020-08-24 RX ADMIN — ARIPIPRAZOLE 10 MG: 10 TABLET ORAL at 08:32

## 2020-08-24 RX ADMIN — QUETIAPINE 50 MG: 50 TABLET, FILM COATED ORAL at 20:30

## 2020-08-24 ASSESSMENT — ACTIVITIES OF DAILY LIVING (ADL)
LAUNDRY: UNABLE TO COMPLETE
DRESS: INDEPENDENT
ORAL_HYGIENE: INDEPENDENT
ORAL_HYGIENE: INDEPENDENT
HYGIENE/GROOMING: INDEPENDENT
HYGIENE/GROOMING: INDEPENDENT
DRESS: INDEPENDENT;SCRUBS (BEHAVIORAL HEALTH)

## 2020-08-24 NOTE — PLAN OF CARE
Face to face shift report received from Yessi BERRY RN. Rounding completed, pt observed.     Patient in bed and appears to be sleeping at the start of shift.     Pt has been in bed with eyes closed and regular respirations. 15 minute and PRN checks all night. No complaints offered. Will continue to monitor.    Suzanne Lamb RN  8/24/2020  4:45 AM    Face to face report will be communicated to oncoming RN.    Suzanne Lamb RN  8/24/2020  6:28 AM    Problem: Adult Behavioral Health Plan of Care  Goal: Patient-Specific Goal (Individualization)  Description: Patient will sleep 6-8 hours per night  Patient will eat at 75% of meals daily  Patient will attend >50% of group programming daily  Patient will be compliant with medication  Patient will have appropriate boundaries staff and peers during hospital      8/24/2020 0104 by Suzanne Lamb, RN  Outcome: Improving  Note:        Problem: Mental State/Mood Impairment (Psychotic Signs/Symptoms)  Goal: Improved Mental State and Mood (Psychotic Signs/Symptoms)  Description: Patient will have a reality based conversation.  Patient will report a decrease in delusions/hallucinations.  Patient will use coping skills to manage labile behavior.  8/24/2020 0104 by Suzanne Lamb, RN  Outcome: Improving

## 2020-08-24 NOTE — PLAN OF CARE
"BREE LINDSEY RN  8/24/2020  7:57 AM  Face to face shift report received from ASHWIN Palacios. Rounding completed, pt observed.     0910-Ate breakfast in lounge.  Denies anxiety, depression, hallucinations, SI, HI, and pain.  Reports sleeping well. Requested and given inhaler this morning.  Pt denies SOB and states \"I feel like my breaths overlap and I need to get them back on track.  I'm trying to use it every 6 hours\".  Med compliant.   0951- Pt in bed.  Reminded pt it was group time and she stated \"I will go later\".  Writer reminded pt that she has to attend groups in order to be accepted at [facility].  Pt stated \"I will go at 10:30\".  1045-Woke pt up at 1040 and reminded her about group and expectations for discharge.  Pt went to group and they are doing a 15 minute break.  Pt reports she will come back at 11.  1105-Pt attending group.  1421-Pt attended afternoon group.  Offers no complaints.  Cooperative with staff and peers.  Pleasant all shift.    Problem: Mental State/Mood Impairment (Psychotic Signs/Symptoms)  Goal: Improved Mental State and Mood (Psychotic Signs/Symptoms)  Description: Patient will have a reality based conversation.  Patient will report a decrease in delusions/hallucinations.  Patient will use coping skills to manage labile behavior.  8/24/2020 0756 by Bree Lindsey, RN  Outcome: No Change     Problem: Adult Behavioral Health Plan of Care  Goal: Patient-Specific Goal (Individualization)  Description: Patient will sleep 6-8 hours per night  Patient will eat at 75% of meals daily  Patient will attend >50% of group programming daily  Patient will be compliant with medication  Patient will have appropriate boundaries staff and peers during hospital  8/24/2020 0756 by Bree Lindsey, RN  Outcome: Improving    Face to face end of shift report communicated to oncoming shift RN.             "

## 2020-08-24 NOTE — PLAN OF CARE
"Face to face shift report received from Bree KELLY RN. Rounding completed, pt observed.    Xi Go RN  8/24/2020  4:14 PM    Problem: Adult Behavioral Health Plan of Care  Goal: Patient-Specific Goal (Individualization)  Description: Patient will sleep 6-8 hours per night  Patient will eat at 75% of meals daily  Patient will attend >50% of group programming daily  Patient will be compliant with medication  Patient will have appropriate boundaries staff and peers during hospital      8/24/2020 1613 by Xi Go RN  Outcome: Improving  Note: Shift Summery: Patient was up on the unit at the start of this shift.  Patient is calm and cooperative, social with peers.  Patient asking if there was any news as to her going to [facility].  Informed patient that there were no new notes except that SW had sent updated notes.  Informed patient that it had been passed on that she does need to attend groups to be accepted there.  Patient stated that she was aware of this and that she had been \"going to them all day\".  Patient did attend the first group this shift.  Currently playing cards in the [employer] with a male peer.  States she feels less anxious and denies pain.    2040:  Patient requested and given Seroquel 50 mg po with HS scheduled Lithium.  Attending and participating in group at this time.    Problem: Mental State/Mood Impairment (Psychotic Signs/Symptoms)  Goal: Improved Mental State and Mood (Psychotic Signs/Symptoms)  Description: Patient will have a reality based conversation.  Patient will report a decrease in delusions/hallucinations.  Patient will use coping skills to manage labile behavior.  8/24/2020 1613 by Xi Go RN  Outcome: Improving  Conversations are reality based and relevant.     Face to face report will be communicated to oncoming RN.      "

## 2020-08-24 NOTE — PLAN OF CARE
Faxed updated notes to Karina at [facility] this morning.     Spoke with pt this morning- She informs staff that she went to most groups this weekend. Informed pt that staff faxed the updated notes over to [facility] this morning.

## 2020-08-24 NOTE — PROGRESS NOTES
"[facility]  Psychiatric Progress Note      Impression:   Jonelle is a 19 year old female with dual dx of MI/CD.    Jonelle is resting in bed this morning when I see her today. She reports that she feels \"pretty good\". Discussed we would draw a lithium level in the AM tomorrow. She states that she has been going to groups throughout the weekend and was just getting up to attend this morning. We agree to increase Metformin to twice a day, has been tolerating without side effects. She has been sleeping well at night. Denies any suicidal thoughts, denies hallucinations. Has been pleasant and cooperative. IRTS referrals were sent last week, she is hopeful to be accepted somewhere this week.      Educated regarding medication indications, risks, benefits, side effects, contraindications and possible interactions. Verbally expressed understanding.        Diagnoses:     Schizoaffective Disorder, bipolar type  Substance Abuse Disorder      Attestation:  Patient has been seen and evaluated by me,  Mai Johnson NP          Interim History:   The patient's care was discussed with the treatment team and chart notes were reviewed.    BEHAVIORAL TEAM DISCUSSION     Progress: Good. No psychotic symptoms noted, affect is brighter. Has been attending groups, is hopeful for IRTS placement    Continued Stay Criteria/Rationale: lacks insight into mental health issues, current plan discharge to [facility] or IRTS- needs structured setting     Medical/Physical: Hep C+  Precautions:   Falls precaution?: No            Behavioral Orders   Procedures     Code 1 - Restrict to Unit     Routine Programming       As clinically indicated     Status 15       Every 15 minutes.      Plan:   Continue Lithium  mg BID  Continue Abilify 10 mg  Continue Cogentin 1 mg PRN  Continue Propanolol 10 mg BID  Continue Metformin 500 mg BID with meals for neuroleptic-induced weight gain  Continue Seroquel  mg PRN at bedtime  Committed and " "chisholm granted  [facility]  Labs in AM: lithium level, BMP     Rationale for change in precautions or plan:   Recheck labs in AM     Petition for Chisholm including Invega, Zyprexa, Abilify, Risperdal, and Geodon     Participants: Mai Johnson, NP, Nursing, OT, SW, [facility]          Medications:   I have reviewed this patient's current medications    Current Facility-Administered Medications   Medication     acetaminophen (TYLENOL) tablet 650 mg     albuterol (ACCUNEB) nebulizer solution 1.25 mg     ARIPiprazole (ABILIFY) tablet 10 mg     benztropine (COGENTIN) tablet 1 mg     diphenhydrAMINE (BENADRYL) capsule 50 mg     Or     diphenhydrAMINE (BENADRYL) injection 50 mg     famotidine (PEPCID) tablet 20 mg     haloperidol (HALDOL) tablet 5 mg     Or     haloperidol lactate (HALDOL) injection 5 mg     lithium ER (LITHOBID) CR tablet 600 mg     LORazepam (ATIVAN) tablet 1 mg     Or     LORazepam (ATIVAN) injection 1 mg     magnesium hydroxide (MILK OF MAGNESIA) suspension 30 mL     metFORMIN (GLUCOPHAGE-XR) 24 hr tablet 500 mg     nicotine (NICORETTE) gum 2-4 mg     propranolol (INDERAL) tablet 10 mg     QUEtiapine (SEROquel) tablet  mg      10 point ROS negative: denies concerns.          Allergies:   No Known Allergies         Psychiatric Examination:   BP 95/55   Pulse 77   Temp 98  F (36.7  C) (Tympanic)   Resp 15   Ht 1.638 m (5' 4.5\")   Wt 87.3 kg (192 lb 6.4 oz)   SpO2 98%   BMI 32.52 kg/m    Weight is 192 lbs 6.4 oz  Body mass index is 32.52 kg/m .    Appearance: awake, alert, dressed in hospital scrubs, casually groomed  Attitude:  cooperative, pleasant  Eye Contact: good  Mood: \"pretty good\"  Affect:  appropriate and in normal range  Speech:  Clear, coherent  Psychomotor Behavior:  no evidence of tardive dyskinesia, dystonia, or tics  Thought Process:  logical and linear, more goal oriented  Associations:  no loose associations  Thought Content: denies any suicidal thoughts, " denies hallucinations  Insight:  limited  Judgment:  limited  Oriented to:  time, person, and place  Attention Span and Concentration:  limited  Recent and Remote Memory:  limited  Fund of Knowledge: delayed  Muscle Strength and Tone: normal  Gait and Station: Normal           Labs:     No results found for this or any previous visit (from the past 24 hour(s)).

## 2020-08-24 NOTE — PLAN OF CARE
"Report received, rounding complete, no needs at this time.         PT attend groups this shift.   1826 PT requested Ativan for \"her mind\", racing thoughts.   Upon reassessment PT reported that when she takes ativan it feels like she has a voice again, and is not stuck in her mind. PT says when she is just taking the lithium and the propranolol that she feels like she cant have a \"fluent conversation, and my voice shakes\"  PT reports that she does not like the way the lithium is making her feel and wants to talk to the provider about what else could help instead.     PT denies pain, PT denies SI/HI and hallucinations.  PT does not appear to be responding to stimuli.   PT endorses anxiety but reports it \"feels more inside my mind, I cant really explain it.\"    PT had PRN inhaler at 1826 as well, reported that she \"feels it in her lungs and it makes breathing feel easier after she takes it.\"  PT calm, cooperative, and social with other patients this shift in the lounge.     PT also requesting that Metformin be changed to BID if possible.           Problem: Adult Behavioral Health Plan of Care  Goal: Patient-Specific Goal (Individualization)  Description: Patient will sleep 6-8 hours per night  Patient will eat at 75% of meals daily  Patient will attend >50% of group programming daily  Patient will be compliant with medication  Patient will have appropriate boundaries staff and peers during hospital      8/23/2020 2022 by Yessi Pandey, RN  Outcome: Improving  Note:        Problem: Mental State/Mood Impairment (Psychotic Signs/Symptoms)  Goal: Improved Mental State and Mood (Psychotic Signs/Symptoms)  Description: Patient will have a reality based conversation.  Patient will report a decrease in delusions/hallucinations.  Patient will use coping skills to manage labile behavior.  8/23/2020 2022 by Yessi Pandey, RN  Outcome: Improving     "

## 2020-08-25 LAB
ANION GAP SERPL CALCULATED.3IONS-SCNC: 2 MMOL/L (ref 3–14)
BUN SERPL-MCNC: 11 MG/DL (ref 7–30)
CALCIUM SERPL-MCNC: 9 MG/DL (ref 8.5–10.1)
CHLORIDE SERPL-SCNC: 109 MMOL/L (ref 94–109)
CO2 SERPL-SCNC: 28 MMOL/L (ref 20–32)
CREAT SERPL-MCNC: 0.66 MG/DL (ref 0.52–1.04)
GFR SERPL CREATININE-BSD FRML MDRD: >90 ML/MIN/{1.73_M2}
GLUCOSE SERPL-MCNC: 87 MG/DL (ref 70–99)
LITHIUM SERPL-SCNC: 0.87 MMOL/L (ref 0.6–1.2)
POTASSIUM SERPL-SCNC: 4 MMOL/L (ref 3.4–5.3)
SODIUM SERPL-SCNC: 139 MMOL/L (ref 133–144)

## 2020-08-25 PROCEDURE — 99232 SBSQ HOSP IP/OBS MODERATE 35: CPT | Performed by: NURSE PRACTITIONER

## 2020-08-25 PROCEDURE — 25000132 ZZH RX MED GY IP 250 OP 250 PS 637: Performed by: NURSE PRACTITIONER

## 2020-08-25 PROCEDURE — 12400011

## 2020-08-25 PROCEDURE — 80178 ASSAY OF LITHIUM: CPT | Performed by: NURSE PRACTITIONER

## 2020-08-25 PROCEDURE — 80048 BASIC METABOLIC PNL TOTAL CA: CPT | Performed by: NURSE PRACTITIONER

## 2020-08-25 PROCEDURE — 36415 COLL VENOUS BLD VENIPUNCTURE: CPT | Performed by: NURSE PRACTITIONER

## 2020-08-25 RX ORDER — CLONAZEPAM 0.5 MG/1
.5-1 TABLET ORAL 2 TIMES DAILY PRN
Status: DISCONTINUED | OUTPATIENT
Start: 2020-08-25 | End: 2020-08-29

## 2020-08-25 RX ADMIN — ALBUTEROL SULFATE 2 PUFF: 90 AEROSOL, METERED RESPIRATORY (INHALATION) at 16:42

## 2020-08-25 RX ADMIN — CLONAZEPAM 1 MG: 0.5 TABLET ORAL at 20:07

## 2020-08-25 RX ADMIN — LITHIUM CARBONATE 600 MG: 300 TABLET, EXTENDED RELEASE ORAL at 20:07

## 2020-08-25 RX ADMIN — PROPRANOLOL HYDROCHLORIDE 10 MG: 10 TABLET ORAL at 17:05

## 2020-08-25 RX ADMIN — CLONAZEPAM 1 MG: 0.5 TABLET ORAL at 13:44

## 2020-08-25 RX ADMIN — LITHIUM CARBONATE 600 MG: 300 TABLET, EXTENDED RELEASE ORAL at 08:59

## 2020-08-25 RX ADMIN — NICOTINE POLACRILEX 4 MG: 2 GUM, CHEWING ORAL at 17:46

## 2020-08-25 RX ADMIN — METFORMIN HCL 500 MG: 500 TABLET ORAL at 17:05

## 2020-08-25 RX ADMIN — NICOTINE POLACRILEX 4 MG: 2 GUM, CHEWING ORAL at 13:04

## 2020-08-25 RX ADMIN — METFORMIN HCL 500 MG: 500 TABLET ORAL at 08:46

## 2020-08-25 RX ADMIN — ARIPIPRAZOLE 10 MG: 10 TABLET ORAL at 08:46

## 2020-08-25 ASSESSMENT — ACTIVITIES OF DAILY LIVING (ADL)
ORAL_HYGIENE: INDEPENDENT
DRESS: SCRUBS (BEHAVIORAL HEALTH);INDEPENDENT
HYGIENE/GROOMING: INDEPENDENT
DRESS: INDEPENDENT
LAUNDRY: UNABLE TO COMPLETE
HYGIENE/GROOMING: INDEPENDENT
ORAL_HYGIENE: INDEPENDENT

## 2020-08-25 NOTE — PROGRESS NOTES
[facility]  Psychiatric Progress Note      Impression:   Jonelle is a 19 year old female with dual dx of MI/CD.    Jonelle is up in the lounge when I see her today. She has been attending groups throughout the day. She denies any suicidal thoughts, denies hallucinations. She is hopeful that an IRTS facility will consider accepting her, as she is looking forward to getting out of the hospital. She does report some intermittent anxiety and restlessness. She has Propranolol scheduled, though blood pressure often runs low in AM so this has been held. She asks about trying an alternative to as needed Ativan, we agree to change this to a low dose of Klonopin, though she needs to start using coping skills learned in group, discussed this may help some with restlessness as well. She has been sleeping well at night.       Educated regarding medication indications, risks, benefits, side effects, contraindications and possible interactions. Verbally expressed understanding.        Diagnoses:     Schizoaffective Disorder, bipolar type  Substance Abuse Disorder      Attestation:  Patient has been seen and evaluated by me,  Mai Johnson NP          Interim History:   The patient's care was discussed with the treatment team and chart notes were reviewed.    BEHAVIORAL TEAM DISCUSSION     Progress: Good. No psychotic symptoms noted, affect is brighter. Has been attending groups, is hopeful for IRTS placement    Continued Stay Criteria/Rationale: lacks insight into mental health issues, current plan discharge to [facility] or IRTS- needs structured setting     Medical/Physical: Hep C+  Precautions:   Falls precaution?: No            Behavioral Orders   Procedures     Code 1 - Restrict to Unit     Routine Programming       As clinically indicated     Status 15       Every 15 minutes.      Plan:   Continue Lithium  mg BID  Continue Abilify 10 mg daily  Continue Cogentin 1 mg PRN  Continue Propanolol 10 mg BID  Continue  "Metformin 500 mg BID with meals for neuroleptic-induced weight gain  Continue Seroquel  mg PRN at bedtime  Committed and chisholm granted  Current plan [facility] versus IRTS- referral sent to [facility]       Rationale for change in precautions or plan:   Mood and affect improved     Petition for Chisholm including Invega, Zyprexa, Abilify, Risperdal, and Geodon     Participants: Mai Johnson, NP, Nursing, OT, SW, LADC          Medications:   I have reviewed this patient's current medications    Current Facility-Administered Medications   Medication     acetaminophen (TYLENOL) tablet 650 mg     albuterol (ACCUNEB) nebulizer solution 1.25 mg     ARIPiprazole (ABILIFY) tablet 10 mg     benztropine (COGENTIN) tablet 1 mg     diphenhydrAMINE (BENADRYL) capsule 50 mg     Or     diphenhydrAMINE (BENADRYL) injection 50 mg     famotidine (PEPCID) tablet 20 mg     haloperidol (HALDOL) tablet 5 mg     Or     haloperidol lactate (HALDOL) injection 5 mg     lithium ER (LITHOBID) CR tablet 600 mg     LORazepam (ATIVAN) tablet 1 mg     Or     LORazepam (ATIVAN) injection 1 mg     magnesium hydroxide (MILK OF MAGNESIA) suspension 30 mL     metFORMIN (GLUCOPHAGE-XR) 24 hr tablet 500 mg     nicotine (NICORETTE) gum 2-4 mg     propranolol (INDERAL) tablet 10 mg     QUEtiapine (SEROquel) tablet  mg      10 point ROS negative: denies concerns.          Allergies:   No Known Allergies         Psychiatric Examination:   BP 98/63   Pulse 68   Temp 97.3  F (36.3  C) (Tympanic)   Resp 14   Ht 1.638 m (5' 4.5\")   Wt 87.3 kg (192 lb 6.4 oz)   SpO2 100%   BMI 32.52 kg/m    Weight is 192 lbs 6.4 oz  Body mass index is 32.52 kg/m .    Appearance: awake, alert, dressed in hospital scrubs, casually groomed  Attitude:  cooperative, pleasant  Eye Contact: good  Mood: \"good\", some anxiety about discharge  Affect: improving  Speech:  Clear, coherent  Psychomotor Behavior:  no evidence of tardive dyskinesia, dystonia, or tics  Thought " Process:  logical and linear, more goal oriented  Associations:  no loose associations  Thought Content: denies any suicidal thoughts, denies hallucinations  Insight:  limited  Judgment:  limited  Oriented to:  time, person, and place  Attention Span and Concentration:  limited  Recent and Remote Memory:  limited  Fund of Knowledge: delayed  Muscle Strength and Tone: normal  Gait and Station: Normal           Labs:     Results for orders placed or performed during the hospital encounter of 06/11/20 (from the past 24 hour(s))   Lithium level   Result Value Ref Range    Lithium Level 0.87 0.60 - 1.20 mmol/L   Basic metabolic panel   Result Value Ref Range    Sodium 139 133 - 144 mmol/L    Potassium 4.0 3.4 - 5.3 mmol/L    Chloride 109 94 - 109 mmol/L    Carbon Dioxide 28 20 - 32 mmol/L    Anion Gap 2 (L) 3 - 14 mmol/L    Glucose 87 70 - 99 mg/dL    Urea Nitrogen 11 7 - 30 mg/dL    Creatinine 0.66 0.52 - 1.04 mg/dL    GFR Estimate >90 >60 mL/min/[1.73_m2]    GFR Estimate If Black >90 >60 mL/min/[1.73_m2]    Calcium 9.0 8.5 - 10.1 mg/dL

## 2020-08-25 NOTE — PLAN OF CARE
Spoke with pt this afternoon- She asks for update on [facility]- informed pt that staff sent updated notes yesterday but will call this afternoon to see if they reviewed those yet.     Called and left message for Karina and [facility] this afternoon.     Spoke with Karina from [facility] this afternoon- she states she got the updated notes and sent them over to the referral team as a priority.

## 2020-08-25 NOTE — PLAN OF CARE
Problem: Adult Behavioral Health Plan of Care  Goal: Patient-Specific Goal (Individualization)  Description: Patient will sleep 6-8 hours per night  Patient will eat at 75% of meals daily  Patient will attend >50% of group programming daily  Patient will be compliant with medication  Patient will have appropriate boundaries staff and peers during hospital      8/25/2020 0606 by Yessi Pandey, RN  Outcome: Improving    Pt has been in bed with eyes closed and regular respirations. 15 minute and PRN checks all night. No complaints offered. Will continue to monitor.  Patient has been in bed throughout the night. Even unlabored respirations and independent position changes noted.       Yessi Pandey RN  8/25/2020      Face to face end of shift report communicated to oncoming RN    Yessi Pandey RN  8/25/2020  6:24 AM

## 2020-08-25 NOTE — PLAN OF CARE
PT still feeling tired after breakfast this morning. PT took medications and went back to sleep until group. PT participated in all groups today and is hopeful to discharge soon.   PT denies all criteria and does not appear to responding to stimuli.   PT more withdrawn to self this shift but reports she is feeling tired.     PT had ativan discontinued today.  PT started new order for Klonopin 1mg at 13:44 for racing thoughts/mental anxiety.    PT still has a somewhat flat affect but makes more conversation, and expresses hopefulness.    Face to face end of shift report communicated to oncoming RN    Yessi Pandey RN  8/25/2020  2:27 PM               Problem: Adult Behavioral Health Plan of Care  Goal: Patient-Specific Goal (Individualization)  Description: Patient will sleep 6-8 hours per night  Patient will eat at 75% of meals daily  Patient will attend >50% of group programming daily  Patient will be compliant with medication  Patient will have appropriate boundaries staff and peers during hospital      8/25/2020 1412 by Yessi Pandey, RN  Outcome: Improving  Note:        Problem: Mental State/Mood Impairment (Psychotic Signs/Symptoms)  Goal: Improved Mental State and Mood (Psychotic Signs/Symptoms)  Description: Patient will have a reality based conversation.  Patient will report a decrease in delusions/hallucinations.  Patient will use coping skills to manage labile behavior.  Outcome: Improving

## 2020-08-25 NOTE — PLAN OF CARE
"Face to face shift report received from Yessi BERRY RN. Rounding completed, pt observed.  iX Go RN  8/25/2020  4:21 PM    Problem: Adult Behavioral Health Plan of Care  Goal: Patient-Specific Goal (Individualization)  Description: Patient will sleep 6-8 hours per night  Patient will eat at 75% of meals daily  Patient will attend >50% of group programming daily  Patient will be compliant with medication  Patient will have appropriate boundaries staff and peers during hospital      8/25/2020 1620 by Xi Go RN  Outcome: Improving  Note: Shift Summery:  Patient has been up on the unit this shift.  Patient is social with peers and cooperative with staff.  Patient is attending and participating in groups.  Patient had several good questions tonight stating that \"I was never taught how to be an adult\".  Patient wrote down questions that she will address with SW tomorrow such as how to get a duplicate SS card, [place] ID card and how to get a PO box.  Patient did request and get Albuterol MDI at 1642 for complaints of SOB.  Patient showered this evening and has been calm/cooperative.  Requested and given Clonazepam 1 mg po at 2007 for complaints of increased anxiety.         Problem: Mental State/Mood Impairment (Psychotic Signs/Symptoms)  Goal: Improved Mental State and Mood (Psychotic Signs/Symptoms)  Description: Patient will have a reality based conversation.  Patient will report a decrease in delusions/hallucinations.  Patient will use coping skills to manage labile behavior.  8/25/2020 1620 by Xi Go RN  Outcome: Improving  Mood has been cooperative.  Denies hallucinations or delusions.     Face to face report will be communicated to oncoming RN.        "

## 2020-08-26 PROCEDURE — 12400011

## 2020-08-26 PROCEDURE — 87635 SARS-COV-2 COVID-19 AMP PRB: CPT | Performed by: NURSE PRACTITIONER

## 2020-08-26 PROCEDURE — 25000132 ZZH RX MED GY IP 250 OP 250 PS 637: Performed by: NURSE PRACTITIONER

## 2020-08-26 PROCEDURE — 99232 SBSQ HOSP IP/OBS MODERATE 35: CPT | Performed by: NURSE PRACTITIONER

## 2020-08-26 RX ORDER — ALBUTEROL SULFATE 90 UG/1
1-2 AEROSOL, METERED RESPIRATORY (INHALATION) 2 TIMES DAILY PRN
Status: DISCONTINUED | OUTPATIENT
Start: 2020-08-26 | End: 2020-09-02 | Stop reason: HOSPADM

## 2020-08-26 RX ORDER — TRAZODONE HYDROCHLORIDE 50 MG/1
50-100 TABLET, FILM COATED ORAL AT BEDTIME
Status: DISCONTINUED | OUTPATIENT
Start: 2020-08-26 | End: 2020-08-29

## 2020-08-26 RX ORDER — ALBUTEROL SULFATE 1.25 MG/3ML
1.25 SOLUTION RESPIRATORY (INHALATION) 2 TIMES DAILY PRN
Status: DISCONTINUED | OUTPATIENT
Start: 2020-08-26 | End: 2020-08-26

## 2020-08-26 RX ORDER — ALBUTEROL SULFATE 90 UG/1
1-2 AEROSOL, METERED RESPIRATORY (INHALATION) 2 TIMES DAILY
Status: DISCONTINUED | OUTPATIENT
Start: 2020-08-26 | End: 2020-08-26

## 2020-08-26 RX ADMIN — PROPRANOLOL HYDROCHLORIDE 10 MG: 10 TABLET ORAL at 17:04

## 2020-08-26 RX ADMIN — LITHIUM CARBONATE 600 MG: 300 TABLET, EXTENDED RELEASE ORAL at 08:31

## 2020-08-26 RX ADMIN — NICOTINE POLACRILEX 4 MG: 2 GUM, CHEWING ORAL at 17:30

## 2020-08-26 RX ADMIN — METFORMIN HCL 500 MG: 500 TABLET ORAL at 17:04

## 2020-08-26 RX ADMIN — TRAZODONE HYDROCHLORIDE 50 MG: 50 TABLET ORAL at 20:30

## 2020-08-26 RX ADMIN — PROPRANOLOL HYDROCHLORIDE 10 MG: 10 TABLET ORAL at 08:33

## 2020-08-26 RX ADMIN — LITHIUM CARBONATE 600 MG: 300 TABLET, EXTENDED RELEASE ORAL at 20:22

## 2020-08-26 RX ADMIN — NICOTINE POLACRILEX 2 MG: 2 GUM, CHEWING ORAL at 20:22

## 2020-08-26 RX ADMIN — METFORMIN HCL 500 MG: 500 TABLET ORAL at 08:33

## 2020-08-26 RX ADMIN — ARIPIPRAZOLE 10 MG: 10 TABLET ORAL at 08:33

## 2020-08-26 RX ADMIN — CLONAZEPAM 1 MG: 0.5 TABLET ORAL at 17:28

## 2020-08-26 ASSESSMENT — ACTIVITIES OF DAILY LIVING (ADL)
ORAL_HYGIENE: INDEPENDENT
ORAL_HYGIENE: INDEPENDENT
HYGIENE/GROOMING: INDEPENDENT
DRESS: SCRUBS (BEHAVIORAL HEALTH);INDEPENDENT
DRESS: INDEPENDENT
LAUNDRY: UNABLE TO COMPLETE
HYGIENE/GROOMING: INDEPENDENT

## 2020-08-26 NOTE — PLAN OF CARE
"Face to face report received Xi OSBORNE RN.   Rounding completed, pt observed in bed at this time appears asleep.     Pt cooperative taking morning medications this morning. Pt does state this morning she would like the metformin before she is eating or as she is eating, so she is not over eating at meals.   Pt states no pain this morning. When this writer asked pt her current mood, pt states \"angry\". Pt states not angry about anything in particular,  Pt just states got the thoughts yesterday of \" You're depressed, and not today feeling angry\".  Pt denies SI, HI, AH, and VH this shift.     Pt in groups for 15 minutes this morning, pt in bed otherwise this shift.          Problem: Adult Behavioral Health Plan of Care  Goal: Patient-Specific Goal (Individualization)  Description: Patient will sleep 6-8 hours per night  Patient will eat at 75% of meals daily  Patient will attend >50% of group programming daily  Patient will be compliant with medication  Patient will have appropriate boundaries staff and peers during hospital      8/26/2020 0749 by Lilia Razo RN  Outcome: Improving  Note:        Problem: Mental State/Mood Impairment (Psychotic Signs/Symptoms)  Goal: Improved Mental State and Mood (Psychotic Signs/Symptoms)  Description: Patient will have a reality based conversation.  Patient will report a decrease in delusions/hallucinations.  Patient will use coping skills to manage labile behavior.  Outcome: Improving     Face to face end of shift report to be communicated to oncoming RN.    Lilia Razo RN  8/26/2020  7:50 AM       "

## 2020-08-26 NOTE — PROGRESS NOTES
"[facility]  Psychiatric Progress Note      Impression:   Jonelle is a 19 year old female with dual dx of MI/CD.    I found Jonelle bedresting after lunch. She has been attending groups throughout the day and we talked about how that will likely work in her favor if she desires to go to an [facility] vs. [facility]. She reports feeling like her \"personality is flat\" and rates her depression/anxiety at a \"6/10,\" but cannot articulate any specific symptoms other than pain in her back/shoulders, feeling flat and intermittently restless, and difficulty staying asleep. She estimates sleeping 4.5-5 hours last night and nursing indicates she wakes at around 0300 regularly. She lobbies for a decrease in her lithium, but we talked about how important good sleep is for mood regulation and she agreed that we should target that first before we start adjusting any of her current medications. She asks about when she should be taking her PRN Klonopin, so I have instructed her to start tracking when her anxiety is worst, so we will have a better idea of if/when we should be proactively medicating. She admittedly misses \"feeling manic,\" so the \"flatness\" she is feeling is likely euthymia.     She otherwise denies any medication side effects, mood issues, suicidal/homicidal ideation, hallucinations or delusions, and indicated she is eating well.        Educated regarding medication indications, risks, benefits, side effects, contraindications and possible interactions. Verbally expressed understanding.        Diagnoses:     Schizoaffective Disorder, bipolar type  Substance Abuse Disorder      Attestation:  Patient has been seen and evaluated by me,  SABI Carlson CNP          Interim History:   The patient's care was discussed with the treatment team and chart notes were reviewed.    BEHAVIORAL TEAM DISCUSSION     Progress: Good. No psychotic symptoms noted, affect is brighter. Has been attending groups, is hopeful for IRTS " "placement. Wants help with \"adulting skills\" when she discharges, which CM can help with.     Continued Stay Criteria/Rationale: lacks insight into mental health issues, current plan discharge to [facility] or IRTS- needs structured setting     Medical/Physical: Hep C+  Precautions:   Falls precaution?: No            Behavioral Orders   Procedures     Code 1 - Restrict to Unit     Routine Programming       As clinically indicated     Status 15       Every 15 minutes.      Plan:   Continue Lithium  mg BID - LL on 8/25 = 0.87  Continue Abilify 10 mg daily  Continue Cogentin 1 mg PRN  Continue Propranolol 10 mg BID  Continue Metformin 500 mg BID with meals for neuroleptic-induced weight gain  Start Trazodone  mg scheduled at bedtime  Continue Seroquel  mg PRN at bedtime. Use if Trazodone ineffective  Committed and chisholm granted  Current plan [facility] versus IR- Accepted to [facility]. Bed opening likely next week. COVID test ordered to ensure negative result. This is per [facility]'s admission requirements       Rationale for change in precautions or plan:   Sleep promotion     Petition for Chisholm including Invega, Zyprexa, Abilify, Risperdal, and Geodon     Participants: Beatrice Avalos, APRN CNP , Nursing, OT, SW, LADC          Medications:   I have reviewed this patient's current medications    Current Facility-Administered Medications   Medication     acetaminophen (TYLENOL) tablet 650 mg     albuterol (ACCUNEB) nebulizer solution 1.25 mg     ARIPiprazole (ABILIFY) tablet 10 mg     benztropine (COGENTIN) tablet 1 mg     diphenhydrAMINE (BENADRYL) capsule 50 mg     Or     diphenhydrAMINE (BENADRYL) injection 50 mg     famotidine (PEPCID) tablet 20 mg     haloperidol (HALDOL) tablet 5 mg     Or     haloperidol lactate (HALDOL) injection 5 mg     lithium ER (LITHOBID) CR tablet 600 mg     LORazepam (ATIVAN) tablet 1 mg     Or     LORazepam (ATIVAN) injection 1 mg     magnesium hydroxide (MILK OF " "MAGNESIA) suspension 30 mL     metFORMIN (GLUCOPHAGE-XR) 24 hr tablet 500 mg     nicotine (NICORETTE) gum 2-4 mg     propranolol (INDERAL) tablet 10 mg     QUEtiapine (SEROquel) tablet  mg      10 point ROS negative: reports musculoskeletal pain in her back and neck.          Allergies:   No Known Allergies         Psychiatric Examination:   BP 98/66   Pulse 62   Temp 96.6  F (35.9  C) (Tympanic)   Resp 14   Ht 1.638 m (5' 4.5\")   Wt 87.3 kg (192 lb 6.4 oz)   SpO2 98%   BMI 32.52 kg/m    Weight is 192 lbs 6.4 oz  Body mass index is 32.52 kg/m .    Appearance: awake, alert, dressed in hospital scrubs, casually groomed  Attitude:  cooperative, pleasant  Eye Contact: good  Mood: \"My personality is flat.\"   Affect: improving  Speech:  Clear, coherent  Psychomotor Behavior:  no evidence of tardive dyskinesia, dystonia, or tics  Thought Process:  logical and linear, more goal oriented  Associations:  no loose associations  Thought Content: denies any suicidal thoughts, denies hallucinations  Insight:  limited  Judgment:  limited  Oriented to:  time, person, and place  Attention Span and Concentration:  limited  Recent and Remote Memory:  limited  Fund of Knowledge: delayed  Muscle Strength and Tone: normal  Gait and Station: Normal           Labs:     No results found for this or any previous visit (from the past 24 hour(s)).                                     "

## 2020-08-26 NOTE — PLAN OF CARE
Care continued for next shift. Rounding completed, pt observed.  Xi Go RN  8/26/2020  12:34 AM    Problem: Adult Behavioral Health Plan of Care  Goal: Patient-Specific Goal (Individualization)  Description: Patient will sleep 6-8 hours per night  Patient will eat at 75% of meals daily  Patient will attend >50% of group programming daily  Patient will be compliant with medication  Patient will have appropriate boundaries staff and peers during hospital      8/26/2020 0033 by Xi Go, RN  Outcome: Improving  Note: Shift Summery:  Patient was in bed at the start of this shift.  Eyes are closed, respirations are visible and position changes noted.         Face to face report will be communicated to oncoming RN.

## 2020-08-26 NOTE — PLAN OF CARE
Face to face shift report received from Lilia FALK RN. Rounding completed, pt observed.  Xi Go RN  8/26/2020  4:10 PM    Problem: Adult Behavioral Health Plan of Care  Goal: Patient-Specific Goal (Individualization)  Description: Patient will sleep 6-8 hours per night  Patient will eat at 75% of meals daily  Patient will attend >50% of group programming daily  Patient will be compliant with medication  Patient will have appropriate boundaries staff and peers during hospital      8/26/2020 1608 by Xi Go RN  Outcome: Improving  Note: Shift Summery:  Patient has been up on the unit this shift.  Patient is attending and participating in groups.  Patient is social with peers.  Patient expresses hopefulness to be able to discharge soon.  Covid swab done and walked to lab at 2040.  Result pending.  Patient continues to ask appropriate questions r/t if discharged to [facility].  She asked if someone there would be able to give her meds when they are due and if what she would need to do if allowed to leave for a walk.  Informed patient that there is a nurse there and that the staff do administer medications there.  Patient educated on Trazodone as fisrt dose scheduled at .  Patient opted to try the 50 mg tonight and aware that if that is not sufficient, she would try the 100 mg tomorrow.    Problem: Mental State/Mood Impairment (Psychotic Signs/Symptoms)  Goal: Improved Mental State and Mood (Psychotic Signs/Symptoms)  Description: Patient will have a reality based conversation.  Patient will report a decrease in delusions/hallucinations.  Patient will use coping skills to manage labile behavior.  8/26/2020 1608 by Xi Go RN  Outcome: Improving  Patient is able to track well during conversation.  Patient is aware that her current medications are helping her to feel better.     Face to face report will be communicated to oncoming RN.

## 2020-08-26 NOTE — PLAN OF CARE
Faxed updated notes to [facility]  this morning.     Spoke with pt this morning- She asks for update from [facility]. Informed pt that staff spoke with Karina from [place] the end of the day yesterday and she informed staff that their team was reviewing the updated notes that were sent and would get back to staff once this was done.       Spoke with Tia from [facility] this afternoon- she states pt has been accepted, pending Covid test. They are scheduled to have an opening some time next week. Had pt sign CAROLINE for [facility] and faxed this back to them. Informed NP that [facility] requires a new Covid test. Updated pt, pt's nurse, and  as well.

## 2020-08-27 LAB
LABORATORY COMMENT REPORT: NORMAL
SARS-COV-2 RNA SPEC QL NAA+PROBE: NEGATIVE
SARS-COV-2 RNA SPEC QL NAA+PROBE: NORMAL
SPECIMEN SOURCE: NORMAL
SPECIMEN SOURCE: NORMAL

## 2020-08-27 PROCEDURE — 25000132 ZZH RX MED GY IP 250 OP 250 PS 637: Performed by: NURSE PRACTITIONER

## 2020-08-27 PROCEDURE — 99231 SBSQ HOSP IP/OBS SF/LOW 25: CPT | Performed by: NURSE PRACTITIONER

## 2020-08-27 PROCEDURE — 12400011

## 2020-08-27 RX ADMIN — CLONAZEPAM 0.5 MG: 0.5 TABLET ORAL at 15:21

## 2020-08-27 RX ADMIN — ARIPIPRAZOLE 10 MG: 10 TABLET ORAL at 08:10

## 2020-08-27 RX ADMIN — LITHIUM CARBONATE 600 MG: 300 TABLET, EXTENDED RELEASE ORAL at 08:09

## 2020-08-27 RX ADMIN — NICOTINE POLACRILEX 4 MG: 2 GUM, CHEWING ORAL at 15:21

## 2020-08-27 RX ADMIN — PROPRANOLOL HYDROCHLORIDE 10 MG: 10 TABLET ORAL at 16:55

## 2020-08-27 RX ADMIN — NICOTINE POLACRILEX 4 MG: 2 GUM, CHEWING ORAL at 19:28

## 2020-08-27 RX ADMIN — METFORMIN HCL 500 MG: 500 TABLET ORAL at 16:55

## 2020-08-27 RX ADMIN — METFORMIN HCL 500 MG: 500 TABLET ORAL at 08:11

## 2020-08-27 RX ADMIN — LITHIUM CARBONATE 600 MG: 300 TABLET, EXTENDED RELEASE ORAL at 20:10

## 2020-08-27 RX ADMIN — TRAZODONE HYDROCHLORIDE 50 MG: 50 TABLET ORAL at 20:10

## 2020-08-27 RX ADMIN — PROPRANOLOL HYDROCHLORIDE 10 MG: 10 TABLET ORAL at 08:11

## 2020-08-27 RX ADMIN — ALBUTEROL SULFATE 2 PUFF: 90 INHALANT RESPIRATORY (INHALATION) at 20:40

## 2020-08-27 ASSESSMENT — ACTIVITIES OF DAILY LIVING (ADL)
ORAL_HYGIENE: INDEPENDENT
HYGIENE/GROOMING: INDEPENDENT
HYGIENE/GROOMING: INDEPENDENT
DRESS: INDEPENDENT
LAUNDRY: UNABLE TO COMPLETE
DRESS: INDEPENDENT
ORAL_HYGIENE: INDEPENDENT

## 2020-08-27 NOTE — PLAN OF CARE
Called and left message for pt's CM Ariana this morning, as Ariana stated she was locked out of her email-  informed her of tentative plans for discharge next week to [facility].

## 2020-08-27 NOTE — PLAN OF CARE
Face to face shift report received from Lilia FALK RN. Rounding completed, pt observed.  Xi Go RN  8/27/2020  4:13 PM    Problem: Adult Behavioral Health Plan of Care  Goal: Patient-Specific Goal (Individualization)  Description: Patient will sleep 6-8 hours per night  Patient will eat at 75% of meals daily  Patient will attend >50% of group programming daily  Patient will be compliant with medication  Patient will have appropriate boundaries staff and peers during hospital      8/27/2020 1612 by Xi Go RN  Outcome: Improving  Note: Shift Summery:  Patient was up and in group at the start of this shift.  Patient is cooperative and social.  No physical complaints.  Appetite is good; eating healthy choices.  States sleep was better last night.  2040:  Patient requested and given Albuterol MDI 2 puffs for c/o wheezing.    Problem: Mental State/Mood Impairment (Psychotic Signs/Symptoms)  Goal: Improved Mental State and Mood (Psychotic Signs/Symptoms)  Description: Patient will have a reality based conversation.  Patient will report a decrease in delusions/hallucinations.  Patient will use coping skills to manage labile behavior.  8/27/2020 1612 by Xi Go RN  Outcome: Improving  Conversations are reality based and relevant.     Face to face report will be communicated to oncoming RN.

## 2020-08-27 NOTE — PLAN OF CARE
"Face to face end of shift report received from Marbella KELLY RN.   Rounding completed, pt observed in bed at this time appears to be sleeping.    Pt denies SI, HI, AH, and VH this morning. Pt reports \"good\" when this writer asked about mood. Pt out in lounge area for meals this shift. Pt did not attend groups today. Pt in bed most of day shift besides for meal times.   Pt reports no requests and/or concerns this shift.     15:20 Pt requesting PRN for 4 out 10 anxiety at this time. Pt received 0.5 mg klonopin at this time.      Problem: Adult Behavioral Health Plan of Care  Goal: Patient-Specific Goal (Individualization)  Description: Patient will sleep 6-8 hours per night  Patient will eat at 75% of meals daily  Patient will attend >50% of group programming daily  Patient will be compliant with medication  Patient will have appropriate boundaries staff and peers during hospital      8/27/2020 0756 by Lilia Razo RN  Outcome: Improving  Note:        Problem: Mental State/Mood Impairment (Psychotic Signs/Symptoms)  Goal: Improved Mental State and Mood (Psychotic Signs/Symptoms)  Description: Patient will have a reality based conversation.  Patient will report a decrease in delusions/hallucinations.  Patient will use coping skills to manage labile behavior.  8/27/2020 0756 by Lilia Razo RN  Outcome: Improving     Face to face end of shift report to be communicated to oncoming RN.     Lilia Razo RN  8/27/2020  7:58 AM       "

## 2020-08-27 NOTE — PLAN OF CARE
Face to face end of shift report will be communicated to oncromelia RN.     Problem: Adult Behavioral Health Plan of Care  Goal: Patient-Specific Goal (Individualization)  Description: Patient will sleep 6-8 hours per night  Patient will eat at 75% of meals daily  Patient will attend >50% of group programming daily  Patient will be compliant with medication  Patient will have appropriate boundaries staff and peers during hospital      8/27/2020 0000 by Marbella Renteria RN  Outcome: No Change     Problem: Mental State/Mood Impairment (Psychotic Signs/Symptoms)  Goal: Improved Mental State and Mood (Psychotic Signs/Symptoms)  Description: Patient will have a reality based conversation.  Patient will report a decrease in delusions/hallucinations.  Patient will use coping skills to manage labile behavior.  8/27/2020 0000 by Marbella Renteria RN  Outcome: No Change   Face to face end of shift report obtained from Xi CHRISTOPHER. Pt observed resting in bed.  Pt appears to be sleeping in bed with eyes closed, having regular respirations, and position changes. 15 minutes and PRN safety checks completed with no noted complains. Will continue to monitor.   0600- Pt appeared to have slept 7 hours.

## 2020-08-27 NOTE — PROGRESS NOTES
"[facility]  Psychiatric Progress Note      Impression:   Jonelle is a 19 year old female with dual dx of MI/CD.    I found Jonelle bedresting after lunch. She continues to attend groups throughout the day and was excited to learn she was accepted by [facility], with a tentative discharge planned for next week. She reports sleeping \"really good\" after taking the Trazodone. She otherwise denies any medication side effects, mood issues, suicidal/homicidal ideation, hallucinations or delusions, and indicated she is eating well.        Educated regarding medication indications, risks, benefits, side effects, contraindications and possible interactions. Verbally expressed understanding.        Diagnoses:     Schizoaffective Disorder, bipolar type  Substance Abuse Disorder      Attestation:  Patient has been seen and evaluated by me,  SABI Carlson CNP          Interim History:   The patient's care was discussed with the treatment team and chart notes were reviewed.    BEHAVIORAL TEAM DISCUSSION     Progress: Good. No psychotic symptoms noted, affect is brighter.    Continued Stay Criteria/Rationale: lacks insight into mental health issues, tentative discharge to [facility] IRTS     Medical/Physical: Hep C+  Precautions:   Falls precaution?: No            Behavioral Orders   Procedures     Code 1 - Restrict to Unit     Routine Programming       As clinically indicated     Status 15       Every 15 minutes.      Plan:   Continue Lithium  mg BID - LL on 8/25 = 0.87  Continue Abilify 10 mg daily  Continue Cogentin 1 mg PRN  Continue Propranolol 10 mg BID  Continue Metformin 500 mg BID with meals for neuroleptic-induced weight gain  ContinueTrazodone  mg scheduled at bedtime  Continue Seroquel  mg PRN at bedtime. Use if Trazodone ineffective  Committed and chisholm granted  Accepted to [facility]. Bed likely available next week. Awaiting COVID result.        Rationale for change in " "precautions or plan: none     Petition for [place] including Invega, Zyprexa, Abilify, Risperdal, and Geodon     Participants: SABI Carlson CNP , Nursing, OT, SW, [facility]          Medications:   I have reviewed this patient's current medications    Current Facility-Administered Medications   Medication     acetaminophen (TYLENOL) tablet 650 mg     albuterol (ACCUNEB) nebulizer solution 1.25 mg     ARIPiprazole (ABILIFY) tablet 10 mg     benztropine (COGENTIN) tablet 1 mg     diphenhydrAMINE (BENADRYL) capsule 50 mg     Or     diphenhydrAMINE (BENADRYL) injection 50 mg     famotidine (PEPCID) tablet 20 mg     haloperidol (HALDOL) tablet 5 mg     Or     haloperidol lactate (HALDOL) injection 5 mg     lithium ER (LITHOBID) CR tablet 600 mg     LORazepam (ATIVAN) tablet 1 mg     Or     LORazepam (ATIVAN) injection 1 mg     magnesium hydroxide (MILK OF MAGNESIA) suspension 30 mL     metFORMIN (GLUCOPHAGE-XR) 24 hr tablet 500 mg     nicotine (NICORETTE) gum 2-4 mg     propranolol (INDERAL) tablet 10 mg     QUEtiapine (SEROquel) tablet  mg      10 point ROS negative: reports musculoskeletal pain in her back and neck.          Allergies:   No Known Allergies         Psychiatric Examination:   BP 99/87   Pulse 72   Temp 96.8  F (36  C) (Tympanic)   Resp 16   Ht 1.638 m (5' 4.5\")   Wt 87.3 kg (192 lb 6.4 oz)   SpO2 98%   BMI 32.52 kg/m    Weight is 192 lbs 6.4 oz  Body mass index is 32.52 kg/m .    Appearance:  awake, alert, adequately groomed and dressed in hospital scrubs  Attitude:  cooperative  Eye Contact:  good  Mood:  \"Happy I'll be getting out of here soon!\"  Affect:  appropriate and in normal range  Speech:  clear, coherent  Psychomotor Behavior:  no evidence of tardive dyskinesia, dystonia, or tics  Thought Process:  logical, linear and goal oriented  Associations:  no loose associations  Thought Content:  no evidence of suicidal ideation or homicidal ideation and no evidence of psychotic " thought  Insight:  fair  Judgment:  fair  Oriented to:  time, person, and place  Attention Span and Concentration:  fair  Recent and Remote Memory:  intact  Fund of Knowledge: appropriate  Muscle Strength and Tone: normal  Gait and Station: Normal             Labs:     Results for orders placed or performed during the hospital encounter of 06/11/20 (from the past 24 hour(s))   Asymptomatic COVID-19 Virus (Coronavirus) by PCR    Specimen: Nasopharyngeal   Result Value Ref Range    COVID-19 Virus PCR to [facility] - Source Nasopharyngeal     COVID-19 Virus PCR to [facility] - Result       Test received-See reflex to [facility] test SARS CoV2 (COVID-19) Virus RT-PCR

## 2020-08-28 PROCEDURE — 25000132 ZZH RX MED GY IP 250 OP 250 PS 637: Performed by: NURSE PRACTITIONER

## 2020-08-28 PROCEDURE — 99231 SBSQ HOSP IP/OBS SF/LOW 25: CPT | Performed by: NURSE PRACTITIONER

## 2020-08-28 PROCEDURE — 12400011

## 2020-08-28 RX ADMIN — METFORMIN HCL 500 MG: 500 TABLET ORAL at 08:21

## 2020-08-28 RX ADMIN — CLONAZEPAM 1 MG: 0.5 TABLET ORAL at 19:25

## 2020-08-28 RX ADMIN — LITHIUM CARBONATE 600 MG: 300 TABLET, EXTENDED RELEASE ORAL at 20:03

## 2020-08-28 RX ADMIN — ALBUTEROL SULFATE 2 PUFF: 90 INHALANT RESPIRATORY (INHALATION) at 12:41

## 2020-08-28 RX ADMIN — CLONAZEPAM 1 MG: 0.5 TABLET ORAL at 12:41

## 2020-08-28 RX ADMIN — ALBUTEROL SULFATE 2 PUFF: 90 INHALANT RESPIRATORY (INHALATION) at 20:14

## 2020-08-28 RX ADMIN — METFORMIN HCL 500 MG: 500 TABLET ORAL at 17:01

## 2020-08-28 RX ADMIN — TRAZODONE HYDROCHLORIDE 100 MG: 50 TABLET ORAL at 20:03

## 2020-08-28 RX ADMIN — ARIPIPRAZOLE 10 MG: 10 TABLET ORAL at 08:21

## 2020-08-28 RX ADMIN — LITHIUM CARBONATE 600 MG: 300 TABLET, EXTENDED RELEASE ORAL at 08:21

## 2020-08-28 RX ADMIN — PROPRANOLOL HYDROCHLORIDE 10 MG: 10 TABLET ORAL at 08:21

## 2020-08-28 RX ADMIN — NICOTINE POLACRILEX 4 MG: 2 GUM, CHEWING ORAL at 19:25

## 2020-08-28 ASSESSMENT — ACTIVITIES OF DAILY LIVING (ADL)
DRESS: SCRUBS (BEHAVIORAL HEALTH);INDEPENDENT
ORAL_HYGIENE: INDEPENDENT
HYGIENE/GROOMING: INDEPENDENT
LAUNDRY: UNABLE TO COMPLETE

## 2020-08-28 NOTE — PLAN OF CARE
Face to face end of shift report communicated to afternoon RN.     Lina Carrero RN  8/28/2020  1:39 PM      Up on unit and social with peers. Complains that meds make her feel flat. She will talk with Np today. Rates depression 4 and anxiety 5. Denies hallucinations. Sleeping OK. Eating well.  Problem: Adult Behavioral Health Plan of Care  Goal: Patient-Specific Goal (Individualization)  Description: Patient will sleep 6-8 hours per night  Patient will eat at 75% of meals daily  Patient will attend >50% of group programming daily  Patient will be compliant with medication  Patient will have appropriate boundaries staff and peers during hospital      8/28/2020 1041 by Lina Carrero RN  Outcome: Improving     Problem: Mental State/Mood Impairment (Psychotic Signs/Symptoms)  Goal: Improved Mental State and Mood (Psychotic Signs/Symptoms)  Description: Patient will have a reality based conversation.  Patient will report a decrease in delusions/hallucinations.  Patient will use coping skills to manage labile behavior.  8/28/2020 1041 by Lina Carrero RN  Outcome: Improving

## 2020-08-28 NOTE — PLAN OF CARE
Face to face end of shift report will be communicated to oncoming RN.      Problem: Adult Behavioral Health Plan of Care  Goal: Patient-Specific Goal (Individualization)  Description: Patient will sleep 6-8 hours per night  Patient will eat at 75% of meals daily  Patient will attend >50% of group programming daily  Patient will be compliant with medication  Patient will have appropriate boundaries staff and peers during hospital      8/28/2020 0307 by Marbella Renteria RN  Outcome: No Change     Problem: Mental State/Mood Impairment (Psychotic Signs/Symptoms)  Goal: Improved Mental State and Mood (Psychotic Signs/Symptoms)  Description: Patient will have a reality based conversation.  Patient will report a decrease in delusions/hallucinations.  Patient will use coping skills to manage labile behavior.  8/28/2020 0307 by Marbella Renteria RN  Outcome: No Change   Face to face end of shift report obtained from ASHWIN Layne. Pt observed resting in bed.  Pt appears to be sleeping in bed with eyes closed, having regular respirations, and position changes. 15 minutes and PRN safety checks completed with no noted complains. Will continue to monitor.   0600- Pt slept 7 hours.

## 2020-08-28 NOTE — PLAN OF CARE
Faxed Covid labs to [facility] this morning.     Spoke with Karina from [facility] who states they can accept pt on Sept 2nd any time between 9-1pm- meds can be sent to [facility] in [place]. Called and left message for pt's ESTER Lane asking if she can arrange transportation.  Updated pt and pt's nurse of discharge plans.

## 2020-08-29 PROCEDURE — 25000132 ZZH RX MED GY IP 250 OP 250 PS 637: Performed by: NURSE PRACTITIONER

## 2020-08-29 PROCEDURE — 12400011

## 2020-08-29 PROCEDURE — 99232 SBSQ HOSP IP/OBS MODERATE 35: CPT | Performed by: NURSE PRACTITIONER

## 2020-08-29 RX ORDER — CLONAZEPAM 1 MG/1
1 TABLET ORAL 2 TIMES DAILY
Status: DISCONTINUED | OUTPATIENT
Start: 2020-08-29 | End: 2020-09-02 | Stop reason: HOSPADM

## 2020-08-29 RX ORDER — TRAZODONE HYDROCHLORIDE 100 MG/1
100 TABLET ORAL AT BEDTIME
Status: DISCONTINUED | OUTPATIENT
Start: 2020-08-29 | End: 2020-09-02 | Stop reason: HOSPADM

## 2020-08-29 RX ORDER — CLONAZEPAM 1 MG/1
1 TABLET ORAL 2 TIMES DAILY
Status: DISCONTINUED | OUTPATIENT
Start: 2020-08-29 | End: 2020-08-29 | Stop reason: DRUGHIGH

## 2020-08-29 RX ADMIN — ARIPIPRAZOLE 10 MG: 10 TABLET ORAL at 08:19

## 2020-08-29 RX ADMIN — NICOTINE POLACRILEX 4 MG: 2 GUM, CHEWING ORAL at 15:45

## 2020-08-29 RX ADMIN — METFORMIN HCL 500 MG: 500 TABLET ORAL at 17:05

## 2020-08-29 RX ADMIN — LITHIUM CARBONATE 600 MG: 300 TABLET, EXTENDED RELEASE ORAL at 08:19

## 2020-08-29 RX ADMIN — NICOTINE POLACRILEX 4 MG: 2 GUM, CHEWING ORAL at 19:05

## 2020-08-29 RX ADMIN — CLONAZEPAM 1 MG: 1 TABLET ORAL at 17:05

## 2020-08-29 RX ADMIN — TRAZODONE HYDROCHLORIDE 100 MG: 100 TABLET ORAL at 20:05

## 2020-08-29 RX ADMIN — NICOTINE POLACRILEX 4 MG: 2 GUM, CHEWING ORAL at 17:41

## 2020-08-29 RX ADMIN — METFORMIN HCL 500 MG: 500 TABLET ORAL at 08:19

## 2020-08-29 RX ADMIN — PROPRANOLOL HYDROCHLORIDE 10 MG: 10 TABLET ORAL at 17:42

## 2020-08-29 RX ADMIN — CLONAZEPAM 1 MG: 1 TABLET ORAL at 12:58

## 2020-08-29 RX ADMIN — LITHIUM CARBONATE 600 MG: 300 TABLET, EXTENDED RELEASE ORAL at 20:05

## 2020-08-29 NOTE — PROGRESS NOTES
"[facility]  Psychiatric Progress Note      Impression:   Jonelle is a 19 year old female with dual dx of MI/CD.    I found Jonelle bedresting this afternoon. She reports intermittent issues with falling and staying asleep. I did make some medication changes that I'm hoping will help, so we talked a little about that.  She continues to \"feel flat,\" but otherwise denies suicidal/homicidal ideation, hallucinations or delusions, and indicated she is eating well. She is eager for discharge on Wednesday.        Educated regarding medication indications, risks, benefits, side effects, contraindications and possible interactions. Verbally expressed understanding.          Diagnoses:     Schizoaffective Disorder, bipolar type  Substance Abuse Disorder      Attestation:  Patient has been seen and evaluated by me,  SABI Carlson CNP          Interim History:   The patient's care was discussed with the treatment team and chart notes were reviewed.    BEHAVIORAL TEAM DISCUSSION     Progress: Good. No psychotic symptoms noted, brighter affect. Is attending groups.    Continued Stay Criteria/Rationale: lacks insight into mental health issues, needs structured setting at discharge     Medical/Physical: Hep C+  Precautions:   Falls precaution?: No            Behavioral Orders   Procedures     Code 1 - Restrict to Unit     Routine Programming       As clinically indicated     Status 15       Every 15 minutes.      Plan:   Continue Lithium  mg BID - LL on 8/25 = 0.87  Continue Abilify 10 mg daily  Continue Cogentin 1 mg PRN  Continue Propranolol 10 mg BID  Continue Metformin 500 mg BID with meals for neuroleptic-induced weight gain  Change Trazodone to 100 mg scheduled at bedtime  Change Klonopin to 1 mg scheduled at 0900 and 1700  Continue Seroquel  mg PRN at bedtime.   Committed and chisholm granted  Discharge to [facility] on Wed 9/2.      Rationale for change in precautions or plan: to help " "facilitate sleep and better manage anxiety     Petition for Granado including Invega, Zyprexa, Abilify, Risperdal, and Geodon     Participants: SABI Carlson CNP , Nursing          Medications:   I have reviewed this patient's current medications    Current Facility-Administered Medications   Medication     acetaminophen (TYLENOL) tablet 650 mg     albuterol (ACCUNEB) nebulizer solution 1.25 mg     ARIPiprazole (ABILIFY) tablet 10 mg     benztropine (COGENTIN) tablet 1 mg     diphenhydrAMINE (BENADRYL) capsule 50 mg     Or     diphenhydrAMINE (BENADRYL) injection 50 mg     famotidine (PEPCID) tablet 20 mg     haloperidol (HALDOL) tablet 5 mg     Or     haloperidol lactate (HALDOL) injection 5 mg     lithium ER (LITHOBID) CR tablet 600 mg     LORazepam (ATIVAN) tablet 1 mg     Or     LORazepam (ATIVAN) injection 1 mg     magnesium hydroxide (MILK OF MAGNESIA) suspension 30 mL     metFORMIN (GLUCOPHAGE-XR) 24 hr tablet 500 mg     nicotine (NICORETTE) gum 2-4 mg     propranolol (INDERAL) tablet 10 mg     QUEtiapine (SEROquel) tablet  mg      10 point ROS negative: no new concerns noted         Allergies:   No Known Allergies         Psychiatric Examination:   /68   Pulse 81   Temp 98.2  F (36.8  C) (Tympanic)   Resp 16   Ht 1.638 m (5' 4.5\")   Wt 87.3 kg (192 lb 6.4 oz)   SpO2 99%   BMI 32.52 kg/m    Weight is 192 lbs 6.4 oz  Body mass index is 32.52 kg/m .    Appearance:  awake, alert, adequately groomed and dressed in hospital scrubs  Attitude:  cooperative  Eye Contact:  good  Mood:  better  Affect:  appropriate and in normal range and mood congruent  Speech:  clear, coherent  Psychomotor Behavior:  no evidence of tardive dyskinesia, dystonia, or tics  Thought Process:  logical, linear and goal oriented  Associations:  no loose associations  Thought Content:  no evidence of suicidal ideation or homicidal ideation, no evidence of psychotic thought and perseverating on medication " changes  Insight:  limited  Judgment:  limited  Oriented to:  time, person, and place  Attention Span and Concentration:  fair  Recent and Remote Memory:  intact  Fund of Knowledge: appropriate  Muscle Strength and Tone: normal  Gait and Station: Normal             Labs:     No results found for this or any previous visit (from the past 24 hour(s)).

## 2020-08-29 NOTE — PLAN OF CARE
Report received Lina COREY RN. Rounding complete. No needs at this time.    PT attended groups this shift. PT is looking forward to discharge, and going somewhere new, with new people.  PT reporting that she feels flat, but affect seems much brighter, conversation is linear.     PT requested PRN Klonopin at 1925. PT reporting that Klonopin helps her form her words more clearly, and makes her feel like herself and reduced her anxiety and restlessness.PT is social with other patients and out in lounge or in groups through shift.   PT has not made any delusional statements this shift.   PT denies pain.   PT propranolol parameters not met for evening dose.     Face to face end of shift report communicated to oncoming RN     Yessi Pandey RN  8/28/2020  10:56 PM          Problem: Adult Behavioral Health Plan of Care  Goal: Patient-Specific Goal (Individualization)  Description: Patient will sleep 6-8 hours per night  Patient will eat at 75% of meals daily  Patient will attend >50% of group programming daily  Patient will be compliant with medication  Patient will have appropriate boundaries staff and peers during hospital      8/28/2020 2122 by Yessi Pandey RN  Outcome: Improving  Note:        Problem: Mental State/Mood Impairment (Psychotic Signs/Symptoms)  Goal: Improved Mental State and Mood (Psychotic Signs/Symptoms)  Description: Patient will have a reality based conversation.  Patient will report a decrease in delusions/hallucinations.  Patient will use coping skills to manage labile behavior.  8/28/2020 2122 by Yessi Pandey, RN  Outcome: Improving

## 2020-08-29 NOTE — PROGRESS NOTES
[facility]  Psychiatric Progress Note      Impression:   Jonelle is a 19 year old female with dual dx of MI/CD.    I found Jonelle in the lounge today. She tells me there should be a bed available for her at [facility] on Wednesday. She continues to lobby for medication changes (re: lithium and Abilify), but I declined due to her upcoming discharge. I explained that if we started to change her regimen and that caused her to decompensate, [facility] might not take her. She indicated understanding and I assured her that she could discuss her medication concerns with the provider who will be seeing her on an outpatient basis. She was disappointed, but doesn't want to jeopardize her discharge.  She otherwise denies any mood issues, suicidal/homicidal ideation, hallucinations or delusions, and indicated she is eating and sleeping well.        Educated regarding medication indications, risks, benefits, side effects, contraindications and possible interactions. Verbally expressed understanding.        Diagnoses:     Schizoaffective Disorder, bipolar type  Substance Abuse Disorder      Attestation:  Patient has been seen and evaluated by me,  SABI Carlson CNP          Interim History:   The patient's care was discussed with the treatment team and chart notes were reviewed.    BEHAVIORAL TEAM DISCUSSION     Progress: Good. No psychotic symptoms noted, brighter affect. Is attending groups.    Continued Stay Criteria/Rationale: lacks insight into mental health issues, needs structured setting at discharge     Medical/Physical: Hep C+  Precautions:   Falls precaution?: No            Behavioral Orders   Procedures     Code 1 - Restrict to Unit     Routine Programming       As clinically indicated     Status 15       Every 15 minutes.      Plan:   Continue Lithium  mg BID - LL on 8/25 = 0.87  Continue Abilify 10 mg daily  Continue Cogentin 1 mg PRN  Continue Propranolol 10 mg BID  Continue  "Metformin 500 mg BID with meals for neuroleptic-induced weight gain  ContinueTrazodone  mg scheduled at bedtime  Continue Seroquel  mg PRN at bedtime. Use if Trazodone ineffective  Committed and chisholm granted  Discharge to [facility] on Wed 9/2. COVID negative - SW to fax results to [facility]     Rationale for change in precautions or plan: none     Petition for Chisholm including Invega, Zyprexa, Abilify, Risperdal, and Geodon     Participants: Beatrice Avalos, APRN CNP , Nursing, OT, SW, LADC          Medications:   I have reviewed this patient's current medications    Current Facility-Administered Medications   Medication     acetaminophen (TYLENOL) tablet 650 mg     albuterol (ACCUNEB) nebulizer solution 1.25 mg     ARIPiprazole (ABILIFY) tablet 10 mg     benztropine (COGENTIN) tablet 1 mg     diphenhydrAMINE (BENADRYL) capsule 50 mg     Or     diphenhydrAMINE (BENADRYL) injection 50 mg     famotidine (PEPCID) tablet 20 mg     haloperidol (HALDOL) tablet 5 mg     Or     haloperidol lactate (HALDOL) injection 5 mg     lithium ER (LITHOBID) CR tablet 600 mg     LORazepam (ATIVAN) tablet 1 mg     Or     LORazepam (ATIVAN) injection 1 mg     magnesium hydroxide (MILK OF MAGNESIA) suspension 30 mL     metFORMIN (GLUCOPHAGE-XR) 24 hr tablet 500 mg     nicotine (NICORETTE) gum 2-4 mg     propranolol (INDERAL) tablet 10 mg     QUEtiapine (SEROquel) tablet  mg      10 point ROS negative: no new concerns noted         Allergies:   No Known Allergies         Psychiatric Examination:   BP 99/68   Pulse 85   Temp 98.3  F (36.8  C) (Tympanic)   Resp 16   Ht 1.638 m (5' 4.5\")   Wt 87.3 kg (192 lb 6.4 oz)   SpO2 97%   BMI 32.52 kg/m    Weight is 192 lbs 6.4 oz  Body mass index is 32.52 kg/m .    Appearance:  awake, alert, adequately groomed and dressed in hospital scrubs  Attitude:  cooperative  Eye Contact:  good  Mood:  better  Affect:  appropriate and in normal range and mood congruent  Speech:  " clear, coherent  Psychomotor Behavior:  no evidence of tardive dyskinesia, dystonia, or tics  Thought Process:  logical, linear and goal oriented  Associations:  no loose associations  Thought Content:  no evidence of suicidal ideation or homicidal ideation, no evidence of psychotic thought and perseverating on medication changes  Insight:  limited  Judgment:  limited  Oriented to:  time, person, and place  Attention Span and Concentration:  fair  Recent and Remote Memory:  intact  Fund of Knowledge: appropriate  Muscle Strength and Tone: normal  Gait and Station: Normal             Labs:     No results found for this or any previous visit (from the past 24 hour(s)).

## 2020-08-29 NOTE — PLAN OF CARE
Problem: Adult Behavioral Health Plan of Care  Goal: Patient-Specific Goal (Individualization)  Description: Patient will sleep 6-8 hours per night  Patient will eat at 75% of meals daily  Patient will attend >50% of group programming daily  Patient will be compliant with medication  Patient will have appropriate boundaries staff and peers during hospital      8/29/2020 0013 by Kiki Castle, RN  Outcome: No Change  Note:        Face to face shift report received from Yessi CHRISTOPHER. Rounding completed, pt observed.    Pt appeared to be sleeping most of this shift.     Face to face report will be communicated to oncoming RN.    Kiki Castle RN  8/29/2020  6:04 AM

## 2020-08-29 NOTE — PLAN OF CARE
Up for breakfast. Affect flat. States didn't sleep through the night. Awake a couple times. Depression 3. Denies hallucinations or pain. Inderal held due to low B/P 102/54. Attended group.Order changes. Klonopin 1 mg given at 1300 per new order. Klonopin is now bid. todays doses will be different from tomorrows  Face to face end of shift report communicated to afternoon nurse.     Lina Carrero RN  8/29/2020  1:27 PM          Problem: Adult Behavioral Health Plan of Care  Goal: Patient-Specific Goal (Individualization)  Description: Patient will sleep 6-8 hours per night  Patient will eat at 75% of meals daily  Patient will attend >50% of group programming daily  Patient will be compliant with medication  Patient will have appropriate boundaries staff and peers during hospital      8/29/2020 0905 by Lina Carrero RN  Outcome: Improving     Problem: Mental State/Mood Impairment (Psychotic Signs/Symptoms)  Goal: Improved Mental State and Mood (Psychotic Signs/Symptoms)  Description: Patient will have a reality based conversation.  Patient will report a decrease in delusions/hallucinations.  Patient will use coping skills to manage labile behavior.  8/29/2020 0905 by Lina Carrero RN  Outcome: Improving

## 2020-08-30 PROCEDURE — 25000132 ZZH RX MED GY IP 250 OP 250 PS 637: Performed by: NURSE PRACTITIONER

## 2020-08-30 PROCEDURE — 12400011

## 2020-08-30 PROCEDURE — 99232 SBSQ HOSP IP/OBS MODERATE 35: CPT | Performed by: NURSE PRACTITIONER

## 2020-08-30 RX ORDER — PROPRANOLOL HYDROCHLORIDE 10 MG/1
10 TABLET ORAL 2 TIMES DAILY PRN
Status: DISCONTINUED | OUTPATIENT
Start: 2020-08-30 | End: 2020-09-02 | Stop reason: HOSPADM

## 2020-08-30 RX ADMIN — CLONAZEPAM 1 MG: 1 TABLET ORAL at 17:00

## 2020-08-30 RX ADMIN — TRAZODONE HYDROCHLORIDE 100 MG: 100 TABLET ORAL at 20:00

## 2020-08-30 RX ADMIN — PROPRANOLOL HYDROCHLORIDE 10 MG: 10 TABLET ORAL at 18:53

## 2020-08-30 RX ADMIN — METFORMIN HCL 500 MG: 500 TABLET ORAL at 08:10

## 2020-08-30 RX ADMIN — LITHIUM CARBONATE 600 MG: 300 TABLET, EXTENDED RELEASE ORAL at 08:10

## 2020-08-30 RX ADMIN — METFORMIN HCL 500 MG: 500 TABLET ORAL at 17:00

## 2020-08-30 RX ADMIN — DIPHENHYDRAMINE HYDROCHLORIDE 50 MG: 50 CAPSULE ORAL at 16:19

## 2020-08-30 RX ADMIN — LITHIUM CARBONATE 600 MG: 300 TABLET, EXTENDED RELEASE ORAL at 20:00

## 2020-08-30 RX ADMIN — NICOTINE POLACRILEX 4 MG: 2 GUM, CHEWING ORAL at 16:19

## 2020-08-30 RX ADMIN — CLONAZEPAM 1 MG: 1 TABLET ORAL at 08:10

## 2020-08-30 RX ADMIN — ARIPIPRAZOLE 10 MG: 10 TABLET ORAL at 08:10

## 2020-08-30 ASSESSMENT — MIFFLIN-ST. JEOR: SCORE: 1651.08

## 2020-08-30 NOTE — PLAN OF CARE
Problem: Adult Behavioral Health Plan of Care  Goal: Patient-Specific Goal (Individualization)  Description: Patient will sleep 6-8 hours per night  Patient will eat at 75% of meals daily  Patient will attend >50% of group programming daily  Patient will be compliant with medication  Patient will have appropriate boundaries staff and peers during hospital      8/30/2020 0013 by Kiki Castle RN  Outcome: No Change  Note:        Problem: Mental State/Mood Impairment (Psychotic Signs/Symptoms)  Goal: Improved Mental State and Mood (Psychotic Signs/Symptoms)  Description: Patient will have a reality based conversation.  Patient will report a decrease in delusions/hallucinations.  Patient will use coping skills to manage labile behavior.  8/30/2020 0013 by Kiki Castle RN  Outcome: No Change     Face to face shift report received from Yessi CHRISTOPHER. Rounding completed, pt observed.     Pt appeared to be sleeping most of this shift.    Face to face report will be communicated to oncoming RN.    Kiki Castle RN  8/30/2020  6:01 AM

## 2020-08-30 NOTE — PROGRESS NOTES
"[facility]  Psychiatric Progress Note      Impression:   Jonelle is a 19 year old female with dual dx of MI/CD.    I found Jonelle at the nurses' station this morning. She reports intermittent issues with falling and staying asleep. I encouraged her to use her PRN Seroquel or Benadryl if she's experiencing increased sleep latency or is unable to fall back to sleep during the night. She continues to \"feel a little flat,\" but admits her mood has improved. She otherwise denies suicidal/homicidal ideation, hallucinations or delusions, and indicated she is eating well. She is eager for discharge on Wednesday.     Educated regarding medication indications, risks, benefits, side effects, contraindications and possible interactions. Verbally expressed understanding.          Diagnoses:     Schizoaffective Disorder, bipolar type  Substance Abuse Disorder    Attestation:  Patient has been seen and evaluated by me,  SABI Carlson CNP          Interim History:   The patient's care was discussed with the treatment team and chart notes were reviewed.    BEHAVIORAL TEAM DISCUSSION     Progress: Good. No psychotic symptoms noted, brighter affect. Is attending groups.    Continued Stay Criteria/Rationale: lacks insight into mental health issues, needs structured setting at discharge     Medical/Physical: Hep C+  Precautions:   Falls precaution?: No            Behavioral Orders   Procedures     Code 1 - Restrict to Unit     Routine Programming       As clinically indicated     Status 15       Every 15 minutes.      Plan:   Continue Lithium  mg BID - LL on 8/25 = 0.87  Continue Abilify 10 mg daily  Continue Cogentin 1 mg PRN  Change Propranolol to 10 mg BID PRN  Continue Metformin 500 mg BID with meals for neuroleptic-induced weight gain  ContinueTrazodone 100 mg at bedtime  Continue Klonopin 1 mg scheduled at 0900 and 1700  Continue Seroquel  mg PRN at bedtime  Committed and chisholm granted  Discharge " to [facility] on Wed 9/2.      Rationale for change in precautions or plan: BP/HR parameters not met for Propranolol administration x2 days     Petition for Granado including Invega, Zyprexa, Abilify, Risperdal, and Geodon     Participants: Beatrice Avalos, APRN CNP , Nursing          Medications:   I have reviewed this patient's current medications    Current Facility-Administered Medications Ordered in Epic   Medication Dose Route Frequency Last Rate Last Dose     acetaminophen (TYLENOL) tablet 650 mg  650 mg Oral Q4H PRN   650 mg at 08/11/20 2118     albuterol (PROAIR HFA/PROVENTIL HFA/VENTOLIN HFA) 108 (90 Base) MCG/ACT inhaler 1-2 puff  1-2 puff Inhalation BID PRN   2 puff at 08/28/20 2014     ARIPiprazole (ABILIFY) tablet 10 mg  10 mg Oral Daily   10 mg at 08/30/20 0810     benztropine (COGENTIN) tablet 1 mg  1 mg Oral Daily PRN   1 mg at 08/23/20 1149     clonazePAM (klonoPIN) tablet 1 mg  1 mg Oral BID   1 mg at 08/30/20 0810     diphenhydrAMINE (BENADRYL) capsule 50 mg  50 mg Oral Q6H PRN   50 mg at 08/03/20 2023    Or     diphenhydrAMINE (BENADRYL) injection 50 mg  50 mg Intramuscular Q6H PRN   50 mg at 06/17/20 1410     famotidine (PEPCID) tablet 20 mg  20 mg Oral BID PRN         haloperidol (HALDOL) tablet 5 mg  5 mg Oral Q6H PRN   5 mg at 07/28/20 1512    Or     haloperidol lactate (HALDOL) injection 5 mg  5 mg Intramuscular Q6H PRN   5 mg at 06/17/20 1410     lithium ER (LITHOBID) CR tablet 600 mg  600 mg Oral Q12H MARIZOL   600 mg at 08/30/20 0810     magnesium hydroxide (MILK OF MAGNESIA) suspension 30 mL  30 mL Oral At Bedtime PRN         metFORMIN (GLUCOPHAGE) tablet 500 mg  500 mg Oral BID w/meals   500 mg at 08/30/20 0810     nicotine (NICORETTE) gum 2-4 mg  2-4 mg Buccal Q1H PRN   4 mg at 08/29/20 1905     propranolol (INDERAL) tablet 10 mg  10 mg Oral BID PRN         QUEtiapine (SEROquel) tablet  mg   mg Oral At Bedtime PRN   50 mg at 08/24/20 2030     traZODone (DESYREL) tablet  "100 mg  100 mg Oral At Bedtime   100 mg at 08/29/20 2005     No current [facility]-ordered outpatient medications on file.        10 point ROS negative: no new concerns noted         Allergies:   No Known Allergies         Psychiatric Examination:   /67   Pulse 84   Temp 98.1  F (36.7  C) (Tympanic)   Resp 16   Ht 1.638 m (5' 4.5\")   Wt 88.8 kg (195 lb 12.8 oz)   SpO2 97%   BMI 33.09 kg/m    Weight is 195 lbs 12.8 oz  Body mass index is 33.09 kg/m .    Appearance:  awake, alert, adequately groomed and dressed in hospital scrubs  Attitude:  cooperative  Eye Contact:  good  Mood:  better  Affect:  appropriate and in normal range and mood congruent  Speech:  clear, coherent  Psychomotor Behavior:  no evidence of tardive dyskinesia, dystonia, or tics  Thought Process:  logical, linear and goal oriented  Associations:  no loose associations  Thought Content:  no evidence of suicidal ideation or homicidal ideation, no evidence of psychotic thought and perseverating on medication changes  Insight:  limited  Judgment:  limited  Oriented to:  time, person, and place  Attention Span and Concentration:  fair  Recent and Remote Memory:  intact  Fund of Knowledge: appropriate  Muscle Strength and Tone: normal  Gait and Station: Normal             Labs:     No results found for this or any previous visit (from the past 24 hour(s)).                                     "

## 2020-08-30 NOTE — PLAN OF CARE
Up on unit. Sits with peers at meals but does not interact much. Rates depression as a 2. Denies pain hallucinations or suicidal thought. Attended groups today. Makes her needs and requests known to staff. Face shows little emotion.  Face to face end of shift report communicated to afternoon nurse.     Lina Carrero RN  8/30/2020  2:17 PM            Problem: Adult Behavioral Health Plan of Care  Goal: Patient-Specific Goal (Individualization)  Description: Patient will sleep 6-8 hours per night  Patient will eat at 75% of meals daily  Patient will attend >50% of group programming daily  Patient will be compliant with medication  Patient will have appropriate boundaries staff and peers during hospital      8/30/2020 1000 by Lina Carrero RN  Outcome: Improving     Problem: Mental State/Mood Impairment (Psychotic Signs/Symptoms)  Goal: Improved Mental State and Mood (Psychotic Signs/Symptoms)  Description: Patient will have a reality based conversation.  Patient will report a decrease in delusions/hallucinations.  Patient will use coping skills to manage labile behavior.  8/30/2020 1000 by Lina Carrero RN  Outcome: Improving

## 2020-08-30 NOTE — PLAN OF CARE
Report received from, Lina COREY RN. Rounding complete, no needs at this time.     PT appeared to have a full range affect this shift.   PT attending all groups.   PT denies all criteria.   PT denies pain.  PT eating well.   PT is excited for discharge.   PT has been calm, cooperative, social and appropriate with other patients, engaging in activities with patients.   PT has a much brighter affect.  PT is well groomed.     Face to face end of shift report communicated to oncoming RN    Yessi Pandey RN  8/29/2020  10:31 PM              Problem: Adult Behavioral Health Plan of Care  Goal: Patient-Specific Goal (Individualization)  Description: Patient will sleep 6-8 hours per night  Patient will eat at 75% of meals daily  Patient will attend >50% of group programming daily  Patient will be compliant with medication  Patient will have appropriate boundaries staff and peers during hospital        Problem: Mental State/Mood Impairment (Psychotic Signs/Symptoms)  Goal: Improved Mental State and Mood (Psychotic Signs/Symptoms)  Description: Patient will have a reality based conversation.  Patient will report a decrease in delusions/hallucinations.  Patient will use coping skills to manage labile behavior.  8/29/2020 2221 by Yessi Pandey, RN  Outcome: Improving

## 2020-08-31 PROCEDURE — 25000132 ZZH RX MED GY IP 250 OP 250 PS 637: Performed by: NURSE PRACTITIONER

## 2020-08-31 PROCEDURE — 12400011

## 2020-08-31 RX ADMIN — PROPRANOLOL HYDROCHLORIDE 10 MG: 10 TABLET ORAL at 16:20

## 2020-08-31 RX ADMIN — NICOTINE POLACRILEX 4 MG: 2 GUM, CHEWING ORAL at 15:05

## 2020-08-31 RX ADMIN — NICOTINE POLACRILEX 4 MG: 2 GUM, CHEWING ORAL at 20:12

## 2020-08-31 RX ADMIN — METFORMIN HCL 500 MG: 500 TABLET ORAL at 08:07

## 2020-08-31 RX ADMIN — METFORMIN HCL 500 MG: 500 TABLET ORAL at 16:20

## 2020-08-31 RX ADMIN — NICOTINE POLACRILEX 4 MG: 2 GUM, CHEWING ORAL at 18:29

## 2020-08-31 RX ADMIN — CLONAZEPAM 1 MG: 1 TABLET ORAL at 08:07

## 2020-08-31 RX ADMIN — TRAZODONE HYDROCHLORIDE 100 MG: 100 TABLET ORAL at 20:12

## 2020-08-31 RX ADMIN — LITHIUM CARBONATE 600 MG: 300 TABLET, EXTENDED RELEASE ORAL at 08:07

## 2020-08-31 RX ADMIN — LITHIUM CARBONATE 600 MG: 300 TABLET, EXTENDED RELEASE ORAL at 20:12

## 2020-08-31 RX ADMIN — CLONAZEPAM 1 MG: 1 TABLET ORAL at 18:29

## 2020-08-31 RX ADMIN — ARIPIPRAZOLE 10 MG: 10 TABLET ORAL at 08:07

## 2020-08-31 RX ADMIN — QUETIAPINE 100 MG: 50 TABLET, FILM COATED ORAL at 20:12

## 2020-08-31 RX ADMIN — NICOTINE POLACRILEX 4 MG: 2 GUM, CHEWING ORAL at 16:20

## 2020-08-31 ASSESSMENT — ACTIVITIES OF DAILY LIVING (ADL)
DRESS: SCRUBS (BEHAVIORAL HEALTH);INDEPENDENT
HYGIENE/GROOMING: INDEPENDENT
ORAL_HYGIENE: INDEPENDENT
LAUNDRY: UNABLE TO COMPLETE
ORAL_HYGIENE: INDEPENDENT
HYGIENE/GROOMING: INDEPENDENT
LAUNDRY: UNABLE TO COMPLETE
DRESS: INDEPENDENT;SCRUBS (BEHAVIORAL HEALTH)

## 2020-08-31 NOTE — PLAN OF CARE
Problem: Adult Behavioral Health Plan of Care  Goal: Patient-Specific Goal (Individualization)  Description: Patient will sleep 6-8 hours per night  Patient will eat at 75% of meals daily  Patient will attend >50% of group programming daily  Patient will be compliant with medication  Patient will have appropriate boundaries staff and peers during hospital      8/31/2020 1614 by Jeimy Cortes, RN  Outcome: Improving  Note: 1540: Received end of shift report from [employer]. Pt sitting out in day room, visiting with peers upon arrival.     1620: PRN propranolol 10 mg given for c/o moderate anxiety, P of 109--- Participates in first afternoon group    1700: Effective prn anxiety relief, denies SI/HI, hallucinations, endorses mild depression et anxiety r/t overall life stressors et hospitalization, voices hopefullness re: anticipated discharge this week.     2105: Participates in approximately 50% of afternoon groups, full range affect, reminisces with peers et plays cards. PRN seroquel given for moderate anxiety et c/o agitation starting to develop, in addition to prn 100 mg trazodone per pt request for sleep with HS medication administration. No other complaints or concerns at this time. Maintains appropriate boundaries with staff et peers.     2308: Pt displaying s/s of sleep since approximately 22:30 @ this time. Offers no complaints or concerns @ present.     Face to face end of shift report to be communicated to on-coming staff.     Jeimy Cortes RN  8/31/2020  11:10 PM                   Problem: Adult Behavioral Health Plan of Care  Goal: Optimized Coping Skills in Response to Life Stressors  Outcome: No Change     Problem: Adult Behavioral Health Plan of Care  Goal: Adheres to Safety Considerations for Self and Others  Outcome: Improving  Note: Appropriate behavior with staff et peers.      Problem: Mental State/Mood Impairment (Psychotic Signs/Symptoms)  Goal: Improved Mental State and Mood  (Psychotic Signs/Symptoms)  Description: Patient will have a reality based conversation.  Patient will report a decrease in delusions/hallucinations.  Patient will use coping skills to manage labile behavior.  8/31/2020 1614 by Jeimy Cortes RN  Outcome: Improving

## 2020-08-31 NOTE — PLAN OF CARE
"Face to face shift report received from ASHWIN Swanson. Rounding completed, pt observed.   BREE LINDSEY RN  8/31/2020  7:49 AM    0847-Ate breakfast in the lounge.  Reports feeling tired this morning and having a hard time falling and staying asleep.  Anxiety 3/10 \"better now that I am on klonopin\".  Depression 1-2/10.  Denies SI, HI, hallucinations, and pain.  Currently in bed trying to nap.            1241-Pt did not attend morning group, slept instead.  Afternoon group has started and writer just reminded pt it is group time.    1445- Pt did not attend any groups this shift.  She has remained napping all shift.       Problem: Adult Behavioral Health Plan of Care  Goal: Patient-Specific Goal (Individualization)  Description: Patient will sleep 6-8 hours per night  Patient will eat at 75% of meals daily  Patient will attend >50% of group programming daily  Patient will be compliant with medication  Patient will have appropriate boundaries staff and peers during hospital  8/31/2020 0839 by Bree Lindsey, RN  Outcome: Improving     Problem: Mental State/Mood Impairment (Psychotic Signs/Symptoms)  Goal: Improved Mental State and Mood (Psychotic Signs/Symptoms)  Description: Patient will have a reality based conversation.  Patient will report a decrease in delusions/hallucinations.  Patient will use coping skills to manage labile behavior.  8/31/2020 0839 by Bree Lindsey RN  Outcome: Improving     Face to face end of shift report [facility]         "

## 2020-08-31 NOTE — PLAN OF CARE
Report received Lina COREY RN. Rounding complete.   PT is continuing to improve  from previous flat affect and appears more engaging with staff as well as other patients. PT is participating in groups.   PT was given Benadryl 50mg at 1619 per request.   1853 PT requested PRN Propranolol and was within parameters to receive, pt reported that she can feel when her ability to talk starts to become harder and less clear and reported that this helps a lot with that.   Pt denies all criteria.    Face to face end of shift report to be communicated to oncoming RN    Yessi Pandey RN  8/30/2020  8:55 PM           Problem: Adult Behavioral Health Plan of Care  Goal: Patient-Specific Goal (Individualization)  Description: Patient will sleep 6-8 hours per night  Patient will eat at 75% of meals daily  Patient will attend >50% of group programming daily  Patient will be compliant with medication  Patient will have appropriate boundaries staff and peers during hospital      8/30/2020 2051 by Yessi Pandey, RN  Outcome: Improving  Note:        Problem: Mental State/Mood Impairment (Psychotic Signs/Symptoms)  Goal: Improved Mental State and Mood (Psychotic Signs/Symptoms)  Description: Patient will have a reality based conversation.  Patient will report a decrease in delusions/hallucinations.  Patient will use coping skills to manage labile behavior.  8/30/2020 2051 by Yessi Pandey, RN  Outcome: Improving

## 2020-08-31 NOTE — PLAN OF CARE
Problem: Adult Behavioral Health Plan of Care  Goal: Patient-Specific Goal (Individualization)  Description: Patient will sleep 6-8 hours per night  Patient will eat at 75% of meals daily  Patient will attend >50% of group programming daily  Patient will be compliant with medication  Patient will have appropriate boundaries staff and peers during hospital      8/30/2020 2350 by Kiki Castle RN  Outcome: No Change  Note:        Problem: Mental State/Mood Impairment (Psychotic Signs/Symptoms)  Goal: Improved Mental State and Mood (Psychotic Signs/Symptoms)  Description: Patient will have a reality based conversation.  Patient will report a decrease in delusions/hallucinations.  Patient will use coping skills to manage labile behavior.  8/30/2020 2350 by Kiki Castle RN  Outcome: No Change     Face to face shift report received from Yessi CHRISTOPHER. Rounding completed, pt observed.     Pt appeared to be sleeping most of this shift.    Face to face report will be communicated to oncoming RN.    Kiki Castle RN  8/31/2020  5:54 AM

## 2020-09-01 PROCEDURE — 25000132 ZZH RX MED GY IP 250 OP 250 PS 637: Performed by: NURSE PRACTITIONER

## 2020-09-01 PROCEDURE — 99238 HOSP IP/OBS DSCHRG MGMT 30/<: CPT | Performed by: NURSE PRACTITIONER

## 2020-09-01 PROCEDURE — 12400011

## 2020-09-01 RX ORDER — TRAZODONE HYDROCHLORIDE 100 MG/1
100 TABLET ORAL AT BEDTIME
DISCHARGE
Start: 2020-09-01 | End: 2020-09-02

## 2020-09-01 RX ORDER — ARIPIPRAZOLE 10 MG/1
10 TABLET ORAL DAILY
DISCHARGE
Start: 2020-09-02 | End: 2020-09-02

## 2020-09-01 RX ORDER — CLONAZEPAM 1 MG/1
1 TABLET ORAL 2 TIMES DAILY
Status: SHIPPED | DISCHARGE
Start: 2020-09-01 | End: 2020-09-02

## 2020-09-01 RX ORDER — FAMOTIDINE 20 MG/1
20 TABLET, FILM COATED ORAL 2 TIMES DAILY PRN
DISCHARGE
Start: 2020-09-01 | End: 2020-09-02

## 2020-09-01 RX ORDER — ALBUTEROL SULFATE 90 UG/1
1-2 AEROSOL, METERED RESPIRATORY (INHALATION) 2 TIMES DAILY PRN
DISCHARGE
Start: 2020-09-01 | End: 2020-09-02

## 2020-09-01 RX ORDER — LITHIUM CARBONATE 300 MG/1
600 TABLET, FILM COATED, EXTENDED RELEASE ORAL EVERY 12 HOURS
DISCHARGE
Start: 2020-09-01 | End: 2020-09-02

## 2020-09-01 RX ORDER — QUETIAPINE FUMARATE 50 MG/1
50-100 TABLET, FILM COATED ORAL
DISCHARGE
Start: 2020-09-01 | End: 2020-09-02

## 2020-09-01 RX ORDER — DIPHENHYDRAMINE HCL 50 MG
50 CAPSULE ORAL EVERY 6 HOURS PRN
DISCHARGE
Start: 2020-09-01 | End: 2020-09-02

## 2020-09-01 RX ORDER — PROPRANOLOL HYDROCHLORIDE 10 MG/1
10 TABLET ORAL 2 TIMES DAILY PRN
DISCHARGE
Start: 2020-09-01 | End: 2020-09-02

## 2020-09-01 RX ORDER — BENZTROPINE MESYLATE 1 MG/1
1 TABLET ORAL DAILY PRN
DISCHARGE
Start: 2020-09-01 | End: 2020-09-02

## 2020-09-01 RX ADMIN — METFORMIN HCL 500 MG: 500 TABLET ORAL at 17:20

## 2020-09-01 RX ADMIN — NICOTINE POLACRILEX 4 MG: 2 GUM, CHEWING ORAL at 19:48

## 2020-09-01 RX ADMIN — METFORMIN HCL 500 MG: 500 TABLET ORAL at 08:05

## 2020-09-01 RX ADMIN — TRAZODONE HYDROCHLORIDE 100 MG: 100 TABLET ORAL at 20:16

## 2020-09-01 RX ADMIN — LITHIUM CARBONATE 600 MG: 300 TABLET, EXTENDED RELEASE ORAL at 08:18

## 2020-09-01 RX ADMIN — LITHIUM CARBONATE 600 MG: 300 TABLET, EXTENDED RELEASE ORAL at 20:16

## 2020-09-01 RX ADMIN — CLONAZEPAM 1 MG: 1 TABLET ORAL at 10:02

## 2020-09-01 RX ADMIN — QUETIAPINE 100 MG: 50 TABLET, FILM COATED ORAL at 20:15

## 2020-09-01 RX ADMIN — CLONAZEPAM 1 MG: 1 TABLET ORAL at 18:24

## 2020-09-01 RX ADMIN — ARIPIPRAZOLE 10 MG: 10 TABLET ORAL at 08:18

## 2020-09-01 RX ADMIN — NICOTINE POLACRILEX 4 MG: 2 GUM, CHEWING ORAL at 18:25

## 2020-09-01 ASSESSMENT — ACTIVITIES OF DAILY LIVING (ADL)
LAUNDRY: UNABLE TO COMPLETE
ORAL_HYGIENE: INDEPENDENT
HYGIENE/GROOMING: INDEPENDENT
DRESS: SCRUBS (BEHAVIORAL HEALTH);INDEPENDENT
DRESS: SCRUBS (BEHAVIORAL HEALTH);INDEPENDENT
ORAL_HYGIENE: INDEPENDENT
HYGIENE/GROOMING: INDEPENDENT
LAUNDRY: UNABLE TO COMPLETE

## 2020-09-01 NOTE — DISCHARGE INSTRUCTIONS
Behavioral Discharge Planning and Instructions    Summary: Patient was admitted with psychosis     Main Diagnosis: Paranoid Delusion r/o substance related, Substance Abuse Disorder    Major Treatments, Procedures and Findings:Stabilize with medications, connect with community programs.    Symptoms to Report: feeling more aggressive, increased confusion, losing more sleep, mood getting worse or thoughts of suicide    Lifestyle Adjustment: Take all medications as prescribed, meet with doctor/ medication provider, out patient therapist, , and [facility] worker as scheduled. Abstain from alcohol or any unprescribed drugs.    REHAB RECOMMENDATION: Occupational Therapy: Cognitive testing completed on 7/30/20 and 8/21/20: ACLs completed with a score of 4.8/5.8 which indicates that the person can live alone with daily assistance to monitor safety and check problem solving effectiveness. The person may get to regularly schedule community activities without assistance. With a  the person may succeed in supportive employment. 26% minimum cognitive assistance is required to setup new activities and verify results.   The following are recommendations to prevent common safety problems at this level of functioning: Prevent poor compliance with taking medication by setting up medications and or handing measured liquids and pills to the person. Assist with medication refills. Supply with daily spending money and manage all other finances. Supervise when showing how to do a new activity, no more than 3 simple steps at a time. Post reminders or simple notes at eye level.    SLUMS completed with a score of 18/30 which places her in the dementia scoring range. During this test patient not as attentive and unsure whether this is due to difficulty with sustained attention or internal stimuli.   Patient lost points in orientation, short term memory, attention, and visuospatial/executive function.     MOCA version 7.1  completed on 8/21/20 with a score of 22/30 which is less than normal cognition.     Based on re-testing patient has shown improvement in attention and orientation. Short term memory continues to show deficits as well as visuospatial/executive functioning. Due to testing it is recommended that the person can live alone with daily assistance to monitor safety and check problem solving effectiveness.    Psychiatry Follow-up:     [facility]  PCP- Juancho Simms- As needed   [address] [place] [address]  Phone:  [phone]    Fax: [phone]    [facility]  - Ariana Prieto- As arranged [phone]  [address] [place] [address]  Phone:  [phone]   Fax - [phone]      [facility]- Accepted for Sept 2nd [address] [place] [address]  [phone]  Fax: [phone]    Resources:   Crisis Intervention: 658.857.4702 or 214-890-7396 (TTY: 477.489.5504).  Call anytime for help.  National [place] on Mental Illness (www.[place].wilson.org): 123.377.7037 or 971-944-9418.  Alcoholics Anonymous (www.alcoholics-anonymous.org): Check your phone book for your local chapter.  Suicide Awareness Voices of Education (SAVE) (www.save.org): 608-801-AABU (8103)  National Suicide Prevention Line (www.mentalhealthmn.org): 421-061-KNQV (5098)  Mental Health Consumer/Survivor Network of [place] (www.mhcsn.net): 377.871.8339 or 492-121-9422  Mental Health Association of [place] (www.mentalhealth.org): 925.292.7292 or 871-336-8074    General Medication Instructions:   See your medication sheet(s) for instructions.   Take all medicines as directed.  Make no changes unless your doctor suggests them.   Go to all your doctor visits.  Be sure to have all your required lab tests. This way, your medicines can be refilled on time.  Do not use any drugs not prescribed by your doctor.  Avoid alcohol.    Range Area:  [facility], [facility] stabilization [facility]- [phone]  [facility] Crisis Line:  "8-371-353-0236  Advocates For Family Peace: 635-5032  Sexual Assault Program of [facility]: 447.275.9821 or 1-385.846.3918  [place] Battered Women's Program: 6-021-428-1655 Ext: 279       Calls answered Mon-Fri-8:00 am--4:30 pm    [place]:  [facility]ates for Family Peace: 0-857-003-6294  [facility] - 4-952-917-9746  [facility] first call for help: 3-204-700-7719  [facility]:  (479) [phone]    [place] Area:  Warm Line: 1-981.684.9928       Calls answered Tuesday--Saturday 4:00 pm--10:00 pm  [place] Crisis Line - 521.644.7203  [place] [facility]abil[facility] 000-572-6219    [place] Statewide:  [place] Crisis and Referral Services: 8-295-789-6289  National Suicide Prevention Lifeline: 7-811-930-TALK (8548)   - rbw5cqgh- Text \"Life\" to 41287  First Call for Help: 2-1-1  [facility] Helpline- 3-530-YHOJ-HELP   Crisis Text Line: Text  [place]  to 854701      "

## 2020-09-01 NOTE — PROGRESS NOTES
The patient's care was discussed with the treatment team and chart notes were reviewed.     BEHAVIORAL TEAM DISCUSSION     Progress: Good. No psychotic symptoms noted, brighter affect. Is attending groups.     Continued Stay Criteria/Rationale: lacks insight into mental health issues, needs structured setting at discharge     Medical/Physical: Hep C+  Precautions:   Falls precaution?: No             Behavioral Orders   Procedures     Code 1 - Restrict to Unit     Routine Programming       As clinically indicated     Status 15       Every 15 minutes.      Plan:   Continue Lithium  mg BID - LL on 8/25 = 0.87  Continue Abilify 10 mg daily  Continue Cogentin 1 mg PRN  Continue Propranolol 10 mg BID PRN  Continue Metformin 500 mg BID with meals for neuroleptic-induced weight gain  ContinueTrazodone 100 mg at bedtime  Continue Klonopin 1 mg scheduled at 0900 and 1700  Continue Seroquel  mg PRN at bedtime  Committed and chisholm granted  Discharge to [facility] on Wed 9/2.      Rationale for change in precautions or plan: no changes were made     Petition for [place] including Invega, Zyprexa, Abilify, Risperdal, and Geodon     Participants: SABI Carlson CNP , Nursing, SW, OT, LADC

## 2020-09-01 NOTE — PLAN OF CARE
Met with pt this afternoon and went over aftercare plan with her. Answered pt's questions regarding the commitment. Informed pt that staff spoke with her CM who will not be arranging transport and instead would like an insurance ride set up. Informed pt that staff will be arranging an insurance ride for her to get to [facility] tomorrow morning.

## 2020-09-01 NOTE — DISCHARGE SUMMARY
"Psychiatric Discharge Summary    Jonelle Adams MRN# [id number]   Age: 19 year old YOB: 2000     Date of Admission:  6/11/2020  Date of Discharge:  9/2/20  Admitting Provider:  Davi Grossman MD  Discharging Provider:  SABI Carlson CNP          Event Leading to Hospitalization and Hospital Stay   Jonelle Adams is a 19 year old female who presented to the emergency department via [place] [employer].  Patient presented to [facility] stating that she will kill herself if not allowed to leave at [place].  She is also stated that she does not know where she lives and she believes that she has been a missing person since the age of 6 years. Additionally, she reports wanting to kill residents at [address] because they are \"in her house.\"     Jonelle was committed MI and jarvised through [facility]. Petition for [place] includes Invega, Zyprexa, Abilify, Risperdal, and Geodon. She is currently stable on her current medication regimen, but often feels \"flat, like I lost part of my personality.\" She is likely in a state of euthymia, as she admittedly prefers to feel a bit manic. We talked a little bit about what to expect from the commitment and IRTS placement and she is eager for discharge. SW to arrange transportation for sometime tomorrow.            Our team has made contact with  Arinaa Prieto and [facility] IRTS to ensure readiness for discharge.     At time of discharge, there is no evidence that patient is in immediate danger of self or others.        Diagnoses:     Schizoaffective Disorder - Bipolar Type         Labs:   7/25/20: EKG shows NSR with non-specific t-wave abnormality  8/25/20: Lithium level was 0.87, BUN 11, Crt 0.66, GFR >60.   8/26/20 :COVID negative         Discharge Medications:     Current Discharge Medication List      START taking these medications    Details   albuterol (PROAIR HFA/PROVENTIL HFA/VENTOLIN HFA) 108 (90 Base) MCG/ACT inhaler Inhale " 1-2 puffs into the lungs 2 times daily as needed for wheezing  Qty:      Comments: Pharmacy may dispense brand covered by insurance (Proair, or proventil or ventolin or generic albuterol inhaler)  Associated Diagnoses: Asthma, unspecified asthma severity, unspecified whether complicated, unspecified whether persistent      ARIPiprazole (ABILIFY) 10 MG tablet Take 1 tablet (10 mg) by mouth daily  Qty:      Associated Diagnoses: Paranoid delusion (H)      benztropine (COGENTIN) 1 MG tablet Take 1 tablet (1 mg) by mouth daily as needed for agitation  Qty:      Associated Diagnoses: Abnormal movements      clonazePAM (KLONOPIN) 1 MG tablet Take 1 tablet (1 mg) by mouth 2 times daily  Qty:      Associated Diagnoses: Anxiety      diphenhydrAMINE (BENADRYL) 50 MG capsule Take 1 capsule (50 mg) by mouth every 6 hours as needed for sleep (agitation)  Qty:      Associated Diagnoses: Sleep disturbances      famotidine (PEPCID) 20 MG tablet Take 1 tablet (20 mg) by mouth 2 times daily as needed (acid reflux/heartburn)  Qty:      Associated Diagnoses: Heartburn      lithium ER (LITHOBID) 300 MG CR tablet Take 2 tablets (600 mg) by mouth every 12 hours  Qty:      Associated Diagnoses: Suicidal ideation      metFORMIN (GLUCOPHAGE) 500 MG tablet Take 1 tablet (500 mg) by mouth 2 times daily (with meals)  Qty:      Associated Diagnoses: Weight gain due to medication      propranolol (INDERAL) 10 MG tablet Take 1 tablet (10 mg) by mouth 2 times daily as needed (Anxiety/Akathisia)  Qty:      Associated Diagnoses: Restless leg syndrome      QUEtiapine (SEROQUEL) 50 MG tablet Take 1-2 tablets ( mg) by mouth nightly as needed (sleep/restlessness)  Qty:      Associated Diagnoses: Sleep disturbances      traZODone (DESYREL) 100 MG tablet Take 1 tablet (100 mg) by mouth At Bedtime  Qty:      Associated Diagnoses: Sleep disturbances           Justification for dual anti-psychotic use: Patient has required the use of two antipsychotics  for psychiatric stabilization. In this case, the Seroquel is only PRN, Abilify is scheduled.          Psychiatric Examination:   Appearance:  awake, alert, adequately groomed and dressed in hospital scrubs  Attitude:  cooperative  Eye Contact:  good  Mood:  better  Affect:  appropriate and in normal range and mood congruent  Speech:  clear, coherent  Psychomotor Behavior:  no evidence of tardive dyskinesia, dystonia, or tics  Thought Process:  logical, linear and goal oriented  Associations:  no loose associations  Thought Content:  no evidence of suicidal ideation or homicidal ideation and no evidence of psychotic thought  Insight:  limited  Judgment:  limited  Oriented to:  time, person, and place  Attention Span and Concentration:  fair  Recent and Remote Memory:  intact  Fund of Knowledge: appropriate  Muscle Strength and Tone: normal  Gait and Station: Normal  Perception:        Discharge Plan:     -Continue Lithium  mg BID   -Continue Abilify 10 mg daily  -Continue Cogentin 1 mg PRN for abnormal movements  -Continue Propranolol 10 mg BID PRN for anxiety or akathisia  -Continue Metformin 500 mg BID with meals for neuroleptic-induced weight gain  -ContinueTrazodone 100 mg at bedtime for sleep  -Continue Klonopin 1 mg scheduled at 0900 and 1700 for anxiety  -Continue Seroquel  mg PRN at bedtime for sleep    -Discharge to [facility] on Wed 9/2  -Participate in IRTS programming as indicated    Attestation:  The patient has been seen and evaluated by me,  SABI Carlson CNP         Discharge Services Provided:   25 minutes spent on discharge services, including:  Final examination of patient.  Review and discussion of hospital stay.  Instructions for continued outpatient care/goals.  Preparation of discharge records.  Preparation of medications refills and new prescriptions.  Preparation of applicable referral forms.

## 2020-09-01 NOTE — PLAN OF CARE
Problem: Adult Behavioral Health Plan of Care  Goal: Patient-Specific Goal (Individualization)  Description: Patient will sleep 6-8 hours per night  Patient will eat at 75% of meals daily  Patient will attend >50% of group programming daily  Patient will be compliant with medication  Patient will have appropriate boundaries staff and peers during hospital      9/1/2020 1721 by Jeimy Cortes, RN  Outcome: Improving  Note: 1540: Received end of shift report from..... Pt walking in day room upon arrival. Blunted affect, mood is calm.     2258: PRN seroquel given for moderate anxiety et mild agitation, in addition to prn 100 mg trazodone per pt request for sleep with HS medication administration. No other complaints or concerns at this time. Maintains appropriate boundaries with staff et peers.     Participated in one afternoon group. Voices feelings of excitement re: tomorrow's discharge.     Face to face end of shift to be communicated to on-coming staff.     Jeimy Cortes RN  9/1/2020  10:59 PM               Problem: Adult Behavioral Health Plan of Care  Goal: Optimized Coping Skills in Response to Life Stressors  Outcome: No Change     Problem: Adult Behavioral Health Plan of Care  Goal: Adheres to Safety Considerations for Self and Others  Outcome: Improving  Note: Appropriate behavior with staff et peers.      Problem: Mental State/Mood Impairment (Psychotic Signs/Symptoms)  Goal: Improved Mental State and Mood (Psychotic Signs/Symptoms)  Description: Patient will have a reality based conversation.  Patient will report a decrease in delusions/hallucinations.  Patient will use coping skills to manage labile behavior.  9/1/2020 1721 by Jeimy Cortes, RN  Outcome: Improving  Note: Thought process is linear, logical. Conversation is reality based.

## 2020-09-01 NOTE — PLAN OF CARE
Problem: Adult Behavioral Health Plan of Care  Goal: Patient-Specific Goal (Individualization)  Description: Patient will sleep 6-8 hours per night  Patient will eat at 75% of meals daily  Patient will attend >50% of group programming daily  Patient will be compliant with medication  Patient will have appropriate boundaries staff and peers during hospital    Pt went back to sleep after breakfast, pt compliant with medications, asked nursing if she could wait until 10am to take her Klonopin since she is taking a nap and doesn't want to sleep through the Klonopin. Pt states her goal is to wake up earlier and make it to a group. Pt denies pain, depression, SI/HI and hallucinations. Pt does report anxiety at 3/10 at 0830 but states that it usually gets bad around 3pm.     Pt did attend part of the afternoon group.   9/1/2020 0736 by Valentina Salcido RN  Outcome: Improving     Problem: Mental State/Mood Impairment (Psychotic Signs/Symptoms)  Goal: Improved Mental State and Mood (Psychotic Signs/Symptoms)  Description: Patient will have a reality based conversation.  Patient will report a decrease in delusions/hallucinations.  Patient will use coping skills to manage labile behavior.  9/1/2020 0736 by Valentina Salcido RN  Outcome: Improving    Face to face end of shift report communicated to oncoming RN.     Valentina Salcido RN  9/1/2020  7:38 AM

## 2020-09-01 NOTE — PLAN OF CARE
Face to face end of shift report will be communicated to oncoming RN.     Problem: Adult Behavioral Health Plan of Care  Goal: Patient-Specific Goal (Individualization)  Description: Patient will sleep 6-8 hours per night  Patient will eat at 75% of meals daily  Patient will attend >50% of group programming daily  Patient will be compliant with medication  Patient will have appropriate boundaries staff and peers during hospital    9/1/2020 0052 by Marbella Renteria RN  Outcome: No Change     Problem: Mental State/Mood Impairment (Psychotic Signs/Symptoms)  Goal: Improved Mental State and Mood (Psychotic Signs/Symptoms)  Description: Patient will have a reality based conversation.  Patient will report a decrease in delusions/hallucinations.  Patient will use coping skills to manage labile behavior.  9/1/2020 0052 by Marbella Renteria RN  Outcome: No Change   Face to face end of shift report obtained from ASHWIN Munson. Pt observed resting in bed.  Pt appears to be sleeping in bed with eyes closed, having regular respirations, and position changes. 15 minutes and PRN safety checks completed with no noted complains. Will continue to monitor.   0600- Pt appeared to have slept all night.

## 2020-09-02 VITALS
BODY MASS INDEX: 32.62 KG/M2 | WEIGHT: 195.8 LBS | DIASTOLIC BLOOD PRESSURE: 71 MMHG | TEMPERATURE: 97 F | SYSTOLIC BLOOD PRESSURE: 98 MMHG | HEART RATE: 73 BPM | RESPIRATION RATE: 16 BRPM | HEIGHT: 65 IN | OXYGEN SATURATION: 99 %

## 2020-09-02 PROCEDURE — 25000132 ZZH RX MED GY IP 250 OP 250 PS 637: Performed by: NURSE PRACTITIONER

## 2020-09-02 RX ORDER — ARIPIPRAZOLE 10 MG/1
10 TABLET ORAL DAILY
Qty: 30 TABLET | Refills: 0 | Status: SHIPPED | OUTPATIENT
Start: 2020-09-02

## 2020-09-02 RX ORDER — QUETIAPINE FUMARATE 50 MG/1
50-100 TABLET, FILM COATED ORAL
Qty: 60 TABLET | Refills: 0 | Status: SHIPPED | OUTPATIENT
Start: 2020-09-02 | End: 2020-09-15 | Stop reason: ALTCHOICE

## 2020-09-02 RX ORDER — TRAZODONE HYDROCHLORIDE 100 MG/1
100 TABLET ORAL AT BEDTIME
Qty: 30 TABLET | Refills: 0 | Status: SHIPPED | OUTPATIENT
Start: 2020-09-02 | End: 2020-09-15

## 2020-09-02 RX ORDER — PROPRANOLOL HYDROCHLORIDE 10 MG/1
10 TABLET ORAL 2 TIMES DAILY PRN
Qty: 60 TABLET | Refills: 0 | Status: SHIPPED | OUTPATIENT
Start: 2020-09-02

## 2020-09-02 RX ORDER — BENZTROPINE MESYLATE 1 MG/1
1 TABLET ORAL DAILY PRN
Qty: 30 TABLET | Refills: 0 | Status: SHIPPED | OUTPATIENT
Start: 2020-09-02

## 2020-09-02 RX ORDER — LITHIUM CARBONATE 300 MG/1
600 TABLET, FILM COATED, EXTENDED RELEASE ORAL 2 TIMES DAILY
Qty: 120 TABLET | Refills: 0 | Status: SHIPPED | OUTPATIENT
Start: 2020-09-02

## 2020-09-02 RX ORDER — ALBUTEROL SULFATE 90 UG/1
1-2 AEROSOL, METERED RESPIRATORY (INHALATION) EVERY 6 HOURS PRN
Qty: 1 INHALER | Refills: 0 | Status: SHIPPED | OUTPATIENT
Start: 2020-09-02

## 2020-09-02 RX ORDER — DIPHENHYDRAMINE HCL 50 MG
50 CAPSULE ORAL EVERY 6 HOURS PRN
Qty: 30 CAPSULE | Refills: 0 | Status: SHIPPED | OUTPATIENT
Start: 2020-09-02 | End: 2020-09-15 | Stop reason: ALTCHOICE

## 2020-09-02 RX ORDER — FAMOTIDINE 20 MG/1
20 TABLET, FILM COATED ORAL 2 TIMES DAILY PRN
Qty: 60 TABLET | Refills: 0 | Status: SHIPPED | OUTPATIENT
Start: 2020-09-02

## 2020-09-02 RX ORDER — CLONAZEPAM 1 MG/1
1 TABLET ORAL 2 TIMES DAILY
Qty: 60 TABLET | Refills: 0 | Status: SHIPPED | OUTPATIENT
Start: 2020-09-02 | End: 2020-09-15

## 2020-09-02 RX ADMIN — LITHIUM CARBONATE 600 MG: 300 TABLET, EXTENDED RELEASE ORAL at 08:30

## 2020-09-02 RX ADMIN — METFORMIN HCL 500 MG: 500 TABLET ORAL at 08:30

## 2020-09-02 RX ADMIN — CLONAZEPAM 1 MG: 1 TABLET ORAL at 08:30

## 2020-09-02 RX ADMIN — ARIPIPRAZOLE 10 MG: 10 TABLET ORAL at 08:30

## 2020-09-02 RX ADMIN — NICOTINE POLACRILEX 4 MG: 2 GUM, CHEWING ORAL at 09:03

## 2020-09-02 ASSESSMENT — ACTIVITIES OF DAILY LIVING (ADL)
HYGIENE/GROOMING: INDEPENDENT
LAUNDRY: UNABLE TO COMPLETE
DRESS: SCRUBS (BEHAVIORAL HEALTH);INDEPENDENT
ORAL_HYGIENE: INDEPENDENT

## 2020-09-02 NOTE — PLAN OF CARE
Face to face end of shift report will be communicated to oncoming RN.      Problem: Adult Behavioral Health Plan of Care  Goal: Patient-Specific Goal (Individualization)  Description: Patient will sleep 6-8 hours per night  Patient will eat at 75% of meals daily  Patient will attend >50% of group programming daily  Patient will be compliant with medication  Patient will have appropriate boundaries staff and peers during hospital      9/2/2020 0115 by Marbella Renteria RN  Outcome: No Change     Problem: Mental State/Mood Impairment (Psychotic Signs/Symptoms)  Goal: Improved Mental State and Mood (Psychotic Signs/Symptoms)  Description: Patient will have a reality based conversation.  Patient will report a decrease in delusions/hallucinations.  Patient will use coping skills to manage labile behavior.  9/2/2020 0115 by Marbella Renteria RN  Outcome: No Change   Face to face end of shift report obtained from ASHWIN Munson. Pt observed resting in bed. Pt appears to be sleeping in bed with eyes closed, having regular respirations, and position changes. 15 minutes and PRN safety checks completed with no noted complains. Will continue to monitor.   0600- Pt appeared to have slept 7 hours.

## 2020-09-02 NOTE — PLAN OF CARE
Problem: Adult Behavioral Health Plan of Care  Goal: Patient-Specific Goal (Individualization)  Description: Patient will sleep 6-8 hours per night  Patient will eat at 75% of meals daily  Patient will attend >50% of group programming daily  Patient will be compliant with medication  Patient will have appropriate boundaries staff and peers during hospital      9/2/2020 0755 by Kiki Castle RN  Outcome: No Change  Note:        Problem: Mental State/Mood Impairment (Psychotic Signs/Symptoms)  Goal: Improved Mental State and Mood (Psychotic Signs/Symptoms)  Description: Patient will have a reality based conversation.  Patient will report a decrease in delusions/hallucinations.  Patient will use coping skills to manage labile behavior.  9/2/2020 0755 by Kiki Castle RN  Outcome: No Change     Face to face shift report received from Marbella CHRISTOPHER. Rounding completed, pt observed.     Pt pleasant and cooperative with nursing assessment. Pt compliant with medications this shift.    Discharge Note    Patient Discharged to [facility] on 9/2/2020 10:35 AM via Health plan transportation accompanied by staff.     Patient informed of discharge instructions in AVS. patient verbalizes understanding and denies having any questions pertaining to AVS. Patient stable at time of discharge. Patient denies SI, HI, and thoughts of self harm at time of discharge. All personal belongings returned to patient. Discharge prescriptions sent to [facility] via electronic communication. Psych evaluation, history and physical, AVS, and discharge summary faxed to next level of care- by .     Kiki Castle RN  9/2/2020  11:00 AM

## 2020-09-02 NOTE — PLAN OF CARE
Pt is discharging at the recommendation of the treatment team. Pt is discharging to [facility] transported by [facility]. Pt denies having any thoughts of hurting themself or anyone else. Pt denies anxiety or depression. Pt has follow up with [facility] and [facility]. Discharge instructions, including; demographic sheet, psychiatric evaluation, discharge summary, and AVS were faxed to these next level of care providers.     Ride set up through [facility]- [facility] will transport pt at 10:30am- they will pick her up at the ED doors and call when they arrive.
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
## 2020-09-15 ENCOUNTER — OFFICE VISIT (OUTPATIENT)
Dept: FAMILY MEDICINE | Facility: OTHER | Age: 20
End: 2020-09-15
Attending: FAMILY MEDICINE
Payer: COMMERCIAL

## 2020-09-15 VITALS
OXYGEN SATURATION: 99 % | DIASTOLIC BLOOD PRESSURE: 56 MMHG | SYSTOLIC BLOOD PRESSURE: 90 MMHG | WEIGHT: 194 LBS | HEART RATE: 84 BPM | BODY MASS INDEX: 30.45 KG/M2 | HEIGHT: 67 IN

## 2020-09-15 DIAGNOSIS — Z00.00 ROUTINE GENERAL MEDICAL EXAMINATION AT A HEALTH CARE FACILITY: Primary | ICD-10-CM

## 2020-09-15 DIAGNOSIS — B17.10 ACUTE HEPATITIS C VIRUS INFECTION WITHOUT HEPATIC COMA: ICD-10-CM

## 2020-09-15 DIAGNOSIS — F25.0 SCHIZOAFFECTIVE DISORDER, BIPOLAR TYPE (H): ICD-10-CM

## 2020-09-15 DIAGNOSIS — G47.9 SLEEP DISTURBANCES: ICD-10-CM

## 2020-09-15 PROCEDURE — 99395 PREV VISIT EST AGE 18-39: CPT | Performed by: FAMILY MEDICINE

## 2020-09-15 RX ORDER — TRAZODONE HYDROCHLORIDE 50 MG/1
50 TABLET, FILM COATED ORAL AT BEDTIME
Qty: 30 TABLET | Refills: 3 | Status: SHIPPED | OUTPATIENT
Start: 2020-09-15

## 2020-09-15 ASSESSMENT — ASTHMA QUESTIONNAIRES
QUESTION_4 LAST FOUR WEEKS HOW OFTEN HAVE YOU USED YOUR RESCUE INHALER OR NEBULIZER MEDICATION (SUCH AS ALBUTEROL): ONCE A WEEK OR LESS
ACT_TOTALSCORE: 20
QUESTION_1 LAST FOUR WEEKS HOW MUCH OF THE TIME DID YOUR ASTHMA KEEP YOU FROM GETTING AS MUCH DONE AT WORK, SCHOOL OR AT HOME: NONE OF THE TIME
QUESTION_3 LAST FOUR WEEKS HOW OFTEN DID YOUR ASTHMA SYMPTOMS (WHEEZING, COUGHING, SHORTNESS OF BREATH, CHEST TIGHTNESS OR PAIN) WAKE YOU UP AT NIGHT OR EARLIER THAN USUAL IN THE MORNING: ONCE OR TWICE
QUESTION_2 LAST FOUR WEEKS HOW OFTEN HAVE YOU HAD SHORTNESS OF BREATH: THREE TO SIX TIMES A WEEK
QUESTION_5 LAST FOUR WEEKS HOW WOULD YOU RATE YOUR ASTHMA CONTROL: WELL CONTROLLED

## 2020-09-15 ASSESSMENT — PATIENT HEALTH QUESTIONNAIRE - PHQ9: SUM OF ALL RESPONSES TO PHQ QUESTIONS 1-9: 0

## 2020-09-15 ASSESSMENT — MIFFLIN-ST. JEOR: SCORE: 1682.61

## 2020-09-15 ASSESSMENT — PAIN SCALES - GENERAL: PAINLEVEL: NO PAIN (0)

## 2020-09-15 NOTE — NURSING NOTE
"Chief Complaint   Patient presents with     Physical       Initial BP 90/56   Pulse 84   Ht 1.702 m (5' 7\")   Wt 88 kg (194 lb)   SpO2 99%   BMI 30.38 kg/m   Estimated body mass index is 30.38 kg/m  as calculated from the following:    Height as of this encounter: 1.702 m (5' 7\").    Weight as of this encounter: 88 kg (194 lb).  Medication Reconciliation: complete  Shireen Cuadra LPN  "

## 2020-09-15 NOTE — LETTER
My Asthma Action Plan    Name: Jonelle Adams   YOB: 2000  Date: 9/10/2020   My doctor: ALIA Simms MD   My clinic: [facility] [place]        My Rescue Medicine:   Albuterol inhaler (Proair/Ventolin/Proventil HFA)  2-4 puffs EVERY 4 HOURS as needed. Use a spacer if recommended by your provider.   My Asthma Severity:   Intermittent / Exercise Induced  Know your asthma triggers: emotions             GREEN ZONE   Good Control    I feel good    No cough or wheeze    Can work, sleep and play without asthma symptoms       Take your asthma control medicine every day.     1. If exercise triggers your asthma, take your rescue medication    15 minutes before exercise or sports, and    During exercise if you have asthma symptoms  2. Spacer to use with inhaler: If you have a spacer, make sure to use it with your inhaler             YELLOW ZONE Getting Worse  I have ANY of these:    I do not feel good    Cough or wheeze    Chest feels tight    Wake up at night   1. Keep taking your Green Zone medications  2. Start taking your rescue medicine:    every 20 minutes for up to 1 hour. Then every 4 hours for 24-48 hours.  3. If you stay in the Yellow Zone for more than 12-24 hours, contact your doctor.  4. If you do not return to the Green Zone in 12-24 hours or you get worse, start taking your oral steroid medicine if prescribed by your provider.           RED ZONE Medical Alert - Get Help  I have ANY of these:    I feel awful    Medicine is not helping    Breathing getting harder    Trouble walking or talking    Nose opens wide to breathe       1. Take your rescue medicine NOW  2. If your provider has prescribed an oral steroid medicine, start taking it NOW  3. Call your doctor NOW  4. If you are still in the Red Zone after 20 minutes and you have not reached your doctor:    Take your rescue medicine again and    Call 911 or go to the emergency room right away    See your regular doctor within 2  weeks of an Emergency Room or Urgent Care visit for follow-up treatment.          Annual Reminders:  Meet with Asthma Educator,  Flu Shot in the Fall, consider Pneumonia Vaccination for patients with asthma (aged 19 and older).    Pharmacy:    [facility] - [place], [place] - [address]  [id number] - [place], [place] - [address]  [id number] - [place], [place] - [address]   [id number] - [place], [place] - [address] [facility] [address]  [id number] - [place], [place] - [address] AT [facility] [address]  [facility] - [place], [place] - [address]    Electronically signed by ALIA Simms MD   Date: 09/10/20                    Asthma Triggers  How To Control Things That Make Your Asthma Worse    Triggers are things that make your asthma worse.  Look at the list below to help you find your triggers and   what you can do about them. You can help prevent asthma flare-ups by staying away from your triggers.      Trigger                                                          What you can do   Cigarette Smoke  Tobacco smoke can make asthma worse. Do not allow smoking in your home, car or around you.  Be sure no one smokes at a child s day care or school.  If you smoke, ask your health care provider for ways to help you quit.  Ask family members to quit too.  Ask your health care provider for a referral to Quit Plan to help you quit smoking, or call 1-568-306-PLAN.     Colds, Flu, Bronchitis  These are common triggers of asthma. Wash your hands often.  Don t touch your eyes, nose or mouth.  Get a flu shot every year.     Dust Mites  These are tiny bugs that live in cloth or carpet. They are too small to see. Wash sheets and blankets in hot water every week.   Encase pillows and mattress in dust mite proof covers.  Avoid having carpet if you can. If you have carpet, vacuum weekly.   Use a dust mask and HEPA vacuum.   Pollen and Outdoor Mold  Some  people are allergic to trees, grass, or weed pollen, or molds. Try to keep your windows closed.  Limit time out doors when pollen count is high.   Ask you health care provider about taking medicine during allergy season.     Animal Dander  Some people are allergic to skin flakes, urine or saliva from pets with fur or feathers. Keep pets with fur or feathers out of your home.    If you can t keep the pet outdoors, then keep the pet out of your bedroom.  Keep the bedroom door closed.  Keep pets off cloth furniture and away from stuffed toys.     Mice, Rats, and Cockroaches  Some people are allergic to the waste from these pests.   Cover food and garbage.  Clean up spills and food crumbs.  Store grease in the refrigerator.   Keep food out of the bedroom.   Indoor Mold  This can be a trigger if your home has high moisture. Fix leaking faucets, pipes, or other sources of water.   Clean moldy surfaces.  Dehumidify basement if it is damp and smelly.   Smoke, Strong Odors, and Sprays  These can reduce air quality. Stay away from strong odors and sprays, such as perfume, powder, hair spray, paints, smoke incense, paint, cleaning products, candles and new carpet.   Exercise or Sports  Some people with asthma have this trigger. Be active!  Ask your doctor about taking medicine before sports or exercise to prevent symptoms.    Warm up for 5-10 minutes before and after sports or exercise.     Other Triggers of Asthma  Cold air:  Cover your nose and mouth with a scarf.  Sometimes laughing or crying can be a trigger.  Some medicines and food can trigger asthma.

## 2020-09-16 ASSESSMENT — ASTHMA QUESTIONNAIRES: ACT_TOTALSCORE: 20

## 2020-10-13 ENCOUNTER — TRANSFERRED RECORDS (OUTPATIENT)
Dept: HEALTH INFORMATION MANAGEMENT | Facility: CLINIC | Age: 20
End: 2020-10-13

## 2020-11-10 DIAGNOSIS — Z79.899 NEED FOR PROPHYLACTIC CHEMOTHERAPY: Primary | ICD-10-CM

## 2021-12-01 NOTE — PLAN OF CARE
Problem: Mental State/Mood Impairment (Psychotic Signs/Symptoms)  Goal: Improved Mental State and Mood (Psychotic Signs/Symptoms)  Description: Patient will have a reality based conversation.  Patient will report a decrease in delusions/hallucinations.  Patient will use coping skills to manage labile behavior.  7/20/2020 2017 by Oneyda Lopez RN  Outcome: Improving   Patient denies hallucinations at this time.  She is able to have a linear, reality based conversation with staff but she keeps conversations superficial.  No delusional or paranoid statements noted.        Problem: Adult Behavioral Health Plan of Care  Goal: Patient-Specific Goal (Individualization)  Description: Patient will sleep 6-8 hours per night  Patient will eat at 75% of meals daily  Patient will attend >50% of group programming daily  Patient will be compliant with medication  Patient will have appropriate boundaries staff and peers during hospital  ELOPEMENT PRECAUTIONS; Eloped from ICU 6/17/2020 7/20/2020 2017 by Oneyda Lopez RN  Outcome: Improving  Note:      Patient has been calm, cooperative, and medication compliant this shift.  [facility].  Laid down to rest after supper but came out again for snack.  Denies all mental health criteria.  Complained of high anxiety and requested PRN medication.  Given Ativan 1 mg at 2005 with good relief of symptoms.  No complaints of pain.  VS WNL. Face to face end of shift report communicated to night shift RN.     Oneyda Lopez RN  7/20/2020  8:27 PM        Patient/Caregiver provided printed discharge information.

## 2022-01-07 NOTE — PLAN OF CARE
Problem: Adult Behavioral Health Plan of Care  Goal: Patient-Specific Goal (Individualization)  Description: Patient will sleep 6-8 hours per night  Patient will eat at 75% of meals daily  Patient will attend >50% of group programming daily  Patient will be compliant with medication  Patient will have appropriate boundaries staff and peers during hospital    Pt appears to be sleeping comfortably with regular respirations. Pt slept approx. 7 hours this shift. No complaints at this time. Will continue to monitor.    Outcome: Improving    Problem: Mental State/Mood Impairment (Psychotic Signs/Symptoms)  Goal: Improved Mental State and Mood (Psychotic Signs/Symptoms)  Description: Patient will have a reality based conversation.  Patient will report a decrease in delusions/hallucinations.  Patient will use coping skills to manage labile behavior.    Outcome: Improving    Face to face end of shift report communicated to oncoming RN.     8/10/2020  5:59 AM      None

## 2022-06-13 NOTE — PLAN OF CARE
Problem: Adult Behavioral Health Plan of Care  Goal: Patient-Specific Goal (Individualization)  Description: Patient will sleep 6-8 hours per night  Patient will eat at 75% of meals daily  Patient will attend >50% of group programming daily  Patient will be compliant with medication  Patient will have appropriate boundaries staff and peers during hospital  6/17/2020-patient unable to wean to open unit due to elopement from [facility]. Treatment team will reassess daily.  Outcome: No Change  Note:      Face to face shift report received from Chelsie CHRISTOPHER. Rounding completed, pt observed.    Pt appeared to be sleeping most of this shift, normal respirations and position changes noted. Pt did not make any attempts to elope this shift.    Face to face report will be communicated to oncoming RN.    Kiki Castle RN  6/19/2020  6:17 AM    No

## 2025-07-14 NOTE — PROGRESS NOTES
"[facility]  Psychiatric Progress Note    Impression:     Jonelle has no insight still into what brought her to the mental health unit.  She remains delusional and tells me that court went very well yesterday and the  said that there is no reason that I need to stay here and dismissed me.  I did tell her that that is not what I had heard.  She truly believes she does not need to be here and I also believe she thinks the  said she did not need to be here which is not the case.  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  She does not appear to be as pressured as she was last week.  She is still quite isolative.     Educated regarding medication indications, risks, benefits, side effects, contraindications and possible interactions. Verbally expressed understanding.        Diagnoses:      Schizoaffective Disorder, bipolar type  Substance Abuse Disorder     Attestation:  Patient has been seen and evaluated by me,  SABI Calderón CNP          Interim History:   The patient's care was discussed with the treatment team and chart notes were reviewed.     BEHAVIORAL TEAM DISCUSSION     Progress: minimal  Continued Stay Criteria/Rationale: more cooperative remains delusional  Medical/Physical: None  Precautions:   Falls precaution?: YES       Behavioral Orders   Procedures     Code 1 - Restrict to Unit     Routine Programming       As clinically indicated     Status 15       Every 15 minutes.     Plan:     Continue lithium.  Awaiting Granado order and commitment order will order lithium level in 3 days as it was last increased 2 days ago     Rationale for change in precautions or plan: Medication adjustment to target mood and delusion  Petition for Granado  including Invega, Zyprexa, Abilify, Risperdal, and Geodon     Participants: Roxy Hardy NP, social work, " Please see the attached refill request. Seen 07-11-25   "occupational therapy Nursing         Current Facility-Administered Medications   Medication     acetaminophen (TYLENOL) tablet 650 mg     diphenhydrAMINE (BENADRYL) capsule 50 mg    Or     diphenhydrAMINE (BENADRYL) injection 50 mg     haloperidol (HALDOL) tablet 5 mg    Or     haloperidol lactate (HALDOL) injection 5 mg     lithium (ESKALITH CR/LITHOBID) CR tablet 450 mg     LORazepam (ATIVAN) injection 2 mg    Or     LORazepam (ATIVAN) tablet 2 mg     magnesium hydroxide (MILK OF MAGNESIA) suspension 30 mL     nicotine (NICORETTE) gum 2-4 mg     OLANZapine (zyPREXA) tablet 15 mg    Or     OLANZapine (zyPREXA) injection 10 mg            10 point ROS negative see H&P       Allergies:   No Known Allergies          Psychiatric Examination:   BP (!) 86/53   Pulse 79   Temp 97.6  F (36.4  C) (Tympanic)   Resp 16   Ht 1.638 m (5' 4.5\")   Wt 72.8 kg (160 lb 6.4 oz)   SpO2 98%   BMI 27.11 kg/m    Weight is 160 lbs 6.4 oz  Body mass index is 27.11 kg/m .     Appearance: Somewhat disheveled  Attitude:  guarded   Eye Contact:  minimal  Mood:  improving.  Still dismissive though not as grandiose not as elated  Affect: flat  Speech: Not as pressured  Psychomotor Behavior: normal, no evidence of abnormal movements  Thought Process:  disorganized and illogical  Associations:  loosening of associations present  Thought Content:  disorganized, some improvement  Insight:  limited  Judgment:  poor  Oriented to:  x3  Attention Span and Concentration:  poor  Recent and Remote Memory:  poor  Fund of Knowledge: delayed  Muscle Strength and Tone: normal  Gait and Station: normal             Labs:             Results for orders placed or performed during the hospital encounter of 06/11/20   HCG qualitative urine     Status: None   Result Value Ref Range     HCG Qual Urine Negative NEG^Negative   UA reflex to Microscopic and Culture     Status: Abnormal     Specimen: Midstream Urine   Result Value Ref Range     Color Urine Yellow   "     Appearance Urine Slightly Cloudy       Glucose Urine Negative NEG^Negative mg/dL     Bilirubin Urine Negative NEG^Negative     Ketones Urine Negative NEG^Negative mg/dL     Specific Gravity Urine 1.030 1.003 - 1.035     Blood Urine Negative NEG^Negative     pH Urine 6.0 4.7 - 8.0 pH     Protein Albumin Urine 30 (A) NEG^Negative mg/dL     Urobilinogen mg/dL Normal 0.0 - 2.0 mg/dL     Nitrite Urine Negative NEG^Negative     Leukocyte Esterase Urine Small (A) NEG^Negative     Source Midstream Urine       RBC Urine 3 (H) 0 - 2 /HPF     WBC Urine 4 0 - 5 /HPF     Bacteria Urine None (A) NEG^Negative /HPF     Squamous Epithelial /HPF Urine 18 (H) 0 - 1 /HPF     Mucous Urine Present (A) NEG^Negative /LPF     Amorphous Crystals Moderate (A) NEG^Negative /HPF   Urine Drugs of Abuse Screen Panel 13     Status: Abnormal   Result Value Ref Range     Cannabinoids (46-[id number]-8-carboxy-9-THC) Detected, Abnormal Result (A) NDET^Not Detected ng/mL     Phencyclidine (Phencyclidine) Not Detected NDET^Not Detected ng/mL     Cocaine (Benzoylecgonine) Not Detected NDET^Not Detected ng/mL     Methamphetamine (d-Methamphetamine) Detected, Abnormal Result (A) NDET^Not Detected ng/mL     Opiates (Morphine) Not Detected NDET^Not Detected ng/mL     Amphetamine (d-Amphetamine) Detected, Abnormal Result (A) NDET^Not Detected ng/mL     Benzodiazepines (Nordiazepam) Detected, Abnormal Result (A) NDET^Not Detected ng/mL     Tricyclic Antidepressants (Desipramine) Not Detected NDET^Not Detected ng/mL     Methadone (Methadone) Not Detected NDET^Not Detected ng/mL     Barbiturates (Butalbital) Not Detected NDET^Not Detected ng/mL     Oxycodone (Oxycodone) Not Detected NDET^Not Detected ng/mL     Propoxyphene (Norpropoxyphene) Not Detected NDET^Not Detected ng/mL     Buprenorphine (Buprenorphine) Not Detected NDET^Not Detected ng/mL   CBC with platelets differential     Status: None   Result Value Ref Range     WBC 8.1 4.0 - 11.0 10e9/L     RBC Count  4.69 3.8 - 5.2 10e12/L     Hemoglobin 14.1 11.7 - 15.7 g/dL     Hematocrit 42.1 35.0 - 47.0 %     MCV 90 78 - 100 fl     MCH 30.1 26.5 - 33.0 pg     MCHC 33.5 31.5 - 36.5 g/dL     RDW 12.3 10.0 - 15.0 %     Platelet Count 280 150 - 450 10e9/L     Diff Method Automated Method       % Neutrophils 56.5 %     % Lymphocytes 35.2 %     % Monocytes 7.0 %     % Eosinophils 0.7 %     % Basophils 0.4 %     % Immature Granulocytes 0.2 %     Nucleated RBCs 0 0 /100     Absolute Neutrophil 4.6 1.6 - 8.3 10e9/L     Absolute Lymphocytes 2.8 0.8 - 5.3 10e9/L     Absolute Monocytes 0.6 0.0 - 1.3 10e9/L     Absolute Eosinophils 0.1 0.0 - 0.7 10e9/L     Absolute Basophils 0.0 0.0 - 0.2 10e9/L     Abs Immature Granulocytes 0.0 0 - 0.4 10e9/L     Absolute Nucleated RBC 0.0     Comprehensive metabolic panel     Status: Abnormal   Result Value Ref Range     Sodium 138 133 - 144 mmol/L     Potassium 3.8 3.4 - 5.3 mmol/L     Chloride 108 94 - 109 mmol/L     Carbon Dioxide 26 20 - 32 mmol/L     Anion Gap 4 3 - 14 mmol/L     Glucose 81 70 - 99 mg/dL     Urea Nitrogen 11 7 - 30 mg/dL     Creatinine 0.63 0.52 - 1.04 mg/dL     GFR Estimate >90 >60 mL/min/[1.73_m2]     GFR Estimate If Black >90 >60 mL/min/[1.73_m2]     Calcium 8.8 8.5 - 10.1 mg/dL     Bilirubin Total 0.4 0.2 - 1.3 mg/dL     Albumin 3.9 3.4 - 5.0 g/dL     Protein Total 8.1 6.8 - 8.8 g/dL     Alkaline Phosphatase 62 40 - 150 U/L      (H) 0 - 50 U/L     AST 84 (H) 0 - 45 U/L   TSH with free T4 reflex     Status: None   Result Value Ref Range     TSH 0.85 0.40 - 4.00 mU/L   Asymptomatic COVID-19 Virus (Coronavirus) by PCR     Status: None     Specimen: Nasopharyngeal   Result Value Ref Range     COVID-19 Virus PCR to [facility] - Source Nasopharyngeal       COVID-19 Virus PCR to [facility] - Result           Test received-See reflex to [place] test SARS CoV2 (COVID-19) Virus RT-PCR   SARS-CoV-2 COVID-19 Virus (Coronavirus) RT-PCR Nasopharyngeal     Status: None     Specimen:  Nasopharyngeal   Result Value Ref Range     SARS-CoV-2 Virus Specimen Source Nasopharyngeal       SARS-CoV-2 PCR Result NEGATIVE       SARS-CoV-2 PCR Comment           This automated, real-time RT-PCR assay by [email or web address] on the [employer] has   been given Emergency Use Authorization (EUA) for the in vitro qualitative detection of RNA   from the SARS-CoV2 virus in nasopharyngeal swabs in viral transport medium from patients   with signs and symptoms of infection who are suspected of COVID-19. The performance is   unknown in asymptomatic patients.